# Patient Record
Sex: MALE | Race: BLACK OR AFRICAN AMERICAN | Employment: OTHER | URBAN - METROPOLITAN AREA
[De-identification: names, ages, dates, MRNs, and addresses within clinical notes are randomized per-mention and may not be internally consistent; named-entity substitution may affect disease eponyms.]

---

## 2017-01-09 ENCOUNTER — OFFICE VISIT (OUTPATIENT)
Dept: OTHER | Facility: CLINIC | Age: 72
End: 2017-01-09
Attending: PHYSICIAN ASSISTANT
Payer: MEDICARE

## 2017-01-09 DIAGNOSIS — I77.0 AVF (ARTERIOVENOUS FISTULA) (H): Primary | ICD-10-CM

## 2017-01-09 DIAGNOSIS — N18.6 ESRD (END STAGE RENAL DISEASE) ON DIALYSIS (H): ICD-10-CM

## 2017-01-09 DIAGNOSIS — Z99.2 ESRD (END STAGE RENAL DISEASE) ON DIALYSIS (H): ICD-10-CM

## 2017-01-09 PROCEDURE — 99024 POSTOP FOLLOW-UP VISIT: CPT | Mod: ZP | Performed by: SURGERY

## 2017-01-09 NOTE — PROGRESS NOTES
Keagan Wayne returned to see me today.  He had a failed right upper arm AVF.  We created a right distal radial - cephalic AVF on 12-14-16.  An excellent vein was noted.    He is on dialysis via a tunneled catheter q T-TH-Sat with no problems.      EXAM:  Alert   Comfortable.               Well healed right wrist incision.    +3 bruit and pulse in AVF from wrist to elbow.               Good size of vein @ least 5mm.   No edema.    IMPRESSION:  Excellent AVF.  Given tourniquet to use for 5-10 minutes at elbow region for 5-6 times daily to mature AVF.  Follow-up Duplex 3-4 weeks the begin use.     Contreras Bowman MD     Please route or send letter to:  *None*

## 2017-01-14 ENCOUNTER — TRANSFERRED RECORDS (OUTPATIENT)
Dept: HEALTH INFORMATION MANAGEMENT | Facility: CLINIC | Age: 72
End: 2017-01-14

## 2017-01-15 ENCOUNTER — HOSPITAL ENCOUNTER (INPATIENT)
Facility: CLINIC | Age: 72
LOS: 4 days | Discharge: HOME | DRG: 190 | End: 2017-01-19
Attending: EMERGENCY MEDICINE | Admitting: INTERNAL MEDICINE
Payer: MEDICARE

## 2017-01-15 ENCOUNTER — APPOINTMENT (OUTPATIENT)
Dept: GENERAL RADIOLOGY | Facility: CLINIC | Age: 72
DRG: 190 | End: 2017-01-15
Attending: EMERGENCY MEDICINE
Payer: MEDICARE

## 2017-01-15 DIAGNOSIS — N18.6 ESRD (END STAGE RENAL DISEASE) ON DIALYSIS (H): ICD-10-CM

## 2017-01-15 DIAGNOSIS — R50.9 FEVER, UNSPECIFIED: ICD-10-CM

## 2017-01-15 DIAGNOSIS — R04.2 HEMOPTYSIS: ICD-10-CM

## 2017-01-15 DIAGNOSIS — J18.9 PNEUMONIA OF LEFT LOWER LOBE DUE TO INFECTIOUS ORGANISM: ICD-10-CM

## 2017-01-15 DIAGNOSIS — Z99.2 ESRD (END STAGE RENAL DISEASE) ON DIALYSIS (H): ICD-10-CM

## 2017-01-15 LAB
ALBUMIN SERPL-MCNC: 2.6 G/DL (ref 3.4–5)
ALP SERPL-CCNC: 53 U/L (ref 40–150)
ALT SERPL W P-5'-P-CCNC: 9 U/L (ref 0–70)
ANION GAP SERPL CALCULATED.3IONS-SCNC: 10 MMOL/L (ref 3–14)
AST SERPL W P-5'-P-CCNC: 9 U/L (ref 0–45)
BASOPHILS # BLD AUTO: 0 10E9/L (ref 0–0.2)
BASOPHILS NFR BLD AUTO: 0.1 %
BILIRUB DIRECT SERPL-MCNC: 0.1 MG/DL (ref 0–0.2)
BILIRUB SERPL-MCNC: 0.8 MG/DL (ref 0.2–1.3)
BUN SERPL-MCNC: 38 MG/DL (ref 7–30)
CALCIUM SERPL-MCNC: 8.7 MG/DL (ref 8.5–10.1)
CHLORIDE SERPL-SCNC: 97 MMOL/L (ref 94–109)
CO2 SERPL-SCNC: 27 MMOL/L (ref 20–32)
CREAT SERPL-MCNC: 4.74 MG/DL (ref 0.66–1.25)
DIFFERENTIAL METHOD BLD: ABNORMAL
EOSINOPHIL # BLD AUTO: 0 10E9/L (ref 0–0.7)
EOSINOPHIL NFR BLD AUTO: 0.1 %
ERYTHROCYTE [DISTWIDTH] IN BLOOD BY AUTOMATED COUNT: 14 % (ref 10–15)
FLUAV+FLUBV AG SPEC QL: NEGATIVE
FLUAV+FLUBV AG SPEC QL: NORMAL
GFR SERPL CREATININE-BSD FRML MDRD: 12 ML/MIN/1.7M2
GLUCOSE BLDC GLUCOMTR-MCNC: 216 MG/DL (ref 70–99)
GLUCOSE BLDC GLUCOMTR-MCNC: 242 MG/DL (ref 70–99)
GLUCOSE BLDC GLUCOMTR-MCNC: 271 MG/DL (ref 70–99)
GLUCOSE BLDC GLUCOMTR-MCNC: 296 MG/DL (ref 70–99)
GLUCOSE SERPL-MCNC: 228 MG/DL (ref 70–99)
GRAM STN SPEC: NORMAL
HCT VFR BLD AUTO: 32.9 % (ref 40–53)
HGB BLD-MCNC: 10.7 G/DL (ref 13.3–17.7)
IMM GRANULOCYTES # BLD: 0.1 10E9/L (ref 0–0.4)
IMM GRANULOCYTES NFR BLD: 0.8 %
LACTATE BLD-SCNC: 0.9 MMOL/L (ref 0.7–2.1)
LYMPHOCYTES # BLD AUTO: 1.2 10E9/L (ref 0.8–5.3)
LYMPHOCYTES NFR BLD AUTO: 7.5 %
Lab: NORMAL
MCH RBC QN AUTO: 29.4 PG (ref 26.5–33)
MCHC RBC AUTO-ENTMCNC: 32.5 G/DL (ref 31.5–36.5)
MCV RBC AUTO: 90 FL (ref 78–100)
MICRO REPORT STATUS: NORMAL
MONOCYTES # BLD AUTO: 1.3 10E9/L (ref 0–1.3)
MONOCYTES NFR BLD AUTO: 8 %
NEUTROPHILS # BLD AUTO: 13.1 10E9/L (ref 1.6–8.3)
NEUTROPHILS NFR BLD AUTO: 83.5 %
NRBC # BLD AUTO: 0 10*3/UL
NRBC BLD AUTO-RTO: 0 /100
PLATELET # BLD AUTO: 141 10E9/L (ref 150–450)
POTASSIUM SERPL-SCNC: 3.4 MMOL/L (ref 3.4–5.3)
PROT SERPL-MCNC: 7.9 G/DL (ref 6.8–8.8)
RBC # BLD AUTO: 3.64 10E12/L (ref 4.4–5.9)
SODIUM SERPL-SCNC: 134 MMOL/L (ref 133–144)
SPECIMEN SOURCE: NORMAL
SPECIMEN SOURCE: NORMAL
WBC # BLD AUTO: 16.2 10E9/L (ref 4–11)

## 2017-01-15 PROCEDURE — 80076 HEPATIC FUNCTION PANEL: CPT | Performed by: INTERNAL MEDICINE

## 2017-01-15 PROCEDURE — 36415 COLL VENOUS BLD VENIPUNCTURE: CPT

## 2017-01-15 PROCEDURE — 25900017 H RX MED GY IP 259 OP 259 PS 637: Mod: GY | Performed by: INTERNAL MEDICINE

## 2017-01-15 PROCEDURE — A9270 NON-COVERED ITEM OR SERVICE: HCPCS | Mod: GY | Performed by: INTERNAL MEDICINE

## 2017-01-15 PROCEDURE — 99285 EMERGENCY DEPT VISIT HI MDM: CPT

## 2017-01-15 PROCEDURE — 25000308 HC RX OP HPI UCR WEL MED 250 IP 250: Performed by: INTERNAL MEDICINE

## 2017-01-15 PROCEDURE — 25000125 ZZHC RX 250: Performed by: EMERGENCY MEDICINE

## 2017-01-15 PROCEDURE — 25000132 ZZH RX MED GY IP 250 OP 250 PS 637: Mod: GY | Performed by: INTERNAL MEDICINE

## 2017-01-15 PROCEDURE — 25000128 H RX IP 250 OP 636

## 2017-01-15 PROCEDURE — 71020 XR CHEST 2 VW: CPT

## 2017-01-15 PROCEDURE — 36415 COLL VENOUS BLD VENIPUNCTURE: CPT | Performed by: INTERNAL MEDICINE

## 2017-01-15 PROCEDURE — 00000146 ZZHCL STATISTIC GLUCOSE BY METER IP

## 2017-01-15 PROCEDURE — A9270 NON-COVERED ITEM OR SERVICE: HCPCS | Mod: GY

## 2017-01-15 PROCEDURE — 25000125 ZZHC RX 250: Performed by: INTERNAL MEDICINE

## 2017-01-15 PROCEDURE — 87804 INFLUENZA ASSAY W/OPTIC: CPT | Performed by: EMERGENCY MEDICINE

## 2017-01-15 PROCEDURE — 85025 COMPLETE CBC W/AUTO DIFF WBC: CPT | Performed by: EMERGENCY MEDICINE

## 2017-01-15 PROCEDURE — 87205 SMEAR GRAM STAIN: CPT | Performed by: INTERNAL MEDICINE

## 2017-01-15 PROCEDURE — 99223 1ST HOSP IP/OBS HIGH 75: CPT | Mod: AI | Performed by: INTERNAL MEDICINE

## 2017-01-15 PROCEDURE — 96365 THER/PROPH/DIAG IV INF INIT: CPT

## 2017-01-15 PROCEDURE — 83605 ASSAY OF LACTIC ACID: CPT | Performed by: EMERGENCY MEDICINE

## 2017-01-15 PROCEDURE — 25000125 ZZHC RX 250

## 2017-01-15 PROCEDURE — 12000000 ZZH R&B MED SURG/OB

## 2017-01-15 PROCEDURE — 87070 CULTURE OTHR SPECIMN AEROBIC: CPT | Performed by: INTERNAL MEDICINE

## 2017-01-15 PROCEDURE — 87040 BLOOD CULTURE FOR BACTERIA: CPT | Performed by: EMERGENCY MEDICINE

## 2017-01-15 PROCEDURE — 40000275 ZZH STATISTIC RCP TIME EA 10 MIN

## 2017-01-15 PROCEDURE — 25000132 ZZH RX MED GY IP 250 OP 250 PS 637: Mod: GY

## 2017-01-15 PROCEDURE — 80048 BASIC METABOLIC PNL TOTAL CA: CPT | Performed by: EMERGENCY MEDICINE

## 2017-01-15 PROCEDURE — 94640 AIRWAY INHALATION TREATMENT: CPT

## 2017-01-15 RX ORDER — NALOXONE HYDROCHLORIDE 0.4 MG/ML
.1-.4 INJECTION, SOLUTION INTRAMUSCULAR; INTRAVENOUS; SUBCUTANEOUS
Status: DISCONTINUED | OUTPATIENT
Start: 2017-01-15 | End: 2017-01-19 | Stop reason: HOSPADM

## 2017-01-15 RX ORDER — ATORVASTATIN CALCIUM 40 MG/1
80 TABLET, FILM COATED ORAL DAILY
Status: DISCONTINUED | OUTPATIENT
Start: 2017-01-15 | End: 2017-01-19 | Stop reason: HOSPADM

## 2017-01-15 RX ORDER — ISOSORBIDE MONONITRATE 30 MG/1
90 TABLET, EXTENDED RELEASE ORAL DAILY
Status: DISCONTINUED | OUTPATIENT
Start: 2017-01-15 | End: 2017-01-19 | Stop reason: HOSPADM

## 2017-01-15 RX ORDER — SODIUM BICARBONATE 650 MG/1
650 TABLET ORAL 2 TIMES DAILY
Status: DISCONTINUED | OUTPATIENT
Start: 2017-01-15 | End: 2017-01-19 | Stop reason: HOSPADM

## 2017-01-15 RX ORDER — OXYCODONE HYDROCHLORIDE 5 MG/1
15 TABLET ORAL EVERY 6 HOURS PRN
Status: DISCONTINUED | OUTPATIENT
Start: 2017-01-15 | End: 2017-01-19 | Stop reason: HOSPADM

## 2017-01-15 RX ORDER — ALBUTEROL SULFATE 0.83 MG/ML
1 SOLUTION RESPIRATORY (INHALATION) EVERY 4 HOURS PRN
Status: DISCONTINUED | OUTPATIENT
Start: 2017-01-15 | End: 2017-01-19 | Stop reason: HOSPADM

## 2017-01-15 RX ORDER — FLUTICASONE PROPIONATE 110 UG/1
2 AEROSOL, METERED RESPIRATORY (INHALATION) 2 TIMES DAILY
Status: DISCONTINUED | OUTPATIENT
Start: 2017-01-15 | End: 2017-01-15 | Stop reason: CLARIF

## 2017-01-15 RX ORDER — BUMETANIDE 1 MG/1
4 TABLET ORAL DAILY
Status: DISCONTINUED | OUTPATIENT
Start: 2017-01-15 | End: 2017-01-19 | Stop reason: HOSPADM

## 2017-01-15 RX ORDER — BRIMONIDINE TARTRATE 1.5 MG/ML
1 SOLUTION/ DROPS OPHTHALMIC 3 TIMES DAILY
Status: DISCONTINUED | OUTPATIENT
Start: 2017-01-15 | End: 2017-01-15 | Stop reason: CLARIF

## 2017-01-15 RX ORDER — VANCOMYCIN HYDROCHLORIDE 1 G/200ML
1000 INJECTION, SOLUTION INTRAVENOUS ONCE
Status: COMPLETED | OUTPATIENT
Start: 2017-01-15 | End: 2017-01-15

## 2017-01-15 RX ORDER — BRIMONIDINE TARTRATE 2 MG/ML
1 SOLUTION/ DROPS OPHTHALMIC 3 TIMES DAILY
Status: DISCONTINUED | OUTPATIENT
Start: 2017-01-15 | End: 2017-01-19 | Stop reason: HOSPADM

## 2017-01-15 RX ORDER — LEVOFLOXACIN 5 MG/ML
500 INJECTION, SOLUTION INTRAVENOUS
Status: DISCONTINUED | OUTPATIENT
Start: 2017-01-17 | End: 2017-01-19 | Stop reason: HOSPADM

## 2017-01-15 RX ORDER — DEXTROSE MONOHYDRATE 25 G/50ML
25-50 INJECTION, SOLUTION INTRAVENOUS
Status: DISCONTINUED | OUTPATIENT
Start: 2017-01-15 | End: 2017-01-19 | Stop reason: HOSPADM

## 2017-01-15 RX ORDER — CODEINE PHOSPHATE AND GUAIFENESIN 10; 100 MG/5ML; MG/5ML
5 SOLUTION ORAL EVERY 4 HOURS PRN
Status: DISCONTINUED | OUTPATIENT
Start: 2017-01-15 | End: 2017-01-19 | Stop reason: HOSPADM

## 2017-01-15 RX ORDER — HYDRALAZINE HYDROCHLORIDE 50 MG/1
50 TABLET, FILM COATED ORAL 3 TIMES DAILY
Status: DISCONTINUED | OUTPATIENT
Start: 2017-01-15 | End: 2017-01-19 | Stop reason: HOSPADM

## 2017-01-15 RX ORDER — ACETAMINOPHEN 325 MG/1
650 TABLET ORAL ONCE
Status: DISCONTINUED | OUTPATIENT
Start: 2017-01-15 | End: 2017-01-19 | Stop reason: HOSPADM

## 2017-01-15 RX ORDER — OXYCODONE HYDROCHLORIDE 5 MG/1
15 TABLET ORAL EVERY 6 HOURS
Status: DISCONTINUED | OUTPATIENT
Start: 2017-01-15 | End: 2017-01-19 | Stop reason: HOSPADM

## 2017-01-15 RX ORDER — PIPERACILLIN SODIUM, TAZOBACTAM SODIUM 2; .25 G/10ML; G/10ML
2.25 INJECTION, POWDER, LYOPHILIZED, FOR SOLUTION INTRAVENOUS ONCE
Status: DISCONTINUED | OUTPATIENT
Start: 2017-01-15 | End: 2017-01-15

## 2017-01-15 RX ORDER — DEXTROSE MONOHYDRATE 25 G/50ML
25-50 INJECTION, SOLUTION INTRAVENOUS
Status: DISCONTINUED | OUTPATIENT
Start: 2017-01-15 | End: 2017-01-15

## 2017-01-15 RX ORDER — ASPIRIN 81 MG/1
81 TABLET, CHEWABLE ORAL DAILY
Status: DISCONTINUED | OUTPATIENT
Start: 2017-01-15 | End: 2017-01-19 | Stop reason: HOSPADM

## 2017-01-15 RX ORDER — NICOTINE POLACRILEX 4 MG
15-30 LOZENGE BUCCAL
Status: DISCONTINUED | OUTPATIENT
Start: 2017-01-15 | End: 2017-01-15

## 2017-01-15 RX ORDER — LISINOPRIL 40 MG/1
40 TABLET ORAL DAILY
Status: DISCONTINUED | OUTPATIENT
Start: 2017-01-15 | End: 2017-01-15 | Stop reason: SINTOL

## 2017-01-15 RX ORDER — GABAPENTIN 100 MG/1
200 CAPSULE ORAL DAILY
Status: DISCONTINUED | OUTPATIENT
Start: 2017-01-15 | End: 2017-01-19 | Stop reason: HOSPADM

## 2017-01-15 RX ORDER — LEVOFLOXACIN 5 MG/ML
750 INJECTION, SOLUTION INTRAVENOUS ONCE
Status: COMPLETED | OUTPATIENT
Start: 2017-01-15 | End: 2017-01-15

## 2017-01-15 RX ORDER — FERROUS SULFATE 325(65) MG
325 TABLET ORAL 2 TIMES DAILY
Status: DISCONTINUED | OUTPATIENT
Start: 2017-01-15 | End: 2017-01-19 | Stop reason: HOSPADM

## 2017-01-15 RX ORDER — NICOTINE POLACRILEX 4 MG
15-30 LOZENGE BUCCAL
Status: DISCONTINUED | OUTPATIENT
Start: 2017-01-15 | End: 2017-01-19 | Stop reason: HOSPADM

## 2017-01-15 RX ORDER — LATANOPROST 50 UG/ML
1 SOLUTION/ DROPS OPHTHALMIC AT BEDTIME
Status: DISCONTINUED | OUTPATIENT
Start: 2017-01-15 | End: 2017-01-19 | Stop reason: HOSPADM

## 2017-01-15 RX ORDER — PIPERACILLIN SODIUM, TAZOBACTAM SODIUM 2; .25 G/10ML; G/10ML
2.25 INJECTION, POWDER, LYOPHILIZED, FOR SOLUTION INTRAVENOUS EVERY 6 HOURS
Status: DISCONTINUED | OUTPATIENT
Start: 2017-01-15 | End: 2017-01-18

## 2017-01-15 RX ADMIN — FERROUS SULFATE TAB 325 MG (65 MG ELEMENTAL FE) 325 MG: 325 (65 FE) TAB at 20:22

## 2017-01-15 RX ADMIN — LEVOFLOXACIN 750 MG: 5 INJECTION, SOLUTION INTRAVENOUS at 09:55

## 2017-01-15 RX ADMIN — OXYCODONE HYDROCHLORIDE 15 MG: 5 TABLET ORAL at 09:51

## 2017-01-15 RX ADMIN — PIPERACILLIN AND TAZOBACTAM 2.25 G: 2; .25 INJECTION, POWDER, FOR SOLUTION INTRAVENOUS at 20:21

## 2017-01-15 RX ADMIN — CALCIUM ACETATE 667 MG: 667 CAPSULE ORAL at 17:57

## 2017-01-15 RX ADMIN — INSULIN ASPART 4 UNITS: 100 INJECTION, SOLUTION INTRAVENOUS; SUBCUTANEOUS at 11:46

## 2017-01-15 RX ADMIN — SODIUM BICARBONATE 650 MG TABLET 650 MG: at 20:22

## 2017-01-15 RX ADMIN — ALBUTEROL SULFATE 2.5 MG: 2.5 SOLUTION RESPIRATORY (INHALATION) at 11:03

## 2017-01-15 RX ADMIN — GABAPENTIN 200 MG: 100 CAPSULE ORAL at 09:36

## 2017-01-15 RX ADMIN — FLUTICASONE PROPIONATE 1 PUFF: 100 POWDER, METERED RESPIRATORY (INHALATION) at 10:56

## 2017-01-15 RX ADMIN — LISINOPRIL 40 MG: 40 TABLET ORAL at 09:51

## 2017-01-15 RX ADMIN — OXYCODONE HYDROCHLORIDE 15 MG: 5 TABLET ORAL at 15:26

## 2017-01-15 RX ADMIN — CARVEDILOL 37.5 MG: 12.5 TABLET, FILM COATED ORAL at 17:56

## 2017-01-15 RX ADMIN — ATORVASTATIN CALCIUM 80 MG: 40 TABLET, FILM COATED ORAL at 09:36

## 2017-01-15 RX ADMIN — PIPERACILLIN AND TAZOBACTAM 2.25 G: 2; .25 INJECTION, POWDER, FOR SOLUTION INTRAVENOUS at 14:29

## 2017-01-15 RX ADMIN — INSULIN ASPART 3 UNITS: 100 INJECTION, SOLUTION INTRAVENOUS; SUBCUTANEOUS at 10:54

## 2017-01-15 RX ADMIN — INSULIN GLARGINE 12 UNITS: 100 INJECTION, SOLUTION SUBCUTANEOUS at 21:27

## 2017-01-15 RX ADMIN — VANCOMYCIN HYDROCHLORIDE 750 MG: 1 INJECTION, POWDER, LYOPHILIZED, FOR SOLUTION INTRAVENOUS at 11:31

## 2017-01-15 RX ADMIN — CARVEDILOL 37.5 MG: 12.5 TABLET, FILM COATED ORAL at 09:36

## 2017-01-15 RX ADMIN — CALCIUM ACETATE 667 MG: 667 CAPSULE ORAL at 11:45

## 2017-01-15 RX ADMIN — VITAMIN D, TAB 1000IU (100/BT) 1000 UNITS: 25 TAB at 09:37

## 2017-01-15 RX ADMIN — LATANOPROST 1 DROP: 50 SOLUTION/ DROPS OPHTHALMIC at 21:26

## 2017-01-15 RX ADMIN — HYDRALAZINE HYDROCHLORIDE 50 MG: 50 TABLET ORAL at 21:27

## 2017-01-15 RX ADMIN — ASPIRIN 81 MG 81 MG: 81 TABLET ORAL at 09:37

## 2017-01-15 RX ADMIN — FLUTICASONE PROPIONATE 1 PUFF: 100 POWDER, METERED RESPIRATORY (INHALATION) at 21:27

## 2017-01-15 RX ADMIN — BUMETANIDE 4 MG: 1 TABLET ORAL at 09:36

## 2017-01-15 RX ADMIN — VITAMINS TABLETS 1 TABLET: 115; 800; 115 TABLET ORAL at 09:51

## 2017-01-15 RX ADMIN — ISOSORBIDE MONONITRATE 90 MG: 30 TABLET, EXTENDED RELEASE ORAL at 09:36

## 2017-01-15 RX ADMIN — INSULIN ASPART 2 UNITS: 100 INJECTION, SOLUTION INTRAVENOUS; SUBCUTANEOUS at 17:57

## 2017-01-15 RX ADMIN — CALCIUM ACETATE 667 MG: 667 CAPSULE ORAL at 09:36

## 2017-01-15 RX ADMIN — VANCOMYCIN HYDROCHLORIDE 1000 MG: 1 INJECTION, SOLUTION INTRAVENOUS at 06:57

## 2017-01-15 RX ADMIN — HYDRALAZINE HYDROCHLORIDE 50 MG: 50 TABLET ORAL at 09:37

## 2017-01-15 RX ADMIN — CLONIDINE 1 PATCH: 0.2 PATCH TRANSDERMAL at 09:40

## 2017-01-15 RX ADMIN — BRIMONIDINE TARTRATE 1 DROP: 2 SOLUTION/ DROPS OPHTHALMIC at 10:55

## 2017-01-15 RX ADMIN — BRIMONIDINE TARTRATE 1 DROP: 2 SOLUTION/ DROPS OPHTHALMIC at 16:11

## 2017-01-15 RX ADMIN — SODIUM BICARBONATE 650 MG TABLET 650 MG: at 09:36

## 2017-01-15 RX ADMIN — PIPERACILLIN AND TAZOBACTAM 2.25 G: 2; .25 INJECTION, POWDER, FOR SOLUTION INTRAVENOUS at 08:49

## 2017-01-15 RX ADMIN — BRIMONIDINE TARTRATE 1 DROP: 2 SOLUTION/ DROPS OPHTHALMIC at 21:28

## 2017-01-15 RX ADMIN — FERROUS SULFATE TAB 325 MG (65 MG ELEMENTAL FE) 325 MG: 325 (65 FE) TAB at 09:36

## 2017-01-15 RX ADMIN — GUAIFENESIN AND CODEINE PHOSPHATE 5 ML: 10; 100 LIQUID ORAL at 11:38

## 2017-01-15 RX ADMIN — OXYCODONE HYDROCHLORIDE 15 MG: 5 TABLET ORAL at 21:27

## 2017-01-15 ASSESSMENT — ENCOUNTER SYMPTOMS
NAUSEA: 0
FEVER: 1
COUGH: 1
VOMITING: 0
DIARRHEA: 0

## 2017-01-15 ASSESSMENT — ACTIVITIES OF DAILY LIVING (ADL)
FALL_HISTORY_WITHIN_LAST_SIX_MONTHS: YES
BATHING: 2-->ASSISTIVE PERSON
AMBULATION: 0-->INDEPENDENT
RETIRED_EATING: 0-->INDEPENDENT
SWALLOWING: 0-->SWALLOWS FOODS/LIQUIDS WITHOUT DIFFICULTY
DRESS: 0-->INDEPENDENT
TRANSFERRING: 0-->INDEPENDENT
DRESS: 0-->INDEPENDENT
COGNITION: 0 - NO COGNITION ISSUES REPORTED
CHANGE_IN_FUNCTIONAL_STATUS_SINCE_ONSET_OF_CURRENT_ILLNESS/INJURY: NO
SWALLOWING: 0-->SWALLOWS FOODS/LIQUIDS WITHOUT DIFFICULTY
EATING: 0-->INDEPENDENT
NUMBER_OF_TIMES_PATIENT_HAS_FALLEN_WITHIN_LAST_SIX_MONTHS: 1
AMBULATION: 0-->INDEPENDENT
TOILETING: 0-->INDEPENDENT
TOILETING: 0-->INDEPENDENT
RETIRED_COMMUNICATION: 0-->UNDERSTANDS/COMMUNICATES WITHOUT DIFFICULTY
COMMUNICATION: 0-->UNDERSTANDS/COMMUNICATES WITHOUT DIFFICULTY
BATHING: 0-->INDEPENDENT
TRANSFERRING: 0-->INDEPENDENT

## 2017-01-15 NOTE — ED NOTES
Bed: ED26  Expected date: 1/15/17  Expected time: 5:28 AM  Means of arrival: Ambulance  Comments:  264-27M Fever

## 2017-01-15 NOTE — PHARMACY-ADMISSION MEDICATION HISTORY
Admission medication history interview status for the 1/15/2017  admission is complete. See EPIC admission navigator for prior to admission medications     Medication history source reliability:Good    Actions taken by pharmacist (provider contacted, etc): interview with patient, wife and recent discharge summary.     Additional medication history information not noted on PTA med list :None    Medication reconciliation/reorder completed by provider prior to medication history? Yes    Time spent in this activity: 15 min    Prior to Admission medications    Medication Sig Last Dose Taking? Auth Provider   OXYCODONE HCL PO Take 15 mg by mouth every 6 hours Pt states he has been taking around the clock 1/14/2017 at am Yes Unknown, Entered By History   insulin glargine (LANTUS) 100 UNIT/ML injection Inject 12 Units Subcutaneous At Bedtime 1/13/2017 at hs Yes Tamir Jett MD   hydrALAZINE (APRESOLINE) 50 MG tablet Take 1 tablet (50 mg) by mouth 3 times daily 1/14/2017 at am Yes Tamir Jett MD   bumetanide (BUMEX) 2 MG tablet Take 2 tablets (4 mg) by mouth daily 1/14/2017 at am Yes Tamir Jett MD   folic acid-vit B6-vit B12 (FOLGARD) 0.8-10-0.115 MG TABS per tablet Take 1 tablet by mouth daily 1/13/2017 at hs Yes Tamir Jett MD   atorvastatin (LIPITOR) 80 MG tablet Take 1 tablet (80 mg) by mouth daily 1/14/2017 at am Yes Eduardo Mahmood MD   lisinopril (PRINIVIL/ZESTRIL) 40 MG tablet Take 1 tablet (40 mg) by mouth daily 1/14/2017 at am Yes Eduardo Mahmood MD   carvedilol (COREG) 25 MG tablet Take 1.5 tablets (37.5 mg) by mouth 2 times daily (with meals) 1/14/2017 at am Yes Eduardo Mahmood MD   albuterol (PROAIR HFA, PROVENTIL HFA, VENTOLIN HFA) 108 (90 BASE) MCG/ACT inhaler Inhale 2 puffs into the lungs every 4 hours as needed for shortness of breath / dyspnea or wheezing prn Yes Sundar Lundberg MD   guaiFENesin-dextromethorphan (ROBITUSSIN DM) 100-10 MG/5ML syrup Take  10 mLs by mouth every 4 hours as needed for cough prn Yes Sundar Lundberg MD   guaiFENesin-codeine (ROBITUSSIN AC) 100-10 MG/5ML SOLN Take 5 mLs by mouth every 4 hours as needed for cough prn Yes Sundar Lundberg MD   fluticasone (FLOVENT HFA) 110 MCG/ACT inhaler Inhale 2 puffs into the lungs 2 times daily 1/14/2017 at am Yes Unknown, Entered By History   GABAPENTIN PO Take 200 mg by mouth daily  1/14/2017 at am Yes Unknown, Entered By History   Isosorbide Mononitrate (IMDUR PO) Take 90 mg by mouth daily  1/14/2017 at am Yes Unknown, Entered By History   ferrous sulfate (IRON) 325 (65 FE) MG tablet Take 325 mg by mouth 2 times daily 1/14/2017 at am Yes Unknown, Entered By History   VITAMIN D, CHOLECALCIFEROL, PO Take 1,000 Units by mouth daily 1/14/2017 at am Yes Unknown, Entered By History   cloNIDine (CATAPRES-TTS2) 0.2 MG/24HR patch Place 1 patch onto the skin once a week 1/6/2017 Yes Reported, Patient   aspirin 81 MG chewable tablet Take 1 tablet (81 mg) by mouth daily 1/14/2017 at am Yes Nila Espana DO   sodium bicarbonate 650 MG tablet Take 1 tablet (650 mg) by mouth 2 times daily 1/14/2017 at am Yes Nila Espana DO   albuterol (2.5 MG/3ML) 0.083% nebulizer solution Take 1 vial (2.5 mg) by nebulization every 4 hours as needed for shortness of breath / dyspnea or wheezing prn Yes Nila Espana DO   beclomethasone (QVAR) 40 MCG/ACT Inhaler Inhale 2 puffs into the lungs 2 times daily 1/14/2017 at am Yes Nila Espana DO   citalopram (CELEXA) 20 MG tablet Take 1 tablet (20 mg) by mouth daily 1/14/2017 at am Yes Nila Espana DO   calcium acetate (PHOSLO) 667 MG CAPS Take 1 capsule (667 mg) by mouth 3 times daily (with meals) 1/14/2017 at am Yes Nila Espana DO   brimonidine (ALPHAGAN-P) 0.15 % ophthalmic solution Place 1 drop into both eyes 3 times daily 1/14/2017 at am Yes Nila Espana DO   latanoprost (XALATAN)  0.005 % ophthalmic solution Place 1 drop into both eyes At Bedtime 1/13/2017 at  Yes Nila Espana, DO

## 2017-01-15 NOTE — PLAN OF CARE
Problem: Goal Outcome Summary  Goal: Goal Outcome Summary  Outcome: No Change  Afebrile; infrequent cough; room air sat mid 90's; mild SOB with activity, neb x 1; sputum sent o lab, blood tinged. BP 94/54 now; Dr. Ward informed; hydralazine held, lisinopril d/c'd; will monitor; patient has very mild lightheadedness, now sitting at edge of bed with no complaints.  BG's 271, 296, Novolog coverage; appetite improved this afternoon.  Up in room independently.  Chronic pain at baseline, scheduled oxycodone; denied needing anything else.  Dialysis next on Tuesday; tunnel cath right upper chest.; urine output dark hilton.

## 2017-01-15 NOTE — CONSULTS
[facility]    RENAL CONSULTATION NOTE    REFERRING MD:  Dr. Ward    REASON FOR CONSULTATION:  ESKD, fever    DATE OF CONSULTATION: 01/15/2017    SHORTHAND KEY FOR MY NOTES:  c = with, s = without, p = after, a = before, x = except, asx = asymptomatic, tx = transplant or treatment, sx = symptoms or symptomatic, cx = canceled or culture, rxn = reaction, yday = yesterday, nl = normal, abx = antibiotics, fxn = function, dx = diagnosis, dz = disease, m/h = melena/hematochezia, c/d/l/ha = cramping/dizziness/lightheadedness/headache, d/c = discharge or diarrhea/constipation, f/c/n/v = fevers/chills/nausea/vomiting, cp/sob = chest pain/shortness of breath.    HPI: Keagan Wayne is a 71 year old male c ESKD 2 DM2 who dialyses TRS via a RIJ at the [facility] under the care of Dr. Calle/Solange Reddy NP and was admitted on 1/15/2017 c fevers.    Pt had fevers for a couple of days prior to admission, as high as 105F at home, he reports.  He went to HD yday and though he had a fever there, it wasn't as high, per his report.  Pt had a stable HD run, and it does not appear that any BCx were drawn.  He did not take any antipyretics at home.  In addition, pt developed a cough over the past few days and the day PTA he had some hemoptysis x 1.       In the ER, the pt's temp was 99.5F and he was started on abx bc of a CXR that showed a L pneumonia.  Overnight, the pt remained afebrile and today he is feeling fine.  He is breathing ok now and is not requiring any supplement o2.  He states his breathing has improved markedly and he is not having any f/c/n/v/itching.  No cp/abd pain.      ROS:  A complete review of systems was performed and is negative x as noted above.    PMH:    Past Medical History   Diagnosis Date     Diabetes (H)      Hypertension      COPD (chronic obstructive pulmonary disease) (H)      Chronic kidney disease      PSH:    Past Surgical History   Procedure Laterality Date      Hernia repair       Create fistula arteriovenous upper extremity Right 5/8/2016     Procedure: CREATE FISTULA ARTERIOVENOUS UPPER EXTREMITY;  Surgeon: Josias Valente MD;  Location: [facility] OR     Create fistula arteriovenous upper extremity Right 12/14/2016     Procedure: CREATE FISTULA ARTERIOVENOUS UPPER EXTREMITY;  Surgeon: Contreras Bowman MD;  Location: [facility] OR     MEDICATIONS:      acetaminophen  650 mg Oral Once     aspirin  81 mg Oral Daily     atorvastatin  80 mg Oral Daily     bumetanide  4 mg Oral Daily     calcium acetate  667 mg Oral TID w/meals     carvedilol  37.5 mg Oral BID w/meals     cloNIDine  1 patch Transdermal Weekly     ferrous sulfate  325 mg Oral BID     folic acid-vit B6-vit B12  1 tablet Oral Daily     gabapentin (NEURONTIN) capsule 200 mg  200 mg Oral Daily     hydrALAZINE  50 mg Oral TID     insulin glargine  12 Units Subcutaneous At Bedtime     isosorbide mononitrate (IMDUR) 24 hr tablet 90 mg  90 mg Oral Daily     latanoprost  1 drop Both Eyes At Bedtime     sodium bicarbonate  650 mg Oral BID     piperacillin-tazobactam  2.25 g Intravenous Q6H     [START ON 1/17/2017] levofloxacin  500 mg Intravenous Q48H     [START ON 1/22/2017] cloNIDine   Transdermal Weekly     cloNIDine   Transdermal Q8H     fluticasone  1 puff Inhalation Q12H     brimonidine  1 drop Both Eyes TID     cholecalciferol  1,000 Units Oral Daily     vancomycin place winslow - receiving intermittent dosing  1 each Does not apply See Admin Instructions     oxyCODONE (ROXICODONE) IR tablet 15 mg  15 mg Oral Q6H     lisinopril  40 mg Oral Daily     insulin aspart  1-7 Units Subcutaneous TID AC     insulin aspart  1-5 Units Subcutaneous At Bedtime     ALLERGIES:    Allergies as of 01/15/2017 - reviewed 01/15/2017   Allergen Reaction Noted     Morphine Rash 09/29/2016     FH:    Family History   Problem Relation Age of Onset     Coronary Artery Disease Brother      SH:    Social History     Social History      "Marital Status:      Spouse Name: N/A     Number of Children: N/A     Years of Education: N/A     Occupational History     Not on file.     Social History Main Topics     Smoking status: Current Some Day Smoker     Smokeless tobacco: Not on file     Alcohol Use: No      Comment: occassionaly     Drug Use: Yes     Special: Marijuana     Sexual Activity: Not on file     Other Topics Concern     Not on file     Social History Narrative     PHYSICAL EXAM:    /68 mmHg  Pulse 68  Temp(Src) 97.6  F (36.4  C) (Oral)  Resp 18  Ht 1.905 m (6' 3\")  Wt 80.513 kg (177 lb 8 oz)  BMI 22.19 kg/m2  SpO2 98%    GENERAL: awake, alert, NAD  HEENT:  Normocephalic. No gross abnormalities.  MMM.  Dentition - missing some teeth.  Pupils equal.  EOMI.  Sclera are whitish/yellow and a bit injected.  CV: RRR c 1/6 murmurs, no clicks, gallops, or rubs, no ble edema.  RESP: Clear bilaterally with good efforts x in LML c crackles  GI: Abdomen s/nt/nd, BS present. No masses, organomegaly  MUSCULOSKELETAL: extremities nl - no gross deformities noted  SKIN: no suspicious lesions or rashes, dry to touch  NEURO:  Strength normal and symmetric.   PSYCH: mood good, affect appropriate  LYMPH: No palpable ant/post cervical and supraclavicular adenopathy  OTHER:  Access - RUAF c good thrill, bruit has a high-pitch squeak mid-forearm; RIJ tunneled catheter - site clean, not warm/red, no pus expressed    LABS:      CBC RESULTS:     Recent Labs  Lab 01/15/17  0600   WBC 16.2*   RBC 3.64*   HGB 10.7*   HCT 32.9*   *     BMP RESULTS:    Recent Labs  Lab 01/15/17  0600      POTASSIUM 3.4   CHLORIDE 97   CO2 27   BUN 38*   CR 4.74*   *   ALLYSON 8.7     INRNo lab results found in last 7 days.     DIAGNOSTICS:  Personally reviewed CXR - LML pneumonia    A/P:  Keagan Wayne is a 71 year old male c ESKD who has pneumonia.    1.  ESKD.  Pt dialyses per a TRS schedule.  His chemistries are fine.  He had a stable run yday c decent " volume removal and no hypotension.    A.  Next planned run on Tuesday.  Orders placed.    2.  Fever likely 2 pneumonia.  Pt's pneumonia seems to be the source for his fever. He has a tunneled HD catheter, which is another potential source, but less likely given the clinical situation.  BCx are pending.  He is on Vanco/Zosyn to cover both potential sources.  WBC is elevated, but fever curve is decreased s antipyretics, though he did take a baby ASA.  A.  Continue abx at proper renal doses.  B.  Follow cx results and adjust meds prn.    3.  HTN.  Pt's BP is controlled c meds.    A.  Continue all meds/doses.    4.  Fe def anemia.  Hb is higher than baseline.  Last wk it was 8.9 at the HD unit.  He is on EPO c HD.  He was getting IV iron for fe def, but will hold it for now.  A.  Continue EPO c HD.    5.  FEN.  Pt is on a dialysis diet.  Electrolytes are ok.  A.  Dialysis diet.    Thank you for this consultation. We will follow c you.  Please call if any questions.    Attestation:   I have reviewed today's relevant vital signs, notes, medications, labs and imaging.    Bryn Warren MD  [facility] - Nephrology  [phone]

## 2017-01-15 NOTE — PROGRESS NOTES
BP 94/54. Will stop lisinopril for now and hold hydralazine. Reassess bp.     Lazaro Ward MD  [phone]

## 2017-01-15 NOTE — ED PROVIDER NOTES
History     Chief Complaint:  Fever      HPI   Keagan Wayne is a 71 year old male, with a complex medical history significant for DM type 2, CHF, COPD, HTN, and ESRD, on dialysis through tunneled dialysis catheter, who presents via EMS with a fever. The patient reports the onset of a fever 2 days ago with an initial temperature of 100.5F. He reports that his nurse then took his temperature this morning and it was 103.2F. He denies taking any medication for his fever. On evaluation, the patient's temperature is 99.5F. The patient also reports the onset of a cough 3 days ago and had an episode of hemoptysis last night where he coughed up straight blood, per patient. The patient denies nausea, vomiting, or diarrhea. His last dialysis appointment was yesterday and they removed 3 liters of fluid. The patient's most recent hospitalization was from 12/6/16-12/19/16 when multiple issues were addressed, including pulmonary edema, hyponatremia, NSTEMI, and anemia, and after which he started treatment with dialysis.    Allergies:  Morphine: rash     Medications:    Xalatan  Celexa  Phoslo  Albuterol  Aspirin  Clonidine  Gabapentin  Imdur  Flovent  Robitussin  Lipitor  Lisinopril  Oxycodone  Bumex  Lantus  Hydralazine     Past Medical History:    DM  HTN  COPD  CKD   Hyperglycemia  Colitis  Glaucoma  Peripheral neuropathy  ESRD on hemodialysis  CHF    Past Surgical History:    Hernia repair  Create fistula arteriovenous upper extremity x 2    Family History:    Brother: CAD     Social History:  Marital status:   Current smoker, positive for alcohol use.  The patient presents via EMS with girlfriend.    Review of Systems   Constitutional: Positive for fever.   Respiratory: Positive for cough.         Positive for hemoptysis.   Gastrointestinal: Negative for nausea, vomiting and diarrhea.   All other systems reviewed and are negative.    Physical Exam     Patient Vitals for the past 24 hrs:   BP Temp Temp src Pulse  "Heart Rate Resp SpO2 Height Weight   01/15/17 0817 156/76 mmHg 98  F (36.7  C) Oral 73 - 16 99 % 1.905 m (6' 3\") 80.513 kg (177 lb 8 oz)   01/15/17 0800 162/87 mmHg - - - - - 99 % - -   01/15/17 0730 170/86 mmHg - - - - - - - -   01/15/17 0700 170/81 mmHg - - - - - - - -   01/15/17 0630 168/77 mmHg - - - - - 98 % - -   01/15/17 0600 171/84 mmHg - - - - - - - -   01/15/17 0554 (!) 180/109 mmHg 99.5  F (37.5  C) Oral - 86 18 98 % 1.905 m (6' 3\") 84.369 kg (186 lb)      Physical Exam  General: Patient is alert and normal appearing.  HEENT: Head atraumatic    Eyes: pupils equal and reactive. Conjunctiva clear   Nares: patent   Oropharynx: no lesions, uvula midline, no palatal draping, normal voice, no trismus, dry mucous membranes  Neck: Supple without lymphadenopathy, no meningismus  Chest: Heart regular rate and rhythm.   Lungs: Equal clear to auscultation with no wheeze or rales.  Tunnel catheter to right chest wall with no erythema, warmth, drainage or tenderness  Abdomen: Soft, non tender, nondistended, normal bowel sounds  Back: No costovertebral angle tenderness, no midline C, T or L spine tenderness  Neuro: Grossly nonfocal, normal speech, strength equal bilaterally, CN 2-12 intact  Extremities: No deformities, equal radial and DP pulses. No clubbing, cyanosis.  No edema  Skin: Warm and dry with no rash.       Emergency Department Course     Imaging:  Radiographic findings were communicated with the patient who voiced understanding of the findings.    Chest XR per radiology:   Left infrahilar consolidation suggests pneumonia. Small bilateral pleural effusions as well.     Laboratory:  CBC: WBC 16.2 (H) HGB 10.7 (L)  (L) RBC 3.64 (L) HCT 32.9 (L) Absolute Neutrophil 13.1 (H)  BMP: Creatinine 4.74 (H) Glucose 228 (H) BUN 38 (H) GFR 12 (L) Rest WNL  Lactic Acid: 0.9 (WNL)    Blood culture 1: Pending  Blood culture 2: Pending     Influenza A/B antigen: A: Negative B: Negative      Interventions:  0657: " Vancomycin, 1000 mg, IV injection  Zosyn pending at time of admission    ED Course:  Nursing notes and vitals reviewed.  I performed an exam of the patient as documented above.     0710: I checked in with and updated the patient.  0717: I spoke to Dr. Ward of the hospitalist service about admitting the patient.    Findings and plan explained to the Patient who consents to admission. Discussed the patient with Dr. Ward, who will admit the patient to a medical telemetry bed for further monitoring, evaluation, and treatment.      Impression & Plan      Medical Decision Making:  Keagan Wayne is a 71 year old male who presents to the Emergency Department with 3 days of fevers, up to 103F and 104F. He began feeling poorly today. He had an episode of hemoptysis last night. He does have an indwelling catheter for dialysis and last dialysis was yesterday and it was a full run with three liters taken off. He has had no difficulty with shortness of breath. He has been coughing productive of sputum. Patient has a fistula that is new in his right arm that is in the process of maturing, per Dr. Bowman's recent report. Patient denies any nausea, vomiting, diarrhea, chest pain, or new shortness of breath. Here in the ED, he is found to have an elevated white blood cell count. His lactic acid is normal. Blood pressures have been stable as is his heart rate. X-ray does show a left infrahilar pneumonia. He is at significant risk for bacteremia given his indwelling catheter and frequent dialysis. Blood cultures were sent and are pending. He was covered with Vancomycin and Zosyn to cover healthcare associated pneumonia. He has the potential to get extremely ill given his very fragile medical state. He has a history of congestive heart failure. I did not give him fluids at this time due to his congestive heart failure, though his mucous membranes appear mildly dry and he may need some fluid hydration throughout his stay. Patient is  agreeable with the management plan and I feel he needs IV antibiotics given the healthcare associated nature of this pneumonia, his white blood cell count, and fragile medical state. He is agreeable with the plan and patient is kindly accepted by Dr. Ward for admission to the medical tele floor.    Diagnosis:    ICD-10-CM    1. Pneumonia of left lower lobe due to infectious organism J18.9 Influenza A/B antigen   2. ESRD (end stage renal disease) on dialysis (H) N18.6     Z99.2    3. Hemoptysis R04.2    4. Fever, unspecified R50.9      Disposition:   Admission for further evaluation, monitoring, and treatment.     IJane, am serving as a scribe on 1/15/2017 at 6:29 AM to personally document services performed by Elida Dickey MD, based on my observations and the provider's statements to me.              Elida Dickey MD  01/15/17 8459

## 2017-01-15 NOTE — IP AVS SNAPSHOT
[facility] [address] [facility]    [address] [place] [address]    Phone:  [phone]                                       After Visit Summary   1/15/2017    Keagan Wayne    MRN: [id number]           After Visit Summary Signature Page     I have received my discharge instructions, and my questions have been answered. I have discussed any challenges I see with this plan with the nurse or doctor.    ..........................................................................................................................................  Patient/Patient Representative Signature      ..........................................................................................................................................  Patient Representative Print Name and Relationship to Patient    ..................................................               ................................................  Date                                            Time    ..........................................................................................................................................  Reviewed by Signature/Title    ...................................................              ..............................................  Date                                                            Time

## 2017-01-15 NOTE — PHARMACY-VANCOMYCIN DOSING SERVICE
Pharmacy Vancomycin Initial Note  Date of Service January 15, 2017  Patient's  1945  71 year old, male    Indication: Healthcare-Associated Pneumonia    Current estimated CrCl = Estimated Creatinine Clearance: 16.5 mL/min (based on Cr of 4.74).    Creatinine for last 3 days  1/15/2017:  6:00 AM Creatinine 4.74 mg/dL*    Recent Vancomycin Level(s) for last 3 days  No results found for requested labs within last 3 days.      Vancomycin IV Administrations (past 72 hours)                   vancomycin (VANCOCIN) 1000 mg in dextrose 5% 200 mL PREMIX (mg) 1,000 mg New Bag 01/15/17 0657                Nephrotoxins and other renal medications (Future)    Start     Dose/Rate Route Frequency Ordered Stop    01/15/17 0900  bumetanide (BUMEX) tablet 4 mg      4 mg Oral DAILY 01/15/17 0818      01/15/17 0846  vancomycin place winslow - receiving intermittent dosing      1 each Does not apply SEE ADMIN INSTRUCTIONS 01/15/17 0846      01/15/17 0830  piperacillin-tazobactam (ZOSYN) 2.25 g vial to attach to  ml bag      2.25 g  over 30 Minutes Intravenous EVERY 6 HOURS 01/15/17 0818            Contrast Orders - past 72 hours     None                Plan:  1. Vancomycin  1000 mg IV Once given in ED. Will give additional 750 mg (total of 1750 mg) followed by intermittent dosing based level.   2.  Goal Trough Level: 15-20 mg/L   3.  Pharmacy will check trough levels as appropriate in 1-3 Days.    4. Serum creatinine levels will be ordered daily for the first week of therapy and at least twice weekly for subsequent weeks.    5. [place] method utilized to dose vancomycin therapy: Method 2    Cally Leavitt, PharmD

## 2017-01-15 NOTE — ED NOTES
"[facility]  ED Nurse Handoff Report    ED Chief complaint: Fever      ED Diagnosis:   Final diagnoses:   Pneumonia of left lower lobe due to infectious organism   ESRD (end stage renal disease) on dialysis (H)   Hemoptysis   Fever, unspecified       Code Status: To be discussed by admitting provider    Allergies:   Allergies   Allergen Reactions     Morphine Rash       Activity level:  Stand with Assist     Needed?: No    Isolation: No  Infection: Not Applicable    Bariatric?: No      Vital Signs:   Filed Vitals:    01/15/17 0554 01/15/17 0630   BP: 180/109 168/77   Temp: 99.5  F (37.5  C)    TempSrc: Oral    Resp: 18    Height: 1.905 m (6' 3\")    Weight: 84.369 kg (186 lb)    SpO2: 98% 98%       Cardiac Rhythm: ,        Pain level: 0-10 Pain Scale: 5    Is this patient confused?: No    Patient Report: Initial Complaint: Patient came in after having a fever at home and a cough.   Focused Assessment: Alert and oriented. Up with SBA, Vitals stable. +cough  Tests Performed: Labs, Xray   Abnormal Results: See results review  Treatments provided: medications    Family Comments: wife at bedside    OBS brochure/video discussed/provided to patient: No    ED Medications:   Medications   acetaminophen (TYLENOL) tablet 650 mg (650 mg Oral Not Given 1/15/17 0704)   vancomycin (VANCOCIN) 1000 mg in dextrose 5% 200 mL PREMIX (1,000 mg Intravenous New Bag 1/15/17 0657)   piperacillin-tazobactam (ZOSYN) 2.25 g vial to attach to  ml bag (not administered)       Drips infusing?: Yes: antibiotics      ED NURSE PHONE NUMBER: [phone] Monik NARVAEZ           "

## 2017-01-15 NOTE — ED NOTES
"Pt presents with c/o fever, pt states that his fever was 105 on Thursday but at that time he opted out of coming to ED, pt cough that started on Wednesday continued to get worse and fever has not improved. Pt reports \"coughing up blood throughout night\" and developed chest pain with coughing. Pt states he presents today for evaluation of fever.   "

## 2017-01-15 NOTE — H&P
CHIEF COMPLAINT:  Fever.      HISTORY OF PRESENT ILLNESS:  Keagan Wayne is a 71-year-old -American gentleman with type 2 diabetes, CHF with combined systolic and diastolic heart failure, COPD and hypertension, end-stage renal disease on hemodialysis on Tuesdays, Thursdays, Saturdays.  The patient states the past several days he has been feeling poorly in general.  He began noting a cough, and in the last 24 hours has become more productive with sometimes blood-tinged sputum.  He reports fevers and chills.  He presented to the Emergency Department at [facility].  X-ray shows what appears to be a left infrahilar consolidation suggestive of pneumonia and small bilateral pleural effusions.  The patient reports he was last dialyzed on Saturday at [facility] in [place].  He is on a Tuesday, Thursday, Saturday schedule.  He does dialyze through a temporary hemodialysis catheter in the right chest.  He reports that he has a right AV graft that is awaiting maturation after a previous failed graft.  The patient was started on vancomycin, Levaquin and Zosyn for healthcare-associated pneumonia as he does have a recent hospitalization from 12/06/2016 through 12/19/2016.      PAST MEDICAL HISTORY:  Type 2 diabetes, CHF with combined systolic and diastolic dysfunction, COPD, hypertension, end-stage renal disease on a Tuesday, Thursday, Saturday dialysis schedule, glaucoma, depression, history of hernia repair, AV fistula, chronic pain, anemia of chronic disease.      MEDICATIONS PRIOR TO ADMISSION:  Include albuterol, aspirin, atorvastatin, Qvar, Alphagan, Bumex, PhosLo, Coreg, clonidine, ferrous sulfate, Flovent, folic acid, Neurontin, Robitussin, hydralazine, Imdur, Xalatan, lisinopril, oxycodone, sodium bicarbonate, vitamin D, Celexa, Robitussin.      ALLERGIES:  Reported to morphine causing a rash.      FAMILY HISTORY:  Brother with CAD.      SOCIAL HISTORY:  No tobacco use.  Occasional alcohol use.  He  does smoke marijuana.      REVIEW OF SYSTEMS:  A 10-point review completed.  The patient reports that he was having some chest pressure, felt short of breath, cough productive of blood-tinged sputum, fevers, chills and some sore throat.  No nausea, no vomiting, some  periumbilical abdominal pain.  No dysuria, but does still make urine.  No, no melena or hematochezia.  Otherwise denies all other complaints on 10-point review.      PHYSICAL EXAMINATION:   GENERAL:  He is awake, alert, resting in bed.   HEENT:  Extraocular muscles intact.   NECK:  No lymphadenopathy.   LUNGS:  Clear to auscultation.   CARDIOVASCULAR:  Regular rate and rhythm, S1, S2.   ABDOMEN:  Bowel sounds positive, nontender, nondistended.   EXTREMITIES:  No edema.   CHEST:  He has a right-sided dialysis catheter in place.   NEUROLOGIC:  Cranial nerves II through XII are intact.      LABORATORY DATA:  Sodium 134, potassium 3.4, chloride 97, CO2 27, BUN of 38, creatinine 4.74, glucose of 228, white blood cell count 16.2, hemoglobin 10.7, hematocrit 32.9, platelet count 141,000.        Chest x-ray shows left infrahilar consolidation suggestive of pneumonia, small bilateral pleural effusions.      ASSESSMENT AND PLAN:  This is a 71-year-old gentleman who presents with fevers, chills, cough with blood-tinged sputum.   1.  Pulmonary:  Cough, fevers, chills, some blood-tinged sputum, x-ray findings of a left infrahilar consolidation.  Suspect he has healthcare-associated pneumonia given his recent hospitalization at [facility] in 12/2016.  We will put him on broad-spectrum antibiotics with Zosyn, Levaquin and vancomycin, send sputum cultures if one is produced, supplemental oxygen if needed.  At baseline, the patient does have a history of chronic obstructive pulmonary disease.  We will continue Qvar, fluticasone, albuterol.   2.  Cardiovascular:  History of coronary artery disease, hypertension, congestive heart failure with systolic and  diastolic dysfunction.  Continue lkhia-aa-oqfsjnqyf aspirin, atorvastatin, Bumex, Coreg, clonidine, hydralazine, lisinopril, Imdur.   3.  Renal:  End-stage renal disease on hemodialysis on ,  and .  Last run was on Saturday.  He does have temporary right-sided dialysis catheter and reports that he has an AV fistula/graft waiting for maturation on his right arm.  Will consult with Nephrology to continue dialysis.  Continue home medications, including PhosLo and sodium bicarbonate.  The patient reports that he does still make urine.   4.  Endocrine:  Type 2 diabetic.  Continue Lantus 12 units nightly and NovoLog medium resistance sliding scale.   5.  Ophthalmologic:  History of glaucoma.  Continue Alphagan and Xalatan eyedrops.   6.  Psychiatric:  History of depression.  Will hold citalopram at this time due to potential QT prolongation with use of Levaquin.  May resume on discharge depending on further antibiotic therapy as an outpatient.   7.  Deep venous thrombosis prophylaxis:  Sequential compressive devices.        CODE STATUS:  Full code.       DISPOSITION:  The patient is admitted under inpatient status, likely greater than a 2-midnight stay.         JOSELYN BETANCUR MD             D: 01/15/2017 09:28   T: 01/15/2017 10:18   MT: [id number]      Name:     KRISTAL MORALES   MRN:      [id number]        Account:      [id number]   :      1945           Admitted:     [id number]      Document: [id number]

## 2017-01-16 LAB
ANION GAP SERPL CALCULATED.3IONS-SCNC: 12 MMOL/L (ref 3–14)
BUN SERPL-MCNC: 62 MG/DL (ref 7–30)
CALCIUM SERPL-MCNC: 8.7 MG/DL (ref 8.5–10.1)
CHLORIDE SERPL-SCNC: 92 MMOL/L (ref 94–109)
CO2 SERPL-SCNC: 27 MMOL/L (ref 20–32)
CREAT SERPL-MCNC: 6.46 MG/DL (ref 0.66–1.25)
ERYTHROCYTE [DISTWIDTH] IN BLOOD BY AUTOMATED COUNT: 13.9 % (ref 10–15)
GFR SERPL CREATININE-BSD FRML MDRD: 9 ML/MIN/1.7M2
GLUCOSE BLDC GLUCOMTR-MCNC: 230 MG/DL (ref 70–99)
GLUCOSE BLDC GLUCOMTR-MCNC: 231 MG/DL (ref 70–99)
GLUCOSE BLDC GLUCOMTR-MCNC: 231 MG/DL (ref 70–99)
GLUCOSE BLDC GLUCOMTR-MCNC: 264 MG/DL (ref 70–99)
GLUCOSE BLDC GLUCOMTR-MCNC: 284 MG/DL (ref 70–99)
GLUCOSE SERPL-MCNC: 220 MG/DL (ref 70–99)
HBA1C MFR BLD: 7.6 % (ref 4.3–6)
HCT VFR BLD AUTO: 32.3 % (ref 40–53)
HGB BLD-MCNC: 10.7 G/DL (ref 13.3–17.7)
MCH RBC QN AUTO: 29.5 PG (ref 26.5–33)
MCHC RBC AUTO-ENTMCNC: 33.1 G/DL (ref 31.5–36.5)
MCV RBC AUTO: 89 FL (ref 78–100)
PLATELET # BLD AUTO: 129 10E9/L (ref 150–450)
POTASSIUM SERPL-SCNC: 3.7 MMOL/L (ref 3.4–5.3)
PROCALCITONIN SERPL-MCNC: 10.31 NG/ML
RBC # BLD AUTO: 3.63 10E12/L (ref 4.4–5.9)
SODIUM SERPL-SCNC: 131 MMOL/L (ref 133–144)
VANCOMYCIN SERPL-MCNC: 17.2 MG/L
WBC # BLD AUTO: 10.5 10E9/L (ref 4–11)

## 2017-01-16 PROCEDURE — 83036 HEMOGLOBIN GLYCOSYLATED A1C: CPT | Performed by: INTERNAL MEDICINE

## 2017-01-16 PROCEDURE — 36415 COLL VENOUS BLD VENIPUNCTURE: CPT | Performed by: INTERNAL MEDICINE

## 2017-01-16 PROCEDURE — 12000000 ZZH R&B MED SURG/OB

## 2017-01-16 PROCEDURE — A9270 NON-COVERED ITEM OR SERVICE: HCPCS | Mod: GY | Performed by: INTERNAL MEDICINE

## 2017-01-16 PROCEDURE — 80048 BASIC METABOLIC PNL TOTAL CA: CPT | Performed by: INTERNAL MEDICINE

## 2017-01-16 PROCEDURE — 25000125 ZZHC RX 250: Performed by: INTERNAL MEDICINE

## 2017-01-16 PROCEDURE — 84145 PROCALCITONIN (PCT): CPT | Performed by: INTERNAL MEDICINE

## 2017-01-16 PROCEDURE — 00000146 ZZHCL STATISTIC GLUCOSE BY METER IP

## 2017-01-16 PROCEDURE — 25900017 H RX MED GY IP 259 OP 259 PS 637: Mod: GY | Performed by: INTERNAL MEDICINE

## 2017-01-16 PROCEDURE — 99233 SBSQ HOSP IP/OBS HIGH 50: CPT | Performed by: INTERNAL MEDICINE

## 2017-01-16 PROCEDURE — 85048 AUTOMATED LEUKOCYTE COUNT: CPT | Performed by: INTERNAL MEDICINE

## 2017-01-16 PROCEDURE — 25000132 ZZH RX MED GY IP 250 OP 250 PS 637: Mod: GY | Performed by: INTERNAL MEDICINE

## 2017-01-16 PROCEDURE — 80202 ASSAY OF VANCOMYCIN: CPT | Performed by: INTERNAL MEDICINE

## 2017-01-16 PROCEDURE — 25000128 H RX IP 250 OP 636: Performed by: INTERNAL MEDICINE

## 2017-01-16 PROCEDURE — 85027 COMPLETE CBC AUTOMATED: CPT | Performed by: INTERNAL MEDICINE

## 2017-01-16 RX ORDER — LISINOPRIL 40 MG/1
40 TABLET ORAL DAILY
Status: DISCONTINUED | OUTPATIENT
Start: 2017-01-16 | End: 2017-01-19 | Stop reason: HOSPADM

## 2017-01-16 RX ADMIN — INSULIN ASPART 3 UNITS: 100 INJECTION, SOLUTION INTRAVENOUS; SUBCUTANEOUS at 17:41

## 2017-01-16 RX ADMIN — CALCIUM ACETATE 667 MG: 667 CAPSULE ORAL at 12:41

## 2017-01-16 RX ADMIN — HYDRALAZINE HYDROCHLORIDE 50 MG: 50 TABLET ORAL at 21:09

## 2017-01-16 RX ADMIN — CARVEDILOL 37.5 MG: 12.5 TABLET, FILM COATED ORAL at 08:13

## 2017-01-16 RX ADMIN — PIPERACILLIN AND TAZOBACTAM 2.25 G: 2; .25 INJECTION, POWDER, FOR SOLUTION INTRAVENOUS at 14:54

## 2017-01-16 RX ADMIN — CALCIUM ACETATE 667 MG: 667 CAPSULE ORAL at 17:41

## 2017-01-16 RX ADMIN — GUAIFENESIN AND CODEINE PHOSPHATE 5 ML: 10; 100 LIQUID ORAL at 16:36

## 2017-01-16 RX ADMIN — VANCOMYCIN HYDROCHLORIDE 1750 MG: 5 INJECTION, POWDER, LYOPHILIZED, FOR SOLUTION INTRAVENOUS at 12:01

## 2017-01-16 RX ADMIN — ASPIRIN 81 MG 81 MG: 81 TABLET ORAL at 08:14

## 2017-01-16 RX ADMIN — FLUTICASONE PROPIONATE 1 PUFF: 100 POWDER, METERED RESPIRATORY (INHALATION) at 21:09

## 2017-01-16 RX ADMIN — HYDRALAZINE HYDROCHLORIDE 50 MG: 50 TABLET ORAL at 08:13

## 2017-01-16 RX ADMIN — FLUTICASONE PROPIONATE 1 PUFF: 100 POWDER, METERED RESPIRATORY (INHALATION) at 08:12

## 2017-01-16 RX ADMIN — INSULIN ASPART 2 UNITS: 100 INJECTION, SOLUTION INTRAVENOUS; SUBCUTANEOUS at 12:41

## 2017-01-16 RX ADMIN — HYDRALAZINE HYDROCHLORIDE 50 MG: 50 TABLET ORAL at 14:10

## 2017-01-16 RX ADMIN — LATANOPROST 1 DROP: 50 SOLUTION/ DROPS OPHTHALMIC at 21:08

## 2017-01-16 RX ADMIN — BUMETANIDE 4 MG: 1 TABLET ORAL at 08:14

## 2017-01-16 RX ADMIN — FERROUS SULFATE TAB 325 MG (65 MG ELEMENTAL FE) 325 MG: 325 (65 FE) TAB at 08:15

## 2017-01-16 RX ADMIN — OXYCODONE HYDROCHLORIDE 15 MG: 5 TABLET ORAL at 03:04

## 2017-01-16 RX ADMIN — CALCIUM ACETATE 667 MG: 667 CAPSULE ORAL at 08:19

## 2017-01-16 RX ADMIN — GABAPENTIN 200 MG: 100 CAPSULE ORAL at 08:15

## 2017-01-16 RX ADMIN — OXYCODONE HYDROCHLORIDE 15 MG: 5 TABLET ORAL at 21:08

## 2017-01-16 RX ADMIN — LISINOPRIL 40 MG: 40 TABLET ORAL at 16:31

## 2017-01-16 RX ADMIN — VITAMINS TABLETS 1 TABLET: 115; 800; 115 TABLET ORAL at 08:14

## 2017-01-16 RX ADMIN — SODIUM BICARBONATE 650 MG TABLET 650 MG: at 08:15

## 2017-01-16 RX ADMIN — INSULIN GLARGINE 12 UNITS: 100 INJECTION, SOLUTION SUBCUTANEOUS at 21:11

## 2017-01-16 RX ADMIN — CARVEDILOL 37.5 MG: 12.5 TABLET, FILM COATED ORAL at 17:42

## 2017-01-16 RX ADMIN — INSULIN ASPART 2 UNITS: 100 INJECTION, SOLUTION INTRAVENOUS; SUBCUTANEOUS at 09:12

## 2017-01-16 RX ADMIN — VITAMIN D, TAB 1000IU (100/BT) 1000 UNITS: 25 TAB at 08:14

## 2017-01-16 RX ADMIN — OXYCODONE HYDROCHLORIDE 15 MG: 5 TABLET ORAL at 15:25

## 2017-01-16 RX ADMIN — BRIMONIDINE TARTRATE 1 DROP: 2 SOLUTION/ DROPS OPHTHALMIC at 21:11

## 2017-01-16 RX ADMIN — ATORVASTATIN CALCIUM 80 MG: 40 TABLET, FILM COATED ORAL at 08:13

## 2017-01-16 RX ADMIN — PIPERACILLIN AND TAZOBACTAM 2.25 G: 2; .25 INJECTION, POWDER, FOR SOLUTION INTRAVENOUS at 03:05

## 2017-01-16 RX ADMIN — FERROUS SULFATE TAB 325 MG (65 MG ELEMENTAL FE) 325 MG: 325 (65 FE) TAB at 21:08

## 2017-01-16 RX ADMIN — PIPERACILLIN AND TAZOBACTAM 2.25 G: 2; .25 INJECTION, POWDER, FOR SOLUTION INTRAVENOUS at 20:57

## 2017-01-16 RX ADMIN — ISOSORBIDE MONONITRATE 90 MG: 30 TABLET, EXTENDED RELEASE ORAL at 08:14

## 2017-01-16 RX ADMIN — SODIUM BICARBONATE 650 MG TABLET 650 MG: at 21:08

## 2017-01-16 RX ADMIN — BRIMONIDINE TARTRATE 1 DROP: 2 SOLUTION/ DROPS OPHTHALMIC at 08:31

## 2017-01-16 RX ADMIN — PIPERACILLIN AND TAZOBACTAM 2.25 G: 2; .25 INJECTION, POWDER, FOR SOLUTION INTRAVENOUS at 08:26

## 2017-01-16 RX ADMIN — BRIMONIDINE TARTRATE 1 DROP: 2 SOLUTION/ DROPS OPHTHALMIC at 16:32

## 2017-01-16 RX ADMIN — OXYCODONE HYDROCHLORIDE 15 MG: 5 TABLET ORAL at 09:16

## 2017-01-16 NOTE — PROGRESS NOTES
Doing well today.  Resp sx's improving.  VS ok.  Labs reviewed.  Lytes with sl low Na+, others ok.  BUN/Cr in expected range.  WBC down to nl.  Dialysis in AM, as planned.  Maybe discharge soon.    Chas Curtis MD  Nephrology; [employer]  [phone]

## 2017-01-16 NOTE — PLAN OF CARE
Problem: Goal Outcome Summary  Goal: Goal Outcome Summary  Outcome: No Change  A/O x 4.  VSS.  Lungs diminished x all lobes; productive cough scant amount of sputum greenish.  ; 231.  Dialysis diet; dialysis in the AM. IND.  DC pending.

## 2017-01-16 NOTE — PLAN OF CARE
Problem: Goal Outcome Summary  Goal: Goal Outcome Summary  Outcome: No Change  Pt A&O x4. VSS on RA. Afebrile. C/o pain, oxy x1. Hemodialysis port c/d/i. Next Dialysis on Tuesday. Dialysis/mod carb diet. . Independent. DC pending.

## 2017-01-16 NOTE — PHARMACY-VANCOMYCIN DOSING SERVICE
Pharmacy Vancomycin Note  Date of Service 2017  Patient's  1945   71 year old, male    Indication: Healthcare-Associated Pneumonia  Goal Trough Level: 15-20 mg/L  Day of Therapy: 2  Current Vancomycin regimen:  Intermittent dosing based on levels and dialysis schedule    Current estimated CrCl = < 10 ml/min  Creatinine for last 3 days  1/15/2017:  6:00 AM Creatinine 4.74 mg/dL*  2017:  8:10 AM Creatinine 6.46 mg/dL*    Recent Vancomycin Levels (past 3 days)  2017:  9:00 AM Vancomycin Level 17.2 mg/L    Vancomycin IV Administrations (past 72 hours)                   vancomycin (VANCOCIN) 750 mg in NaCl 0.9 % 250 mL intermittent infusion (mg) 750 mg New Bag 01/15/17 1131    vancomycin (VANCOCIN) 1000 mg in dextrose 5% 200 mL PREMIX (mg) 1,000 mg New Bag 01/15/17 0657                Nephrotoxins and other renal medications (Future)    Start     Dose/Rate Route Frequency Ordered Stop    17 1100  vancomycin (VANCOCIN) 1,750 mg in NaCl 0.9 % 500 mL intermittent infusion      1,750 mg Intravenous ONCE 17 1009      01/15/17 0900  bumetanide (BUMEX) tablet 4 mg      4 mg Oral DAILY 01/15/17 0818      01/15/17 0846  vancomycin place winslow - receiving intermittent dosing      1 each Does not apply SEE ADMIN INSTRUCTIONS 01/15/17 0846      01/15/17 0830  piperacillin-tazobactam (ZOSYN) 2.25 g vial to attach to  ml bag      2.25 g  over 30 Minutes Intravenous EVERY 6 HOURS 01/15/17 0818               Contrast Orders - past 72 hours     None          Interpretation of levels and current regimen:  Trough level is  Therapeutic    Has serum creatinine changed > 50% in last 72 hours: n/a    Urine output:  n/a    Renal Function: ESRD on Dialysis    Plan:  1.  give another 1750 mg IV once  2.  Pharmacy will check trough levels as appropriate  117 am pre dialysis.    3. Serum creatinine levels will be ordered n/a.      Herbie Atkins [facility]        .

## 2017-01-16 NOTE — PLAN OF CARE
Problem: Goal Outcome Summary  Goal: Goal Outcome Summary  Outcome: No Change  VSS, O2 wnl RA. A&Ox4. Up independently. Tolerating dialysis/mod carb diet, good appetite. Scheduled pain meds given for chronic pain. Rocky on right upper chest, dressing CDI. Blood sugars 216 and 242. Next Dialysis on Tuesday. On IV abx. DC pending. RN will cont to monitor.

## 2017-01-16 NOTE — PROGRESS NOTES
[facility]    Hospitalist Progress Note    Date of Admission:  1/15/2017  Date of Service: 01/16/2017    Assessment and Plan  This is a 71-year-old gentleman who presents with fevers, chills, cough with blood-tinged sputum.     Healthcare-associated pneumonia    Cough, fevers, chills, some blood-tinged sputum, x-ray findings of a left infrahilar consolidation. Recent hospitalization in 12/2016.  History of COPD.  Leukocytosis and low grade temp improved.  - Continue Zosyn, Levaquin.  Stop vancomycin  - Sputum culture negative, Blood culture NGTD  - continue Qvar, fluticasone, albuterol  - Not hypoxic  - Check procalcitonin    History of CAD, hypertension, chronic systolic and diastolic CHF    - Continue PTA aspirin, atorvastatin, Bumex, Coreg, clonidine, hydralazine, lisinopril, Imdur.  - Lisinopril was held yesterday due to hypotension, now with uncontrolled HTN, lisinopril has been reordered     ESRD  On hemodialysis on Tuesdays, Thursdays and Saturdays.  He does have temporary right-sided dialysis catheter and reports that he has an AV fistula/graft waiting for maturation on his right arm.    - Nephrology consulted, next dialysis on 1/17.    - Continue PTA PhosLo and sodium bicarbonate  - Intermittent bladder scans and straight cath if > 300ml      Type 2 diabetes.    - Continue Lantus 12 units nightly and NovoLog medium resistance sliding scale.     Glaucoma.   - Continue Alphagan and Xalatan eyedrops.     Depression.    - Will hold citalopram at this time due to potential QT prolongation with use of Levaquin.      DVT Prophylaxis: Pneumatic Compression Devices  Code Status: Full Code  Disposition: Expected discharge in 1-2 days    Tad Barker MD  Hospitalist  [phone] (P)  Text Page (7 am to 6 pm)    Interval History  Minimal respiratory symptoms today as he denies SOB.  Cough is intermittent.  -Data reviewed today: I reviewed all new labs and imaging results over the last 24 hours. I  "personally reviewed no images or EKG's today.    Physical Exam  /70 mmHg  Pulse 60  Temp(Src) 97.9  F (36.6  C) (Oral)  Resp 16  Ht 1.905 m (6' 3\")  Wt 85.7 kg (188 lb 15 oz)  BMI 23.62 kg/m2  SpO2 97%  Constitutional: NAD, awake  HEENT: EOMI   Cardiovascular: S1, S2, regular rhythm  Respiratory: CTAB, no crackles  Gastrointestinal: Soft, NT  Neurologic: No focal deficits    Medications       - MEDICATION INSTRUCTIONS for Dialysis Patients -   Does not apply See Admin Instructions     acetaminophen  650 mg Oral Once     aspirin  81 mg Oral Daily     atorvastatin  80 mg Oral Daily     bumetanide  4 mg Oral Daily     calcium acetate  667 mg Oral TID w/meals     carvedilol  37.5 mg Oral BID w/meals     cloNIDine  1 patch Transdermal Weekly     ferrous sulfate  325 mg Oral BID     folic acid-vit B6-vit B12  1 tablet Oral Daily     gabapentin (NEURONTIN) capsule 200 mg  200 mg Oral Daily     hydrALAZINE  50 mg Oral TID     insulin glargine  12 Units Subcutaneous At Bedtime     isosorbide mononitrate (IMDUR) 24 hr tablet 90 mg  90 mg Oral Daily     latanoprost  1 drop Both Eyes At Bedtime     sodium bicarbonate  650 mg Oral BID     piperacillin-tazobactam  2.25 g Intravenous Q6H     [START ON 1/17/2017] levofloxacin  500 mg Intravenous Q48H     [START ON 1/22/2017] cloNIDine   Transdermal Weekly     cloNIDine   Transdermal Q8H     fluticasone  1 puff Inhalation Q12H     brimonidine  1 drop Both Eyes TID     cholecalciferol  1,000 Units Oral Daily     vancomycin place winslow - receiving intermittent dosing  1 each Does not apply See Admin Instructions     oxyCODONE (ROXICODONE) IR tablet 15 mg  15 mg Oral Q6H     insulin aspart  1-7 Units Subcutaneous TID AC     insulin aspart  1-5 Units Subcutaneous At Bedtime       Data    Recent Labs  Lab 01/16/17  0810 01/15/17  1530 01/15/17  0600   WBC 10.5  --  16.2*   HGB 10.7*  --  10.7*   MCV 89  --  90   *  --  141*   *  --  134   POTASSIUM 3.7  --  3.4 "   CHLORIDE 92*  --  97   CO2 27  --  27   BUN 62*  --  38*   CR 6.46*  --  4.74*   ANIONGAP 12  --  10   ALLYSON 8.7  --  8.7   *  --  228*   LACT  --   --  0.9   ALBUMIN  --  2.6*  --    PROTTOTAL  --  7.9  --    BILITOTAL  --  0.8  --    ALKPHOS  --  53  --    ALT  --  9  --    AST  --  9  --      No results found for this or any previous visit (from the past 24 hour(s)).

## 2017-01-17 LAB
ANION GAP SERPL CALCULATED.3IONS-SCNC: 9 MMOL/L (ref 3–14)
BACTERIA SPEC CULT: NORMAL
BUN SERPL-MCNC: 26 MG/DL (ref 7–30)
CALCIUM SERPL-MCNC: 7.7 MG/DL (ref 8.5–10.1)
CHLORIDE SERPL-SCNC: 98 MMOL/L (ref 94–109)
CO2 SERPL-SCNC: 28 MMOL/L (ref 20–32)
CREAT SERPL-MCNC: 3.23 MG/DL (ref 0.66–1.25)
GFR SERPL CREATININE-BSD FRML MDRD: 19 ML/MIN/1.7M2
GLUCOSE BLDC GLUCOMTR-MCNC: 152 MG/DL (ref 70–99)
GLUCOSE BLDC GLUCOMTR-MCNC: 175 MG/DL (ref 70–99)
GLUCOSE BLDC GLUCOMTR-MCNC: 195 MG/DL (ref 70–99)
GLUCOSE BLDC GLUCOMTR-MCNC: 231 MG/DL (ref 70–99)
GLUCOSE BLDC GLUCOMTR-MCNC: 280 MG/DL (ref 70–99)
GLUCOSE SERPL-MCNC: 133 MG/DL (ref 70–99)
Lab: NORMAL
MICRO REPORT STATUS: NORMAL
PHOSPHATE SERPL-MCNC: 1.7 MG/DL (ref 2.5–4.5)
POTASSIUM SERPL-SCNC: 3.5 MMOL/L (ref 3.4–5.3)
SODIUM SERPL-SCNC: 135 MMOL/L (ref 133–144)
SPECIMEN SOURCE: NORMAL

## 2017-01-17 PROCEDURE — 25000125 ZZHC RX 250: Performed by: INTERNAL MEDICINE

## 2017-01-17 PROCEDURE — 25000132 ZZH RX MED GY IP 250 OP 250 PS 637: Mod: GY | Performed by: INTERNAL MEDICINE

## 2017-01-17 PROCEDURE — 84100 ASSAY OF PHOSPHORUS: CPT | Performed by: INTERNAL MEDICINE

## 2017-01-17 PROCEDURE — A9270 NON-COVERED ITEM OR SERVICE: HCPCS | Mod: GY | Performed by: INTERNAL MEDICINE

## 2017-01-17 PROCEDURE — 25900017 H RX MED GY IP 259 OP 259 PS 637: Mod: GY | Performed by: INTERNAL MEDICINE

## 2017-01-17 PROCEDURE — 25000125 ZZHC RX 250

## 2017-01-17 PROCEDURE — 63400005 ZZH RX 634: Performed by: INTERNAL MEDICINE

## 2017-01-17 PROCEDURE — 5A1D60Z PERFORMANCE OF URINARY FILTRATION, MULTIPLE: ICD-10-PCS | Performed by: INTERNAL MEDICINE

## 2017-01-17 PROCEDURE — 12000000 ZZH R&B MED SURG/OB

## 2017-01-17 PROCEDURE — 25000128 H RX IP 250 OP 636: Performed by: INTERNAL MEDICINE

## 2017-01-17 PROCEDURE — 00000146 ZZHCL STATISTIC GLUCOSE BY METER IP

## 2017-01-17 PROCEDURE — 40000894 ZZH STATISTIC OT IP EVAL DEFER

## 2017-01-17 PROCEDURE — 90937 HEMODIALYSIS REPEATED EVAL: CPT

## 2017-01-17 PROCEDURE — 99233 SBSQ HOSP IP/OBS HIGH 50: CPT | Performed by: INTERNAL MEDICINE

## 2017-01-17 PROCEDURE — 80048 BASIC METABOLIC PNL TOTAL CA: CPT | Performed by: INTERNAL MEDICINE

## 2017-01-17 RX ORDER — ONDANSETRON 2 MG/ML
4-8 INJECTION INTRAMUSCULAR; INTRAVENOUS EVERY 8 HOURS PRN
Status: DISCONTINUED | OUTPATIENT
Start: 2017-01-17 | End: 2017-01-19 | Stop reason: HOSPADM

## 2017-01-17 RX ORDER — PROCHLORPERAZINE MALEATE 5 MG
5 TABLET ORAL EVERY 6 HOURS PRN
Status: DISCONTINUED | OUTPATIENT
Start: 2017-01-17 | End: 2017-01-19 | Stop reason: HOSPADM

## 2017-01-17 RX ORDER — DOXERCALCIFEROL 4 UG/2ML
4 INJECTION INTRAVENOUS SEE ADMIN INSTRUCTIONS
Status: DISCONTINUED | OUTPATIENT
Start: 2017-01-17 | End: 2017-01-17

## 2017-01-17 RX ORDER — ALBUMIN, HUMAN INJ 5% 5 %
250 SOLUTION INTRAVENOUS
Status: DISCONTINUED | OUTPATIENT
Start: 2017-01-17 | End: 2017-01-17

## 2017-01-17 RX ORDER — ALBUMIN (HUMAN) 12.5 G/50ML
50 SOLUTION INTRAVENOUS
Status: DISCONTINUED | OUTPATIENT
Start: 2017-01-17 | End: 2017-01-17

## 2017-01-17 RX ORDER — BISACODYL 10 MG
10 SUPPOSITORY, RECTAL RECTAL DAILY PRN
Status: DISCONTINUED | OUTPATIENT
Start: 2017-01-17 | End: 2017-01-19 | Stop reason: HOSPADM

## 2017-01-17 RX ORDER — BISACODYL 10 MG
10 SUPPOSITORY, RECTAL RECTAL ONCE
Status: COMPLETED | OUTPATIENT
Start: 2017-01-17 | End: 2017-01-17

## 2017-01-17 RX ORDER — ONDANSETRON 2 MG/ML
INJECTION INTRAMUSCULAR; INTRAVENOUS
Status: COMPLETED
Start: 2017-01-17 | End: 2017-01-17

## 2017-01-17 RX ORDER — LORAZEPAM 2 MG/ML
.5-1 INJECTION INTRAMUSCULAR EVERY 4 HOURS PRN
Status: DISCONTINUED | OUTPATIENT
Start: 2017-01-17 | End: 2017-01-19 | Stop reason: HOSPADM

## 2017-01-17 RX ADMIN — DOXERCALCIFEROL 4 MCG: 2 INJECTION, SOLUTION INTRAVENOUS at 09:32

## 2017-01-17 RX ADMIN — FERROUS SULFATE TAB 325 MG (65 MG ELEMENTAL FE) 325 MG: 325 (65 FE) TAB at 14:50

## 2017-01-17 RX ADMIN — BRIMONIDINE TARTRATE 1 DROP: 2 SOLUTION/ DROPS OPHTHALMIC at 15:47

## 2017-01-17 RX ADMIN — OXYCODONE HYDROCHLORIDE 15 MG: 5 TABLET ORAL at 14:48

## 2017-01-17 RX ADMIN — EPOETIN ALFA 5000 UNITS: 3000 SOLUTION INTRAVENOUS; SUBCUTANEOUS at 09:33

## 2017-01-17 RX ADMIN — SODIUM BICARBONATE 650 MG TABLET 650 MG: at 21:09

## 2017-01-17 RX ADMIN — CALCIUM ACETATE 667 MG: 667 CAPSULE ORAL at 14:38

## 2017-01-17 RX ADMIN — ATORVASTATIN CALCIUM 80 MG: 40 TABLET, FILM COATED ORAL at 14:47

## 2017-01-17 RX ADMIN — FERROUS SULFATE TAB 325 MG (65 MG ELEMENTAL FE) 325 MG: 325 (65 FE) TAB at 21:09

## 2017-01-17 RX ADMIN — HYDRALAZINE HYDROCHLORIDE 50 MG: 50 TABLET ORAL at 21:09

## 2017-01-17 RX ADMIN — ISOSORBIDE MONONITRATE 90 MG: 30 TABLET, EXTENDED RELEASE ORAL at 14:40

## 2017-01-17 RX ADMIN — FLUTICASONE PROPIONATE 1 PUFF: 100 POWDER, METERED RESPIRATORY (INHALATION) at 21:08

## 2017-01-17 RX ADMIN — PROCHLORPERAZINE MALEATE 5 MG: 5 TABLET, FILM COATED ORAL at 16:30

## 2017-01-17 RX ADMIN — PIPERACILLIN AND TAZOBACTAM 2.25 G: 2; .25 INJECTION, POWDER, FOR SOLUTION INTRAVENOUS at 03:00

## 2017-01-17 RX ADMIN — INSULIN ASPART 2 UNITS: 100 INJECTION, SOLUTION INTRAVENOUS; SUBCUTANEOUS at 08:19

## 2017-01-17 RX ADMIN — BRIMONIDINE TARTRATE 1 DROP: 2 SOLUTION/ DROPS OPHTHALMIC at 21:08

## 2017-01-17 RX ADMIN — LEVOFLOXACIN 500 MG: 5 INJECTION, SOLUTION INTRAVENOUS at 08:30

## 2017-01-17 RX ADMIN — ONDANSETRON HYDROCHLORIDE 4 MG: 2 SOLUTION INTRAMUSCULAR; INTRAVENOUS at 08:19

## 2017-01-17 RX ADMIN — SODIUM CHLORIDE 250 ML: 9 INJECTION, SOLUTION INTRAVENOUS at 09:33

## 2017-01-17 RX ADMIN — ONDANSETRON HYDROCHLORIDE 8 MG: 2 SOLUTION INTRAMUSCULAR; INTRAVENOUS at 14:36

## 2017-01-17 RX ADMIN — GABAPENTIN 200 MG: 100 CAPSULE ORAL at 14:37

## 2017-01-17 RX ADMIN — OXYCODONE HYDROCHLORIDE 15 MG: 5 TABLET ORAL at 03:01

## 2017-01-17 RX ADMIN — LATANOPROST 1 DROP: 50 SOLUTION/ DROPS OPHTHALMIC at 21:11

## 2017-01-17 RX ADMIN — HYDRALAZINE HYDROCHLORIDE 50 MG: 50 TABLET ORAL at 14:49

## 2017-01-17 RX ADMIN — LEVOFLOXACIN 500 MG: 5 INJECTION, SOLUTION INTRAVENOUS at 13:24

## 2017-01-17 RX ADMIN — VITAMIN D, TAB 1000IU (100/BT) 1000 UNITS: 25 TAB at 14:48

## 2017-01-17 RX ADMIN — BUMETANIDE 4 MG: 1 TABLET ORAL at 14:38

## 2017-01-17 RX ADMIN — OXYCODONE HYDROCHLORIDE 15 MG: 5 TABLET ORAL at 21:09

## 2017-01-17 RX ADMIN — INSULIN GLARGINE 12 UNITS: 100 INJECTION, SOLUTION SUBCUTANEOUS at 21:11

## 2017-01-17 RX ADMIN — CARVEDILOL 37.5 MG: 12.5 TABLET, FILM COATED ORAL at 14:47

## 2017-01-17 RX ADMIN — BISACODYL 10 MG: 10 SUPPOSITORY RECTAL at 17:47

## 2017-01-17 RX ADMIN — PIPERACILLIN AND TAZOBACTAM 2.25 G: 2; .25 INJECTION, POWDER, FOR SOLUTION INTRAVENOUS at 14:49

## 2017-01-17 RX ADMIN — ASPIRIN 81 MG 81 MG: 81 TABLET ORAL at 14:50

## 2017-01-17 RX ADMIN — SODIUM BICARBONATE 650 MG TABLET 650 MG: at 14:40

## 2017-01-17 RX ADMIN — FLUTICASONE PROPIONATE 1 PUFF: 100 POWDER, METERED RESPIRATORY (INHALATION) at 07:59

## 2017-01-17 RX ADMIN — VITAMINS TABLETS 1 TABLET: 115; 800; 115 TABLET ORAL at 14:48

## 2017-01-17 RX ADMIN — CALCIUM ACETATE 667 MG: 667 CAPSULE ORAL at 17:47

## 2017-01-17 RX ADMIN — BRIMONIDINE TARTRATE 1 DROP: 2 SOLUTION/ DROPS OPHTHALMIC at 08:00

## 2017-01-17 RX ADMIN — PIPERACILLIN AND TAZOBACTAM 2.25 G: 2; .25 INJECTION, POWDER, FOR SOLUTION INTRAVENOUS at 21:06

## 2017-01-17 RX ADMIN — INSULIN ASPART 1 UNITS: 100 INJECTION, SOLUTION INTRAVENOUS; SUBCUTANEOUS at 15:05

## 2017-01-17 RX ADMIN — CARVEDILOL 37.5 MG: 12.5 TABLET, FILM COATED ORAL at 17:47

## 2017-01-17 RX ADMIN — GUAIFENESIN AND CODEINE PHOSPHATE 5 ML: 10; 100 LIQUID ORAL at 15:19

## 2017-01-17 RX ADMIN — LISINOPRIL 40 MG: 40 TABLET ORAL at 14:40

## 2017-01-17 RX ADMIN — INSULIN ASPART 1 UNITS: 100 INJECTION, SOLUTION INTRAVENOUS; SUBCUTANEOUS at 16:31

## 2017-01-17 NOTE — CONSULTS
GI     Consult received for possible treatment of HCV.      Chart reviewed.  Labs consistent with advanced liver disease, perhaps cirrhosis. Does not appear to have active issues attributable to acute hepatic decompensation.     Will arrange Liver clinic follow up as HCV management is an outpatient issue requiring ongoing relationship and commitment from the patient - will need to obtain [facility] records to have a meaningful discussion of treatment options.     Please call if there are acute issues we can help with.     Thanks.   Edward Case DO   [facility]  Cell [phone]

## 2017-01-17 NOTE — PLAN OF CARE
Problem: Goal Outcome Summary  Goal: Goal Outcome Summary  OT: Orders received. Patient noted to be independent in his room. Patient going to dialysis today per conversation with RN. No need for OT at this time. Recommend patient ambulate in halls with nursing.

## 2017-01-17 NOTE — PLAN OF CARE
Problem: Goal Outcome Summary  Goal: Goal Outcome Summary  Outcome: No Change  Nausea with abdominal pain this am; MD updated and zofran given prior to going down to dialysis; heat application to abdomen also helpful in relieving.  Taking fluids only today as still has some mild nausea.  , 152.  BP elevated 150-170 systolic; taking daily meds now since back from dialysis and tolerating some fluid intake.  No lab results; were drawn in dialysis but not received by lab?  Will have lab draw if they cannot find the tube.   Lungs clear/diminished; no cough; continues on IV abx for pneumonia.  Afebrile.  Up independently in room.

## 2017-01-17 NOTE — PROVIDER NOTIFICATION
"Patient reports abdominal pain and nausea.  Active bowel sounds, soft non tender abdomen.  States pain is \"sore\" pointing to mid left abdomen.  Had normal BM last evening.  Last void 100 cc evenings.  Strong nausea, text page to MD to request nausea meds.  "

## 2017-01-17 NOTE — PROGRESS NOTES
[facility]    Hospitalist Progress Note    Date of Admission:  1/15/2017  Date of Service: 01/17/2017    Assessment and Plan  This is a 71-year-old gentleman who presents with fevers, chills, cough with blood-tinged sputum.     Healthcare-associated pneumonia    Cough, fevers, chills, some blood-tinged sputum, x-ray findings of a left infrahilar consolidation. Recent hospitalization in 12/2016.  History of COPD.  Leukocytosis and low grade temp improved. Procalcitonin elevated to 10.31.  - Continue Zosyn, Levaquin.    - vancomycin stopped 1/16  - Sputum culture normal memo, Blood culture NGTD  - continue Qvar, fluticasone, albuterol  - Not hypoxic    Nausea and LLQ abdominal pain  Exam benign.  Last BM 1/16, although no flatus.  - Zofran, Compazine, ativan PRN  - Check CMP, lipase  - Trial of Dulcolax suppository  - If not improved by tomorrow, with perform a noncontrast CT abd/pelvis    History of CAD, hypertension, chronic systolic and diastolic CHF    - Continue PTA aspirin, atorvastatin, Bumex, Coreg, clonidine, hydralazine, lisinopril, Imdur.     ESRD  On hemodialysis on Tuesdays, Thursdays and Saturdays.  He does have temporary right-sided dialysis catheter and reports that he has an AV fistula/graft waiting for maturation on his right arm.    - Nephrology consulted, dialyzed on 1/17   - Continue PTA PhosLo and sodium bicarbonate  - Intermittent bladder scans and straight cath if > 300ml      Type 2 diabetes.    - Continue Lantus 12 units nightly and NovoLog medium resistance sliding scale.     Glaucoma.   - Continue Alphagan and Xalatan eyedrops.     Chronic hepatitis C  Previously followed with [place]. Bacon at [facility], but no longer wants to go back [place] [facility]  - Will need to re-establish care in a liver clinic for possible chronic hep C treatment  - Appreciate assistance from GI in arranging this    Depression.    - Will hold citalopram at this time due to potential QT prolongation with use of  "Levaquin.      DVT Prophylaxis: Pneumatic Compression Devices  Code Status: Full Code  Disposition: Expected discharge in 2-3 days    Tad Barker MD  Hospitalist  [phone] (P)  Text Page (7 am to 6 pm)    Interval History  Patient reports constant 4/10 LLQ abdominal pain and increased nausea that started yesterday evening.  Tolerating some food without emesis  -Data reviewed today: I reviewed all new labs and imaging results over the last 24 hours. I personally reviewed no images or EKG's today.    Physical Exam  /73 mmHg  Pulse 78  Temp(Src) 97.6  F (36.4  C) (Oral)  Resp 18  Ht 1.905 m (6' 3\")  Wt 75.8 kg (167 lb 1.7 oz)  BMI 20.89 kg/m2  SpO2 98%  Constitutional: NAD, awake  HEENT: EOMI   Cardiovascular: S1, S2, regular rhythm  Respiratory: CTAB, no crackles  Gastrointestinal: Soft, no rebound, tenderness over LLQ  Neurologic: No focal deficits    Medications       - MEDICATION INSTRUCTIONS for Dialysis Patients -   Does not apply See Admin Instructions     lisinopril  40 mg Oral Daily     acetaminophen  650 mg Oral Once     aspirin  81 mg Oral Daily     atorvastatin  80 mg Oral Daily     bumetanide  4 mg Oral Daily     calcium acetate  667 mg Oral TID w/meals     carvedilol  37.5 mg Oral BID w/meals     cloNIDine  1 patch Transdermal Weekly     ferrous sulfate  325 mg Oral BID     folic acid-vit B6-vit B12  1 tablet Oral Daily     gabapentin (NEURONTIN) capsule 200 mg  200 mg Oral Daily     hydrALAZINE  50 mg Oral TID     insulin glargine  12 Units Subcutaneous At Bedtime     isosorbide mononitrate (IMDUR) 24 hr tablet 90 mg  90 mg Oral Daily     latanoprost  1 drop Both Eyes At Bedtime     sodium bicarbonate  650 mg Oral BID     piperacillin-tazobactam  2.25 g Intravenous Q6H     levofloxacin  500 mg Intravenous Q48H     [START ON 1/22/2017] cloNIDine   Transdermal Weekly     cloNIDine   Transdermal Q8H     fluticasone  1 puff Inhalation Q12H     brimonidine  1 drop Both Eyes TID     " cholecalciferol  1,000 Units Oral Daily     oxyCODONE (ROXICODONE) IR tablet 15 mg  15 mg Oral Q6H     insulin aspart  1-7 Units Subcutaneous TID AC     insulin aspart  1-5 Units Subcutaneous At Bedtime       Data    Recent Labs  Lab 01/17/17  1230 01/16/17  0810 01/15/17  1530 01/15/17  0600   WBC  --  10.5  --  16.2*   HGB  --  10.7*  --  10.7*   MCV  --  89  --  90   PLT  --  129*  --  141*    131*  --  134   POTASSIUM 3.5 3.7  --  3.4   CHLORIDE 98 92*  --  97   CO2 28 27  --  27   BUN 26 62*  --  38*   CR 3.23* 6.46*  --  4.74*   ANIONGAP 9 12  --  10   ALLYSON 7.7* 8.7  --  8.7   * 220*  --  228*   LACT  --   --   --  0.9   ALBUMIN  --   --  2.6*  --    PROTTOTAL  --   --  7.9  --    BILITOTAL  --   --  0.8  --    ALKPHOS  --   --  53  --    ALT  --   --  9  --    AST  --   --  9  --      No results found for this or any previous visit (from the past 24 hour(s)).

## 2017-01-17 NOTE — PLAN OF CARE
Problem: Goal Outcome Summary  Goal: Goal Outcome Summary  Outcome: Improving  Pt A&O x4. VSS on RA. Afebrile. C/o pain, oxy x1. Hemodialysis port c/d/i. Dialysis today in AM. Dialysis/mod carb diet. . Independent. DC pending.

## 2017-01-17 NOTE — PLAN OF CARE
Problem: Goal Outcome Summary  Goal: Goal Outcome Summary  PT: Received PT order.  Noted pt up independently, no PT needs identified.  Pt to continue ambulating independently.

## 2017-01-17 NOTE — PROGRESS NOTES
" Inpatient Dialysis Progress Note           Assessment and Plan:   ESRD, status quo.  Stable dialysis today.  Expect good chemical exchange and improved fluid balance by end of run.  Next HD 1/19.    Pt's abd distress today, \"Gas pain,\" of unclear etiology; exam not remarkable.  He has untreated infection with Hepatitis C.  Previous [place] [facility] [place] to have been lost due to non-compliance.  Consider GI consult here to evaluate for   Rx.     Pneumonia    * No resolved hospital problems. *               Interval History:   denies chest pain, denies shortness of breath, denies abdominal pain, up and ambulating and alert, oriented to person, place and time.  Examined during run.  Abd distress began after PM meal yesterday and persists today.  No N&V.  VS ok.  Syst BP sl high.  Good rm air SaO2.  Some intake.  Small UO.  Meds reviewed.  Labs pending.                 Dialysis Details:   Current weight: 75.8 kg (actual weight)  Dry Weight: TBD  Dialysis Temp: 36.5  C  Access Device: IJ catheter  Access Site: right  Dialyzer: Optiflux 160  Dialysis Bath: K+ 3  Sodium Profile: no  UF Goal: 2.5-3L.  Blood Flow Rate (mL/min): 400  Total Treatment Time (hrs): 3.5  Heparin: none    Medications:  EPO dose: 5000  Zemplar: 4mcg  IV Fe: no            Medications:       sodium chloride 0.9%  250-1,000 mL Intravenous Once in dialysis     epoetin antwon (EPOGEN,PROCRIT) inj ESRD  5,000 Units Intravenous See Admin Instructions     doxercalciferol  4 mcg Intravenous See Admin Instructions     epoetin antwon         - MEDICATION INSTRUCTIONS for Dialysis Patients -   Does not apply See Admin Instructions     lisinopril  40 mg Oral Daily     acetaminophen  650 mg Oral Once     aspirin  81 mg Oral Daily     atorvastatin  80 mg Oral Daily     bumetanide  4 mg Oral Daily     calcium acetate  667 mg Oral TID w/meals     carvedilol  37.5 mg Oral BID w/meals     cloNIDine  1 patch Transdermal Weekly     ferrous sulfate  325 mg " Oral BID     folic acid-vit B6-vit B12  1 tablet Oral Daily     gabapentin (NEURONTIN) capsule 200 mg  200 mg Oral Daily     hydrALAZINE  50 mg Oral TID     insulin glargine  12 Units Subcutaneous At Bedtime     isosorbide mononitrate (IMDUR) 24 hr tablet 90 mg  90 mg Oral Daily     latanoprost  1 drop Both Eyes At Bedtime     sodium bicarbonate  650 mg Oral BID     piperacillin-tazobactam  2.25 g Intravenous Q6H     levofloxacin  500 mg Intravenous Q48H     [START ON 1/22/2017] cloNIDine   Transdermal Weekly     cloNIDine   Transdermal Q8H     fluticasone  1 puff Inhalation Q12H     brimonidine  1 drop Both Eyes TID     cholecalciferol  1,000 Units Oral Daily     oxyCODONE (ROXICODONE) IR tablet 15 mg  15 mg Oral Q6H     insulin aspart  1-7 Units Subcutaneous TID AC     insulin aspart  1-5 Units Subcutaneous At Bedtime       - MEDICATION INSTRUCTIONS -                      Physical Exam:       Vital Sign Ranges  Temp:  [97.4  F (36.3  C)-98  F (36.7  C)] 98  F (36.7  C)  Pulse:  [60-68] 64  Heart Rate:  [59-70] 70  Resp:  [14-20] 20  BP: (138-176)/(70-88) 176/86 mmHg  SpO2:  [94 %-99 %] 99 %    Weight, current: 75.8 kg (actual weight)  Weight change: -4.713 kg (-10 lb 6.3 oz)    I/O last 3 completed shifts:  In: 1220 [P.O.:1120; I.V.:100]  Out: 100 [Urine:100]    Physical Exam:   General:  Patient uncomfortable, but in no acute distress.  Awake, alert, oriented x3.  Neck:  Supple, no JVD.  Lungs:  Clear to auscultation bilaterally.  Cardiac:  Regular rate and rhythm, no murmurs, rub, or gallops.  Abdomen:  Soft, nontender, active sounds.  Extremities:  Without edema.  2+ pulses.  Right arm AVF patent.  Skin:  Warm, dry.  Neurologic:  No focal deficits.             Data:        Lab Results   Component Value Date    * 01/16/2017    Lab Results   Component Value Date    CHLORIDE 92* 01/16/2017    Lab Results   Component Value Date    BUN 62* 01/16/2017      Lab Results   Component Value Date    POTASSIUM 3.7  01/16/2017    Lab Results   Component Value Date    CO2 27 01/16/2017    Lab Results   Component Value Date    CR 6.46* 01/16/2017        Lab Results   Component Value Date    * 01/16/2017     01/15/2017     12/17/2016     Lab Results   Component Value Date    POTASSIUM 3.7 01/16/2017    POTASSIUM 3.4 01/15/2017    POTASSIUM 4.6 12/17/2016     Lab Results   Component Value Date    CHLORIDE 92* 01/16/2017    CHLORIDE 97 01/15/2017    CHLORIDE 99 12/17/2016     Lab Results   Component Value Date    CO2 27 01/16/2017    CO2 27 01/15/2017    CO2 29 12/17/2016     Lab Results   Component Value Date    CR 6.46* 01/16/2017    CR 4.74* 01/15/2017    CR 5.99* 12/17/2016     Lab Results   Component Value Date    BUN 62* 01/16/2017    BUN 38* 01/15/2017    BUN 49* 12/17/2016     Lab Results   Component Value Date    HGB 10.7* 01/16/2017    HGB 10.7* 01/15/2017    HGB 8.4* 12/15/2016     Lab Results   Component Value Date    PH 7.36 12/06/2016    PO2 54* 12/06/2016    PCO2 36 12/06/2016    HCO3 21 12/06/2016    BERTHA 2.9 08/05/2016           Chas Curtis MD  Nephrology; [employer]  [phone]

## 2017-01-17 NOTE — PROGRESS NOTES
POTASSIUM   Date Value Ref Range Status   01/16/2017 3.7 3.4 - 5.3 mmol/L Final     HGB     10.7   1/16/2017  Weight: 75.8 kg (167 lb 1.7 oz) (Actual weight after subtrating bed extension.)  POST WT  DIALYSIS PROCEDURE NOTE    Patient dialyzed for 3.5 hrs on a 3 K bath with a  net fluid removal of 3.0L.  A BFR of 400ml/min was obtained via R CVC.  Patient was seen by Dr. Curtis during treatment.  Total heparin received during treatment:: 0 units.  Heparin instilled in both ports post run.  Meds given: Hectorol, Epogen. Complications: Abdominal pain, present prior to initiation of HD and persistent throughout treatment.   Procedure and ESRD teaching done and questions answered.  See flowsheet in EPIC for further details and post assessment.  Machine water alarm in place and functioning.  HEPATITIS B SURFACE ANTIGEN Non-Reactive Date 12/8/16 HEPATITIS B SURFACE ANTIBODY Immune DATE12/8/16  CHLORINE AND CHLORAMINES CHECK on WATER SYSTEM EVERY 4 hours.   PT returned via WC  Catheter Access: Aseptic prep done for both on/off.  Report received from: ARIN Brian RN  Report given to: ARIN Brian RN  Outpatient Dialysis at [facility]

## 2017-01-17 NOTE — PROVIDER NOTIFICATION
On call hospitalist notified regarding critical procal level 10.31. Pt already on IV abx. No new orders.

## 2017-01-17 NOTE — CONSULTS
[facility]  Antimicrobial Stewardship Team (AST) Note: Rosana Boogie, MRN: [id number]            To:  Hospitalist  Unit:  66  Allergies: Morphine     Brief Summary: Keagan Wayne is a 71-year-old -American gentleman with type 2 diabetes, CHF with combined systolic and diastolic heart failure, COPD and hypertension, ESRD on HD , , .  Who presents to the Emergency department with fever, chills and productive cough with blood tinged sputum.   X-ray shows what appears to be a left infrahilar consolidation suggestive of pneumonia and small bilateral pleural effusions.  The patient reports he was last dialyzed on Saturday at [facility] in [place].   He does dialyze through a temporary hemodialysis catheter in the right chest.  He reports that he has a right AV graft that is awaiting maturation after a previous failed graft.  The patient was started on vancomycin, Levaquin and Zosyn for healthcare-associated pneumonia as he does have a recent hospitalization from 2016 through 2016.      Healthcare-associated pneumonia    Cough, fevers, chills, some blood-tinged sputum, x-ray findings of a left infrahilar consolidation. Recent hospitalization in 2016.  History of COPD.  Leukocytosis and low grade temp improved.  - Continue Zosyn, Levaquin.  Stop vancomycin ()   - Sputum culture negative, Blood culture NGTD  - continue Qvar, fluticasone, albuterol  - Not hypoxic  - Check procalcitonin    Assessment: Agree with current antibiotic plan.       Previous Anti-infective orders: Vancomycin 1/15 -     Clinical Features/Vital Signs (VS):    Tmax: 99.5, afebrile since  Procal:  = 10.31        Imagin/15/2017 CXR:  IMPRESSION: Left infrahilar consolidation suggests pneumonia. Small bilateral pleural effusions as well. JESSE GRIFFIN MD    Recommendation/Interventions:   1). Continue Levaquin and Zosyn    Discussed w/ ID Staff-Dr. Isidro Leavitt  PharmD

## 2017-01-17 NOTE — PLAN OF CARE
Problem: Goal Outcome Summary  Goal: Goal Outcome Summary  Outcome: No Change  VSS, O2 wnl RA. A&Ox4. Up independently. Tolerating dialysis/mod carb diet, good appetite. Blood sugars 264 and 284. Dialysis port on right chest, dressing CDI. Scheduled for dialysis tomorrow. Procal elevated 10.31, On call hospitalist notified, no new orders. Pt on IV Zosyn and IV Levaquin. Pt voided x1, 100 cc, Bladder scanned with 20 cc PVR.  Possible dc in 1-2 days. RN will cont to monitor.

## 2017-01-18 LAB
ALBUMIN SERPL-MCNC: 2.7 G/DL (ref 3.4–5)
ALP SERPL-CCNC: 57 U/L (ref 40–150)
ALT SERPL W P-5'-P-CCNC: 9 U/L (ref 0–70)
ANION GAP SERPL CALCULATED.3IONS-SCNC: 9 MMOL/L (ref 3–14)
AST SERPL W P-5'-P-CCNC: 10 U/L (ref 0–45)
BILIRUB SERPL-MCNC: 0.3 MG/DL (ref 0.2–1.3)
BUN SERPL-MCNC: 37 MG/DL (ref 7–30)
CALCIUM SERPL-MCNC: 8.1 MG/DL (ref 8.5–10.1)
CHLORIDE SERPL-SCNC: 98 MMOL/L (ref 94–109)
CO2 SERPL-SCNC: 27 MMOL/L (ref 20–32)
CREAT SERPL-MCNC: 5.57 MG/DL (ref 0.66–1.25)
ERYTHROCYTE [DISTWIDTH] IN BLOOD BY AUTOMATED COUNT: 13.7 % (ref 10–15)
GFR SERPL CREATININE-BSD FRML MDRD: 10 ML/MIN/1.7M2
GLUCOSE BLDC GLUCOMTR-MCNC: 180 MG/DL (ref 70–99)
GLUCOSE BLDC GLUCOMTR-MCNC: 185 MG/DL (ref 70–99)
GLUCOSE BLDC GLUCOMTR-MCNC: 235 MG/DL (ref 70–99)
GLUCOSE BLDC GLUCOMTR-MCNC: 267 MG/DL (ref 70–99)
GLUCOSE BLDC GLUCOMTR-MCNC: 277 MG/DL (ref 70–99)
GLUCOSE SERPL-MCNC: 249 MG/DL (ref 70–99)
HCT VFR BLD AUTO: 30.3 % (ref 40–53)
HGB BLD-MCNC: 9.8 G/DL (ref 13.3–17.7)
LIPASE SERPL-CCNC: 82 U/L (ref 73–393)
MCH RBC QN AUTO: 28.9 PG (ref 26.5–33)
MCHC RBC AUTO-ENTMCNC: 32.3 G/DL (ref 31.5–36.5)
MCV RBC AUTO: 89 FL (ref 78–100)
PLATELET # BLD AUTO: 156 10E9/L (ref 150–450)
POTASSIUM SERPL-SCNC: 3.9 MMOL/L (ref 3.4–5.3)
PROT SERPL-MCNC: 7.9 G/DL (ref 6.8–8.8)
RBC # BLD AUTO: 3.39 10E12/L (ref 4.4–5.9)
SODIUM SERPL-SCNC: 134 MMOL/L (ref 133–144)
WBC # BLD AUTO: 5.4 10E9/L (ref 4–11)

## 2017-01-18 PROCEDURE — 83690 ASSAY OF LIPASE: CPT | Performed by: INTERNAL MEDICINE

## 2017-01-18 PROCEDURE — A9270 NON-COVERED ITEM OR SERVICE: HCPCS | Mod: GY | Performed by: INTERNAL MEDICINE

## 2017-01-18 PROCEDURE — 25000125 ZZHC RX 250: Performed by: INTERNAL MEDICINE

## 2017-01-18 PROCEDURE — 80053 COMPREHEN METABOLIC PANEL: CPT | Performed by: INTERNAL MEDICINE

## 2017-01-18 PROCEDURE — 85027 COMPLETE CBC AUTOMATED: CPT | Performed by: INTERNAL MEDICINE

## 2017-01-18 PROCEDURE — 25000132 ZZH RX MED GY IP 250 OP 250 PS 637: Mod: GY | Performed by: INTERNAL MEDICINE

## 2017-01-18 PROCEDURE — 12000000 ZZH R&B MED SURG/OB

## 2017-01-18 PROCEDURE — 99232 SBSQ HOSP IP/OBS MODERATE 35: CPT | Performed by: INTERNAL MEDICINE

## 2017-01-18 PROCEDURE — 25900017 H RX MED GY IP 259 OP 259 PS 637: Mod: GY | Performed by: INTERNAL MEDICINE

## 2017-01-18 PROCEDURE — 00000146 ZZHCL STATISTIC GLUCOSE BY METER IP

## 2017-01-18 PROCEDURE — 36415 COLL VENOUS BLD VENIPUNCTURE: CPT | Performed by: INTERNAL MEDICINE

## 2017-01-18 RX ORDER — HYDRALAZINE HYDROCHLORIDE 20 MG/ML
10 INJECTION INTRAMUSCULAR; INTRAVENOUS EVERY 4 HOURS PRN
Status: DISCONTINUED | OUTPATIENT
Start: 2017-01-18 | End: 2017-01-19 | Stop reason: HOSPADM

## 2017-01-18 RX ORDER — DOXERCALCIFEROL 4 UG/2ML
4 INJECTION INTRAVENOUS
Status: DISCONTINUED | OUTPATIENT
Start: 2017-01-19 | End: 2017-01-19 | Stop reason: HOSPADM

## 2017-01-18 RX ADMIN — OXYCODONE HYDROCHLORIDE 15 MG: 5 TABLET ORAL at 02:43

## 2017-01-18 RX ADMIN — BRIMONIDINE TARTRATE 1 DROP: 2 SOLUTION/ DROPS OPHTHALMIC at 09:04

## 2017-01-18 RX ADMIN — GUAIFENESIN AND CODEINE PHOSPHATE 5 ML: 10; 100 LIQUID ORAL at 09:10

## 2017-01-18 RX ADMIN — OXYCODONE HYDROCHLORIDE 15 MG: 5 TABLET ORAL at 08:46

## 2017-01-18 RX ADMIN — VITAMIN D, TAB 1000IU (100/BT) 1000 UNITS: 25 TAB at 08:45

## 2017-01-18 RX ADMIN — OXYCODONE HYDROCHLORIDE 15 MG: 5 TABLET ORAL at 15:44

## 2017-01-18 RX ADMIN — HYDRALAZINE HYDROCHLORIDE 50 MG: 50 TABLET ORAL at 22:20

## 2017-01-18 RX ADMIN — HYDRALAZINE HYDROCHLORIDE 50 MG: 50 TABLET ORAL at 08:44

## 2017-01-18 RX ADMIN — INSULIN GLARGINE 16 UNITS: 100 INJECTION, SOLUTION SUBCUTANEOUS at 22:21

## 2017-01-18 RX ADMIN — FLUTICASONE PROPIONATE 1 PUFF: 100 POWDER, METERED RESPIRATORY (INHALATION) at 22:20

## 2017-01-18 RX ADMIN — FERROUS SULFATE TAB 325 MG (65 MG ELEMENTAL FE) 325 MG: 325 (65 FE) TAB at 08:45

## 2017-01-18 RX ADMIN — VITAMINS TABLETS 1 TABLET: 115; 800; 115 TABLET ORAL at 08:46

## 2017-01-18 RX ADMIN — HYDRALAZINE HYDROCHLORIDE 50 MG: 50 TABLET ORAL at 13:18

## 2017-01-18 RX ADMIN — CARVEDILOL 37.5 MG: 12.5 TABLET, FILM COATED ORAL at 08:45

## 2017-01-18 RX ADMIN — LATANOPROST 1 DROP: 50 SOLUTION/ DROPS OPHTHALMIC at 22:24

## 2017-01-18 RX ADMIN — CARVEDILOL 37.5 MG: 12.5 TABLET, FILM COATED ORAL at 18:01

## 2017-01-18 RX ADMIN — INSULIN ASPART 3 UNITS: 100 INJECTION, SOLUTION INTRAVENOUS; SUBCUTANEOUS at 18:01

## 2017-01-18 RX ADMIN — BRIMONIDINE TARTRATE 1 DROP: 2 SOLUTION/ DROPS OPHTHALMIC at 15:45

## 2017-01-18 RX ADMIN — FERROUS SULFATE TAB 325 MG (65 MG ELEMENTAL FE) 325 MG: 325 (65 FE) TAB at 22:20

## 2017-01-18 RX ADMIN — CALCIUM ACETATE 667 MG: 667 CAPSULE ORAL at 18:01

## 2017-01-18 RX ADMIN — ASPIRIN 81 MG 81 MG: 81 TABLET ORAL at 08:45

## 2017-01-18 RX ADMIN — CALCIUM ACETATE 667 MG: 667 CAPSULE ORAL at 13:16

## 2017-01-18 RX ADMIN — ISOSORBIDE MONONITRATE 90 MG: 30 TABLET, EXTENDED RELEASE ORAL at 08:44

## 2017-01-18 RX ADMIN — LISINOPRIL 40 MG: 40 TABLET ORAL at 08:45

## 2017-01-18 RX ADMIN — GABAPENTIN 200 MG: 100 CAPSULE ORAL at 08:45

## 2017-01-18 RX ADMIN — INSULIN ASPART 1 UNITS: 100 INJECTION, SOLUTION INTRAVENOUS; SUBCUTANEOUS at 13:15

## 2017-01-18 RX ADMIN — FLUTICASONE PROPIONATE 1 PUFF: 100 POWDER, METERED RESPIRATORY (INHALATION) at 09:03

## 2017-01-18 RX ADMIN — ATORVASTATIN CALCIUM 80 MG: 40 TABLET, FILM COATED ORAL at 08:43

## 2017-01-18 RX ADMIN — SODIUM BICARBONATE 650 MG TABLET 650 MG: at 22:20

## 2017-01-18 RX ADMIN — SODIUM BICARBONATE 650 MG TABLET 650 MG: at 08:45

## 2017-01-18 RX ADMIN — OXYCODONE HYDROCHLORIDE 15 MG: 5 TABLET ORAL at 22:20

## 2017-01-18 RX ADMIN — BUMETANIDE 4 MG: 1 TABLET ORAL at 08:44

## 2017-01-18 RX ADMIN — PIPERACILLIN AND TAZOBACTAM 2.25 G: 2; .25 INJECTION, POWDER, FOR SOLUTION INTRAVENOUS at 02:40

## 2017-01-18 RX ADMIN — CALCIUM ACETATE 667 MG: 667 CAPSULE ORAL at 08:44

## 2017-01-18 RX ADMIN — BRIMONIDINE TARTRATE 1 DROP: 2 SOLUTION/ DROPS OPHTHALMIC at 22:22

## 2017-01-18 RX ADMIN — PIPERACILLIN AND TAZOBACTAM 2.25 G: 2; .25 INJECTION, POWDER, FOR SOLUTION INTRAVENOUS at 08:51

## 2017-01-18 RX ADMIN — INSULIN ASPART 3 UNITS: 100 INJECTION, SOLUTION INTRAVENOUS; SUBCUTANEOUS at 09:04

## 2017-01-18 NOTE — PLAN OF CARE
Problem: Goal Outcome Summary  Goal: Goal Outcome Summary  Outcome: Improving  Pt A&O x4. VSS on RA. Afebrile. Infrequent cough. LS dim. C/o pain, oxy x1. Hemodialysis port c/d/i. Dialysis T, Thurs, Sat. Urine output  280 cc. Dialysis/mod carb diet. . Eat x 2, good appetite. Independent. DC pending.

## 2017-01-18 NOTE — PROGRESS NOTES
[facility]    Hospitalist Progress Note    Date of Admission:  1/15/2017  Date of Service: 01/18/2017    Assessment and Plan  This is a 71-year-old gentleman who presents with fevers, chills, cough with blood-tinged sputum.     Healthcare-associated pneumonia    Cough, fevers, chills, some blood-tinged sputum, x-ray findings of a left infrahilar consolidation. Recent hospitalization in 12/2016.  History of COPD.  Leukocytosis and low grade temp resolved. Procalcitonin elevated to 10.31.  - Continue Levaquin.    - vancomycin stopped 1/16, Zosyn stopped 1/18  - Sputum culture normal memo, Blood culture NGTD  - continue Qvar, fluticasone, albuterol  - Not hypoxic  - Ambulating independently, no need for PT    Nausea and LLQ abdominal pain  Much improved today, possibly related to constipation or urinary retention  - Zofran, Compazine, ativan PRN  - Lipase and LFT's normal  - PRN Dulcolax suppository    History of CAD, hypertension, chronic systolic and diastolic CHF    BP uncontrolled today  - Continue PTA aspirin, atorvastatin, Bumex, Coreg, clonidine, hydralazine, lisinopril, Imdur  - PRN IV hydralazine     ESRD  On hemodialysis on Tuesdays, Thursdays and Saturdays.  He does have temporary right-sided dialysis catheter and reports that he has an AV fistula/graft waiting for maturation on his right arm.    - Nephrology consulted, dialyzed on 1/17   - Continue PTA PhosLo and sodium bicarbonate  - Intermittent bladder scans and straight cath if > 300ml      Type 2 diabetes.    BG in 200's  - Increase Lantus to 16 units nightly  - NovoLog medium resistance sliding scale.     Glaucoma.   - Continue Alphagan and Xalatan eyedrops.     Chronic hepatitis C  Previously [place] with [place]. Bacon [place] [facility], but no longer wants to go back to [facility]  - Will need to re-establish care in a liver clinic for possible chronic hep C treatment  - Appreciate assistance from GI in arranging this    Depression.    - Will  "hold citalopram at this time due to potential QT prolongation with use of Levaquin.      DVT Prophylaxis: Pneumatic Compression Devices  Code Status: Full Code  Disposition: Expected discharge tomorrow after dialysis    Tad Barker MD  Hospitalist  [phone] (P)  Text Page (7 am to 6 pm)    Interval History  Patient reports a bowel movement with suppository yesterday along with being able to urinate further.  Since then, his nausea has resolved.  Still mild LLQ abdominal pain.  -Data reviewed today: I reviewed all new labs and imaging results over the last 24 hours. I personally reviewed no images or EKG's today.    Physical Exam  /84 mmHg  Pulse 78  Temp(Src) 97.9  F (36.6  C) (Oral)  Resp 16  Ht 1.905 m (6' 3\")  Wt 75.8 kg (167 lb 1.7 oz)  BMI 20.89 kg/m2  SpO2 90%  Constitutional: NAD, awake  HEENT: EOMI   Cardiovascular: S1, S2, regular rhythm  Respiratory: CTAB, no crackles  Gastrointestinal: Soft, no rebound, non-tender  Neurologic: No focal deficits    Medications       insulin glargine  16 Units Subcutaneous At Bedtime     - MEDICATION INSTRUCTIONS for Dialysis Patients -   Does not apply See Admin Instructions     lisinopril  40 mg Oral Daily     acetaminophen  650 mg Oral Once     aspirin  81 mg Oral Daily     atorvastatin  80 mg Oral Daily     bumetanide  4 mg Oral Daily     calcium acetate  667 mg Oral TID w/meals     carvedilol  37.5 mg Oral BID w/meals     cloNIDine  1 patch Transdermal Weekly     ferrous sulfate  325 mg Oral BID     folic acid-vit B6-vit B12  1 tablet Oral Daily     gabapentin (NEURONTIN) capsule 200 mg  200 mg Oral Daily     hydrALAZINE  50 mg Oral TID     isosorbide mononitrate (IMDUR) 24 hr tablet 90 mg  90 mg Oral Daily     latanoprost  1 drop Both Eyes At Bedtime     sodium bicarbonate  650 mg Oral BID     levofloxacin  500 mg Intravenous Q48H     [START ON 1/22/2017] cloNIDine   Transdermal Weekly     cloNIDine   Transdermal Q8H     fluticasone  1 puff " Inhalation Q12H     brimonidine  1 drop Both Eyes TID     cholecalciferol  1,000 Units Oral Daily     oxyCODONE (ROXICODONE) IR tablet 15 mg  15 mg Oral Q6H     insulin aspart  1-7 Units Subcutaneous TID AC     insulin aspart  1-5 Units Subcutaneous At Bedtime       Data    Recent Labs  Lab 01/18/17  0741 01/17/17  1230 01/16/17  0810 01/15/17  1530 01/15/17  0600   WBC 5.4  --  10.5  --  16.2*   HGB 9.8*  --  10.7*  --  10.7*   MCV 89  --  89  --  90     --  129*  --  141*    135 131*  --  134   POTASSIUM 3.9 3.5 3.7  --  3.4   CHLORIDE 98 98 92*  --  97   CO2 27 28 27  --  27   BUN 37* 26 62*  --  38*   CR 5.57* 3.23* 6.46*  --  4.74*   ANIONGAP 9 9 12  --  10   ALLYSON 8.1* 7.7* 8.7  --  8.7   * 133* 220*  --  228*   LACT  --   --   --   --  0.9   ALBUMIN 2.7*  --   --  2.6*  --    PROTTOTAL 7.9  --   --  7.9  --    BILITOTAL 0.3  --   --  0.8  --    ALKPHOS 57  --   --  53  --    ALT 9  --   --  9  --    AST 10  --   --  9  --    LIPASE 82  --   --   --   --      No results found for this or any previous visit (from the past 24 hour(s)).

## 2017-01-18 NOTE — PLAN OF CARE
Problem: Goal Outcome Summary  Goal: Goal Outcome Summary  Outcome: No Change  Pt A&O x4. BP elevated, VSS on RA. Afebrile. Infrequent cough. LS dim. C/o pain, oxy x1. Robitussin x1. Hemodialysis port c/d/i. Dialysis T, Thurs, Sat. Dialysis/mod carb diet. , 180. Tolerating diet very well, good appetite. Independent. Zosyn d/c'd. DC expecting tomorrow after dialysis.

## 2017-01-18 NOTE — PROGRESS NOTES
Better today.  No further abd sx's.  Plan reviewed for out-pt GI appt to discuss treatment for Hepatitis C infection.  Today's labs all ok; chemistries in expected range, and CBC better with normal WBC/plts.  Dialysis in AM.  Likely discharge after.    Chas Curtis MD  Nephrology; [employer]  [phone]

## 2017-01-18 NOTE — PLAN OF CARE
Problem: Goal Outcome Summary  Goal: Goal Outcome Summary  Outcome: Improving  Pt A&Ox4. VSS on RA. C/O generalized pain- scheduled oxy given x1.  and 280. Infrequent cough. Dim LS. C/O nausea- compazine given x1. Suppository given- 1 BM per pt report.

## 2017-01-19 VITALS
DIASTOLIC BLOOD PRESSURE: 87 MMHG | SYSTOLIC BLOOD PRESSURE: 151 MMHG | OXYGEN SATURATION: 96 % | HEART RATE: 68 BPM | WEIGHT: 167.11 LBS | TEMPERATURE: 97.9 F | BODY MASS INDEX: 20.78 KG/M2 | HEIGHT: 75 IN | RESPIRATION RATE: 16 BRPM

## 2017-01-19 LAB
ANION GAP SERPL CALCULATED.3IONS-SCNC: 11 MMOL/L (ref 3–14)
BUN SERPL-MCNC: 45 MG/DL (ref 7–30)
CALCIUM SERPL-MCNC: 8.4 MG/DL (ref 8.5–10.1)
CHLORIDE SERPL-SCNC: 98 MMOL/L (ref 94–109)
CO2 SERPL-SCNC: 26 MMOL/L (ref 20–32)
CREAT SERPL-MCNC: 6.16 MG/DL (ref 0.66–1.25)
GFR SERPL CREATININE-BSD FRML MDRD: 9 ML/MIN/1.7M2
GLUCOSE BLDC GLUCOMTR-MCNC: 144 MG/DL (ref 70–99)
GLUCOSE BLDC GLUCOMTR-MCNC: 171 MG/DL (ref 70–99)
GLUCOSE BLDC GLUCOMTR-MCNC: 175 MG/DL (ref 70–99)
GLUCOSE SERPL-MCNC: 156 MG/DL (ref 70–99)
POTASSIUM SERPL-SCNC: 3.6 MMOL/L (ref 3.4–5.3)
SODIUM SERPL-SCNC: 135 MMOL/L (ref 133–144)

## 2017-01-19 PROCEDURE — A9270 NON-COVERED ITEM OR SERVICE: HCPCS | Mod: GY | Performed by: INTERNAL MEDICINE

## 2017-01-19 PROCEDURE — 80048 BASIC METABOLIC PNL TOTAL CA: CPT | Performed by: INTERNAL MEDICINE

## 2017-01-19 PROCEDURE — 25900017 H RX MED GY IP 259 OP 259 PS 637: Mod: GY | Performed by: INTERNAL MEDICINE

## 2017-01-19 PROCEDURE — 25000132 ZZH RX MED GY IP 250 OP 250 PS 637: Mod: GY | Performed by: INTERNAL MEDICINE

## 2017-01-19 PROCEDURE — 99239 HOSP IP/OBS DSCHRG MGMT >30: CPT | Performed by: INTERNAL MEDICINE

## 2017-01-19 PROCEDURE — 25000128 H RX IP 250 OP 636: Performed by: INTERNAL MEDICINE

## 2017-01-19 PROCEDURE — 25000125 ZZHC RX 250: Performed by: INTERNAL MEDICINE

## 2017-01-19 PROCEDURE — 00000146 ZZHCL STATISTIC GLUCOSE BY METER IP

## 2017-01-19 PROCEDURE — 90937 HEMODIALYSIS REPEATED EVAL: CPT

## 2017-01-19 PROCEDURE — 63400005 ZZH RX 634: Performed by: INTERNAL MEDICINE

## 2017-01-19 RX ORDER — LABETALOL HYDROCHLORIDE 5 MG/ML
10 INJECTION, SOLUTION INTRAVENOUS
Status: DISCONTINUED | OUTPATIENT
Start: 2017-01-19 | End: 2017-01-19 | Stop reason: HOSPADM

## 2017-01-19 RX ORDER — LEVOFLOXACIN 500 MG/1
500 TABLET, FILM COATED ORAL
Qty: 2 TABLET | Refills: 0 | Status: SHIPPED | OUTPATIENT
Start: 2017-01-21 | End: 2017-01-24

## 2017-01-19 RX ORDER — HEPARIN SODIUM 1000 [USP'U]/ML
500 INJECTION, SOLUTION INTRAVENOUS; SUBCUTANEOUS
Status: COMPLETED | OUTPATIENT
Start: 2017-01-19 | End: 2017-01-19

## 2017-01-19 RX ORDER — HEPARIN SODIUM 1000 [USP'U]/ML
500 INJECTION, SOLUTION INTRAVENOUS; SUBCUTANEOUS CONTINUOUS
Status: DISCONTINUED | OUTPATIENT
Start: 2017-01-19 | End: 2017-01-19

## 2017-01-19 RX ADMIN — HYDRALAZINE HYDROCHLORIDE 50 MG: 50 TABLET ORAL at 11:42

## 2017-01-19 RX ADMIN — LISINOPRIL 40 MG: 40 TABLET ORAL at 11:41

## 2017-01-19 RX ADMIN — OXYCODONE HYDROCHLORIDE 15 MG: 5 TABLET ORAL at 09:19

## 2017-01-19 RX ADMIN — HEPARIN SODIUM 500 UNITS: 1000 INJECTION, SOLUTION INTRAVENOUS; SUBCUTANEOUS at 07:30

## 2017-01-19 RX ADMIN — HEPARIN SODIUM 500 UNITS/HR: 1000 INJECTION, SOLUTION INTRAVENOUS; SUBCUTANEOUS at 07:30

## 2017-01-19 RX ADMIN — GUAIFENESIN AND CODEINE PHOSPHATE 5 ML: 10; 100 LIQUID ORAL at 09:19

## 2017-01-19 RX ADMIN — VITAMINS TABLETS 1 TABLET: 115; 800; 115 TABLET ORAL at 11:41

## 2017-01-19 RX ADMIN — DOXERCALCIFEROL 4 MCG: 4 INJECTION INTRAVENOUS at 10:56

## 2017-01-19 RX ADMIN — CARVEDILOL 37.5 MG: 12.5 TABLET, FILM COATED ORAL at 11:42

## 2017-01-19 RX ADMIN — VITAMIN D, TAB 1000IU (100/BT) 1000 UNITS: 25 TAB at 11:40

## 2017-01-19 RX ADMIN — INSULIN ASPART 1 UNITS: 100 INJECTION, SOLUTION INTRAVENOUS; SUBCUTANEOUS at 11:50

## 2017-01-19 RX ADMIN — GABAPENTIN 200 MG: 100 CAPSULE ORAL at 11:39

## 2017-01-19 RX ADMIN — BUMETANIDE 4 MG: 1 TABLET ORAL at 11:49

## 2017-01-19 RX ADMIN — INSULIN ASPART 1 UNITS: 100 INJECTION, SOLUTION INTRAVENOUS; SUBCUTANEOUS at 09:18

## 2017-01-19 RX ADMIN — ATORVASTATIN CALCIUM 80 MG: 40 TABLET, FILM COATED ORAL at 11:39

## 2017-01-19 RX ADMIN — EPOETIN ALFA 5000 UNITS: 3000 SOLUTION INTRAVENOUS; SUBCUTANEOUS at 10:56

## 2017-01-19 RX ADMIN — ASPIRIN 81 MG 81 MG: 81 TABLET ORAL at 11:42

## 2017-01-19 RX ADMIN — ISOSORBIDE MONONITRATE 90 MG: 30 TABLET, EXTENDED RELEASE ORAL at 11:40

## 2017-01-19 RX ADMIN — FLUTICASONE PROPIONATE 1 PUFF: 100 POWDER, METERED RESPIRATORY (INHALATION) at 11:50

## 2017-01-19 RX ADMIN — BRIMONIDINE TARTRATE 1 DROP: 2 SOLUTION/ DROPS OPHTHALMIC at 11:50

## 2017-01-19 RX ADMIN — OXYCODONE HYDROCHLORIDE 15 MG: 5 TABLET ORAL at 03:36

## 2017-01-19 RX ADMIN — FERROUS SULFATE TAB 325 MG (65 MG ELEMENTAL FE) 325 MG: 325 (65 FE) TAB at 11:43

## 2017-01-19 RX ADMIN — SODIUM CHLORIDE 250 ML: 9 INJECTION, SOLUTION INTRAVENOUS at 07:30

## 2017-01-19 RX ADMIN — LEVOFLOXACIN 500 MG: 5 INJECTION, SOLUTION INTRAVENOUS at 11:57

## 2017-01-19 RX ADMIN — SODIUM BICARBONATE 650 MG TABLET 650 MG: at 11:39

## 2017-01-19 NOTE — PLAN OF CARE
Problem: Goal Outcome Summary  Goal: Goal Outcome Summary  Outcome: Improving  Pt a&ox4. Up independently. VSS on ra. Pt tolerating mod carb/renal diet well. Dialysis port cdi. Infrequent cough. /185. D/c tomorrow post dialysis.

## 2017-01-19 NOTE — DISCHARGE SUMMARY
[facility]    Discharge Summary  Hospitalist    Date of Admission:  1/15/2017  Date of Discharge:  1/19/2017  Discharging Provider: Tad Barker MD  Date of Service: 01/19/2017    Discharge Diagnoses  Pneumonia of left lower lobe due to infectious organism  ESRD (end stage renal disease) on dialysis  Hemoptysis  Fever  Nausea and LLQ abdominal pain    History of Present Illness  This is a 71-year-old gentleman who presents with fevers, chills, cough with blood-tinged sputum.     Please refer to HPI for further details.    Hospital Course  The following problems were addressed during his hospitalization.     Healthcare-associated pneumonia (cannot clinically determine organism)  Cough, fevers, chills, some blood-tinged sputum, x-ray findings of a left infrahilar consolidation. Small bilateral pleural effusions also seen.  Recent hospitalization in 12/2016.  History of COPD.  Leukocytosis and low grade temp resolved with antibiotics. Procalcitonin elevated to 10.31.  No evidence of sepsis.  - Continue Levaquin on discharge for another 2 doses every 48 hours  - vancomycin stopped 1/16, Zosyn stopped 1/18  - Sputum culture normal memo, Blood culture NGTD  - continue Qvar, fluticasone, albuterol  - Not hypoxic  - Ambulating independently, no need for PT  - Recommend repeat CXR in 1-2 months    Nausea and LLQ abdominal pain  Much improved, possibly related to constipation or urinary retention  - Lipase and LFT's normal    History of CAD, hypertension, chronic systolic and diastolic CHF    BP uncontrolled thus far today due to not taking his BP meds until after dialysis today  - Continue PTA aspirin, atorvastatin, Bumex, Coreg, clonidine, hydralazine, lisinopril, Imdur     ESRD  On hemodialysis on Tuesdays, Thursdays and Saturdays.  He does have temporary right-sided dialysis catheter and reports that he has an AV fistula/graft waiting for maturation on his right arm.    - Nephrology consulted, dialyzed  "on 1/17 and 1/19  - Continue PTA PhosLo and sodium bicarbonate     Type 2 diabetes.    - Increased Lantus to 16 units nightly due to A1c of 7.6 and BG in the 200's     Glaucoma.   - Continue Alphagan and Xalatan eyedrops.     Chronic hepatitis C  Previously followed with Dr. Bacon at [facility], but no longer wants to go back to [facility]  - Will need to re-establish care in a liver clinic for possible chronic hep C treatment  - Appreciate assistance from  in arranging this  - He may also f/u with a liver specialist arranged though his PCP    Depression.    - Will hold citalopram at this time due to potential QT prolongation with use of Levaquin.      Tad Barker MD  [place] [facility]    Unresulted Labs Ordered in the Past 30 Days of this Admission     Date and Time Order Name Status Description    1/15/2017 0615 Blood culture Preliminary     1/15/2017 0615 Blood culture Preliminary           Code Status  Full Code       Primary Care Physician  Precious Echevarria    Physical Exam  /94 mmHg  Pulse 70  Temp(Src) 97.9  F (36.6  C) (Oral)  Resp 16  Ht 1.905 m (6' 3\")  Wt 75.8 kg (167 lb 1.7 oz)  BMI 20.89 kg/m2  SpO2 96%  Constitutional: NAD, awake  HEENT: EOMI   Cardiovascular: S1, S2, regular rhythm  Respiratory: CTAB, no crackles  Gastrointestinal: Soft, NT  Neurologic: No focal deficits    Discharge Disposition  Discharged to home  Condition at discharge: Stable    Consultations This Hospital Stay  PHARMACY TO DOSE VANCO  NEPHROLOGY IP CONSULT  PHYSICAL THERAPY ADULT IP CONSULT  OCCUPATIONAL THERAPY ADULT IP CONSULT  GASTROENTEROLOGY IP CONSULT    Time Spent on This Encounter  ITad, personally saw the patient today and spent greater than 30 minutes discharging this patient.    Discharge Orders    Transitions 30-Day Tel-Assurance   You will receive a phone call for enrollment following hospital discharge. If you have not received a phone call within 24 hours to enroll, please call [phone]. "     Reason for your hospital stay   pneumonia     Follow-up and recommended labs and tests   Follow up with primary care provider, Precious Echevarria, within 7 days for hospital follow- up.  The following labs/tests are recommended: chest x-ray in 1-2 months.     Activity   Your activity upon discharge: activity as tolerated     Full Code     Diet   Follow this diet upon discharge: Orders Placed This Encounter  Combination Diet Dialysis Diet         Discharge Medications  Current Discharge Medication List      START taking these medications    Details   levofloxacin (LEVAQUIN) 500 MG tablet Take 1 tablet (500 mg) by mouth every 48 hours for 2 doses  Qty: 2 tablet, Refills: 0    Associated Diagnoses: Pneumonia of left lower lobe due to infectious organism         CONTINUE these medications which have CHANGED    Details   insulin glargine (LANTUS) 100 UNIT/ML injection Inject 16 Units Subcutaneous At Bedtime  Qty: 6 mL, Refills: 0    Associated Diagnoses: Uncontrolled type 2 diabetes mellitus with complication, with long-term current use of insulin (H)         CONTINUE these medications which have NOT CHANGED    Details   OXYCODONE HCL PO Take 15 mg by mouth every 6 hours Pt states he has been taking around the clock      hydrALAZINE (APRESOLINE) 50 MG tablet Take 1 tablet (50 mg) by mouth 3 times daily  Qty: 90 tablet, Refills: 0    Associated Diagnoses: Benign essential hypertension      bumetanide (BUMEX) 2 MG tablet Take 2 tablets (4 mg) by mouth daily  Qty: 60 tablet, Refills: 2    Associated Diagnoses: Acute on chronic systolic congestive heart failure (H)      folic acid-vit B6-vit B12 (FOLGARD) 0.8-10-0.115 MG TABS per tablet Take 1 tablet by mouth daily  Qty: 30 tablet, Refills: 0    Associated Diagnoses: Anemia of chronic disease      atorvastatin (LIPITOR) 80 MG tablet Take 1 tablet (80 mg) by mouth daily  Qty: 30 tablet, Refills: 0    Associated Diagnoses: Acute diastolic CHF (congestive heart failure) (H)       lisinopril (PRINIVIL/ZESTRIL) 40 MG tablet Take 1 tablet (40 mg) by mouth daily  Qty: 30 tablet, Refills: 0    Associated Diagnoses: Hypertension due to endocrine disorder      carvedilol (COREG) 25 MG tablet Take 1.5 tablets (37.5 mg) by mouth 2 times daily (with meals)  Qty: 100 tablet, Refills: 0    Associated Diagnoses: Acute diastolic CHF (congestive heart failure) (H)      albuterol (PROAIR HFA, PROVENTIL HFA, VENTOLIN HFA) 108 (90 BASE) MCG/ACT inhaler Inhale 2 puffs into the lungs every 4 hours as needed for shortness of breath / dyspnea or wheezing  Qty: 1 Inhaler, Refills: 3    Associated Diagnoses: Chronic obstructive pulmonary disease, unspecified COPD type (H)      guaiFENesin-dextromethorphan (ROBITUSSIN DM) 100-10 MG/5ML syrup Take 10 mLs by mouth every 4 hours as needed for cough  Qty: 560 mL, Refills: 0    Associated Diagnoses: Cough      guaiFENesin-codeine (ROBITUSSIN AC) 100-10 MG/5ML SOLN Take 5 mLs by mouth every 4 hours as needed for cough  Qty: 120 mL, Refills: 0    Associated Diagnoses: Cough      fluticasone (FLOVENT HFA) 110 MCG/ACT inhaler Inhale 2 puffs into the lungs 2 times daily      GABAPENTIN PO Take 200 mg by mouth daily       Isosorbide Mononitrate (IMDUR PO) Take 90 mg by mouth daily       ferrous sulfate (IRON) 325 (65 FE) MG tablet Take 325 mg by mouth 2 times daily      VITAMIN D, CHOLECALCIFEROL, PO Take 1,000 Units by mouth daily      cloNIDine (CATAPRES-TTS2) 0.2 MG/24HR patch Place 1 patch onto the skin once a week      aspirin 81 MG chewable tablet Take 1 tablet (81 mg) by mouth daily  Qty: 36 tablet    Associated Diagnoses: Type 2 diabetes mellitus with diabetic nephropathy (H)      sodium bicarbonate 650 MG tablet Take 1 tablet (650 mg) by mouth 2 times daily    Associated Diagnoses: Chronic kidney disease, stage IV (severe) (H)      albuterol (2.5 MG/3ML) 0.083% nebulizer solution Take 1 vial (2.5 mg) by nebulization every 4 hours as needed for shortness of breath /  dyspnea or wheezing  Qty: 360 mL    Associated Diagnoses: Chronic obstructive pulmonary disease, unspecified COPD type (H)      beclomethasone (QVAR) 40 MCG/ACT Inhaler Inhale 2 puffs into the lungs 2 times daily    Associated Diagnoses: Chronic obstructive pulmonary disease, unspecified COPD type (H)      citalopram (CELEXA) 20 MG tablet Take 1 tablet (20 mg) by mouth daily  Qty: 30 tablet    Associated Diagnoses: Depression      calcium acetate (PHOSLO) 667 MG CAPS Take 1 capsule (667 mg) by mouth 3 times daily (with meals)  Qty: 180 capsule    Associated Diagnoses: Chronic kidney disease, stage IV (severe) (H)      brimonidine (ALPHAGAN-P) 0.15 % ophthalmic solution Place 1 drop into both eyes 3 times daily  Qty: 1 Bottle    Associated Diagnoses: Glaucoma      latanoprost (XALATAN) 0.005 % ophthalmic solution Place 1 drop into both eyes At Bedtime  Qty: 1 Bottle    Associated Diagnoses: Glaucoma           Allergies  Allergies   Allergen Reactions     Morphine Rash     Data  Most Recent 3 CBC's:  Recent Labs   Lab Test  01/18/17   0741  01/16/17   0810  01/15/17   0600   WBC  5.4  10.5  16.2*   HGB  9.8*  10.7*  10.7*   MCV  89  89  90   PLT  156  129*  141*      Most Recent 3 BMP's:  Recent Labs   Lab Test  01/19/17   0735  01/18/17   0741  01/17/17   1230   NA  135  134  135   POTASSIUM  3.6  3.9  3.5   CHLORIDE  98  98  98   CO2  26  27  28   BUN  45*  37*  26   CR  6.16*  5.57*  3.23*   ANIONGAP  11  9  9   ALLYSON  8.4*  8.1*  7.7*   GLC  156*  249*  133*     Most Recent 2 LFT's:  Recent Labs   Lab Test  01/18/17   0741  01/15/17   1530   AST  10  9   ALT  9  9   ALKPHOS  57  53   BILITOTAL  0.3  0.8     Most Recent INR's and Anticoagulation Dosing History:        Anticoagulation Dose History     Recent Dosing and Labs Latest Ref Rng 1/3/2016 1/27/2016 5/5/2016 10/7/2016 12/6/2016 12/12/2016 12/15/2016    INR 0.86 - 1.14 1.0 1.00 1.05 1.09 1.18(H) 1.12 1.17(H)        Most Recent 3 Troponin's:  Recent Labs   Lab  Test  12/07/16   1125  12/07/16   0526  12/06/16   1645   TROPI  0.176*  0.279*  0.255*     Most Recent Cholesterol Panel:  Recent Labs   Lab Test  12/07/16   0526   CHOL  117   LDL  57   HDL  39*   TRIG  104     Most Recent 6 Bacteria Isolates From Any Culture (See EPIC Reports for Culture Details):  Recent Labs   Lab Test  01/15/17   0910  01/15/17   0624  01/15/17   0600  12/06/16   0815  12/06/16   0812  07/01/16   0950   CULT  Moderate growth Normal memo  No growth after 4 days  No growth after 4 days  No growth  No growth  Heavy growth Normal memo  Moderate growth Haemophilus influenzae Beta lactamase negative  Beta-lactamase negative Haemophilus influenzae are usually susceptible to   ampicillin, amoxacillin/clavulanic acid, levofloxacin, and 3rd generation   cephalosporins, such as ceftriaxone.  *  Canceled, Test credited Incorrectly ordered by PCU/Clinic     Most Recent TSH, T4 and A1c Labs:  Recent Labs   Lab Test  01/16/17   0810   01/27/16   0737   TSH   --    --   6.44*   T4   --    --   1.23   A1C  7.6*   < >   --     < > = values in this interval not displayed.     Results for orders placed or performed during the hospital encounter of 01/15/17   XR Chest 2 Views    Narrative    XR CHEST 2 VW 1/15/2017 6:45 AM    HISTORY: Cough.    COMPARISON: 12/7/2016    FINDINGS: Small bilateral pleural effusions. Left infrahilar airspace  opacity. No pneumothorax. Stable heart size. Dual lumen catheter tip  is in the projection of the SVC/RA junction.      Impression    IMPRESSION: Left infrahilar consolidation suggests pneumonia. Small  bilateral pleural effusions as well.    JESSE GRIFFIN MD

## 2017-01-19 NOTE — PROGRESS NOTES
Care Coordinator met with pt yesterday.  He was in agreement for Tele-Assurance and explanation was given again to his Significant Other Elsa, that lives with him.  Pt has a past history of noncompliance, which has improved since elsa has been in his life.  Follow-up appointments have been made.  No other discharge needs have been identified.

## 2017-01-19 NOTE — PLAN OF CARE
Problem: Goal Outcome Summary  Goal: Goal Outcome Summary  Outcome: No Change  Alert & oriented x 4, independent, renal diet, complain of back and leg pain, scheduled oxycodone given, denies shortness of breath, vital signs stable, except BP elevated 174/78, blood glucose 171. Dialysis today, possible discharge after dialysis

## 2017-01-19 NOTE — PROGRESS NOTES
Dialysis Run:  Assumed care from YASIR Grande  Complications:None  Pt was hypertensive thru out run floor nurse Aware  Pt was seen by Dr Curtis  during run  Aseptic prep both on and off procedure.  Heparin instilled in both ports post run   Procedure and ESRD discussed and all questions answered  VSS post run See EPIC for more details  Water detection monitor on floor during run  Pt dialyzes at [facility]   3L fluid removed     Bld Vol 77L     POTASSIUM   Date Value Ref Range Status   01/19/2017 3.6 3.4 - 5.3 mmol/L Final   ]  HEMOGLOBIN   Date Value Ref Range Status   01/18/2017 9.8* 13.3 - 17.7 g/dL Final   ]  CREATININE   Date Value Ref Range Status   01/19/2017 6.16* 0.66 - 1.25 mg/dL Final   ]      Fabiana Hernandez RN [facility]

## 2017-01-19 NOTE — PROGRESS NOTES
Inpatient Dialysis Progress Note           Assessment and Plan:   ESRD, status quo.  Stable dialysis today.  Expect good chemical exchange and improved fluid balance by end of run.  Next HD 1/21 at [facility].  BP high during dialysis, but pt's 6-drug anti-hypertensive regimen was held pre-run.  No change in Rx for HBP indicated at this time.  OK for discharge today from my view.  Calcium Acetate stopped yesterday after review of 1/17 labs, which included Phos 1.7.  Resume as needed when Phos rechecked in out-pt dialysis unit.  Pt's right arm AVF maturing.  Await re-evaluation by Landry Bowman later this month.  Thanks.        Pneumonia    * No resolved hospital problems. *               Interval History:   no new complaints, up and ambulating, alert, oriented to person, place and time and doing well; no cp, sob, n/v/d, or abd pain.  Examined during run.  Feels fine.  VS ok, except BP ~200/100, improving to ~180/95 at end of run.  Eating well.  Wt same as 1/17.  Meds and labs reviewed.  Lytes ok.  K+ continues <4.  BUN/Cr in expected higher range.  Glucoses ok, 150-200.                 Dialysis Details:   Current weight: 75.8 kg (actual weight)  Dry Weight: TBD  Dialysis Temp: 36.5  C  Access Device: IJ catheter  Access Site: right  Dialyzer: Optiflux 160  Dialysis Bath: K+ 3  Sodium Profile: no  UF Goal: 3L  Blood Flow Rate (mL/min): 400  Total Treatment Time (hrs): 3.5  Heparin: Low dose as required    Medications:  EPO dose: 5000  Zemplar: 4mcg  IV Fe: no            Medications:       sodium chloride 0.9%  250-1,000 mL Intravenous Once in dialysis     insulin glargine  16 Units Subcutaneous At Bedtime     epoetin antwon (EPOGEN,PROCRIT) inj ESRD  5,000 Units Intravenous Once per day on Tue Thu Sat     doxercalciferol  4 mcg Intravenous Once per day on Tue Thu Sat     - MEDICATION INSTRUCTIONS for Dialysis Patients -   Does not apply See Admin Instructions     lisinopril  40 mg Oral Daily     acetaminophen  650  mg Oral Once     aspirin  81 mg Oral Daily     atorvastatin  80 mg Oral Daily     bumetanide  4 mg Oral Daily     carvedilol  37.5 mg Oral BID w/meals     cloNIDine  1 patch Transdermal Weekly     ferrous sulfate  325 mg Oral BID     folic acid-vit B6-vit B12  1 tablet Oral Daily     gabapentin (NEURONTIN) capsule 200 mg  200 mg Oral Daily     hydrALAZINE  50 mg Oral TID     isosorbide mononitrate (IMDUR) 24 hr tablet 90 mg  90 mg Oral Daily     latanoprost  1 drop Both Eyes At Bedtime     sodium bicarbonate  650 mg Oral BID     levofloxacin  500 mg Intravenous Q48H     [START ON 1/22/2017] cloNIDine   Transdermal Weekly     cloNIDine   Transdermal Q8H     fluticasone  1 puff Inhalation Q12H     brimonidine  1 drop Both Eyes TID     cholecalciferol  1,000 Units Oral Daily     oxyCODONE (ROXICODONE) IR tablet 15 mg  15 mg Oral Q6H     insulin aspart  1-7 Units Subcutaneous TID AC     insulin aspart  1-5 Units Subcutaneous At Bedtime       heparin (porcine) 500 Units/hr (01/19/17 0730)                    Physical Exam:       Vital Sign Ranges  Temp:  [97.7  F (36.5  C)-97.9  F (36.6  C)] 97.9  F (36.6  C)  Pulse:  [69-75] 70  Heart Rate:  [69-71] 69  Resp:  [16-18] 16  BP: (156-210)/(78-99) 179/94 mmHg  SpO2:  [94 %-96 %] 96 %    Weight, current: 75.8 kg (actual weight)  Weight change:          Physical Exam:    General:  Patient comfortable, in no apparent distress.  Awake, alert, oriented x3.  Neck:  Supple, no JVD.  Lungs:  Clear to auscultation bilaterally.  Cardiac:  Regular rate and rhythm, no murmurs, rub, or gallops.  Abdomen:  Soft, nontender, physiologic sounds.  Extremities:  Without edema.  2+ pulses.  Right arm AVF developing.  Skin:  Warm, dry.  Neurologic:  No focal deficits.             Data:        Lab Results   Component Value Date     01/19/2017    Lab Results   Component Value Date    CHLORIDE 98 01/19/2017    Lab Results   Component Value Date    BUN 45* 01/19/2017      Lab Results    Component Value Date    POTASSIUM 3.6 01/19/2017    Lab Results   Component Value Date    CO2 26 01/19/2017    Lab Results   Component Value Date    CR 6.16* 01/19/2017        Lab Results   Component Value Date     01/19/2017     01/18/2017     01/17/2017     Lab Results   Component Value Date    POTASSIUM 3.6 01/19/2017    POTASSIUM 3.9 01/18/2017    POTASSIUM 3.5 01/17/2017     Lab Results   Component Value Date    CHLORIDE 98 01/19/2017    CHLORIDE 98 01/18/2017    CHLORIDE 98 01/17/2017     Lab Results   Component Value Date    CO2 26 01/19/2017    CO2 27 01/18/2017    CO2 28 01/17/2017     Lab Results   Component Value Date    CR 6.16* 01/19/2017    CR 5.57* 01/18/2017    CR 3.23* 01/17/2017     Lab Results   Component Value Date    BUN 45* 01/19/2017    BUN 37* 01/18/2017    BUN 26 01/17/2017     Lab Results   Component Value Date    HGB 9.8* 01/18/2017    HGB 10.7* 01/16/2017    HGB 10.7* 01/15/2017     Lab Results   Component Value Date    PH 7.36 12/06/2016    PO2 54* 12/06/2016    PCO2 36 12/06/2016    HCO3 21 12/06/2016    BERTHA 2.9 08/05/2016           Chas Curtis MD  Nephrology; [employer]  [phone]

## 2017-01-19 NOTE — DISCHARGE INSTRUCTIONS
The following appointments have been scheduled for you:  Dr Echevarria  Monday 1/23/17 at 10:00AM  [facility]  [phone]    [facility]  Xu Schaffer PA-C  Monday 2/6/17 at 9:15AM, please arrive at 9:00AM  [address] [facility]  [phone]    - Recommend not taking citalopram until you have completed your final dose of Levaquin antibiotic

## 2017-01-19 NOTE — PROGRESS NOTES
POTASSIUM   Date Value Ref Range Status   01/18/2017 3.9 3.4 - 5.3 mmol/L Final     HGB      9.8   1/18/2017  Weight: 75.8 kg (167 lb 1.7 oz)  POST WT 72.8 kg  DIALYSIS PROCEDURE NOTE    Patient dialyzed for 3.5 hrs. on a 3 K bath with a net fluid removal of  3 L.  A BFR of 400  ml/min was obtained via a RIJ. The patient was seen by Dr. Curtis during the treatment.  Total heparin received during the treatment: 1000 units.  Meds  Given: Epogen 5,000 units IV, Hectorol 4 mcg IV. Complications: Patient hypertensive throughout treatment.  Procedure and ESRD teaching done and questions answered. See flowsheet in EPIC for further details and post assessment.  Machine water alarm in place and functioning.  Consent for dialysis signed.  Pt returned via wheelchair alert and oriented to room 613.  Vascular Access: Aseptic prep done for both on/off.  Report received from: Sridhar Perdomo RN  Report given to: Fabiana Hernandez RN  HEPATITIS B SURFACE ANTIGEN non-reactive. DATE 12-8-16. HEPATITIS B SURFACE ANTIBODY immune DATE 12-8-16.  Chlorine/Chloramine water system checked every 4 hours.  Pt expected to discharge today after dialysis, 1-19-17.      Jyoti Grande RN  [facility]

## 2017-01-21 LAB
BACTERIA SPEC CULT: NO GROWTH
BACTERIA SPEC CULT: NO GROWTH
Lab: NORMAL
Lab: NORMAL
MICRO REPORT STATUS: NORMAL
MICRO REPORT STATUS: NORMAL
SPECIMEN SOURCE: NORMAL
SPECIMEN SOURCE: NORMAL

## 2017-01-25 ENCOUNTER — TRANSFERRED RECORDS (OUTPATIENT)
Dept: HEALTH INFORMATION MANAGEMENT | Facility: CLINIC | Age: 72
End: 2017-01-25

## 2017-02-13 ENCOUNTER — THERAPY VISIT (OUTPATIENT)
Dept: PHYSICAL THERAPY | Facility: CLINIC | Age: 72
End: 2017-02-13
Payer: MEDICARE

## 2017-02-13 DIAGNOSIS — M54.50 ACUTE BILATERAL LOW BACK PAIN WITHOUT SCIATICA: Primary | ICD-10-CM

## 2017-02-13 PROCEDURE — G8978 MOBILITY CURRENT STATUS: HCPCS | Mod: GP | Performed by: PHYSICAL THERAPIST

## 2017-02-13 PROCEDURE — 97110 THERAPEUTIC EXERCISES: CPT | Mod: GP | Performed by: PHYSICAL THERAPIST

## 2017-02-13 PROCEDURE — 97112 NEUROMUSCULAR REEDUCATION: CPT | Mod: GP | Performed by: PHYSICAL THERAPIST

## 2017-02-13 PROCEDURE — G8979 MOBILITY GOAL STATUS: HCPCS | Mod: GP | Performed by: PHYSICAL THERAPIST

## 2017-02-13 PROCEDURE — 97162 PT EVAL MOD COMPLEX 30 MIN: CPT | Mod: GP | Performed by: PHYSICAL THERAPIST

## 2017-02-13 NOTE — LETTER
DEPARTMENT OF HEALTH AND HUMAN SERVICES  CENTERS FOR MEDICARE & MEDICAID SERVICES    PLAN/UPDATED PLAN OF PROGRESS FOR OUTPATIENT REHABILITATION    PATIENTS NAME:  Keagan Wayne     : 1945    PROVID[facility] NUMBER:    [id number]    [id number]    PROVIDER NAME: [facility]    MEDICAL RECORD NUMBER: [id number]     START OF CARE DATE:  SOC Date: 17   TYPE:  PT    PRIMARY/TREATMENT DIAGNOSIS: (Pertinent Medical Diagnosis)  Acute bilateral low back pain without sciatica    VISITS FROM START OF CARE:  Rxs Used: 1     [facility] Initial Evaluation    Subjective:    Keagan Wayne is a 71 year old male with a lumbar condition.  Condition occurred with:  Insidious onset.  Condition occurred: for unknown reasons.  This is a new condition  Pt presents to clinic with complaints of bilateral low back pain and body aches starting after beginning dialysis treatment 2016. Pt currently has dialysis 3 days/week, often feeling very fatigued or weak the following day. Pt also was recently hospitalized with pneumonia. Pt had MD appointment on 17 and referred to PT. .    Patient reports pain:  Mid lumbar spine and lower lumbar spine.  Radiates to:  No radiation.  Pain is described as aching and is intermittent and reported as 7/10 and 4/10.  Associated symptoms:  Tingling, loss of motion/stiffness, loss of strength, fatigue and numbness. Pain is the same all the time.  Symptoms are exacerbated by walking, twisting and bending and relieved by analgesics and activity/movement.  Since onset symptoms are unchanged.  Special testing: none.  Previous treatment: none.    General health as reported by patient is poor.Pertinent medical history includes:  Diabetes, high blood pressure, heart problems, implanted device, kidney disease and hepatitis.  Medical allergies: yes.    Current medications:  Cardiac medication and high blood pressure medication.  Current occupation is Retired  Barriers include:  None as reported by the patient.Red flags:  None as reported by the patient.    Objective:    Standing Alignment:    Cervical/Thoracic:  Forward head  Shoulder/UE:  Rounded shoulders    Flexibility/Screens:     Lower Extremity:  Decreased left lower extremity flexibility:Quadriceps and Hamstrings    Decreased right lower extremity flexibility:  Quadriceps and Hamstrings         Lumbar/SI Evaluation  ROM:    AROM Lumbar:   Flexion:            To mid tibia slight pain  Ext:                    50%   Side Bend:        Left:  50%    Right:  50%  Rotation:           Left:  50%    Right:  50%  Side Glide:        Left:     Right:         Strength: poor abdominal strength  Lumbar Myotomes:    T12-L3 (Hip Flex):  Left: 5    Right: 5  L2-4 (Quads):  Left:  5    Right:  5  L4 (Ankle DF):  Left:  4+    Right:  5  L5 (Great Toe Ext): Left: 5    Right: 5   S1 (Toe Raise):  Left: 4+    Right: 4+  Lumbar DTR's:  normal    Lumbar Dermtomes:  normal    Neural Tension/Mobility:      Left side:SLR; Femoral Nerve or Slump  negative.     Right side:   Femoral Nerve; Slump or SLR  negative.   Lumbar Palpation:    Tenderness present at Left:    Quadratus Lumborum and Erector Spinae  Tenderness not present at Left:    Piriformis  Tenderness present at Right: Quadratus Lumborum and Erector Spinae  Tenderness not present at Right:  Piriformis    Cervical/Thoracic Evaluation    AROM:  AROM Cervical:    Flexion:          WNL  Extension:       50%  Rotation:         Left: 50%     Right: 50%  Side Bend:      Left:     Right:         Assessment/Plan:    Patient is a 71 year old male with lumbar complaints.    Patient has the following significant findings with corresponding treatment plan.                Diagnosis 1:  Low back pain, generalized weakness  Pain -  hot/cold therapy, manual therapy, self management, education and home program  Decreased ROM/flexibility - manual therapy, therapeutic exercise, therapeutic activity and  home program  Decreased strength - therapeutic exercise, therapeutic activities and home program  Impaired muscle performance - neuro re-education and home program  Decreased function - therapeutic activities and home program  Impaired posture - neuro re-education, therapeutic activities and home program    Therapy Evaluation Codes:   1) History comprised of:   Personal factors that impact the plan of care:      Age.    Comorbidity factors that impact the plan of care are:      Diabetes, Heart problems, High blood pressure, Implanted device, Pain at night/rest and Weakness.     Medications impacting care: Cardiac and High blood pressure.  2) Examination of Body Systems comprised of:   Body structures and functions that impact the plan of care:      Cervical spine, Hip, Lumbar spine and Pelvis.   Activity limitations that impact the plan of care are:      Bending, Dressing, Lifting, Sitting, Stairs, Standing, Walking and Laying down.  3) Clinical presentation characteristics are:   Evolving/Changing.  4) Decision-Making    Moderate complexity using standardized patient assessment instrument and/or measureable assessment of functional outcome.  Cumulative Therapy Evaluation is: Moderate complexity.    Previous and current functional limitations:  (See Goal Flow Sheet for this information)    Short term and Long term goals: (See Goal Flow Sheet for this information)     Communication ability:  Patient appears to be able to clearly communicate and understand verbal and written communication and follow directions correctly.  Treatment Explanation - The following has been discussed with the patient:   RX ordered/plan of care  Anticipated outcomes  Possible risks and side effects  This patient would benefit from PT intervention to resume normal activities.   Rehab potential is good.    Frequency:  1 X week, once daily  Duration:  for 8 weeks  Discharge Plan:  Achieve all LTG.  Independent in home treatment program.  Return  "to previous functional level by discharge.  Reach maximal therapeutic benefit.    Caregiver Signature/Credentials _____________________________ Date ________       Treating Provider: Michael Correia DPT   I have reviewed and certified the need for these services and plan of treatment while under my care.        PHYSICIAN'S SIGNATURE:   _________________________________________  Date___________   Xu Marcelino Atwood    Certification period:  Beginning of Cert date period: 02/13/17 to  End of Cert period date: 05/13/17     Functional Level Progress Report: Please see attached \"Goal Flow sheet for Functional level.\"    ____X____ Continue Services or       ________ DC Services                Service dates: From  SOC Date: 02/13/17 date to present                         "

## 2017-02-13 NOTE — PROGRESS NOTES
Subjective:    Keagan Wayne is a 71 year old male with a lumbar condition.  Condition occurred with:  Insidious onset.  Condition occurred: for unknown reasons.  This is a new condition  Pt presents to clinic with complaints of bilateral low back pain and body aches starting after beginning dialysis treatment December 2016. Pt currently has dialysis 3 days/week, often feeling very fatigued or weak the following day. Pt also was recently hospitalized with pneumonia. Pt had MD appointment on 1/24/17 and referred to PT. .    Patient reports pain:  Mid lumbar spine and lower lumbar spine.  Radiates to:  No radiation.  Pain is described as aching and is intermittent and reported as 7/10 and 4/10.  Associated symptoms:  Tingling, loss of motion/stiffness, loss of strength, fatigue and numbness. Pain is the same all the time.  Symptoms are exacerbated by walking, twisting and bending and relieved by analgesics and activity/movement.  Since onset symptoms are unchanged.  Special testing: none.  Previous treatment: none.    General health as reported by patient is poor.                  Barriers include:  None as reported by the patient.    Red flags:  None as reported by the patient.                      Objective:    Standing Alignment:    Cervical/Thoracic:  Forward head  Shoulder/UE:  Rounded shoulders                  Flexibility/Screens:       Lower Extremity:  Decreased left lower extremity flexibility:Quadriceps and Hamstrings    Decreased right lower extremity flexibility:  Quadriceps and Hamstrings               Lumbar/SI Evaluation  ROM:    AROM Lumbar:   Flexion:            To mid tibia slight pain  Ext:                    50%   Side Bend:        Left:  50%    Right:  50%  Rotation:           Left:  50%    Right:  50%  Side Glide:        Left:     Right:         Strength: poor abdominal strength  Lumbar Myotomes:    T12-L3 (Hip Flex):  Left: 5    Right: 5  L2-4 (Quads):  Left:  5    Right:  5  L4 (Ankle DF):   Left:  4+    Right:  5  L5 (Great Toe Ext): Left: 5    Right: 5   S1 (Toe Raise):  Left: 4+    Right: 4+  Lumbar DTR's:  normal        Lumbar Dermtomes:  normal                Neural Tension/Mobility:      Left side:SLR; Femoral Nerve or Slump  negative.     Right side:   Femoral Nerve; Slump or SLR  negative.   Lumbar Palpation:    Tenderness present at Left:    Quadratus Lumborum and Erector Spinae  Tenderness not present at Left:    Piriformis  Tenderness present at Right: Quadratus Lumborum and Erector Spinae  Tenderness not present at Right:  Piriformis             Cervical/Thoracic Evaluation    AROM:  AROM Cervical:    Flexion:          WNL  Extension:       50%  Rotation:         Left: 50%     Right: 50%  Side Bend:      Left:     Right:                                                                   [facility] Initial Evaluation    Assessment/Plan:      Patient is a 71 year old male with lumbar complaints.    Patient has the following significant findings with corresponding treatment plan.                Diagnosis 1:  Low back pain, generalized weakness  Pain -  hot/cold therapy, manual therapy, self management, education and home program  Decreased ROM/flexibility - manual therapy, therapeutic exercise, therapeutic activity and home program  Decreased strength - therapeutic exercise, therapeutic activities and home program  Impaired muscle performance - neuro re-education and home program  Decreased function - therapeutic activities and home program  Impaired posture - neuro re-education, therapeutic activities and home program    Therapy Evaluation Codes:   1) History comprised of:   Personal factors that impact the plan of care:      Age.    Comorbidity factors that impact the plan of care are:      Diabetes, Heart problems, High blood pressure, Implanted device, Pain at night/rest and Weakness.     Medications impacting care: Cardiac and High blood  pressure.  2) Examination of Body Systems comprised of:   Body structures and functions that impact the plan of care:      Cervical spine, Hip, Lumbar spine and Pelvis.   Activity limitations that impact the plan of care are:      Bending, Dressing, Lifting, Sitting, Stairs, Standing, Walking and Laying down.  3) Clinical presentation characteristics are:   Evolving/Changing.  4) Decision-Making    Moderate complexity using standardized patient assessment instrument and/or measureable assessment of functional outcome.  Cumulative Therapy Evaluation is: Moderate complexity.    Previous and current functional limitations:  (See Goal Flow Sheet for this information)    Short term and Long term goals: (See Goal Flow Sheet for this information)     Communication ability:  Patient appears to be able to clearly communicate and understand verbal and written communication and follow directions correctly.  Treatment Explanation - The following has been discussed with the patient:   RX ordered/plan of care  Anticipated outcomes  Possible risks and side effects  This patient would benefit from PT intervention to resume normal activities.   Rehab potential is good.    Frequency:  1 X week, once daily  Duration:  for 8 weeks  Discharge Plan:  Achieve all LTG.  Independent in home treatment program.  Return to previous functional level by discharge.  Reach maximal therapeutic benefit.    Please refer to the daily flowsheet for treatment today, total treatment time and time spent performing 1:1 timed codes.

## 2017-02-14 NOTE — PROGRESS NOTES
Subjective:                                       Pertinent medical history includes:  Diabetes, high blood pressure, heart problems, implanted device, kidney disease and hepatitis.  Medical allergies: yes.    Current medications:  Cardiac medication and high blood pressure medication.  Current occupation is Retired  .                                  Objective:    System    Physical Exam    General     ROS    Assessment/Plan:

## 2017-02-20 ENCOUNTER — THERAPY VISIT (OUTPATIENT)
Dept: PHYSICAL THERAPY | Facility: CLINIC | Age: 72
End: 2017-02-20
Payer: MEDICARE

## 2017-02-20 DIAGNOSIS — M54.50 ACUTE BILATERAL LOW BACK PAIN WITHOUT SCIATICA: ICD-10-CM

## 2017-02-20 PROCEDURE — 97112 NEUROMUSCULAR REEDUCATION: CPT | Mod: GP | Performed by: PHYSICAL THERAPIST

## 2017-02-20 PROCEDURE — 97110 THERAPEUTIC EXERCISES: CPT | Mod: GP | Performed by: PHYSICAL THERAPIST

## 2017-02-22 ENCOUNTER — TRANSFERRED RECORDS (OUTPATIENT)
Dept: HEALTH INFORMATION MANAGEMENT | Facility: CLINIC | Age: 72
End: 2017-02-22

## 2017-02-27 ENCOUNTER — HOSPITAL ENCOUNTER (OUTPATIENT)
Dept: ULTRASOUND IMAGING | Facility: CLINIC | Age: 72
Discharge: HOME | End: 2017-02-27
Attending: SURGERY | Admitting: SURGERY
Payer: MEDICARE

## 2017-02-27 DIAGNOSIS — I77.0 AVF (ARTERIOVENOUS FISTULA) (H): ICD-10-CM

## 2017-02-27 PROCEDURE — 93990 DOPPLER FLOW TESTING: CPT

## 2017-03-13 ENCOUNTER — HOSPITAL ENCOUNTER (OUTPATIENT)
Dept: ULTRASOUND IMAGING | Facility: CLINIC | Age: 72
Discharge: HOME | End: 2017-03-13
Attending: SURGERY | Admitting: SURGERY
Payer: MEDICARE

## 2017-03-13 ENCOUNTER — THERAPY VISIT (OUTPATIENT)
Dept: PHYSICAL THERAPY | Facility: CLINIC | Age: 72
End: 2017-03-13
Payer: MEDICARE

## 2017-03-13 DIAGNOSIS — N18.6 END STAGE RENAL DISEASE ON DIALYSIS (H): ICD-10-CM

## 2017-03-13 DIAGNOSIS — M54.50 ACUTE BILATERAL LOW BACK PAIN WITHOUT SCIATICA: ICD-10-CM

## 2017-03-13 DIAGNOSIS — Z99.2 END STAGE RENAL DISEASE ON DIALYSIS (H): ICD-10-CM

## 2017-03-13 PROCEDURE — 97110 THERAPEUTIC EXERCISES: CPT | Mod: GP | Performed by: PHYSICAL THERAPIST

## 2017-03-13 PROCEDURE — 93990 DOPPLER FLOW TESTING: CPT

## 2017-03-13 PROCEDURE — 97112 NEUROMUSCULAR REEDUCATION: CPT | Mod: GP | Performed by: PHYSICAL THERAPIST

## 2017-03-21 ENCOUNTER — TELEPHONE (OUTPATIENT)
Dept: OTHER | Facility: CLINIC | Age: 72
End: 2017-03-21

## 2017-03-21 NOTE — TELEPHONE ENCOUNTER
I called [facility], spoke to Dolores, explained Dr. Bowman's recommendation that they can start using pts fistula tomorrow. Dolores notes understanding.     Joann Davis, RN, BSN.

## 2017-03-21 NOTE — TELEPHONE ENCOUNTER
Keagan Morenot Replacement of a right distal radial to cephalic arterial venous fistula.  He previously undergone an upper arm distal radial to cephalic AVF.  Instead of mobilizing this I decided since he had a very well visualized cephalic vein to do the mentioned operation.  He's been working on his tourniquet exercises and clinically the vein is of good caliber.    However, on his last visit dated evaluated the first stage of the fistula in the upper arm but not the newer fistula in the forearm.  This had to be rescheduled and the exam was performed on 3/13/2017..    This reveals a widely patent fistula the diameter 4-5 mm in the forearm without a tourniquet.  There is no evidence of stenosis.  Good outflow was noted.    His dialysis center has reported that the fistula is functioning well and the ready to start using this.  With the ultrasound report as above I feel this is appropriate and we will contact them so they can use the fistula tomorrow.       Contreras Bwoman MD

## 2017-03-31 ENCOUNTER — HOSPITAL ENCOUNTER (OUTPATIENT)
Facility: CLINIC | Age: 72
Setting detail: OBSERVATION
Discharge: HOME | End: 2017-03-31
Attending: EMERGENCY MEDICINE | Admitting: INTERNAL MEDICINE
Payer: MEDICARE

## 2017-03-31 ENCOUNTER — APPOINTMENT (OUTPATIENT)
Dept: GENERAL RADIOLOGY | Facility: CLINIC | Age: 72
End: 2017-03-31
Attending: EMERGENCY MEDICINE
Payer: MEDICARE

## 2017-03-31 VITALS
SYSTOLIC BLOOD PRESSURE: 146 MMHG | TEMPERATURE: 97.9 F | RESPIRATION RATE: 18 BRPM | DIASTOLIC BLOOD PRESSURE: 81 MMHG | BODY MASS INDEX: 20.87 KG/M2 | OXYGEN SATURATION: 94 % | WEIGHT: 167 LBS | HEART RATE: 95 BPM

## 2017-03-31 DIAGNOSIS — E87.70 HYPERVOLEMIA, UNSPECIFIED HYPERVOLEMIA TYPE: ICD-10-CM

## 2017-03-31 LAB
ANION GAP SERPL CALCULATED.3IONS-SCNC: 12 MMOL/L (ref 3–14)
BASE DEFICIT BLDV-SCNC: 3.8 MMOL/L
BASOPHILS # BLD AUTO: 0.1 10E9/L (ref 0–0.2)
BASOPHILS NFR BLD AUTO: 0.9 %
BUN SERPL-MCNC: 62 MG/DL (ref 7–30)
CALCIUM SERPL-MCNC: 8.2 MG/DL (ref 8.5–10.1)
CHLORIDE SERPL-SCNC: 101 MMOL/L (ref 94–109)
CO2 SERPL-SCNC: 22 MMOL/L (ref 20–32)
CREAT SERPL-MCNC: 5.12 MG/DL (ref 0.66–1.25)
DIFFERENTIAL METHOD BLD: ABNORMAL
EOSINOPHIL # BLD AUTO: 0.5 10E9/L (ref 0–0.7)
EOSINOPHIL NFR BLD AUTO: 8.4 %
ERYTHROCYTE [DISTWIDTH] IN BLOOD BY AUTOMATED COUNT: 14.2 % (ref 10–15)
GFR SERPL CREATININE-BSD FRML MDRD: 11 ML/MIN/1.7M2
GLUCOSE BLDC GLUCOMTR-MCNC: 205 MG/DL (ref 70–99)
GLUCOSE BLDC GLUCOMTR-MCNC: 255 MG/DL (ref 70–99)
GLUCOSE SERPL-MCNC: 315 MG/DL (ref 70–99)
HCO3 BLDV-SCNC: 23 MMOL/L (ref 21–28)
HCT VFR BLD AUTO: 33.1 % (ref 40–53)
HGB BLD-MCNC: 10.6 G/DL (ref 13.3–17.7)
IMM GRANULOCYTES # BLD: 0 10E9/L (ref 0–0.4)
IMM GRANULOCYTES NFR BLD: 0 %
INTERPRETATION ECG - MUSE: NORMAL
LYMPHOCYTES # BLD AUTO: 1.8 10E9/L (ref 0.8–5.3)
LYMPHOCYTES NFR BLD AUTO: 31.2 %
MCH RBC QN AUTO: 28.7 PG (ref 26.5–33)
MCHC RBC AUTO-ENTMCNC: 32 G/DL (ref 31.5–36.5)
MCV RBC AUTO: 90 FL (ref 78–100)
MONOCYTES # BLD AUTO: 0.3 10E9/L (ref 0–1.3)
MONOCYTES NFR BLD AUTO: 5 %
NEUTROPHILS # BLD AUTO: 3.2 10E9/L (ref 1.6–8.3)
NEUTROPHILS NFR BLD AUTO: 54.5 %
NRBC # BLD AUTO: 0 10*3/UL
NRBC BLD AUTO-RTO: 0 /100
OXYHGB MFR BLDV: 89 %
PCO2 BLDV: 47 MM HG (ref 40–50)
PH BLDV: 7.29 PH (ref 7.32–7.43)
PLATELET # BLD AUTO: 169 10E9/L (ref 150–450)
PO2 BLDV: 68 MM HG (ref 25–47)
POTASSIUM SERPL-SCNC: 4.6 MMOL/L (ref 3.4–5.3)
RBC # BLD AUTO: 3.69 10E12/L (ref 4.4–5.9)
SODIUM SERPL-SCNC: 135 MMOL/L (ref 133–144)
WBC # BLD AUTO: 5.8 10E9/L (ref 4–11)

## 2017-03-31 PROCEDURE — 25000131 ZZH RX MED GY IP 250 OP 636 PS 637: Mod: GY | Performed by: INTERNAL MEDICINE

## 2017-03-31 PROCEDURE — 96360 HYDRATION IV INFUSION INIT: CPT

## 2017-03-31 PROCEDURE — 94640 AIRWAY INHALATION TREATMENT: CPT | Mod: 76

## 2017-03-31 PROCEDURE — G0378 HOSPITAL OBSERVATION PER HR: HCPCS

## 2017-03-31 PROCEDURE — 25000128 H RX IP 250 OP 636: Performed by: INTERNAL MEDICINE

## 2017-03-31 PROCEDURE — 90937 HEMODIALYSIS REPEATED EVAL: CPT

## 2017-03-31 PROCEDURE — 71010 XR CHEST PORT 1 VW: CPT

## 2017-03-31 PROCEDURE — 25000132 ZZH RX MED GY IP 250 OP 250 PS 637: Mod: GY | Performed by: INTERNAL MEDICINE

## 2017-03-31 PROCEDURE — 00000146 ZZHCL STATISTIC GLUCOSE BY METER IP

## 2017-03-31 PROCEDURE — 80048 BASIC METABOLIC PNL TOTAL CA: CPT | Performed by: EMERGENCY MEDICINE

## 2017-03-31 PROCEDURE — 40000275 ZZH STATISTIC RCP TIME EA 10 MIN

## 2017-03-31 PROCEDURE — 99285 EMERGENCY DEPT VISIT HI MDM: CPT | Mod: 25

## 2017-03-31 PROCEDURE — G0257 UNSCHED DIALYSIS ESRD PT HOS: HCPCS

## 2017-03-31 PROCEDURE — 94640 AIRWAY INHALATION TREATMENT: CPT

## 2017-03-31 PROCEDURE — 25000125 ZZHC RX 250: Performed by: EMERGENCY MEDICINE

## 2017-03-31 PROCEDURE — 99236 HOSP IP/OBS SAME DATE HI 85: CPT | Performed by: INTERNAL MEDICINE

## 2017-03-31 PROCEDURE — 63400005 ZZH RX 634: Performed by: INTERNAL MEDICINE

## 2017-03-31 PROCEDURE — 94660 CPAP INITIATION&MGMT: CPT

## 2017-03-31 PROCEDURE — A9270 NON-COVERED ITEM OR SERVICE: HCPCS | Mod: GY | Performed by: INTERNAL MEDICINE

## 2017-03-31 PROCEDURE — 82805 BLOOD GASES W/O2 SATURATION: CPT | Performed by: EMERGENCY MEDICINE

## 2017-03-31 PROCEDURE — 85025 COMPLETE CBC W/AUTO DIFF WBC: CPT | Performed by: EMERGENCY MEDICINE

## 2017-03-31 PROCEDURE — 25000131 ZZH RX MED GY IP 250 OP 636 PS 637: Mod: GY | Performed by: EMERGENCY MEDICINE

## 2017-03-31 RX ORDER — ONDANSETRON 2 MG/ML
4 INJECTION INTRAMUSCULAR; INTRAVENOUS EVERY 6 HOURS PRN
Status: DISCONTINUED | OUTPATIENT
Start: 2017-03-31 | End: 2017-03-31 | Stop reason: HOSPADM

## 2017-03-31 RX ORDER — LIDOCAINE 40 MG/G
CREAM TOPICAL
Status: DISCONTINUED | OUTPATIENT
Start: 2017-03-31 | End: 2017-03-31 | Stop reason: HOSPADM

## 2017-03-31 RX ORDER — POLYETHYLENE GLYCOL 3350 17 G/17G
17 POWDER, FOR SOLUTION ORAL DAILY PRN
Status: DISCONTINUED | OUTPATIENT
Start: 2017-03-31 | End: 2017-03-31 | Stop reason: HOSPADM

## 2017-03-31 RX ORDER — IPRATROPIUM BROMIDE AND ALBUTEROL SULFATE 2.5; .5 MG/3ML; MG/3ML
3 SOLUTION RESPIRATORY (INHALATION) ONCE
Status: COMPLETED | OUTPATIENT
Start: 2017-03-31 | End: 2017-03-31

## 2017-03-31 RX ORDER — SODIUM BICARBONATE 650 MG/1
650 TABLET ORAL 2 TIMES DAILY
Status: DISCONTINUED | OUTPATIENT
Start: 2017-03-31 | End: 2017-03-31 | Stop reason: HOSPADM

## 2017-03-31 RX ORDER — ACETAMINOPHEN 650 MG/1
650 SUPPOSITORY RECTAL EVERY 4 HOURS PRN
Status: DISCONTINUED | OUTPATIENT
Start: 2017-03-31 | End: 2017-03-31 | Stop reason: HOSPADM

## 2017-03-31 RX ORDER — ALBUTEROL SULFATE 0.83 MG/ML
1 SOLUTION RESPIRATORY (INHALATION) EVERY 4 HOURS PRN
Status: DISCONTINUED | OUTPATIENT
Start: 2017-03-31 | End: 2017-03-31 | Stop reason: HOSPADM

## 2017-03-31 RX ORDER — NALOXONE HYDROCHLORIDE 0.4 MG/ML
.1-.4 INJECTION, SOLUTION INTRAMUSCULAR; INTRAVENOUS; SUBCUTANEOUS
Status: DISCONTINUED | OUTPATIENT
Start: 2017-03-31 | End: 2017-03-31 | Stop reason: HOSPADM

## 2017-03-31 RX ORDER — PROCHLORPERAZINE MALEATE 5 MG
5 TABLET ORAL EVERY 6 HOURS PRN
Status: DISCONTINUED | OUTPATIENT
Start: 2017-03-31 | End: 2017-03-31 | Stop reason: HOSPADM

## 2017-03-31 RX ORDER — CARVEDILOL 12.5 MG/1
37.5 TABLET ORAL 2 TIMES DAILY WITH MEALS
Status: DISCONTINUED | OUTPATIENT
Start: 2017-03-31 | End: 2017-03-31 | Stop reason: HOSPADM

## 2017-03-31 RX ORDER — ISOSORBIDE MONONITRATE 30 MG/1
90 TABLET, EXTENDED RELEASE ORAL DAILY
Status: ON HOLD | COMMUNITY
End: 2020-06-22

## 2017-03-31 RX ORDER — ONDANSETRON 4 MG/1
4 TABLET, ORALLY DISINTEGRATING ORAL EVERY 6 HOURS PRN
Status: DISCONTINUED | OUTPATIENT
Start: 2017-03-31 | End: 2017-03-31 | Stop reason: HOSPADM

## 2017-03-31 RX ORDER — NICOTINE POLACRILEX 4 MG
15-30 LOZENGE BUCCAL
Status: DISCONTINUED | OUTPATIENT
Start: 2017-03-31 | End: 2017-03-31 | Stop reason: HOSPADM

## 2017-03-31 RX ORDER — AMOXICILLIN 250 MG
1-2 CAPSULE ORAL 2 TIMES DAILY PRN
Status: DISCONTINUED | OUTPATIENT
Start: 2017-03-31 | End: 2017-03-31 | Stop reason: HOSPADM

## 2017-03-31 RX ORDER — ACETAMINOPHEN 325 MG/1
650 TABLET ORAL EVERY 4 HOURS PRN
Status: DISCONTINUED | OUTPATIENT
Start: 2017-03-31 | End: 2017-03-31 | Stop reason: HOSPADM

## 2017-03-31 RX ORDER — PROCHLORPERAZINE 25 MG
12.5 SUPPOSITORY, RECTAL RECTAL EVERY 12 HOURS PRN
Status: DISCONTINUED | OUTPATIENT
Start: 2017-03-31 | End: 2017-03-31 | Stop reason: HOSPADM

## 2017-03-31 RX ORDER — LISINOPRIL 40 MG/1
40 TABLET ORAL DAILY
Status: DISCONTINUED | OUTPATIENT
Start: 2017-03-31 | End: 2017-03-31 | Stop reason: HOSPADM

## 2017-03-31 RX ORDER — DEXTROSE MONOHYDRATE 25 G/50ML
25-50 INJECTION, SOLUTION INTRAVENOUS
Status: DISCONTINUED | OUTPATIENT
Start: 2017-03-31 | End: 2017-03-31 | Stop reason: HOSPADM

## 2017-03-31 RX ORDER — HYDRALAZINE HYDROCHLORIDE 50 MG/1
50 TABLET, FILM COATED ORAL 3 TIMES DAILY
Status: DISCONTINUED | OUTPATIENT
Start: 2017-03-31 | End: 2017-03-31 | Stop reason: HOSPADM

## 2017-03-31 RX ORDER — GABAPENTIN 100 MG/1
200 CAPSULE ORAL DAILY
Status: DISCONTINUED | OUTPATIENT
Start: 2017-03-31 | End: 2017-03-31 | Stop reason: HOSPADM

## 2017-03-31 RX ORDER — HEPARIN SODIUM 1000 [USP'U]/ML
500 INJECTION, SOLUTION INTRAVENOUS; SUBCUTANEOUS
Status: DISCONTINUED | OUTPATIENT
Start: 2017-03-31 | End: 2017-03-31

## 2017-03-31 RX ORDER — HEPARIN SODIUM 1000 [USP'U]/ML
500 INJECTION, SOLUTION INTRAVENOUS; SUBCUTANEOUS CONTINUOUS
Status: DISCONTINUED | OUTPATIENT
Start: 2017-03-31 | End: 2017-03-31

## 2017-03-31 RX ORDER — BISACODYL 10 MG
10 SUPPOSITORY, RECTAL RECTAL DAILY PRN
Status: DISCONTINUED | OUTPATIENT
Start: 2017-03-31 | End: 2017-03-31 | Stop reason: HOSPADM

## 2017-03-31 RX ORDER — GABAPENTIN 100 MG/1
200 CAPSULE ORAL DAILY
Status: DISCONTINUED | OUTPATIENT
Start: 2017-03-31 | End: 2017-03-31

## 2017-03-31 RX ORDER — OXYCODONE HYDROCHLORIDE 5 MG/1
15 TABLET ORAL EVERY 6 HOURS
Status: DISCONTINUED | OUTPATIENT
Start: 2017-03-31 | End: 2017-03-31 | Stop reason: HOSPADM

## 2017-03-31 RX ORDER — HYDRALAZINE HYDROCHLORIDE 20 MG/ML
10 INJECTION INTRAMUSCULAR; INTRAVENOUS EVERY 4 HOURS PRN
Status: DISCONTINUED | OUTPATIENT
Start: 2017-03-31 | End: 2017-03-31 | Stop reason: HOSPADM

## 2017-03-31 RX ADMIN — EPOETIN ALFA 3000 UNITS: 3000 SOLUTION INTRAVENOUS; SUBCUTANEOUS at 10:58

## 2017-03-31 RX ADMIN — INSULIN GLARGINE 10 UNITS: 100 INJECTION, SOLUTION SUBCUTANEOUS at 08:40

## 2017-03-31 RX ADMIN — ISOSORBIDE MONONITRATE 90 MG: 60 TABLET, EXTENDED RELEASE ORAL at 13:47

## 2017-03-31 RX ADMIN — HYDRALAZINE HYDROCHLORIDE 50 MG: 50 TABLET ORAL at 16:34

## 2017-03-31 RX ADMIN — GABAPENTIN 200 MG: 100 CAPSULE ORAL at 13:47

## 2017-03-31 RX ADMIN — CALCIUM ACETATE 667 MG: 667 CAPSULE ORAL at 17:40

## 2017-03-31 RX ADMIN — IPRATROPIUM BROMIDE AND ALBUTEROL SULFATE 3 ML: .5; 3 SOLUTION RESPIRATORY (INHALATION) at 06:50

## 2017-03-31 RX ADMIN — LISINOPRIL 40 MG: 40 TABLET ORAL at 13:48

## 2017-03-31 RX ADMIN — CALCIUM ACETATE 667 MG: 667 CAPSULE ORAL at 13:47

## 2017-03-31 RX ADMIN — SODIUM CHLORIDE 250 ML: 9 INJECTION, SOLUTION INTRAVENOUS at 08:00

## 2017-03-31 RX ADMIN — IPRATROPIUM BROMIDE AND ALBUTEROL SULFATE 3 ML: .5; 3 SOLUTION RESPIRATORY (INHALATION) at 06:30

## 2017-03-31 RX ADMIN — INSULIN ASPART 5 UNITS: 100 INJECTION, SOLUTION INTRAVENOUS; SUBCUTANEOUS at 17:40

## 2017-03-31 RX ADMIN — OXYCODONE HYDROCHLORIDE 15 MG: 5 TABLET ORAL at 13:47

## 2017-03-31 RX ADMIN — CARVEDILOL 37.5 MG: 12.5 TABLET, FILM COATED ORAL at 16:34

## 2017-03-31 ASSESSMENT — PAIN DESCRIPTION - DESCRIPTORS
DESCRIPTORS: ACHING
DESCRIPTORS: ACHING

## 2017-03-31 ASSESSMENT — ENCOUNTER SYMPTOMS
FEVER: 0
SHORTNESS OF BREATH: 1
COUGH: 0

## 2017-03-31 NOTE — ED NOTES
[facility]  ED Nurse Handoff Report    ED Chief complaint: Respiratory Distress (SOB since last night was found hanging out his car in ED parking lot 80% sAo2 RA. Recieves dialysis [facility] missed wednesday because the machine wasent working denies CP)      ED Diagnosis:   Final diagnoses:   CHF (congestive heart failure) (H)       Code Status: Full Code-prior  Allergies:   Allergies   Allergen Reactions     Morphine Rash       Activity level:  Stand with Assist     Needed?: No    Isolation: No  Infection: Not Applicable    Bariatric?: No      Vital Signs:   Vitals:    03/31/17 0641 03/31/17 0645 03/31/17 0707 03/31/17 0708   BP: (!) 154/97 (!) 185/114     Resp:  22 21 21   Temp:       TempSrc:       SpO2:  100% 97% 98%   Weight:           Cardiac Rhythm: SR    Pain level:      Is this patient confused?: No    Patient Report: Initial Complaint: SOB  Focused Assessment: Respiratory Distress (SOB since last night was found hanging out his car in ED parking lot 80% sAo2 RA. Recieves dialysis [facility] missed wednesday because the machine wasent working denies CP)    Tests Performed: labs, EKG, CXR  Abnormal Results:pulmonary edema and effusions on CXR. Kidney failure. Ph 7.29 with elevated CO2.  Treatments provided: bipap    Family Comments: not in ED    OBS brochure/video discussed/provided to patient: N/A    ED Medications:   Medications   ipratropium - albuterol 0.5 mg/2.5 mg/3 mL (DUONEB) neb solution 3 mL (3 mLs Nebulization Given 3/31/17 0630)   ipratropium - albuterol 0.5 mg/2.5 mg/3 mL (DUONEB) neb solution 3 mL (3 mLs Nebulization Given 3/31/17 0650)       Drips infusing?:  No      ED NURSE PHONE NUMBER: [phone]

## 2017-03-31 NOTE — PROGRESS NOTES
Lab Results   Component Value Date    POTASSIUM 4.6 03/31/2017     Lab Results   Component Value Date    HGB 10.6 03/31/2017          Hemodialysis Note:      All machine safety checks completed and passed.  Total chlorine checks less than 0.1ppm   All connections secured, saline line double clamped.  Venous and arterial parameters set  Report rec'd from Bret Dreyer, RN    Rec'd pt per cart acc'd by transport team, directly from ER. Consent obtained for dialysis Rx this hospitalization.  Good circulation to immature right fistula arm. Time out taken.     Dialysis started with good flows from RIJ tunneled catheter. Pt ran 3.5 hours on a K 2 bath, with 4L removed. Blood flow rate of 400 ml/min was obtained.   PRE-WEIGHT:75.8kgs,    TARGET WEIGHT 83.5kgs,     POST-WT 71.8kgs.      Complications: None.   Education regarding ESRD given to patient with good understanding.     Catheter Access: Aseptic prep done for both on/off       Dressing changed to IJ catheter.  Total Heparin given: 0    Meds given: Epogen     Dr. Purvis visited pt during run.   Transducers checked Q 15 minutes, remain clear throughout Rx.  Machine water alarms in place and functioning.  Total blood volume processed:77.2L Patient dialyzes at [place] [facility] M-[facility]-F.  POST ASSESSMENTS: Skin warm and dry, alert, denies discomfort, Lungs diminished, Resp rate regular, apical rate regular. No change of edema.  See flowsheet in EPIC for further details.  Report given to Kymberly Ruelas RN.   [facility]

## 2017-03-31 NOTE — IP AVS SNAPSHOT
MRN:[id number]                      After Visit Summary   3/31/2017    Keagan Wayne    MRN: [id number]           Thank you!     Thank you for choosing [facility] for your care. Our goal is always to provide you with excellent care. Hearing back from our patients is one way we can continue to improve our services. Please take a few minutes to complete the written survey that you may receive in the mail after you visit with us. Thank you!        Patient Information     Date Of Birth          1945        About your hospital stay     You were admitted on:  March 31, 2017 You last received care in the:  [facility]    You were discharged on:  March 31, 2017        Reason for your hospital stay       You were admitted for fluid overload and trouble breathing after not getting dialysis.  After 4 liters were removed with dialysis your breathing is much improved and you are not needing oxygen.  Your blood pressure was elevated but we expect this to improve with continued dialysis and taking your meds.  Please work with your dialysis team to improve your fluid balance to make your breathing better and to decrease your blood pressure. It was a pleasure for our Hospitalist group and me personally to care for you at [facility].  We wish you a speedy recovery and good health!                  Who to Call     For medical emergencies, please call 911.  For non-urgent questions about your medical care, please call your primary care provider or clinic, [phone]          Attending Provider     Provider Specialty    Dolores Choi MD Emergency Medicine    Victor M Kohli MD Internal Medicine       Primary Care Provider Office Phone # Fax #    Precious Vinnie Echevarria [phone]       [facility] [address]  [facility] [address]        After Care Instructions     Activity       Your activity upon discharge: activity as  "tolerated            Diet       Follow this diet upon discharge: Dialysis Diet                  Follow-up Appointments     Follow-up and recommended labs and tests        Follow up with your nephrologist for continued dialysis as planned and they can work on your fluid intake and blood pressure.                  Pending Results     No orders found from 3/29/2017 to 2017.            Statement of Approval     Ordered          17 1644  I have reviewed and agree with all the recommendations and orders detailed in this document.  EFFECTIVE NOW     Approved and electronically signed by:  Victor M Kohli MD             Admission Information     Date & Time Provider Department Dept. Phone    3/31/2017 Victor M Kohli MD [facility] [phone]      Your Vitals Were     Blood Pressure Pulse Temperature Respirations Weight Pulse Oximetry    146/81 95 97.9  F (36.6  C) (Oral) 18 75.8 kg (167 lb) 94%    BMI (Body Mass Index)                   20.87 kg/m2           MyChart Information     [employer] lets you send messages to your doctor, view your test results, renew your prescriptions, schedule appointments and more. To sign up, go to [email or web address] . Click on \"Log in\" on the left side of the screen, which will take you to the Welcome page. Then click on \"Sign up Now\" on the right side of the page.     You will be asked to enter the access code listed below, as well as some personal information. Please follow the directions to create your username and password.     Your access code is: [email or web address]  Expires: 2017  9:28 AM     Your access code will  in 90 days. If you need help or a new code, please call your [facility] clinic or [phone].        Care EveryWhere ID     This is your Care EveryWhere ID. This could be used by other organizations to access your [facility] medical records  [id number]           Review of your medicines      CONTINUE these medicines which " have NOT CHANGED        Dose / Directions    * albuterol (2.5 MG/3ML) 0.083% neb solution   Used for:  Chronic obstructive pulmonary disease, unspecified COPD type (H)        Dose:  1 vial   Take 1 vial (2.5 mg) by nebulization every 4 hours as needed for shortness of breath / dyspnea or wheezing   Quantity:  360 mL   Refills:  0       * albuterol 108 (90 BASE) MCG/ACT Inhaler   Commonly known as:  PROAIR HFA/PROVENTIL HFA/VENTOLIN HFA   Used for:  Chronic obstructive pulmonary disease, unspecified COPD type (H)        Dose:  2 puff   Inhale 2 puffs into the lungs every 4 hours as needed for shortness of breath / dyspnea or wheezing   Quantity:  1 Inhaler   Refills:  3       aspirin 81 MG chewable tablet   Used for:  Type 2 diabetes mellitus with diabetic nephropathy (H)        Dose:  81 mg   Take 1 tablet (81 mg) by mouth daily   Quantity:  36 tablet   Refills:  0       atorvastatin 80 MG tablet   Commonly known as:  LIPITOR   Used for:  Acute diastolic CHF (congestive heart failure) (H)        Dose:  80 mg   Take 1 tablet (80 mg) by mouth daily   Quantity:  30 tablet   Refills:  0       brimonidine 0.15 % ophthalmic solution   Commonly known as:  ALPHAGAN-P   Used for:  Glaucoma        Dose:  1 drop   Place 1 drop into both eyes 3 times daily   Quantity:  1 Bottle   Refills:  0       bumetanide 2 MG tablet   Commonly known as:  BUMEX   Used for:  Acute on chronic systolic congestive heart failure (H)        Dose:  4 mg   Take 2 tablets (4 mg) by mouth daily   Quantity:  60 tablet   Refills:  2       calcium acetate 667 MG Caps capsule   Commonly known as:  PHOSLO   Used for:  Chronic kidney disease, stage IV (severe) (H)        Dose:  667 mg   Take 1 capsule (667 mg) by mouth 3 times daily (with meals)   Quantity:  180 capsule   Refills:  0       carvedilol 25 MG tablet   Commonly known as:  COREG   Used for:  Acute diastolic CHF (congestive heart failure) (H)        Dose:  37.5 mg   Take 1.5 tablets (37.5 mg) by  mouth 2 times daily (with meals)   Quantity:  100 tablet   Refills:  0       citalopram 20 MG tablet   Commonly known as:  celeXA   Used for:  Depression        Dose:  20 mg   Take 1 tablet (20 mg) by mouth daily   Quantity:  30 tablet   Refills:  0       cloNIDine 0.2 MG/24HR WK patch   Commonly known as:  CATAPRES-TTS2        Dose:  1 patch   Place 1 patch onto the skin once a week   Refills:  0       ferrous sulfate 325 (65 FE) MG tablet   Commonly known as:  IRON        Dose:  325 mg   Take 325 mg by mouth 2 times daily   Refills:  0       folic acid-vit B6-vit B12 0.8-10-0.115 MG Tabs per tablet   Commonly known as:  FOLGARD   Used for:  Anemia of chronic disease        Dose:  1 tablet   Take 1 tablet by mouth daily   Quantity:  30 tablet   Refills:  0       guaiFENesin-codeine 100-10 MG/5ML Soln solution   Commonly known as:  ROBITUSSIN AC   Used for:  Cough        Dose:  1 tsp.   Take 5 mLs by mouth every 4 hours as needed for cough   Quantity:  120 mL   Refills:  0       guaiFENesin-dextromethorphan 100-10 MG/5ML syrup   Commonly known as:  ROBITUSSIN DM   Used for:  Cough        Dose:  10 mL   Take 10 mLs by mouth every 4 hours as needed for cough   Quantity:  560 mL   Refills:  0       hydrALAZINE 50 MG tablet   Commonly known as:  APRESOLINE   Used for:  Benign essential hypertension        Dose:  50 mg   Take 1 tablet (50 mg) by mouth 3 times daily   Quantity:  90 tablet   Refills:  0       INCRUSE ELLIPTA 62.5 MCG/INH oral inhaler   Generic drug:  umeclidinium        Dose:  1 puff   Inhale 1 puff into the lungs daily   Refills:  0       * insulin glargine 100 UNIT/ML injection   Commonly known as:  LANTUS        Dose:  10 Units   Inject 10 Units Subcutaneous every morning   Refills:  0       * insulin glargine 100 UNIT/ML injection   Commonly known as:  LANTUS        Dose:  12 Units   Inject 12 Units Subcutaneous At Bedtime   Refills:  0       isosorbide mononitrate 30 MG 24 hr tablet   Commonly known  as:  IMDUR        Dose:  90 mg   Take 90 mg by mouth daily   Refills:  0       latanoprost 0.005 % ophthalmic solution   Commonly known as:  XALATAN   Used for:  Glaucoma        Dose:  1 drop   Place 1 drop into both eyes At Bedtime   Quantity:  1 Bottle   Refills:  0       lisinopril 40 MG tablet   Commonly known as:  PRINIVIL/ZESTRIL   Used for:  Hypertension due to endocrine disorder        Dose:  40 mg   Take 1 tablet (40 mg) by mouth daily   Quantity:  30 tablet   Refills:  0       NEURONTIN PO        Dose:  200 mg   Take 200 mg by mouth daily   Refills:  0       OXYCODONE HCL PO        Dose:  15 mg   Take 15 mg by mouth every 6 hours (takes scheduled)   Refills:  0       sodium bicarbonate 650 MG tablet   Used for:  Chronic kidney disease, stage IV (severe) (H)        Dose:  650 mg   Take 1 tablet (650 mg) by mouth 2 times daily   Refills:  0       VITAMIN D (CHOLECALCIFEROL) PO        Dose:  1000 Units   Take 1,000 Units by mouth daily   Refills:  0       * Notice:  This list has 4 medication(s) that are the same as other medications prescribed for you. Read the directions carefully, and ask your doctor or other care provider to review them with you.             Protect others around you: Learn how to safely use, store and throw away your medicines at www.disposemymeds.org.             Medication List: This is a list of all your medications and when to take them. Check marks below indicate your daily home schedule. Keep this list as a reference.      Medications           Morning Afternoon Evening Bedtime As Needed    * albuterol (2.5 MG/3ML) 0.083% neb solution   Take 1 vial (2.5 mg) by nebulization every 4 hours as needed for shortness of breath / dyspnea or wheezing                                   * albuterol 108 (90 BASE) MCG/ACT Inhaler   Commonly known as:  PROAIR HFA/PROVENTIL HFA/VENTOLIN HFA   Inhale 2 puffs into the lungs every 4 hours as needed for shortness of breath / dyspnea or wheezing                                    aspirin 81 MG chewable tablet   Take 1 tablet (81 mg) by mouth daily   Next Dose Due:  Tomorrow morning, 4/1/17.                                   atorvastatin 80 MG tablet   Commonly known as:  LIPITOR   Take 1 tablet (80 mg) by mouth daily   Next Dose Due:  Tonight at bedtime, 3/31/17                                   brimonidine 0.15 % ophthalmic solution   Commonly known as:  ALPHAGAN-P   Place 1 drop into both eyes 3 times daily   Next Dose Due:  Continue home schedule                                         bumetanide 2 MG tablet   Commonly known as:  BUMEX   Take 2 tablets (4 mg) by mouth daily   Next Dose Due:  Tomorrow morning, 4/1/17.                                   calcium acetate 667 MG Caps capsule   Commonly known as:  PHOSLO   Take 1 capsule (667 mg) by mouth 3 times daily (with meals)   Last time this was given:  667 mg on 3/31/2017  5:40 PM   Next Dose Due:  Tomorrow morning, 4/1/17.                                         carvedilol 25 MG tablet   Commonly known as:  COREG   Take 1.5 tablets (37.5 mg) by mouth 2 times daily (with meals)   Last time this was given:  37.5 mg on 3/31/2017  4:34 PM   Next Dose Due:  Tomorrow morning, 4/1/17.                                      citalopram 20 MG tablet   Commonly known as:  celeXA   Take 1 tablet (20 mg) by mouth daily   Next Dose Due:  Tomorrow morning, 4/1/17.                                   cloNIDine 0.2 MG/24HR WK patch   Commonly known as:  CATAPRES-TTS2   Place 1 patch onto the skin once a week   Next Dose Due:  Continue home schedule.                                   ferrous sulfate 325 (65 FE) MG tablet   Commonly known as:  IRON   Take 325 mg by mouth 2 times daily   Next Dose Due:  Tonight at bedtime, 3/31/17                                      folic acid-vit B6-vit B12 0.8-10-0.115 MG Tabs per tablet   Commonly known as:  FOLGARD   Take 1 tablet by mouth daily   Next Dose Due:  Tomorrow morning, 4/1/17.                                    guaiFENesin-codeine 100-10 MG/5ML Soln solution   Commonly known as:  ROBITUSSIN AC   Take 5 mLs by mouth every 4 hours as needed for cough                                   guaiFENesin-dextromethorphan 100-10 MG/5ML syrup   Commonly known as:  ROBITUSSIN DM   Take 10 mLs by mouth every 4 hours as needed for cough                                   hydrALAZINE 50 MG tablet   Commonly known as:  APRESOLINE   Take 1 tablet (50 mg) by mouth 3 times daily   Last time this was given:  50 mg on 3/31/2017  4:34 PM   Next Dose Due:  Tonight at bedtime, 3/31/17                                         INCRUSE ELLIPTA 62.5 MCG/INH oral inhaler   Inhale 1 puff into the lungs daily   Generic drug:  umeclidinium   Next Dose Due:  Tomorrow morning, 4/1/17.                                   * insulin glargine 100 UNIT/ML injection   Commonly known as:  LANTUS   Inject 10 Units Subcutaneous every morning   Last time this was given:  10 Units on 3/31/2017  8:40 AM   Next Dose Due:  Tomorrow morning, 4/1/17.                                   * insulin glargine 100 UNIT/ML injection   Commonly known as:  LANTUS   Inject 12 Units Subcutaneous At Bedtime   Last time this was given:  10 Units on 3/31/2017  8:40 AM   Next Dose Due:  Tonight at bedtime, 3/31/17                                   isosorbide mononitrate 30 MG 24 hr tablet   Commonly known as:  IMDUR   Take 90 mg by mouth daily   Last time this was given:  90 mg on 3/31/2017  1:47 PM   Next Dose Due:  Tomorrow morning, 4/1/17.                                   latanoprost 0.005 % ophthalmic solution   Commonly known as:  XALATAN   Place 1 drop into both eyes At Bedtime   Next Dose Due:  Tonight at bedtime, 3/31/17                                   lisinopril 40 MG tablet   Commonly known as:  PRINIVIL/ZESTRIL   Take 1 tablet (40 mg) by mouth daily   Last time this was given:  40 mg on 3/31/2017  1:48 PM   Next Dose Due:  Tomorrow morning, 4/1/17.                                    NEURONTIN PO   Take 200 mg by mouth daily   Last time this was given:  200 mg on 3/31/2017  1:47 PM   Next Dose Due:  Tomorrow morning, 4/1/17.                                   OXYCODONE HCL PO   Take 15 mg by mouth every 6 hours (takes scheduled)   Last time this was given:  15 mg on 3/31/2017  1:47 PM   Next Dose Due:  Continue home schedule, last dose 1:47pm.                                sodium bicarbonate 650 MG tablet   Take 1 tablet (650 mg) by mouth 2 times daily   Next Dose Due:  Continue home schedule                                      VITAMIN D (CHOLECALCIFEROL) PO   Take 1,000 Units by mouth daily   Next Dose Due:  Tomorrow morning, 4/1/17.                                   * Notice:  This list has 4 medication(s) that are the same as other medications prescribed for you. Read the directions carefully, and ask your doctor or other care provider to review them with you.

## 2017-03-31 NOTE — PHARMACY-ADMISSION MEDICATION HISTORY
Admission medication history interview status for the 3/31/2017  admission is complete. See EPIC admission navigator for prior to admission medications     Medication history source reliability:Good    Actions taken by pharmacist (provider contacted, etc):None     Additional medication history information not noted on PTA med list :    Med hx was last completed by pharmacy in Jan 2017.    Clarification was needed on his inhalers. He states that he no longer uses the steroid inhalers (QVar or Flovent). He says that he only uses the Incruse. I verified the medication by showing him a picture of an Incruse inhaler, and he confirmed that this is what he uses.     Updated the Lantus dosing.    He states that he uses the oxycodone around the clock (not PRN).     Medication reconciliation/reorder completed by provider prior to medication history? Yes    Time spent in this activity: 20 mins    Prior to Admission medications    Medication Sig Last Dose Taking? Auth Provider   Gabapentin (NEURONTIN PO) Take 200 mg by mouth daily 3/30/2017 at am Yes Unknown, Entered By History   insulin glargine (LANTUS) 100 UNIT/ML injection Inject 10 Units Subcutaneous every morning 3/31/2017 at ordered in ED (given?) Yes Unknown, Entered By History   insulin glargine (LANTUS) 100 UNIT/ML injection Inject 12 Units Subcutaneous At Bedtime 3/30/2017 at hs Yes Unknown, Entered By History   isosorbide mononitrate (IMDUR) 30 MG 24 hr tablet Take 90 mg by mouth daily 3/30/2017 at am Yes Unknown, Entered By History   umeclidinium (INCRUSE ELLIPTA) 62.5 MCG/INH oral inhaler Inhale 1 puff into the lungs daily 3/30/2017 at am Yes Unknown, Entered By History   OXYCODONE HCL PO Take 15 mg by mouth every 6 hours (takes scheduled) 3/30/2017 at pm Yes Unknown, Entered By History   hydrALAZINE (APRESOLINE) 50 MG tablet Take 1 tablet (50 mg) by mouth 3 times daily 3/30/2017 at pm Yes Tamir Jett MD   bumetanide (BUMEX) 2 MG tablet Take 2 tablets (4 mg)  by mouth daily 3/30/2017 at am Yes Tamir Jett MD   folic acid-vit B6-vit B12 (FOLGARD) 0.8-10-0.115 MG TABS per tablet Take 1 tablet by mouth daily 3/30/2017 at am Yes Tamir Jett MD   atorvastatin (LIPITOR) 80 MG tablet Take 1 tablet (80 mg) by mouth daily 3/30/2017 at am Yes Eduardo Mahmood MD   lisinopril (PRINIVIL/ZESTRIL) 40 MG tablet Take 1 tablet (40 mg) by mouth daily 3/30/2017 at am Yes Eduardo Mahmood MD   carvedilol (COREG) 25 MG tablet Take 1.5 tablets (37.5 mg) by mouth 2 times daily (with meals) 3/30/2017 at am Yes Eduardo Mahmood MD   ferrous sulfate (IRON) 325 (65 FE) MG tablet Take 325 mg by mouth 2 times daily 3/30/2017 at pm Yes Unknown, Entered By History   VITAMIN D, CHOLECALCIFEROL, PO Take 1,000 Units by mouth daily 3/30/2017 at am Yes Unknown, Entered By History   cloNIDine (CATAPRES-TTS2) 0.2 MG/24HR patch Place 1 patch onto the skin once a week 3/24/2017 at Needs change qFri Yes Reported, Patient   aspirin 81 MG chewable tablet Take 1 tablet (81 mg) by mouth daily 3/30/2017 at am Yes Nila Espana DO   sodium bicarbonate 650 MG tablet Take 1 tablet (650 mg) by mouth 2 times daily 3/30/2017 at pm Yes Nila Espana DO   citalopram (CELEXA) 20 MG tablet Take 1 tablet (20 mg) by mouth daily 3/30/2017 at am Yes Nila Espana DO   calcium acetate (PHOSLO) 667 MG CAPS Take 1 capsule (667 mg) by mouth 3 times daily (with meals) 3/30/2017 at pm Yes Nila Espana DO   brimonidine (ALPHAGAN-P) 0.15 % ophthalmic solution Place 1 drop into both eyes 3 times daily 3/30/2017 at pm Yes Nila Espana DO   latanoprost (XALATAN) 0.005 % ophthalmic solution Place 1 drop into both eyes At Bedtime 3/30/2017 at hs Yes Nila Espana DO   albuterol (PROAIR HFA, PROVENTIL HFA, VENTOLIN HFA) 108 (90 BASE) MCG/ACT inhaler Inhale 2 puffs into the lungs every 4 hours as needed for shortness of breath /  dyspnea or wheezing prn at prn  Sundar Lundberg MD guaiFENesin-dextromethorphan (ROBITUSSIN DM) 100-10 MG/5ML syrup Take 10 mLs by mouth every 4 hours as needed for cough More than a month at prn  Sundar Lundberg MD guaiFENesin-codeine (ROBITUSSIN AC) 100-10 MG/5ML SOLN Take 5 mLs by mouth every 4 hours as needed for cough More than a month at prn  Sundar Lundberg MD   albuterol (2.5 MG/3ML) 0.083% nebulizer solution Take 1 vial (2.5 mg) by nebulization every 4 hours as needed for shortness of breath / dyspnea or wheezing prn at prn  Nila Espana DO [facility]

## 2017-03-31 NOTE — H&P
[facility]    History and Physical  Hospitalist       Date of Admission:  3/31/2017    Assessment & Plan   Keagan Wayne is a 71 year old male with a history of diabetes type 2 causing peripheral neuropathy and ESRD on M-W-F dialysis, who missed his session of dialysis 2 days ago because he says the machines were not working and now presents after being found in his car with extreme shortness of breath and saturating 80% on room air.     Acute respiratory failure with hypoxia secondary to fluid overload from missing dialysis and flash pulmonary edema from emergent hypertension: He missed dialysis on Wednesday, March 29 and he says it was because the machines were broken. Isak said they offered him another machine or coming in the next day but he refuse and did not show up for the appointment the next day. Chest x-ray showed pulmonary edema and small bilateral pleural effusions.   -register to observation  -arrange emergent dialysis  -resume home blood pressure medications with IV hydralazine PRN for SBP > 180 but suspect BP will improve dramatically after dialysis  -Continue home PhosLo and bicarb, dialysis diet  -wean oxygen as tolerated and once off oxygen after dialysis can likely go home this evening  Diabetes mellitus type 2 complicated by hyperglycemia, peripheral neuropathy and nephropathy.  Home regimen is Lantus 10 units in the morning and 12 units in the evening.  Glucose on admission was 315. A1c was 7.6 in January 2017.  -resume home lantus 10 units in the morning and 12 units in the evening  -start high SSI while here  -follow up with PCP as previously arranged  Chronic COPD.  Has not been on any home oxygen.  -No wheezing or signs of exacerbation   -Continue home albuterol nebs every 4 hours as needed  -continue home umeclidinium  Peripheral neuropathy.  -Continue home gabapentin and oxycodone    DVT Prophylaxis: Low Risk/Ambulatory with no VTE prophylaxis indicated  Code Status:  Full Code    Disposition: Expected discharge later today after dialysis if dyspnea/hypoxia improves and blood pressure improves.    Victor M Kohli MD  [phone] (C)  [phone] (P)  Text Page (7 am to 6 pm)    Primary Care Physician   Precious Echevarria    Chief Complaint   Shortness of breath after missing dialysis    History is obtained from the patient and review of medical records.    History of Present Illness   Keagan Wayne is a 71 year old male with a history of diabetes type 2 causing peripheral neuropathy and ESRD on M-W-F dialysis, who missed his session of dialysis 2 days ago because he says the machines were not working and now presents after being found in his car with extreme shortness of breath and saturating 80% on room air.  He does say he was trying to cut back on fluid intake the last few days.  He started dialysis back in December.  His dyspnea began last night. He usually does dialysis M-W-F in [place]. He denies any chest pain, cough or fever.  He has some mild leg edema that's been worsening over last few days.  He has not taken his insulin or blood pressure medicines this morning.  After arrival to the ED he was placed on BiPAP with dramatic improvement in his respiratory status.  The BiPAP was now been weaned off and he has been placed on oxygen.  Chest x-ray showed pulmonary edema and small bilateral pleural effusions.  VBG showed pH of 7.29 with pCO2 of 47 and pO2 of 68.  White blood cell count was normal.  Hemoglobin was reduced at 10.6 which is his baseline.  Glucose was elevated at 315.    Past Medical History    I have reviewed this patient's medical history and updated it with pertinent information if needed.   Past Medical History:   Diagnosis Date     COPD (chronic obstructive pulmonary disease) (H)      Depression      Diabetes (H)      ESRD (end stage renal disease) (H)      Hyperlipidemia      Hypertension      Peripheral neuropathy (H)      Past Surgical History   I have  reviewed this patient's surgical history and updated it with pertinent information if needed.  Past Surgical History:   Procedure Laterality Date     CREATE FISTULA ARTERIOVENOUS UPPER EXTREMITY Right 5/8/2016    Procedure: CREATE FISTULA ARTERIOVENOUS UPPER EXTREMITY;  Surgeon: Josias Valente MD;  Location: [facility] OR     CREATE FISTULA ARTERIOVENOUS UPPER EXTREMITY Right 12/14/2016    Procedure: CREATE FISTULA ARTERIOVENOUS UPPER EXTREMITY;  Surgeon: Contreras Bowman MD;  Location: [facility] OR     HERNIA REPAIR       Prior to Admission Medications   Prior to Admission Medications   Prescriptions Last Dose Informant Patient Reported? Taking?   Gabapentin (NEURONTIN PO) 3/30/2017 at am Self Yes Yes   Sig: Take 200 mg by mouth daily   OXYCODONE HCL PO 3/30/2017 at pm Self Yes Yes   Sig: Take 15 mg by mouth every 6 hours (takes scheduled)   VITAMIN D, CHOLECALCIFEROL, PO 3/30/2017 at am Self Yes Yes   Sig: Take 1,000 Units by mouth daily   albuterol (2.5 MG/3ML) 0.083% nebulizer solution prn at prn Self No No   Sig: Take 1 vial (2.5 mg) by nebulization every 4 hours as needed for shortness of breath / dyspnea or wheezing   albuterol (PROAIR HFA, PROVENTIL HFA, VENTOLIN HFA) 108 (90 BASE) MCG/ACT inhaler prn at prn Self No No   Sig: Inhale 2 puffs into the lungs every 4 hours as needed for shortness of breath / dyspnea or wheezing   aspirin 81 MG chewable tablet 3/30/2017 at am Self No Yes   Sig: Take 1 tablet (81 mg) by mouth daily   atorvastatin (LIPITOR) 80 MG tablet 3/30/2017 at am Self No Yes   Sig: Take 1 tablet (80 mg) by mouth daily   brimonidine (ALPHAGAN-P) 0.15 % ophthalmic solution 3/30/2017 at pm Self No Yes   Sig: Place 1 drop into both eyes 3 times daily   bumetanide (BUMEX) 2 MG tablet 3/30/2017 at am Self No Yes   Sig: Take 2 tablets (4 mg) by mouth daily   calcium acetate (PHOSLO) 667 MG CAPS 3/30/2017 at pm Self No Yes   Sig: Take 1 capsule (667 mg) by mouth 3 times daily (with meals)   carvedilol  (COREG) 25 MG tablet 3/30/2017 at am Self No Yes   Sig: Take 1.5 tablets (37.5 mg) by mouth 2 times daily (with meals)   citalopram (CELEXA) 20 MG tablet 3/30/2017 at am Self No Yes   Sig: Take 1 tablet (20 mg) by mouth daily   cloNIDine (CATAPRES-TTS2) 0.2 MG/24HR patch 3/24/2017 at Needs change qFri Self Yes Yes   Sig: Place 1 patch onto the skin once a week   ferrous sulfate (IRON) 325 (65 FE) MG tablet 3/30/2017 at pm Self Yes Yes   Sig: Take 325 mg by mouth 2 times daily   folic acid-vit B6-vit B12 (FOLGARD) 0.8-10-0.115 MG TABS per tablet 3/30/2017 at am Self No Yes   Sig: Take 1 tablet by mouth daily   guaiFENesin-codeine (ROBITUSSIN AC) 100-10 MG/5ML SOLN More than a month at prn Self No No   Sig: Take 5 mLs by mouth every 4 hours as needed for cough   guaiFENesin-dextromethorphan (ROBITUSSIN DM) 100-10 MG/5ML syrup More than a month at prn Self No No   Sig: Take 10 mLs by mouth every 4 hours as needed for cough   hydrALAZINE (APRESOLINE) 50 MG tablet 3/30/2017 at pm Self No Yes   Sig: Take 1 tablet (50 mg) by mouth 3 times daily   insulin glargine (LANTUS) 100 UNIT/ML injection 3/31/2017 at ordered in ED (given?) Self Yes Yes   Sig: Inject 10 Units Subcutaneous every morning   insulin glargine (LANTUS) 100 UNIT/ML injection 3/30/2017 at hs Self Yes Yes   Sig: Inject 12 Units Subcutaneous At Bedtime   isosorbide mononitrate (IMDUR) 30 MG 24 hr tablet 3/30/2017 at am Self Yes Yes   Sig: Take 90 mg by mouth daily   latanoprost (XALATAN) 0.005 % ophthalmic solution 3/30/2017 at hs Self No Yes   Sig: Place 1 drop into both eyes At Bedtime   lisinopril (PRINIVIL/ZESTRIL) 40 MG tablet 3/30/2017 at am Self No Yes   Sig: Take 1 tablet (40 mg) by mouth daily   sodium bicarbonate 650 MG tablet 3/30/2017 at pm Self No Yes   Sig: Take 1 tablet (650 mg) by mouth 2 times daily   umeclidinium (INCRUSE ELLIPTA) 62.5 MCG/INH oral inhaler 3/30/2017 at am Self Yes Yes   Sig: Inhale 1 puff into the lungs daily       Facility-Administered Medications: None     Allergies   Allergies   Allergen Reactions     Morphine Rash     Social History   I have reviewed this patient's social history and updated it with pertinent information if needed.   Keagan  reports that he quit smoking about 2 months ago. His smoking use included Cigarettes. He does not have any smokeless tobacco history on file. He reports that he drinks alcohol. He reports that he uses illicit drugs, including Marijuana. He has a girlfriend who manages most of his medical care.    Family History   I have reviewed this patient's family history and updated it with pertinent information if needed.    Problem (# of Occurrences) Relation (Name,Age of Onset)    Coronary Artery Disease (1) Brother        Review of Systems   The 10 point Review of Systems is negative other than noted in the HPI or here. Peripheral neuropathy pain in his legs and hands is severe and requires narcotics according to the patient.    Physical Exam   Temp: 95.7  F (35.4  C) Temp src: Temporal BP: (!) 181/99   Heart Rate: 79 Resp: 28 SpO2: 95 % O2 Device: BiPAP/CPAP Oxygen Delivery: 15 LPM  Vital Signs with Ranges  Temp:  [95.7  F (35.4  C)] 95.7  F (35.4  C)  Heart Rate:  [] 79  Resp:  [21-29] 28  BP: (154-187)/() 181/99  FiO2 (%):  [35 %] 35 %  SpO2:  [95 %-100 %] 95 %  167 lbs 0 oz    Constitutional: Awake, alert, cooperative, moderate to severe respiratory distress.  Eyes: Conjunctiva and pupils examined and normal.  HEENT: Moist mucous membranes, normal dentition.  Respiratory: Crackles bilaterally at the bases, no wheezing.  Cardiovascular: Regular rate and rhythm, normal S1 and S2, and no murmur noted.  GI: Soft, non-distended, non-tender, normal bowel sounds.  Lymph/Hematologic: No anterior cervical or supraclavicular adenopathy.  Skin: No rashes, no cyanosis, 1+ bilateral pitting edema in the ankles.  Musculoskeletal: No joint swelling, erythema or tenderness.  Neurologic:  Cranial nerves 2-12 intact, normal strength and sensation.  Psychiatric: Alert, oriented to person, place and time, no obvious anxiety or depression.    Data   Data reviewed today:  I personally reviewed the EKG tracing showing T wave inversions in 1, aVL and V6 that are nonspecific and similar to previous with T wave inversion in V4 and V5 noted on previous EKG now resolved.    Recent Labs  Lab 03/31/17  0634   WBC 5.8   HGB 10.6*   MCV 90         POTASSIUM 4.6   CHLORIDE 101   CO2 22   BUN 62*   CR 5.12*   ANIONGAP 12   ALLYSON 8.2*   *       Recent Results (from the past 24 hour(s))   XR Chest Port 1 View    Narrative    CHEST SINGLE VIEW PORTABLE  3/31/2017 6:30 AM     HISTORY: Shortness of breath.    COMPARISON: 1/15/2017.    FINDINGS: Moderate interstitial and air-space opacities in the central  aspects of both lungs. Small bilateral pleural effusions. The size of  the cardiac silhouette is likely within normal limits. Atherosclerotic  calcification in the thoracic aorta. Right internal jugular dual-lumen  central venous catheter with distal catheter tips in the superior vena  cava.      Impression    IMPRESSION:  1. Moderate interstitial and air-space opacities in the central  aspects of both lungs, likely representing pulmonary edema.  2. Small bilateral pleural effusions.    KAYY CARTAGENA MD

## 2017-03-31 NOTE — ED PROVIDER NOTES
History     Chief Complaint:  Respiratory distress     HPI   Keagan Wayne is a 71 year old male with a history of DM nephropathy on dialysis, , CHF, COPD, who was found outside by his car with SOB and saturating at 80% at room air. His dyspnea began last night. He started dialysis in December. He usually does it M-W-F in [place].. He missed a session 2 days ago because the machine was not working. No chest pain, cough, or fever.      Upon later conversation with [place] [facility], they clarified what happened with the patient's appointment on Wednesday. The machine was indeed broken, but they offered to set up another machine, the patient did not want to wait, they gave him the alternative to come in first thing in the morning the next day but he did not show up either.     Allergies:  Morphine     Medications:    Insulin glargine (Lantus) 100 unit/ml injection  Oxycodone HCl PO  Hydralazine (apresoline) 50 mg tablet  Bumetanide (Bumex) 2 mg tablet  Folic acid-vit B6-vit B12 (folgard) 0.8-10-0.115 mg tabs per tablet  Atorvastatin (Lipitor) 80 mg tablet  Lisinopril (Prinivil/Zestril) 40 mg tablet  Carvedilol (coreg) 25 mg tablet  Albuterol (Proair HFA, Proventil HFA, ventolin HFA) 108 (90 base) mcg/act inhaler  Guaifenesin-dextromethorphan (robitussin DM) 100-10 mg/5ml syrup  Guaifenesin-codeine (robitussin ac) 100-10 mg/5ml soln  Fluticasone (Flovent HFA) 110 mcg/act inhaler  Gabapentin PO  Isosorbide mononitrate (Imdur PO)  Ferrous sulfate (iron) 325 (65 Fe) mg tablet  Vitamin d, cholecalciferol, PO  Clonidine (catapres-tts2) 0.2 mg/24hr patch  Aspirin 81 mg chewable tablet  Sodium bicarbonate 650 mg tablet  Albuterol (2.5 mg/3ml) 0.083% nebulizer solution  Beclomethasone (qvar) 40 mcg/act inhaler  Citalopram (Celexa) 20 mg tablet  Calcium acetate (Phoslo) 667 mg caps  Brimonidine (Alphagan-p) 0.15 % ophthalmic solution  Latanoprost (Xalatan) 0.005 % ophthalmic solution    Past Medical History:     ESRD on hemodialysis  COPD with acute respiratory failure with hypoxia  IDDM   HTN  Glaucoma  Anemia of chronic disease   Acute bilateral low back pain without sciatica 02/13/2017  Pneumonia 01/15/2017  Respiratory failure, acute (H) 12/06/2016  Acute respiratory failure (H) 10/07/2016  Acute diastolic CHF (congestive heart failure) (H) 09/29/2016  Respiratory failure (H) 06/29/2016  Acute exacerbation of CHF (congestive heart failure) (H) 05/05/2016  Cognitive impairment 02/11/2016  Chronic diastolic congestive heart failure (H) 02/11/2016  Acute respiratory failure with hypoxia (H) 02/10/2016  Acute kidney injury (H) 02/10/2016  Peripheral neuropathy (H) 02/10/2016  Volume overload 01/27/2016  Colitis 09/26/2015    Past Surgical History:    AVF right upper extremity (5/2016, Sidra and 12/2016, Gracie)  Hernia repair     Family History:    CAD    Social History:  Marital Status:   [4]  Smoking status: current smoker   Alcohol status: occasional   Patient presents alone.  He drove himself to the hospital  PCP: Precious Echevarria      Review of Systems   Constitutional: Negative for fever.   Respiratory: Positive for shortness of breath. Negative for cough.    Cardiovascular: Negative for chest pain.   All other systems reviewed and are negative.      Physical Exam     Patient Vitals for the past 24 hours:   BP Temp Pulse Heart Rate Resp SpO2 Weight   03/31/17 0820 164/75 - 77 - - 96 % -   03/31/17 0813 165/86 97.6  F (36.4  C) - 78 28 96 % -   03/31/17 0745 (!) 181/99 - - 79 28 95 % -   03/31/17 0734 (!) 187/101 - - 79 24 99 % -   03/31/17 0730 (!) 187/101 - - 80 26 98 % -   03/31/17 0708 - - - 82 21 98 % -   03/31/17 0707 - - - 83 21 97 % -   03/31/17 0645 (!) 185/114 - - 86 22 100 % -   03/31/17 0630 - - - - - 97 % -   03/31/17 0620 - - - 100 29 100 % 75.8 kg (167 lb)   03/31/17 0616 - 95.7  F (35.4  C) - 105 29 100 % -        Physical Exam  Constitutional:  Oriented to person, place, and time. Working  to breathe.      Appears well-developed and well-nourished.   HENT:   Head:    Normocephalic and atraumatic.   Right Ear:   Tympanic membrane and external ear normal.   Left Ear:   Tympanic membrane and external ear normal.   Mouth/Throat:   Oropharynx is clear and moist.      Mucous membranes are normal.   Eyes:    Conjunctivae normal and EOM are normal.      Pupils are equal, round, and reactive to light.   Neck:    Positive JVD. Normal range of motion. Neck supple.   Cardiovascular:  Tachycardic, regular rhythm, S1 normal and S2 normal.      No gallop and no friction rub. No murmur heard.  Pulmonary/Chest:  Catheter on right upper chest. Breath sounds normal.       Bilateral expiratory wheezes diffusely. No rhonchi. No rales.   Abdominal:   Soft. No hepatosplenomegaly. No tenderness.      No rebound and no CVA tenderness.   Musculoskeletal:  Normal range of motion.   Neurological:   Alert and oriented to person, place, and time. Normal strength.      GCS eye subscore is 4. GCS verbal subscore is 5.      GCS motor subscore is 6.   Skin:    Skin is warm and dry.   Psychiatric:   Normal mood and affect.      Speech is normal and behavior is normal.      Judgment and thought content normal.      Cognition and memory are normal.      Emergency Department Course     ECG (06:17:16):  Indication: SOB.   Rate 101 bpm. VA interval 194 ms. QRS duration 76 ms. QT/QTc 366/474 ms. R axis -41.   Interpretation: sinus tachycardia with frequent PVC. Possible KATAI. LAD. ST and T wave abnormality, consider lateral ischemia.   Agree with computer interpretation.   No changes from previous ECG dated 12/6/16  Interpreted at 0630 by Dolores Choi MD.     Imaging:  Radiology findings were communicated with the patient who voiced understanding of the findings.    Portable Chest XR, per radiology:     1. Moderate interstitial and air-space opacities in the central aspects of both lungs, likely representing pulmonary edema.  2. Small  bilateral pleural effusions.    Laboratory:  Laboratory findings were communicated with the patient who voiced understanding of the findings.  CBC: WBC 5.8, HGB 10.6,   Venous Blood Gas and OxyHGB: pH: 7.29, HCO3: 23, Base Deficit: 3.8, OxyHGB: 89   BMP: , bun 62, Creatinine 5.12, GFR 14, Ca 8.2    Interventions:  0630, 0650: Duoneb 3ml solution, nebulized x2      Emergency Department Course:  Nursing notes and vitals reviewed.  I performed an exam of the patient as documented above.   The patient was placed on continuous pulse oximetry and cardiac monitoring.  A peripheral IV was established and blood was drawn for laboratory testing, results above}.  CXR obtained while in the emergency department, findings above.      0630, spoke to nephrologist on call, Dr. Williamson.  0655, patient on BiPAP, he is feeling improved. On exam, increased breath sounds, decreased wheezing.   0709, discussed the patient with hospitalist, Dr. Kohli.   0818, contacted [place] [facility].  Patient asks for bipap to be removed which was done shortly before going to dialysis.     I rechecked the patient and the findings were explained to him, who consents to admission. I discussed the patient with Dr. Kohli, who will admit the patient for further monitoring, evaluation and treatment.     Patient was sent to dialysis before being admitted.     Impression & Plan      Medical Decision Making:  Patient on dialysis M-W-F, he missed it on Wednesday as he says the machines were broken. He was on his way to dialysis this morning and appeared on the parking lot extremely SOB, onset of which was last night. He was unable to get out of the car, was found in the car extremely SOB, with low temperature due to being outside with anne-marie RR and hypoxic at 80%. He had JVD and expiratory wheezes. He was placed on BiPAP, feeling much better. CXR shows fluid overload. At the time he is going to dialysis, he is asked to be taken off the  BiPAP. I talked to Dr. Williamson emergently to let them know that he needed dialysis right away, where he is going from here. He may be going home later today.     Diagnosis:  1. Fluid overload due to missed dialysis    Disposition:   Dialysis, then observation.     Scribe Disclosure:  I, Trista Monroy, am serving as a scribe at 6:55 AM on 3/31/2017 to document services personally performed by Dolores Choi MD, based on my observations and the provider's statements to me.     EMERGENCY DEPARTMENT       Dolores Choi MD  03/31/17 [id number]

## 2017-03-31 NOTE — DISCHARGE SUMMARY
[facility]    Discharge Summary  Hospitalist    Date of Admission:  3/31/2017  Date of Discharge:  3/31/2017    Discharge Diagnoses   Acute respiratory failure from hypervolemia secondary to fluid overload from missing dialysis and flash pulmonary edema from hypertensive emergency    History of Present Illness   Keagan Wayne is a 71 year old male with a history of diabetes type 2 causing peripheral neuropathy and ESRD on M-W-F dialysis, who missed his session of dialysis 2 days ago because he says the machines were not working and now presents after being found in his car with extreme shortness of breath and saturating 80% on room air. He does say he was trying to cut back on fluid intake the last few days. He started dialysis back in December. His dyspnea began last night. He usually does dialysis M-W-F in [place]. He denies any chest pain, cough or fever.  He has some mild leg edema that's been worsening over last few days. He has not taken his insulin or blood pressure medicines this morning.  After arrival to the ED he was placed on BiPAP with dramatic improvement in his respiratory status. The BiPAP was now been weaned off and he has been placed on oxygen. Chest x-ray showed pulmonary edema and small bilateral pleural effusions. VBG showed pH of 7.29 with pCO2 of 47 and pO2 of 68. White blood cell count was normal. Hemoglobin was reduced at 10.6 which is his baseline. Glucose was elevated at 315.    Hospital Course   Keagan Wayne was admitted on 3/31/2017.  The following problems were addressed during his hospitalization:    Keagan Wayne is a 71 year old male with a history of diabetes type 2 causing peripheral neuropathy and ESRD on M-W-F dialysis, who missed his session of dialysis 2 days ago because he says the machines were not working and now presents after being found in his car with extreme shortness of breath and saturating 80% on room air.      Acute respiratory failure with  hypoxia secondary to fluid overload from missing dialysis and flash pulmonary edema from emergent hypertension: He missed dialysis on Wednesday, March 29 and he says it was because the machines were broken. Isak said they offered him another machine or coming in the next day but he refuse and did not show up for the appointment the next day. Chest x-ray showed pulmonary edema and small bilateral pleural effusions.   -improved after dialysis and was saturating normally on room air at discharge  -continue home BP meds and follow up with dialysis/nephrology  -Continue home PhosLo and bicarb, dialysis diet  -wean oxygen as tolerated and once off oxygen after dialysis can likely go home this evening  Diabetes mellitus type 2 complicated by hyperglycemia, peripheral neuropathy and nephropathy. Home regimen is Lantus 10 units in the morning and 12 units in the evening. Glucose on admission was 315. A1c was 7.6 in January 2017.  -continue home regimen  -follow up with PCP as previously arranged  Chronic COPD. Has not been on any home oxygen.  -No wheezing or signs of exacerbation   -Continue home albuterol nebs every 4 hours as needed  -continue home umeclidinium  Peripheral neuropathy.  -Continue home gabapentin and oxycodone    Victor M Kohli MD  [phone] (C)  [phone] (P)  Text Page (7 am to 6 pm)    Significant Results and Procedures   Chest x-ray showed bilateral pulmonary edema and small effusions.    Pending Results    None    Code Status   Full Code       Primary Care Physician   Precious Echevarria    Discharge Disposition   Discharged to home  Condition at discharge: Stable    Consultations This Hospital Stay   NEPHROLOGY IP CONSULT    Time Spent on this Encounter   I, Victor M Kohli, personally saw the patient today and spent less than or equal to 30 minutes discharging this patient.    Discharge Orders     Reason for your hospital stay   You were admitted for fluid overload and trouble breathing after not  getting dialysis.  After 4 liters were removed with dialysis your breathing is much improved and you are not needing oxygen.  Your blood pressure was elevated but we expect this to improve with continued dialysis and taking your meds.  Please work with your dialysis team to improve your fluid balance to make your breathing better and to decrease your blood pressure. It was a pleasure for our Hospitalist group and me personally to care for you at [facility].  We wish you a speedy recovery and good health!     Follow-up and recommended labs and tests    Follow up with your nephrologist for continued dialysis as planned and they can work on your fluid intake and blood pressure.     Activity   Your activity upon discharge: activity as tolerated     Full Code     Diet   Follow this diet upon discharge: Dialysis Diet       Discharge Medications   Current Discharge Medication List      CONTINUE these medications which have NOT CHANGED    Details   Gabapentin (NEURONTIN PO) Take 200 mg by mouth daily      !! insulin glargine (LANTUS) 100 UNIT/ML injection Inject 10 Units Subcutaneous every morning      !! insulin glargine (LANTUS) 100 UNIT/ML injection Inject 12 Units Subcutaneous At Bedtime      isosorbide mononitrate (IMDUR) 30 MG 24 hr tablet Take 90 mg by mouth daily      umeclidinium (INCRUSE ELLIPTA) 62.5 MCG/INH oral inhaler Inhale 1 puff into the lungs daily      OXYCODONE HCL PO Take 15 mg by mouth every 6 hours (takes scheduled)      hydrALAZINE (APRESOLINE) 50 MG tablet Take 1 tablet (50 mg) by mouth 3 times daily  Qty: 90 tablet, Refills: 0    Associated Diagnoses: Benign essential hypertension      bumetanide (BUMEX) 2 MG tablet Take 2 tablets (4 mg) by mouth daily  Qty: 60 tablet, Refills: 2    Associated Diagnoses: Acute on chronic systolic congestive heart failure (H)      folic acid-vit B6-vit B12 (FOLGARD) 0.8-10-0.115 MG TABS per tablet Take 1 tablet by mouth daily  Qty: 30 tablet,  Refills: 0    Associated Diagnoses: Anemia of chronic disease      atorvastatin (LIPITOR) 80 MG tablet Take 1 tablet (80 mg) by mouth daily  Qty: 30 tablet, Refills: 0    Associated Diagnoses: Acute diastolic CHF (congestive heart failure) (H)      lisinopril (PRINIVIL/ZESTRIL) 40 MG tablet Take 1 tablet (40 mg) by mouth daily  Qty: 30 tablet, Refills: 0    Associated Diagnoses: Hypertension due to endocrine disorder      carvedilol (COREG) 25 MG tablet Take 1.5 tablets (37.5 mg) by mouth 2 times daily (with meals)  Qty: 100 tablet, Refills: 0    Associated Diagnoses: Acute diastolic CHF (congestive heart failure) (H)      ferrous sulfate (IRON) 325 (65 FE) MG tablet Take 325 mg by mouth 2 times daily      VITAMIN D, CHOLECALCIFEROL, PO Take 1,000 Units by mouth daily      cloNIDine (CATAPRES-TTS2) 0.2 MG/24HR patch Place 1 patch onto the skin once a week      aspirin 81 MG chewable tablet Take 1 tablet (81 mg) by mouth daily  Qty: 36 tablet    Associated Diagnoses: Type 2 diabetes mellitus with diabetic nephropathy (H)      sodium bicarbonate 650 MG tablet Take 1 tablet (650 mg) by mouth 2 times daily    Associated Diagnoses: Chronic kidney disease, stage IV (severe) (H)      citalopram (CELEXA) 20 MG tablet Take 1 tablet (20 mg) by mouth daily  Qty: 30 tablet    Associated Diagnoses: Depression      calcium acetate (PHOSLO) 667 MG CAPS Take 1 capsule (667 mg) by mouth 3 times daily (with meals)  Qty: 180 capsule    Associated Diagnoses: Chronic kidney disease, stage IV (severe) (H)      brimonidine (ALPHAGAN-P) 0.15 % ophthalmic solution Place 1 drop into both eyes 3 times daily  Qty: 1 Bottle    Associated Diagnoses: Glaucoma      latanoprost (XALATAN) 0.005 % ophthalmic solution Place 1 drop into both eyes At Bedtime  Qty: 1 Bottle    Associated Diagnoses: Glaucoma      albuterol (PROAIR HFA, PROVENTIL HFA, VENTOLIN HFA) 108 (90 BASE) MCG/ACT inhaler Inhale 2 puffs into the lungs every 4 hours as needed for  shortness of breath / dyspnea or wheezing  Qty: 1 Inhaler, Refills: 3    Associated Diagnoses: Chronic obstructive pulmonary disease, unspecified COPD type (H)      guaiFENesin-dextromethorphan (ROBITUSSIN DM) 100-10 MG/5ML syrup Take 10 mLs by mouth every 4 hours as needed for cough  Qty: 560 mL, Refills: 0    Associated Diagnoses: Cough      guaiFENesin-codeine (ROBITUSSIN AC) 100-10 MG/5ML SOLN Take 5 mLs by mouth every 4 hours as needed for cough  Qty: 120 mL, Refills: 0    Associated Diagnoses: Cough      albuterol (2.5 MG/3ML) 0.083% nebulizer solution Take 1 vial (2.5 mg) by nebulization every 4 hours as needed for shortness of breath / dyspnea or wheezing  Qty: 360 mL    Associated Diagnoses: Chronic obstructive pulmonary disease, unspecified COPD type (H)       !! - Potential duplicate medications found. Please discuss with provider.        Allergies   Allergies   Allergen Reactions     Morphine Rash     Data   Most Recent 3 CBC's:  Recent Labs   Lab Test  03/31/17   0634  01/18/17   0741  01/16/17   0810   WBC  5.8  5.4  10.5   HGB  10.6*  9.8*  10.7*   MCV  90  89  89   PLT  169  156  129*      Most Recent 3 BMP's:  Recent Labs   Lab Test  03/31/17   0634  01/19/17   0735  01/18/17   0741   NA  135  135  134   POTASSIUM  4.6  3.6  3.9   CHLORIDE  101  98  98   CO2  22  26  27   BUN  62*  45*  37*   CR  5.12*  6.16*  5.57*   ANIONGAP  12  11  9   ALLYSON  8.2*  8.4*  8.1*   GLC  315*  156*  249*     Most Recent 2 LFT's:  Recent Labs   Lab Test  01/18/17   0741  01/15/17   1530   AST  10  9   ALT  9  9   ALKPHOS  57  53   BILITOTAL  0.3  0.8     Most Recent INR's and Anticoagulation Dosing History:  Anticoagulation Dose History     Recent Dosing and Labs Latest Ref Rng & Units 1/3/2016 1/27/2016 5/5/2016 10/7/2016 12/6/2016 12/12/2016 12/15/2016    INR 0.86 - 1.14 1.0 1.00 1.05 1.09 1.18(H) 1.12 1.17(H)        Most Recent 3 Troponin's:  Recent Labs   Lab Test  12/07/16   1125  12/07/16   0526  12/06/16   1642    TROPI  0.176*  0.279*  0.255*     Most Recent Cholesterol Panel:  Recent Labs   Lab Test  12/07/16   0526   CHOL  117   LDL  57   HDL  39*   TRIG  104     Most Recent 6 Bacteria Isolates From Any Culture (See EPIC Reports for Culture Details):  Recent Labs   Lab Test  01/15/17   0910  01/15/17   0624  01/15/17   0600  12/06/16   0815  12/06/16   0812  07/01/16   0950   CULT  Moderate growth Normal memo  No growth  No growth  No growth  No growth  Heavy growth Normal memo  Moderate growth Haemophilus influenzae Beta lactamase negative  Beta-lactamase negative Haemophilus influenzae are usually susceptible to   ampicillin, amoxacillin/clavulanic acid, levofloxacin, and 3rd generation   cephalosporins, such as ceftriaxone.  *  Canceled, Test credited Incorrectly ordered by PCU/Clinic     Most Recent TSH, T4 and A1c Labs:  Recent Labs   Lab Test  01/16/17   0810   01/27/16   0737   TSH   --    --   6.44*   T4   --    --   1.23   A1C  7.6*   < >   --     < > = values in this interval not displayed.       Results for orders placed or performed during the hospital encounter of 03/31/17   XR Chest Port 1 View    Narrative    CHEST SINGLE VIEW PORTABLE  3/31/2017 6:30 AM     HISTORY: Shortness of breath.    COMPARISON: 1/15/2017.    FINDINGS: Moderate interstitial and air-space opacities in the central  aspects of both lungs. Small bilateral pleural effusions. The size of  the cardiac silhouette is likely within normal limits. Atherosclerotic  calcification in the thoracic aorta. Right internal jugular dual-lumen  central venous catheter with distal catheter tips in the superior vena  cava.      Impression    IMPRESSION:  1. Moderate interstitial and air-space opacities in the central  aspects of both lungs, likely representing pulmonary edema.  2. Small bilateral pleural effusions.    KAYY CARTAGENA MD

## 2017-03-31 NOTE — PROGRESS NOTES
"\"What is Observation\" brochure was given and explained to the pt. Pt verbalized understanding and denies any questions at this time.     "

## 2017-03-31 NOTE — PROGRESS NOTES
Renal Medicine Inpatient Dialysis Note                                Keagan Wayne MRN# [id number]   Age: 71 year old YOB: 1945   Date of Admission: 3/31/2017 Hospital LOS: 0          Assessment/Plan:     Admitted via ER with increasing SOB.  Seen for urgent dialysis in a patient with ESRD    1.  ESRD   -right IJ   -dry weight 83.5 kg   -[facility]  2.  Anemia   -[facility]  3.  SOB   -pulmonary edema/fluid overload  4.  HTN   -moderate control  5.  DM    Urgent dialysis this am for volume  Assess BP control when at dry weight    Next dialysis presumably 04/03/17      Interval History:     MWF schedule at [facility].  Missed 03/27/17. Was offered immediate dialysis  (alternate machine) that day when his machine failed conductivity.  He refused    Obvious SOB.  No CP.  Pleasant.  /97.      ROS     GENERAL: NAD, No fever,chills  RESP:SOB/dyspnea  CV: NEGATIVE for chest pain, palpitations  EXT: no change edema  ROS otherwise negative    Dialysis Parameters:     Vitals were reviewed  Patient Vitals for the past 8 hrs:   BP Temp Temp src Pulse Heart Rate Resp SpO2 Weight   03/31/17 0905 (!) 180/97 - - 81 - - 100 % -   03/31/17 0850 (!) 163/92 - - 81 - - 96 % -   03/31/17 0835 169/88 - - 76 - - 94 % -   03/31/17 0820 164/75 - - 77 - - 96 % -   03/31/17 0813 165/86 97.6  F (36.4  C) Axillary - 78 28 96 % -   03/31/17 0745 (!) 181/99 - - - 79 28 95 % -   03/31/17 0734 (!) 187/101 - - - 79 24 99 % -   03/31/17 0730 (!) 187/101 - - - 80 26 98 % -   03/31/17 0708 - - - - 82 21 98 % -   03/31/17 0707 - - - - 83 21 97 % -   03/31/17 0645 (!) 185/114 - - - 86 22 100 % -   03/31/17 0641 (!) 154/97 - - - - - - -   03/31/17 0630 - - - - - - 97 % -   03/31/17 0620 - - - - 100 29 100 % 75.8 kg (167 lb)   03/31/17 0616 - 95.7  F (35.4  C) Temporal - 105 29 100 % -          Vitals:    03/31/17 0620   Weight: 75.8 kg (167 lb)       Current Weight: 75.8 ?  Dry Weight: 83.5 outpatient  Dialysis Temp: 36.5   C  Access Device: IJ  Access Site: right  Dialyzer: Revaclear  Dialysis Bath: 2  Sodium Profile: n  UF Goal: 4  Blood Flow Rate (mL/min): 400  Total Treatment Time (hrs): 3.5  Heparin: Low dose as required      EPO dose: y  Zemplar: n  IV Fe: n      Medications and Allergies:     Reviewed      Physical Exam:     Seen and examined during course of dialysis run    BP (!) 163/92  Pulse 81  Temp 97.6  F (36.4  C) (Axillary)  Resp 28  Wt 75.8 kg (167 lb)  SpO2 96%  BMI 20.87 kg/m2    GENERAL: awake, alert, follows  HEENT: NC/AT, PERRLA, EOMI, non icteric, pharynx moist without lesion  NECK: supple, no masses or adenopathy  RESP: decreased  CV: RRR, normal S1 S2  ABDOMEN: S/NT, BS present  MS: 1 plus edema  SKIN: catheter site clean without drainage  NEURO: speech normal and cranial nerves 2-12 intact  PSYCH: affect normal/bright    Data:         Recent Labs  Lab 03/31/17  0634      POTASSIUM 4.6   CHLORIDE 101   CO2 22   ANIONGAP 12   *   BUN 62*   CR 5.12*   GFRESTIMATED 11*   GFRESTBLACK 14*   ALLYSON 8.2*       Recent Labs  Lab 03/31/17  0634   HGB 10.6*         [facility] - Nephrology  [phone]

## 2017-03-31 NOTE — PROGRESS NOTES
VSS. Tele shows SR with PVC. Pt denies any CP or SOB. Discharge instructions, medication indications/side effects, and follow up instructions discussed w/ pt. Handouts given. All questions answered. All pt belongings are accounted for and sent home w/ pt. Pt left floor via w/c and drove himself home.

## 2017-03-31 NOTE — IP AVS SNAPSHOT
[facility]    [address] [place] [address], [address]    [place] [address]    Phone:  [phone]                                       After Visit Summary   3/31/2017    Keagan Wayne    MRN: [id number]           After Visit Summary Signature Page     I have received my discharge instructions, and my questions have been answered. I have discussed any challenges I see with this plan with the nurse or doctor.    ..........................................................................................................................................  Patient/Patient Representative Signature      ..........................................................................................................................................  Patient Representative Print Name and Relationship to Patient    ..................................................               ................................................  Date                                            Time    ..........................................................................................................................................  Reviewed by Signature/Title    ...................................................              ..............................................  Date                                                            Time

## 2017-04-27 ENCOUNTER — HOSPITAL ENCOUNTER (OUTPATIENT)
Facility: CLINIC | Age: 72
Discharge: HOME | End: 2017-04-27
Attending: RADIOLOGY | Admitting: RADIOLOGY
Payer: MEDICARE

## 2017-04-27 ENCOUNTER — APPOINTMENT (OUTPATIENT)
Dept: INTERVENTIONAL RADIOLOGY/VASCULAR | Facility: CLINIC | Age: 72
End: 2017-04-27
Attending: INTERNAL MEDICINE
Payer: MEDICARE

## 2017-04-27 VITALS
OXYGEN SATURATION: 98 % | DIASTOLIC BLOOD PRESSURE: 66 MMHG | SYSTOLIC BLOOD PRESSURE: 149 MMHG | HEART RATE: 66 BPM | RESPIRATION RATE: 18 BRPM | TEMPERATURE: 97.5 F

## 2017-04-27 DIAGNOSIS — Z51.89 TREATMENT: ICD-10-CM

## 2017-04-27 PROCEDURE — 36589 REMOVAL TUNNELED CV CATH: CPT | Mod: RT

## 2017-04-27 PROCEDURE — 40000854 ZZH STATISTIC SIMPLE TUBE INSERTION/CHARGE, PORT, CATH, FISTULOGRAM

## 2017-04-27 PROCEDURE — 25000125 ZZHC RX 250: Performed by: RADIOLOGY

## 2017-04-27 RX ORDER — LIDOCAINE HYDROCHLORIDE 10 MG/ML
1-30 INJECTION, SOLUTION EPIDURAL; INFILTRATION; INTRACAUDAL; PERINEURAL
Status: COMPLETED | OUTPATIENT
Start: 2017-04-27 | End: 2017-04-27

## 2017-04-27 RX ADMIN — LIDOCAINE HYDROCHLORIDE 70 MG: 10 INJECTION, SOLUTION EPIDURAL; INFILTRATION; INTRACAUDAL; PERINEURAL at 10:13

## 2017-04-27 NOTE — PROGRESS NOTES
[facility] Discharge Summary    Discharge Criteria:   Discharge Criteria met per MD orders: Yes.   Vital signs stable.     Pt demonstrates ability to ambulate safely: Yes.  (See discharge questionnaire for additional information)    Discharge instructions & education:   Discharge instructions reviewed with patient and girl friend. Patient verbalizes  understanding.    Medications:   Patient will be discharging on new medications- No. Patient verbalizes reason for use, start date, and side effects NA.    Items returned to patient:   Home and hospital acquired medications returned to patient NA   Listed belongings gathered and returned to patient: Yes    Patient discharged to home via ambulating with girl friend.    Sana Yates

## 2017-04-27 NOTE — DISCHARGE INSTRUCTIONS
Tunnel Cath Removal Discharge Instructions     After you go home:      You may resume your normal diet    Care of Puncture Site:      Keep the dressing clean & dry for 3 days    You may use a bandaid on the site after 3 days until the wound has healed    No tub baths, whirlpools or swimming until your puncture site has fully healed     Activity       You may go back to normal activity in 24 hours     Avoid heavy lifting (greater than 10 pounds), strenuous activity or the overuse of your shoulder for 3 days    Bleeding:      If you start bleeding from the incision site in your chest or have swelling in your neck, sit down and press on the site for 5-10 minutes.     If bleeding has not stopped after 10 minutes, call your provider.        Call 911 right away if you have heavy bleeding or bleeding that does not stop.      Medicines:      You may resume all your medicines today    For minor pain, you may take Acetaminophen (Tylenol) or Ibuprofen (Advil)                 Call the provider who ordered this procedure if:      The site is red, swollen, hot or tender or there is drainage at the site    You have chills or a fever greater than 100 F (37.8 C)    Swelling in your neck    Any questions or concerns      If you have questions call:          [facility] Radiology Dept @ [phone]        The provider who performed your procedure was Dr Daly.

## 2017-04-27 NOTE — PROGRESS NOTES
Explained tunnel cath removal and gave d/c instructions with verbalized understanding.  IR here to take pt.

## 2017-04-27 NOTE — IP AVS SNAPSHOT
[place] [address]    [place] [address]    Phone:  [phone]                                       After Visit Summary   4/27/2017    Keagan Wayne    MRN: [id number]           After Visit Summary Signature Page     I have received my discharge instructions, and my questions have been answered. I have discussed any challenges I see with this plan with the nurse or doctor.    ..........................................................................................................................................  Patient/Patient Representative Signature      ..........................................................................................................................................  Patient Representative Print Name and Relationship to Patient    ..................................................               ................................................  Date                                            Time    ..........................................................................................................................................  Reviewed by Signature/Title    ...................................................              ..............................................  Date                                                            Time

## 2017-04-27 NOTE — PROGRESS NOTES
Interventional Radiology Intra-procedural Nursing Note    Patient Name: Keagan Wayne  Medical Record Number: [id number]  Today's Date: April 27, 2017    Start Time:  1011  End of procedure time:  1018  Procedure:  Tunneled catheter removal  Report given to:  Sana ESQUIVEL, [facility] RN  Time pt departs:   1025  :  N/A    Other Notes:   Patient into IR#3 at 10:00.  Informed consent for procedure obtained by MD Kaci.  Patient prepped and draped in supine position on cart.  Tunneled catheter removed by MD.  7cc Lidocaine used.  Site C/D/I with dressing.  Transported back to [facility] in stable condition by IR RN.      Dolores Esquivel, RN, BSN, CCRN

## 2017-04-27 NOTE — IP AVS SNAPSHOT
MRN:[id number]                      After Visit Summary   4/27/2017    Keagan Wayne    MRN: [id number]           Visit Information        Department      4/27/2017  9:06 AM [facility]          Review of your medicines      UNREVIEWED medicines. Ask your doctor about these medicines        Dose / Directions    * albuterol (2.5 MG/3ML) 0.083% neb solution   Used for:  Chronic obstructive pulmonary disease, unspecified COPD type (H)        Dose:  1 vial   Take 1 vial (2.5 mg) by nebulization every 4 hours as needed for shortness of breath / dyspnea or wheezing   Quantity:  360 mL   Refills:  0       * albuterol 108 (90 BASE) MCG/ACT Inhaler   Commonly known as:  PROAIR HFA/PROVENTIL HFA/VENTOLIN HFA   Used for:  Chronic obstructive pulmonary disease, unspecified COPD type (H)        Dose:  2 puff   Inhale 2 puffs into the lungs every 4 hours as needed for shortness of breath / dyspnea or wheezing   Quantity:  1 Inhaler   Refills:  3       aspirin 81 MG chewable tablet   Used for:  Type 2 diabetes mellitus with diabetic nephropathy (H)        Dose:  81 mg   Take 1 tablet (81 mg) by mouth daily   Quantity:  36 tablet   Refills:  0       atorvastatin 80 MG tablet   Commonly known as:  LIPITOR   Used for:  Acute diastolic CHF (congestive heart failure) (H)        Dose:  80 mg   Take 1 tablet (80 mg) by mouth daily   Quantity:  30 tablet   Refills:  0       brimonidine 0.15 % ophthalmic solution   Commonly known as:  ALPHAGAN-P   Used for:  Glaucoma        Dose:  1 drop   Place 1 drop into both eyes 3 times daily   Quantity:  1 Bottle   Refills:  0       bumetanide 2 MG tablet   Commonly known as:  BUMEX   Used for:  Acute on chronic systolic congestive heart failure (H)        Dose:  4 mg   Take 2 tablets (4 mg) by mouth daily   Quantity:  60 tablet   Refills:  2       calcium acetate 667 MG Caps capsule   Commonly known as:  PHOSLO   Used for:  Chronic kidney disease, stage IV  (severe) (H)        Dose:  667 mg   Take 1 capsule (667 mg) by mouth 3 times daily (with meals)   Quantity:  180 capsule   Refills:  0       carvedilol 25 MG tablet   Commonly known as:  COREG   Used for:  Acute diastolic CHF (congestive heart failure) (H)        Dose:  37.5 mg   Take 1.5 tablets (37.5 mg) by mouth 2 times daily (with meals)   Quantity:  100 tablet   Refills:  0       citalopram 20 MG tablet   Commonly known as:  celeXA   Used for:  Depression        Dose:  20 mg   Take 1 tablet (20 mg) by mouth daily   Quantity:  30 tablet   Refills:  0       cloNIDine 0.2 MG/24HR WK patch   Commonly known as:  CATAPRES-TTS2        Dose:  1 patch   Place 1 patch onto the skin once a week   Refills:  0       folic acid-vit B6-vit B12 0.8-10-0.115 MG Tabs per tablet   Commonly known as:  FOLGARD   Used for:  Anemia of chronic disease        Dose:  1 tablet   Take 1 tablet by mouth daily   Quantity:  30 tablet   Refills:  0       guaiFENesin-codeine 100-10 MG/5ML Soln solution   Commonly known as:  ROBITUSSIN AC   Used for:  Cough        Dose:  1 tsp.   Take 5 mLs by mouth every 4 hours as needed for cough   Quantity:  120 mL   Refills:  0       guaiFENesin-dextromethorphan 100-10 MG/5ML syrup   Commonly known as:  ROBITUSSIN DM   Used for:  Cough        Dose:  10 mL   Take 10 mLs by mouth every 4 hours as needed for cough   Quantity:  560 mL   Refills:  0       hydrALAZINE 50 MG tablet   Commonly known as:  APRESOLINE   Used for:  Benign essential hypertension        Dose:  50 mg   Take 1 tablet (50 mg) by mouth 3 times daily   Quantity:  90 tablet   Refills:  0       INCRUSE ELLIPTA 62.5 MCG/INH oral inhaler   Generic drug:  umeclidinium        Dose:  1 puff   Inhale 1 puff into the lungs daily   Refills:  0       insulin glargine 100 UNIT/ML injection   Commonly known as:  LANTUS        Dose:  16 Units   Inject 16 Units Subcutaneous every morning   Refills:  0       isosorbide mononitrate 30 MG 24 hr tablet    Commonly known as:  IMDUR        Dose:  90 mg   Take 90 mg by mouth daily   Refills:  0       latanoprost 0.005 % ophthalmic solution   Commonly known as:  XALATAN   Used for:  Glaucoma        Dose:  1 drop   Place 1 drop into both eyes At Bedtime   Quantity:  1 Bottle   Refills:  0       lisinopril 40 MG tablet   Commonly known as:  PRINIVIL/ZESTRIL   Used for:  Hypertension due to endocrine disorder        Dose:  40 mg   Take 1 tablet (40 mg) by mouth daily   Quantity:  30 tablet   Refills:  0       NEURONTIN PO        Dose:  200 mg   Take 200 mg by mouth daily   Refills:  0       OXYCODONE HCL PO        Dose:  15 mg   Take 15 mg by mouth every 6 hours (takes scheduled)   Refills:  0       sodium bicarbonate 650 MG tablet   Used for:  Chronic kidney disease, stage IV (severe) (H)        Dose:  650 mg   Take 1 tablet (650 mg) by mouth 2 times daily   Refills:  0       VITAMIN D (CHOLECALCIFEROL) PO        Dose:  1000 Units   Take 1,000 Units by mouth daily   Refills:  0       * Notice:  This list has 2 medication(s) that are the same as other medications prescribed for you. Read the directions carefully, and ask your doctor or other care provider to review them with you.             Protect others around you: Learn how to safely use, store and throw away your medicines at www.disposemymeds.org.         Follow-ups after your visit         Care Instructions        Further instructions from your care team         Tunnel Cath Removal Discharge Instructions     After you go home:      You may resume your normal diet    Care of Puncture Site:      Keep the dressing clean & dry for 3 days    You may use a bandaid on the site after 3 days until the wound has healed    No tub baths, whirlpools or swimming until your puncture site has fully healed     Activity       You may go back to normal activity in 24 hours     Avoid heavy lifting (greater than 10 pounds), strenuous activity or the overuse of your shoulder for 3  "days    Bleeding:      If you start bleeding from the incision site in your chest or have swelling in your neck, sit down and press on the site for 5-10 minutes.     If bleeding has not stopped after 10 minutes, call your provider.        Call 911 right away if you have heavy bleeding or bleeding that does not stop.      Medicines:      You may resume all your medicines today    For minor pain, you may take Acetaminophen (Tylenol) or Ibuprofen (Advil)                 Call the provider who ordered this procedure if:      The site is red, swollen, hot or tender or there is drainage at the site    You have chills or a fever greater than 100 F (37.8 C)    Swelling in your neck    Any questions or concerns      If you have questions call:          [facility] Radiology Dept @ [phone]        The provider who performed your procedure was Dr Daly.                     Additional Information About Your Visit        MyChart Information     [facility] lets you send messages to your doctor, view your test results, renew your prescriptions, schedule appointments and more. To sign up, go to [email or web address] . Click on \"Log in\" on the left side of the screen, which will take you to the Welcome page. Then click on \"Sign up Now\" on the right side of the page.     You will be asked to enter the access code listed below, as well as some personal information. Please follow the directions to create your username and password.     Your access code is: [email or web address]  Expires: 2017  9:43 AM     Your access code will  in 90 days. If you need help or a new code, please call your [place] clinic or [phone].        Care EveryWhere ID     This is your Care EveryWhere ID. This could be used by other organizations to access your [place] medical records  [id number]        Your Vitals Were     Blood Pressure Pulse Temperature Respirations Pulse Oximetry       139/63 (BP Location: Left arm) 71 97.5  F (36.4  C) " (Oral) 18 98%        Primary Care Provider Office Phone # Fax #    Precious Vinnie Echevarria [phone]      Thank you!     Thank you for choosing [facility] for your care. Our goal is always to provide you with excellent care. Hearing back from our patients is one way we can continue to improve our services. Please take a few minutes to complete the written survey that you may receive in the mail after you visit with us. Thank you!             Medication List: This is a list of all your medications and when to take them. Check marks below indicate your daily home schedule. Keep this list as a reference.      Medications           Morning Afternoon Evening Bedtime As Needed    * albuterol (2.5 MG/3ML) 0.083% neb solution   Take 1 vial (2.5 mg) by nebulization every 4 hours as needed for shortness of breath / dyspnea or wheezing                                * albuterol 108 (90 BASE) MCG/ACT Inhaler   Commonly known as:  PROAIR HFA/PROVENTIL HFA/VENTOLIN HFA   Inhale 2 puffs into the lungs every 4 hours as needed for shortness of breath / dyspnea or wheezing                                aspirin 81 MG chewable tablet   Take 1 tablet (81 mg) by mouth daily                                atorvastatin 80 MG tablet   Commonly known as:  LIPITOR   Take 1 tablet (80 mg) by mouth daily                                brimonidine 0.15 % ophthalmic solution   Commonly known as:  ALPHAGAN-P   Place 1 drop into both eyes 3 times daily                                bumetanide 2 MG tablet   Commonly known as:  BUMEX   Take 2 tablets (4 mg) by mouth daily                                calcium acetate 667 MG Caps capsule   Commonly known as:  PHOSLO   Take 1 capsule (667 mg) by mouth 3 times daily (with meals)                                carvedilol 25 MG tablet   Commonly known as:  COREG   Take 1.5 tablets (37.5 mg) by mouth 2 times daily (with meals)                                citalopram 20 MG tablet    Commonly known as:  celeXA   Take 1 tablet (20 mg) by mouth daily                                cloNIDine 0.2 MG/24HR WK patch   Commonly known as:  CATAPRES-TTS2   Place 1 patch onto the skin once a week                                folic acid-vit B6-vit B12 0.8-10-0.115 MG Tabs per tablet   Commonly known as:  FOLGARD   Take 1 tablet by mouth daily                                guaiFENesin-codeine 100-10 MG/5ML Soln solution   Commonly known as:  ROBITUSSIN AC   Take 5 mLs by mouth every 4 hours as needed for cough                                guaiFENesin-dextromethorphan 100-10 MG/5ML syrup   Commonly known as:  ROBITUSSIN DM   Take 10 mLs by mouth every 4 hours as needed for cough                                hydrALAZINE 50 MG tablet   Commonly known as:  APRESOLINE   Take 1 tablet (50 mg) by mouth 3 times daily                                INCRUSE ELLIPTA 62.5 MCG/INH oral inhaler   Inhale 1 puff into the lungs daily   Generic drug:  umeclidinium                                insulin glargine 100 UNIT/ML injection   Commonly known as:  LANTUS   Inject 16 Units Subcutaneous every morning                                isosorbide mononitrate 30 MG 24 hr tablet   Commonly known as:  IMDUR   Take 90 mg by mouth daily                                latanoprost 0.005 % ophthalmic solution   Commonly known as:  XALATAN   Place 1 drop into both eyes At Bedtime                                lisinopril 40 MG tablet   Commonly known as:  PRINIVIL/ZESTRIL   Take 1 tablet (40 mg) by mouth daily                                NEURONTIN PO   Take 200 mg by mouth daily                                OXYCODONE HCL PO   Take 15 mg by mouth every 6 hours (takes scheduled)                                sodium bicarbonate 650 MG tablet   Take 1 tablet (650 mg) by mouth 2 times daily                                VITAMIN D (CHOLECALCIFEROL) PO   Take 1,000 Units by mouth daily                                * Notice:   This list has 2 medication(s) that are the same as other medications prescribed for you. Read the directions carefully, and ask your doctor or other care provider to review them with you.

## 2017-05-10 PROBLEM — M54.50 ACUTE BILATERAL LOW BACK PAIN WITHOUT SCIATICA: Status: RESOLVED | Noted: 2017-02-13 | Resolved: 2017-05-10

## 2017-05-10 NOTE — PROGRESS NOTES
Subjective:    HPI                    Objective:    System    Physical Exam    General     ROS    Assessment/Plan:      DISCHARGE REPORT    Progress reporting period is from 2/13/17 to 3/13/17.     SUBJECTIVE  Subjective: Ed reports having fall shortly after last session, missed step and landed hard on L side. Pt reports having increased L elbow, bilateral knee, and posterior head. Has had difficulty performing HEP since falling.    Current Pain level: 7/10   Initial Pain level: 7/10   Changes in function: No changes noted in function since last SOAP note   Adverse reactions: Activity:;   , Adverse reaction activity: Fell landing on L side   Patient has failed to return to therapy so current objective findings are unknown.    OBJECTIVE  Objective: Lumbar ROM: flexion to mid tibia +pain. extension 75% +pain, bilateral SB 50%, bilateral rotation 50%      ASSESSMENT/PLAN  Updated problem list and treatment plan: Diagnosis 1:  Low back pain, generalized weakness  Pain -  hot/cold therapy, manual therapy, self management, education and home program  Decreased ROM/flexibility - manual therapy, therapeutic exercise, therapeutic activity and home program  Decreased strength - therapeutic exercise, therapeutic activities and home program  Impaired gait - gait training, assistive devices and home program  Impaired muscle performance - neuro re-education and home program  Decreased function - therapeutic activities and home program  Impaired posture - neuro re-education, therapeutic activities and home program  STG/LTGs have been met or progress has been made towards goals:  None  Assessment of Progress: The patient has not returned to therapy. Current status is unknown.  Self Management Plans:  Patient has been instructed in a home treatment program.  Patient  has been instructed in self management of symptoms.  I have re-evaluated this patient and find that the nature, scope, duration and intensity of the therapy is  appropriate for the medical condition of the patient.  Edward continues to require the following intervention to meet STG and LTG's: PT  The patient failed to complete scheduled/ordered appointments so current information is unknown.  We will discharge this patient from PT.    Recommendations:  This patient would benefit from further evaluation.    Please refer to the daily flowsheet for treatment today, total treatment time and time spent performing 1:1 timed codes.

## 2017-05-16 ENCOUNTER — TELEPHONE (OUTPATIENT)
Dept: TRANSPLANT | Facility: CLINIC | Age: 72
End: 2017-05-16

## 2017-05-16 DIAGNOSIS — Z91.199 PERSONAL HISTORY OF NONCOMPLIANCE WITH MEDICAL TREATMENT, PRESENTING HAZARDS TO HEALTH: ICD-10-CM

## 2017-05-16 DIAGNOSIS — Z12.5 ENCOUNTER FOR SCREENING FOR MALIGNANT NEOPLASM OF PROSTATE: ICD-10-CM

## 2017-05-16 DIAGNOSIS — K76.89 LIVER DYSFUNCTION: ICD-10-CM

## 2017-05-16 DIAGNOSIS — J98.4 DISEASE OF LUNG: ICD-10-CM

## 2017-05-16 DIAGNOSIS — B18.2 HEPATITIS C, CHRONIC (H): ICD-10-CM

## 2017-05-16 DIAGNOSIS — E11.9 DIABETES MELLITUS, TYPE 2 (H): ICD-10-CM

## 2017-05-16 DIAGNOSIS — Z76.82 ORGAN TRANSPLANT CANDIDATE: ICD-10-CM

## 2017-05-16 DIAGNOSIS — E78.5 HYPERLIPIDEMIA: ICD-10-CM

## 2017-05-16 DIAGNOSIS — I25.10 CARDIOVASCULAR DISEASE: ICD-10-CM

## 2017-05-16 DIAGNOSIS — I10 ESSENTIAL HYPERTENSION: ICD-10-CM

## 2017-05-16 DIAGNOSIS — Z87.891 HISTORY OF TOBACCO USE: ICD-10-CM

## 2017-05-16 DIAGNOSIS — N18.6 END STAGE RENAL DISEASE (H): ICD-10-CM

## 2017-05-16 NOTE — Clinical Note
Please see smart set. Dialysis MWF. Call his SO María Miller to schedule [phone]. Please send schedule to Renee. Thanks.

## 2017-05-16 NOTE — LETTER
Keagan Wayne  [address]    [place] [address]          Dear Keagan,    Thank you for your interest in the Transplant Center at [facility], [facility]. We look forward to being a part your care team and assisting you through the transplant process.    As we discussed, your transplant coordinator is Margaret Reardon ([phone]).  You may call your coordinator at any time with questions or concerns.    Please complete the following.    1. Sign up for:    [email or web address], your electronic medical record    [facility], the Transplant Center's website (see enclosed booklet)    You can use these tools to learn more about your transplant, communicate with your care team, and track your medical details    2. Fill out and return the enclosed forms    Authorization for Electronic Communication    Authorization to Discuss Protected Health Information    [employer] Release of Information       Best Wishes,      Solid Organ Transplant Intake  [facility], [facility]    cc: Referring Physician        Dialysis Unit        PCP

## 2017-05-23 NOTE — TELEPHONE ENCOUNTER
Called patient son and left my name,hours, date and number regardng his father referral on his vm. I called Friend Kiley and also lm. Patient phone does accept incoming calls

## 2017-05-24 NOTE — TELEPHONE ENCOUNTER
Intake Progress Note  Organ: Kidney    Referral Came Via fax    Referring Physician (outside provider, if patient does not remember the name of provider contact clinic or dialysis unit to get this information) :  Dr. Sundar Calle                PCP: Dr. Hernandez @ [facility]    Assigned Coordinator:  Renee Reardon    Reported Diagnosis that caused the kidney failure ( not CKD)  Kidney Failure    Best time patient can be reached:  Call María anytime (PCA)    How would you like to be communicated with, through MyChart, phone, or mail?  Phone/mail      Records:  [facility] from: [facility], [place], [place], [facility]      Insurance information:  Medicare/[employer]  Policy winslow:  Self  Subscriber/policy/ID number:  Medicare ([id number]) [employer] ([id number])  Group Number:  [id number]    History of diabetes:  yes  Type 2    If yes, age of onset:  57 years old    Do you have an endocrinologist?: no Name and Location:  n/a         On insulin or oral medication:  Yes,   Huge list of meds please check snap shot          What type of insulin and how many units? Lantus 18 units x1 per day,  Will be starting Novolog soon          History of a kidney biopsy:  no         If Yes,when and where:  n/a    Past Medical and Surgical History (updated in Epic medical / surgical):             History of  cancer personally:  No , What type? n/a              Where and when was it treated? n/a                            History of cardiac events:  No, but MD say he has congestive heart failure             When and where was it was it treated? n/a             History abdominal surgeries other than previous transplant: no  What type? n/a              Where and when? n/a              History of previous transplant: no             If Yes where and estimated date:  n/a             Listed or in eval at another transplant center?  no             History of hospitalization in last 12 months:  yes               If Yes:  [facility] 2016 regarding  kidney issue, [place] 2016 regarding kidney issue               History of blood transfusion:  no               Smoking history:  yes     Current smoker: yes  How much? 3-4 per day      Quit Date: n/a    On dialysis: yes  Where: [facility] in [place]                 Type of Dialysis: hemo  Start date: 12/2016       Dialysis Days:  MWF from 6am-10am       If NYOD, estimated GFR:  n/a  Height:  6'3  Weight:  183  BMI:  23.1    Health Maintenance:  PAP:  n/a  Mammo:  n/a  Colon:  ?  PSA:  ?  Dental: no  Vaccines: ?  Special Needs (ie--wheelchair, assistance, guardian, interpretor): no    I would like to schedule a date for your evaluation approximately 4 weeks from now. I have some options for you to choose from. (Offer them a couple different dates and times and enter into calendar for proposed evaluation date)    I also want to schedule your first call with the coordinator. (Offer a couple choices and schedule patient's preferred date and time.)      Inform patient on the need to arrange age appropriate cancer screening, vaccines up to date and dental clearance    Reviewed evaluation process and reminded patient to complete questionnaire, complete medical records release, and review packet prior to evaluation visit     Informed patient that coordinator will review their chart and insurance coverage and if no concerns they will receive a call from a  to schedule evaluation

## 2017-05-30 ENCOUNTER — MEDICAL CORRESPONDENCE (OUTPATIENT)
Dept: TRANSPLANT | Facility: CLINIC | Age: 72
End: 2017-05-30

## 2017-05-30 NOTE — TELEPHONE ENCOUNTER
Reviewed medical records to date in EPIC. ESRD on HD since 12/20/2016. No kidney biopsy done. DM2 since age 57. Has history of CHF, non ST elevation MI,CAD with coronary angiogram from 12/12/2016 showing no significant obstructive lesions, non ischemic cardiomyopathy with efx of 35%. HTN, anemia, COPD and still smokes, peripheral neuropathy, glaucoma, low back pain, mild-moderate cognitive impairment but is his own guardian, colitis, hyperlipidemia, noncompliance with medical care, history of leaving hospital AMA, Hepatitis C and bipolar disease ( no meds). Only surgery is the AVF and a hernia repair. Has not received blood. Is a smoker. BMI 23.1. All records acceptable to proceed with pre kidney evaluation.    Spoke to both Keagan and his PCA María. Their relationship is more of old boyfriend and girlfriend reconnecting after 50+ years and she takes care of him now. They live together and she helps him with his health and cooks for him. Keagan is able to walk, drive and use the bathroom himself. María helps him with bathing and dressing. She keeps track of his medical appointments and makes sure he takes his medications. She was very helpful in helping me locate additional providers and records. María describes Keagan as very social and has many friends. Records indicate he drinks 1 beer and a shot on a nightly basis. She said that is no longer true. He may have one drink when out with friends. She said he has neuropathy in his feet but no ulcers. They are going to work on scheduling his dental up date. Colonoscopy was done in 2015?? [facility] will [facility]. We discussed the 2 day evaluation process. He will arrive the first day fasting for labs. Once the labs are done he may eat breakfast. He was encouraged to bring breakfast or money to purchase and to bring his morning insulin to take once he is eating . He may take his other morning medications with water before he arrives. We talked about the online group  presentation of [facility] and he was encouraged to bring María. We talked about the list of providers he will see and their roles. Discussed the overall evaluation/approval process and wait listing. María was interested in becoming a living donor for Keagan but she is 69 and also has diabetes. Explained that we will talk more about that once he comes in for evaluation. They are aware the next contact will be from our  to offer them the next available appointments. They both have my contact information.    Smart set orders placed in EPIC and routed to .

## 2017-05-31 ENCOUNTER — MEDICAL CORRESPONDENCE (OUTPATIENT)
Dept: TRANSPLANT | Facility: CLINIC | Age: 72
End: 2017-05-31

## 2017-05-31 ENCOUNTER — TELEPHONE (OUTPATIENT)
Dept: TRANSPLANT | Facility: CLINIC | Age: 72
End: 2017-05-31

## 2017-05-31 NOTE — LETTER
June 1, 2017    Keagan Wayne   [address]    [place] [address]      Dear Mr. Wayne,    Attached is your Pre Kidney Evaluation schedule on June 22, 2017 and returning October 10, 2017. Please feel free to contact me at [phone], if you have any questions.       Sincerely,   Stacie CHEEMA    CC:Renee SNOW                                                  [facility] [address]  [address]      EVALUATION SCHEDULE: KIDNEY TRANSPLANT SLOT 3 EVAL      Patient:   KEAGAN WAYNE  MR#:    [id number]  Coordinator:   Renee SNOW     [phone]   :   Stacie CHEEMA     [phone]  Location:   [facility]  Date:    June 22, 2017 & October 10, 2017      This is your evaluation schedule, please follow dates and times.  You  will receive reminder phone calls for other tests, but please follow this schedule only!  If you have any questions about dates and times,  please call us on number listed above.  Thank you, Transplant Clinic.       NO FOOD or DRINK AFTER MIDNIGHT  (You may only have small amounts of water)      Day/Date:   Thursday, June 22, 2017  Time Location Activity   6:30a.m. [facility]  Imaging and Lab Testing  1st floor Blood tests (fasting, PLEASE)    7:15a.m. BREAKFAST     7:30a.m. [facility]  Transplant Services  3rd floor; Clinic 3A Review evaluation process, vitals, medication review   7:50a.m-  9:00a.m. [facility]  Transplant Services  3rd floor; Clinic 3A Pre-transplant class   9:00a.m. [facility]  Transplant Services  3rd floor; Clinic 3A; Consult Room Appointment with Vicky Dunbar,  Registered Dietitian   9:30a.m. [facility]  Transplant Services  3rd floor; Clinic 3A Appointment with Dr. Greene,   Transplant Surgeon   10:00a.m. [facility]  Transplant Services  3rd floor; Clinic 3A Meet with Renee SNOW  Transplant Coordinator   10:30a.m. [facility]  Transplant Services  3rd floor; Clinic 3B Appointment with Dr. Ornelas,   Transplant Nephrologist   11:15a.m. [facility]  Transplant Services  3rd floor; Clinic 3A; Consult Room Appointment with either Brit Shah  Transplant      12:15p.m.-  12:30p.m. [facility]  Transplant Services  3rd floor; Clinic 3A; Consult Room Research    12:30p.m.-  1:30p.m. LUNCH    1:45p.m. [facility]  Imaging and Lab Testing  1st floor 2nd ABO blood draw - confirmation of 1st draw   2:00p.m. [facility]  Imaging and Lab Testing  1st floor Chest X-ray    2:20p.m. [facility]  Imaging and Lab Testing  1st floor EKG   3:30p.m. [facility]  Cardiology/Heart Care Clinic  3rd floor; Clinic 3L Echocardiogram                           NO FOOD or DRINK AFTER MIDNIGHT  (You may only have small amounts of water)      Day/Date:   Tuesday, October 10, 2017  Time Location Activity   10:00a.m. [facility]  Imaging and Lab Testing  1st floor Aorta/Ivc/Iliac Ultra Sound = NO FOOD or DRINK AFTER MIDNIGHT!!  Aorta Imaging =NO FOOD or DRINK AFTER MIDNIGHT!!   11:30a.m. LUNCH    12:30p.m. [facility]  Pulmonary Function Lab  3rd floor Pulmonary Function Tests   2:00p.m. [facility]  Cardiology/Heart Care Clinic  3rd floor; Clinic 3L Appointment with Dr. Brown,  Cardiology

## 2017-06-02 ENCOUNTER — MEDICAL CORRESPONDENCE (OUTPATIENT)
Dept: TRANSPLANT | Facility: CLINIC | Age: 72
End: 2017-06-02

## 2017-06-05 ENCOUNTER — DOCUMENTATION ONLY (OUTPATIENT)
Dept: TRANSPLANT | Facility: CLINIC | Age: 72
End: 2017-06-05

## 2017-06-05 ENCOUNTER — MEDICAL CORRESPONDENCE (OUTPATIENT)
Dept: TRANSPLANT | Facility: CLINIC | Age: 72
End: 2017-06-05

## 2017-06-13 ENCOUNTER — MEDICAL CORRESPONDENCE (OUTPATIENT)
Dept: TRANSPLANT | Facility: CLINIC | Age: 72
End: 2017-06-13

## 2017-06-14 ENCOUNTER — MEDICAL CORRESPONDENCE (OUTPATIENT)
Dept: TRANSPLANT | Facility: CLINIC | Age: 72
End: 2017-06-14

## 2017-06-22 ENCOUNTER — ALLIED HEALTH/NURSE VISIT (OUTPATIENT)
Dept: TRANSPLANT | Facility: CLINIC | Age: 72
End: 2017-06-22
Attending: INTERNAL MEDICINE
Payer: MEDICARE

## 2017-06-22 ENCOUNTER — OFFICE VISIT (OUTPATIENT)
Dept: TRANSPLANT | Facility: CLINIC | Age: 72
End: 2017-06-22
Attending: TRANSPLANT SURGERY
Payer: MEDICARE

## 2017-06-22 ENCOUNTER — RESULTS ONLY (OUTPATIENT)
Dept: OTHER | Facility: CLINIC | Age: 72
End: 2017-06-22

## 2017-06-22 ENCOUNTER — DOCUMENTATION ONLY (OUTPATIENT)
Dept: TRANSPLANT | Facility: CLINIC | Age: 72
End: 2017-06-22

## 2017-06-22 ENCOUNTER — RADIANT APPOINTMENT (OUTPATIENT)
Dept: CARDIOLOGY | Facility: CLINIC | Age: 72
End: 2017-06-22
Attending: PHYSICIAN ASSISTANT

## 2017-06-22 VITALS
BODY MASS INDEX: 24.56 KG/M2 | TEMPERATURE: 97.8 F | WEIGHT: 191.4 LBS | OXYGEN SATURATION: 99 % | HEART RATE: 68 BPM | SYSTOLIC BLOOD PRESSURE: 190 MMHG | DIASTOLIC BLOOD PRESSURE: 80 MMHG | HEIGHT: 74 IN

## 2017-06-22 VITALS
DIASTOLIC BLOOD PRESSURE: 64 MMHG | SYSTOLIC BLOOD PRESSURE: 138 MMHG | OXYGEN SATURATION: 99 % | WEIGHT: 191.4 LBS | TEMPERATURE: 97.8 F | BODY MASS INDEX: 24.56 KG/M2 | HEART RATE: 68 BPM | HEIGHT: 74 IN

## 2017-06-22 DIAGNOSIS — I10 ESSENTIAL HYPERTENSION: ICD-10-CM

## 2017-06-22 DIAGNOSIS — J98.4 DISEASE OF LUNG: ICD-10-CM

## 2017-06-22 DIAGNOSIS — Z91.199 PERSONAL HISTORY OF NONCOMPLIANCE WITH MEDICAL TREATMENT, PRESENTING HAZARDS TO HEALTH: ICD-10-CM

## 2017-06-22 DIAGNOSIS — I25.10 CARDIOVASCULAR DISEASE: ICD-10-CM

## 2017-06-22 DIAGNOSIS — Z76.82 AWAITING ORGAN TRANSPLANT STATUS: Primary | ICD-10-CM

## 2017-06-22 DIAGNOSIS — B18.2 HEPATITIS C, CHRONIC (H): ICD-10-CM

## 2017-06-22 DIAGNOSIS — Z87.891 HISTORY OF TOBACCO USE: ICD-10-CM

## 2017-06-22 DIAGNOSIS — E11.9 DIABETES MELLITUS, TYPE 2 (H): ICD-10-CM

## 2017-06-22 DIAGNOSIS — Z76.82 AWAITING ORGAN TRANSPLANT: Primary | ICD-10-CM

## 2017-06-22 DIAGNOSIS — Z76.82 ORGAN TRANSPLANT CANDIDATE: ICD-10-CM

## 2017-06-22 DIAGNOSIS — Z12.5 ENCOUNTER FOR SCREENING FOR MALIGNANT NEOPLASM OF PROSTATE: ICD-10-CM

## 2017-06-22 DIAGNOSIS — N18.6 ESRD (END STAGE RENAL DISEASE) (H): Primary | ICD-10-CM

## 2017-06-22 DIAGNOSIS — E78.5 HYPERLIPIDEMIA: ICD-10-CM

## 2017-06-22 DIAGNOSIS — Z01.818 PRE-TRANSPLANT EVALUATION FOR END STAGE RENAL DISEASE: Primary | ICD-10-CM

## 2017-06-22 DIAGNOSIS — K76.89 LIVER DYSFUNCTION: ICD-10-CM

## 2017-06-22 DIAGNOSIS — N18.6 END STAGE RENAL DISEASE (H): ICD-10-CM

## 2017-06-22 DIAGNOSIS — Z76.82 ORGAN TRANSPLANT CANDIDATE: Primary | ICD-10-CM

## 2017-06-22 LAB
ABO + RH BLD: NORMAL
ALBUMIN SERPL-MCNC: 3.9 G/DL (ref 3.4–5)
ALBUMIN UR-MCNC: >499 MG/DL
ALP SERPL-CCNC: 68 U/L (ref 40–150)
ALT SERPL W P-5'-P-CCNC: 21 U/L (ref 0–70)
ANION GAP SERPL CALCULATED.3IONS-SCNC: 9 MMOL/L (ref 3–14)
APPEARANCE UR: CLEAR
APTT PPP: 26 SEC (ref 22–37)
AST SERPL W P-5'-P-CCNC: 11 U/L (ref 0–45)
BASOPHILS # BLD AUTO: 0 10E9/L (ref 0–0.2)
BASOPHILS NFR BLD AUTO: 0.9 %
BILIRUB SERPL-MCNC: 0.7 MG/DL (ref 0.2–1.3)
BILIRUB UR QL STRIP: NEGATIVE
BLD GP AB SCN SERPL QL: NORMAL
BLD GP AB SCN TITR SERPL: NORMAL {TITER}
BLOOD BANK CMNT PATIENT-IMP: NORMAL
BUN SERPL-MCNC: 59 MG/DL (ref 7–30)
C PEPTIDE SERPL-MCNC: 7.9 NG/ML (ref 0.9–6.9)
CALCIUM SERPL-MCNC: 8.7 MG/DL (ref 8.5–10.1)
CHLORIDE SERPL-SCNC: 101 MMOL/L (ref 94–109)
CHOLEST SERPL-MCNC: 119 MG/DL
CO2 SERPL-SCNC: 27 MMOL/L (ref 20–32)
COLOR UR AUTO: YELLOW
CREAT SERPL-MCNC: 6.91 MG/DL (ref 0.66–1.25)
DIFFERENTIAL METHOD BLD: ABNORMAL
EOSINOPHIL # BLD AUTO: 0.5 10E9/L (ref 0–0.7)
EOSINOPHIL NFR BLD AUTO: 9.9 %
ERYTHROCYTE [DISTWIDTH] IN BLOOD BY AUTOMATED COUNT: 14.6 % (ref 10–15)
GFR SERPL CREATININE-BSD FRML MDRD: 8 ML/MIN/1.7M2
GLUCOSE SERPL-MCNC: 169 MG/DL (ref 70–99)
GLUCOSE UR STRIP-MCNC: NEGATIVE MG/DL
HBA1C MFR BLD: 8.1 % (ref 4.3–6)
HCT VFR BLD AUTO: 39.6 % (ref 40–53)
HDLC SERPL-MCNC: 74 MG/DL
HGB BLD-MCNC: 12.3 G/DL (ref 13.3–17.7)
HGB UR QL STRIP: NEGATIVE
HYALINE CASTS #/AREA URNS LPF: 18 /LPF (ref 0–2)
IMM GRANULOCYTES # BLD: 0 10E9/L (ref 0–0.4)
IMM GRANULOCYTES NFR BLD: 0.2 %
INR PPP: 1.15 (ref 0.86–1.14)
KETONES UR STRIP-MCNC: NEGATIVE MG/DL
LDLC SERPL CALC-MCNC: 21 MG/DL
LEUKOCYTE ESTERASE UR QL STRIP: NEGATIVE
LYMPHOCYTES # BLD AUTO: 1.6 10E9/L (ref 0.8–5.3)
LYMPHOCYTES NFR BLD AUTO: 34.9 %
MCH RBC QN AUTO: 29.6 PG (ref 26.5–33)
MCHC RBC AUTO-ENTMCNC: 31.1 G/DL (ref 31.5–36.5)
MCV RBC AUTO: 95 FL (ref 78–100)
MONOCYTES # BLD AUTO: 0.5 10E9/L (ref 0–1.3)
MONOCYTES NFR BLD AUTO: 10.4 %
MUCOUS THREADS #/AREA URNS LPF: PRESENT /LPF
NEUTROPHILS # BLD AUTO: 2 10E9/L (ref 1.6–8.3)
NEUTROPHILS NFR BLD AUTO: 43.7 %
NITRATE UR QL: NEGATIVE
NONHDLC SERPL-MCNC: 44 MG/DL
NRBC # BLD AUTO: 0 10*3/UL
NRBC BLD AUTO-RTO: 0 /100
OVAL FAT BODIES #/AREA URNS HPF: ABNORMAL /HPF
PH UR STRIP: 6 PH (ref 5–7)
PHOSPHATE SERPL-MCNC: 5.4 MG/DL (ref 2.5–4.5)
PLATELET # BLD AUTO: 139 10E9/L (ref 150–450)
POTASSIUM SERPL-SCNC: 4.3 MMOL/L (ref 3.4–5.3)
PROT SERPL-MCNC: 9.3 G/DL (ref 6.8–8.8)
PSA SERPL-ACNC: 0.51 UG/L (ref 0–4)
RBC # BLD AUTO: 4.15 10E12/L (ref 4.4–5.9)
RBC #/AREA URNS AUTO: 3 /HPF (ref 0–2)
SODIUM SERPL-SCNC: 137 MMOL/L (ref 133–144)
SP GR UR STRIP: 1.01 (ref 1–1.03)
SPECIMEN EXP DATE BLD: NORMAL
SPECIMEN EXP DATE BLD: NORMAL
SQUAMOUS #/AREA URNS AUTO: 1 /HPF (ref 0–1)
T PALLIDUM IGG+IGM SER QL: NEGATIVE
THROMBIN TIME: 18.6 SEC (ref 13–19)
TRIGL SERPL-MCNC: 118 MG/DL
URN SPEC COLLECT METH UR: ABNORMAL
UROBILINOGEN UR STRIP-MCNC: 2 MG/DL (ref 0–2)
WBC # BLD AUTO: 4.5 10E9/L (ref 4–11)
WBC #/AREA URNS AUTO: 1 /HPF (ref 0–2)

## 2017-06-22 PROCEDURE — 86706 HEP B SURFACE ANTIBODY: CPT | Performed by: PHYSICIAN ASSISTANT

## 2017-06-22 PROCEDURE — 86803 HEPATITIS C AB TEST: CPT | Performed by: PHYSICIAN ASSISTANT

## 2017-06-22 PROCEDURE — 85730 THROMBOPLASTIN TIME PARTIAL: CPT | Performed by: PHYSICIAN ASSISTANT

## 2017-06-22 PROCEDURE — 87340 HEPATITIS B SURFACE AG IA: CPT | Performed by: PHYSICIAN ASSISTANT

## 2017-06-22 PROCEDURE — 86665 EPSTEIN-BARR CAPSID VCA: CPT | Performed by: PHYSICIAN ASSISTANT

## 2017-06-22 PROCEDURE — 86886 COOMBS TEST INDIRECT TITER: CPT | Performed by: PHYSICIAN ASSISTANT

## 2017-06-22 PROCEDURE — 85610 PROTHROMBIN TIME: CPT | Performed by: PHYSICIAN ASSISTANT

## 2017-06-22 PROCEDURE — 81241 F5 GENE: CPT | Performed by: PHYSICIAN ASSISTANT

## 2017-06-22 PROCEDURE — G0103 PSA SCREENING: HCPCS | Performed by: PHYSICIAN ASSISTANT

## 2017-06-22 PROCEDURE — 80053 COMPREHEN METABOLIC PANEL: CPT | Performed by: PHYSICIAN ASSISTANT

## 2017-06-22 PROCEDURE — 86147 CARDIOLIPIN ANTIBODY EA IG: CPT | Performed by: PHYSICIAN ASSISTANT

## 2017-06-22 PROCEDURE — 81240 F2 GENE: CPT | Performed by: PHYSICIAN ASSISTANT

## 2017-06-22 PROCEDURE — 86780 TREPONEMA PALLIDUM: CPT | Performed by: PHYSICIAN ASSISTANT

## 2017-06-22 PROCEDURE — 40000724 ZZH UMP OPEN ENCOUNTER >70 DAYS

## 2017-06-22 PROCEDURE — 81370 HLA I & II TYPING LR: CPT | Performed by: PHYSICIAN ASSISTANT

## 2017-06-22 PROCEDURE — 86900 BLOOD TYPING SEROLOGIC ABO: CPT | Performed by: PHYSICIAN ASSISTANT

## 2017-06-22 PROCEDURE — 84681 ASSAY OF C-PEPTIDE: CPT | Performed by: PHYSICIAN ASSISTANT

## 2017-06-22 PROCEDURE — 81376 HLA II TYPING 1 LOCUS LR: CPT | Mod: XU | Performed by: PHYSICIAN ASSISTANT

## 2017-06-22 PROCEDURE — 85670 THROMBIN TIME PLASMA: CPT | Performed by: PHYSICIAN ASSISTANT

## 2017-06-22 PROCEDURE — 86833 HLA CLASS II HIGH DEFIN QUAL: CPT | Performed by: PHYSICIAN ASSISTANT

## 2017-06-22 PROCEDURE — 86850 RBC ANTIBODY SCREEN: CPT | Performed by: PHYSICIAN ASSISTANT

## 2017-06-22 PROCEDURE — 81001 URINALYSIS AUTO W/SCOPE: CPT | Performed by: PHYSICIAN ASSISTANT

## 2017-06-22 PROCEDURE — 83036 HEMOGLOBIN GLYCOSYLATED A1C: CPT | Performed by: PHYSICIAN ASSISTANT

## 2017-06-22 PROCEDURE — 86901 BLOOD TYPING SEROLOGIC RH(D): CPT | Performed by: PHYSICIAN ASSISTANT

## 2017-06-22 PROCEDURE — 84100 ASSAY OF PHOSPHORUS: CPT | Performed by: PHYSICIAN ASSISTANT

## 2017-06-22 PROCEDURE — 85025 COMPLETE CBC W/AUTO DIFF WBC: CPT | Performed by: PHYSICIAN ASSISTANT

## 2017-06-22 PROCEDURE — 85613 RUSSELL VIPER VENOM DILUTED: CPT | Performed by: PHYSICIAN ASSISTANT

## 2017-06-22 PROCEDURE — 80061 LIPID PANEL: CPT | Performed by: PHYSICIAN ASSISTANT

## 2017-06-22 PROCEDURE — 40000866 ZZHCL STATISTIC HIV 1/2 ANTIGEN/ANTIBODY PRETRANSPLANT ONLY: Performed by: PHYSICIAN ASSISTANT

## 2017-06-22 PROCEDURE — 86905 BLOOD TYPING RBC ANTIGENS: CPT | Performed by: PHYSICIAN ASSISTANT

## 2017-06-22 PROCEDURE — G0499 HEPB SCREEN HIGH RISK INDIV: HCPCS | Performed by: PHYSICIAN ASSISTANT

## 2017-06-22 PROCEDURE — 86704 HEP B CORE ANTIBODY TOTAL: CPT | Performed by: PHYSICIAN ASSISTANT

## 2017-06-22 PROCEDURE — 86787 VARICELLA-ZOSTER ANTIBODY: CPT | Performed by: PHYSICIAN ASSISTANT

## 2017-06-22 PROCEDURE — 86480 TB TEST CELL IMMUN MEASURE: CPT | Performed by: PHYSICIAN ASSISTANT

## 2017-06-22 PROCEDURE — 36415 COLL VENOUS BLD VENIPUNCTURE: CPT | Performed by: PHYSICIAN ASSISTANT

## 2017-06-22 PROCEDURE — 86644 CMV ANTIBODY: CPT | Performed by: PHYSICIAN ASSISTANT

## 2017-06-22 PROCEDURE — 86832 HLA CLASS I HIGH DEFIN QUAL: CPT | Performed by: PHYSICIAN ASSISTANT

## 2017-06-22 ASSESSMENT — PAIN SCALES - GENERAL
PAINLEVEL: NO PAIN (0)
PAINLEVEL: NO PAIN (0)

## 2017-06-22 NOTE — PROGRESS NOTES
RE: Keagan Wayne    [facility]# [id number]        I saw your patient, Keagan Wayne, in consultation in our pretransplant clinic.  He was at clinic to discuss care for his end-stage renal disease.  He was at clinic with a friend who also lives in the same house (and sets up his meds on a weekly basis).  Prior to clinic, they attended our pretransplant class.  Of note, he was half asleep through most of our discussion. The friend had questions about both transplantation as well as living donation.     We talked about the pros and cons of transplantation vs. dialysis.  We discussed the fact that it was important that he think about the pros and cons of each treatment option and make an active decision.  We also discussed the fact that the two were interconnected and he may need to go on dialysis before transplant (if he chose to have a transplant) and that if the transplant failed, he might need dialysis before another transplant.       We also discussed the fact that if he chose to have a transplant, he would need to decide between going on the wait list for a  donor transplant vs. having a living donor transplant.  We talked about the pros and cons of each option.  Although I didn't express an opinion regarding transplantation or dialysis, I suggested that if he chose to have a transplant, a living donor transplant would be preferable in that the surgery is the same, the immunosuppressive drugs and the risks are the same, but the transplant could be done sooner and the results are better.  I told him that the wait for  donor kidney was approximately five years for patients who are newly put on the waiting list.  In addition, we talked about the fact that the disadvantage of a living donor transplant was the risk to the donor.       Because he was interested in living donation, we spent some time discussing donor risks, including the risks of mortality, morbidity, and long-term risks with a  single kidney. I also discussed the new ( donor) kidney (KDPI) scoring system with him.       Finally, he stated emphatically that he did not want to continue on dialysis and would actively want to move forward with transplantation.     I attempted to answer any remaining questions.  I also told him that should he have any questions, he should feel free to contact us.  We would be glad to answer any questions either over the phone or at another clinic visit.  His transplant coordinator is Renee Reardon and may be reached at [phone].  Thank you for the opportunity to see him.     I spent 20 minutes with this patient.  Over 90% of that time was spent in counseling and coordination of care.             Yours truly,               Franko Greene MD         Professor of Surgery         ([phone])    RAF/

## 2017-06-22 NOTE — LETTER
2017       RE: Keagan Wayne  [address]    [place] [facility] [address]     Dear Colleague,    Thank you for referring your patient, Keagan Wayne, to the [facility] SOLID ORGAN TRANSPLANT at [facility]. Please see a copy of my visit note below.             RE: Keagan Wayne    [facility]# [id number]        I saw your patient, Keagan Wayne, in consultation in our pretransplant clinic.  He was at clinic to discuss care for his end-stage renal disease.  He was at clinic with a friend who also lives in the same house (and sets up his meds on a weekly basis).  Prior to clinic, they attended our pretransplant class.  Of note, he was half asleep through most of our discussion. The friend had questions about both transplantation as well as living donation.     We talked about the pros and cons of transplantation vs. dialysis.  We discussed the fact that it was important that he think about the pros and cons of each treatment option and make an active decision.  We also discussed the fact that the two were interconnected and he may need to go on dialysis before transplant (if he chose to have a transplant) and that if the transplant failed, he might need dialysis before another transplant.       We also discussed the fact that if he chose to have a transplant, he would need to decide between going on the wait list for a  donor transplant vs. having a living donor transplant.  We talked about the pros and cons of each option.  Although I didn't express an opinion regarding transplantation or dialysis, I suggested that if he chose to have a transplant, a living donor transplant would be preferable in that the surgery is the same, the immunosuppressive drugs and the risks are the same, but the transplant could be done sooner and the results are better.  I told him that the wait for  donor kidney was approximately five years for patients who are newly put on the  waiting list.  In addition, we talked about the fact that the disadvantage of a living donor transplant was the risk to the donor.       Because he was interested in living donation, we spent some time discussing donor risks, including the risks of mortality, morbidity, and long-term risks with a single kidney. I also discussed the new ( donor) kidney (KDPI) scoring system with him.       Finally, he stated emphatically that he did not want to continue on dialysis and would actively want to move forward with transplantation.     I attempted to answer any remaining questions.  I also told him that should he have any questions, he should feel free to contact us.  We would be glad to answer any questions either over the phone or at another clinic visit.  His transplant coordinator is Renee Reardon and may be reached at [phone].  Thank you for the opportunity to see him.     I spent 20 minutes with this patient.  Over 90% of that time was spent in counseling and coordination of care.             Yours truly,               Franko Greene MD         Professor of Surgery         ([phone])    RAF/

## 2017-06-22 NOTE — Clinical Note
6/22/2017       RE: Keagan Wayne  [address] [place]    [place] [address]     Dear Colleague,    Thank you for referring your patient, Keagan Wayne, to the [facility] SOLID ORGAN TRANSPLANT at [facility]. Please see a copy of my visit note below.    No notes on file    Again, thank you for allowing me to participate in the care of your patient.      Sincerely,    AUDREY

## 2017-06-22 NOTE — MR AVS SNAPSHOT
After Visit Summary   6/22/2017    Keagan Wayne    MRN: [id number]           Patient Information     Date Of Birth          1945        Visit Information        Provider Department      6/22/2017 10:00 AM Margaret Reardon RN [facility] Solid Organ Transplant        Today's Diagnoses     Awaiting organ transplant    -  1       Follow-ups after your visit        Your next 10 appointments already scheduled     Jun 22, 2017  2:00 PM CDT   (Arrive by 1:45 PM)   XR CHEST 2 VIEWS with UCXR1   [facility] Xray ([facility])    [address] [id number] [phone]           Please bring a list of your current medicines to your exam. (Include vitamins, minerals and over-thecounter medicines.) Leave your valuables at home.  Tell your doctor if there is a chance you may be pregnant.  You do not need to do anything special for this exam.            Jun 22, 2017  2:20 PM CDT   (Arrive by 2:05 PM)   ecg with  CV EKG   [facility] Xray ([facility])     [address] [id number] [phone]            Jun 22, 2017  3:30 PM CDT   Ech Complete with UCECHCR1   [facility] Echo ([facility])    [address] [id number] [phone]           1.  Please bring or wear a comfortable two-piece outfit. 2.  You may eat, drink and take your normal medicines. 3.  For any questions that cannot be answered, please contact the ordering physician            Oct 10, 2017 10:00 AM CDT   US AORTIC IMAGING with UCUSV2   [facility] US ([facility])    [address] [id number] [phone]           Please bring a list of your medicines (including vitamins, minerals and over-the-counter drugs). Also, tell your doctor about any allergies you may have. Wear  comfortable clothes and leave your valuables at home.  Adults: No eating or drinking for 8 hours before the exam. You may take medicine with a small sip of water.  Children: - Children 6+ years: No food or drink for 6 hours before exam. - Children 1-5 years: No food or drink for 4 hours before exam. - Infants, breast-fed: may have breast milk up to 2 hours before exam. - Infants, formula: may have bottle until 4 hours before exam.  Please call the Imaging Department at your exam site with any questions.            Oct 10, 2017 10:30 AM CDT   US AORTA/IVC/ILIAC DUPLEX COMPLETE with UCUSV2   [facility] [place] US ([facility])    [address] [place] [id number] [phone]           Please bring a list of your medicines (including vitamins, minerals and over-the-counter drugs). Also, tell your doctor about any allergies you may have. Wear comfortable clothes and leave your valuables at home.  Adults: No eating or drinking for 8 hours before the exam. You may take medicine with a small sip of water.  Children: - Children 6+ years: No food or drink for 6 hours before exam. - Children 1-5 years: No food or drink for 4 hours before exam. - Infants, breast-fed: may have breast milk up to 2 hours before exam. - Infants, formula: may have bottle until 4 hours before exam.  Please call the Imaging Department at your exam site with any questions.            Oct 10, 2017 12:30 PM CDT   FULL PULMONARY FUNCTION with  PFL C   [facility] Pulmonary Function Testing ([facility])    [address] [place] [id number] [phone]            Oct 10, 2017  2:00 PM CDT   (Arrive by 1:45 PM)   NEW PANCREAS/KIDNEY TRANSPLANT WORK-UP with BUCKY Khan MD   [facility] Heart Care ([facility])    [address] [place] [phone] [phone]              Who to contact     If you  "have questions or need follow up information about today's clinic visit or your schedule please contact [facility] SOLID ORGAN TRANSPLANT directly at [phone].  Normal or non-critical lab and imaging results will be communicated to you by [employer], letter or phone within 4 business days after the clinic has received the results. If you do not hear from us within 7 days, please contact the clinic through [employer] or phone. If you have a critical or abnormal lab result, we will notify you by phone as soon as possible.  Submit refill requests through [employer] or call your pharmacy and they will forward the refill request to us. Please allow 3 business days for your refill to be completed.          Additional Information About Your Visit        [employer] Information     [employer] lets you send messages to your doctor, view your test results, renew your prescriptions, schedule appointments and more. To sign up, go to [email or web address] . Click on \"Log in\" on the left side of the screen, which will take you to the Welcome page. Then click on \"Sign up Now\" on the right side of the page.     You will be asked to enter the access code listed below, as well as some personal information. Please follow the directions to create your username and password.     Your access code is: [email or web address]  Expires: 2017  9:43 AM     Your access code will  in 90 days. If you need help or a new code, please call your [facility] clinic or [phone].        Care EveryWhere ID     This is your Care EveryWhere ID. This could be used by other organizations to access your [facility] medical records  [id number]         Blood Pressure from Last 3 Encounters:   17 190/90   17 190/80   17 149/66    Weight from Last 3 Encounters:   17 86.8 kg (191 lb 6.4 oz)   17 86.8 kg (191 lb 6.4 oz)   17 75.8 kg (167 lb)              Today, you had the following     No orders found for display       Primary Care " Provider Office Phone # Fax #    Yolanda Hernandez [phone]       [facility] [address]  [facility] [address]        Equal Access to Services     JIAN HOROWITZ : Hadii aad ku hadjarodo Sosusanali, waaxda luqadaha, qaybta kaalmada adeegyada, gio moseskelin brown. So [facility] [phone].    ATENCIÓN: Si habla español, tiene a benitez disposición servicios gratuitos de asistencia lingüística. Llame al [phone].    We comply with applicable federal civil rights laws and [place] laws. We do not discriminate on the basis of race, color, national origin, age, disability sex, sexual orientation or gender identity.            Thank you!     Thank you for choosing [facility] SOLID ORGAN TRANSPLANT  for your care. Our goal is always to provide you with excellent care. Hearing back from our patients is one way we can continue to improve our services. Please take a few minutes to complete the written survey that you may receive in the mail after your visit with us. Thank you!             Your Updated Medication List - Protect others around you: Learn how to safely use, store and throw away your medicines at www.disposemymeds.org.          This list is accurate as of: 6/22/17  1:59 PM.  Always use your most recent med list.                   Brand Name Dispense Instructions for use Diagnosis    * albuterol (2.5 MG/3ML) 0.083% neb solution     360 mL    Take 1 vial (2.5 mg) by nebulization every 4 hours as needed for shortness of breath / dyspnea or wheezing    Chronic obstructive pulmonary disease, unspecified COPD type (H)       * albuterol 108 (90 BASE) MCG/ACT Inhaler    PROAIR HFA/PROVENTIL HFA/VENTOLIN HFA    1 Inhaler    Inhale 2 puffs into the lungs every 4 hours as needed for shortness of breath / dyspnea or wheezing    Chronic obstructive pulmonary disease, unspecified COPD type (H)       aspirin 81 MG chewable tablet     36 tablet    Take 1 tablet (81 mg) by mouth daily     Type 2 diabetes mellitus with diabetic nephropathy (H)       atorvastatin 80 MG tablet    LIPITOR    30 tablet    Take 1 tablet (80 mg) by mouth daily    Acute diastolic CHF (congestive heart failure) (H)       brimonidine 0.15 % ophthalmic solution    ALPHAGAN-P    1 Bottle    Place 1 drop into both eyes 3 times daily    Glaucoma       bumetanide 2 MG tablet    BUMEX    60 tablet    Take 2 tablets (4 mg) by mouth daily    Acute on chronic systolic congestive heart failure (H)       calcium acetate 667 MG Caps capsule    PHOSLO    180 capsule    Take 1 capsule (667 mg) by mouth 3 times daily (with meals)    Chronic kidney disease, stage IV (severe) (H)       carvedilol 25 MG tablet    COREG    100 tablet    Take 1.5 tablets (37.5 mg) by mouth 2 times daily (with meals)    Acute diastolic CHF (congestive heart failure) (H)       citalopram 20 MG tablet    celeXA    30 tablet    Take 1 tablet (20 mg) by mouth daily    Depression       cloNIDine 0.2 MG/24HR WK patch    CATAPRES-TTS2     Place 1 patch onto the skin once a week        folic acid-vit B6-vit B12 0.8-10-0.115 MG Tabs per tablet    FOLGARD    30 tablet    Take 1 tablet by mouth daily    Anemia of chronic disease       guaiFENesin-codeine 100-10 MG/5ML Soln solution    ROBITUSSIN AC    120 mL    Take 5 mLs by mouth every 4 hours as needed for cough    Cough       guaiFENesin-dextromethorphan 100-10 MG/5ML syrup    ROBITUSSIN DM    560 mL    Take 10 mLs by mouth every 4 hours as needed for cough    Cough       hydrALAZINE 50 MG tablet    APRESOLINE    90 tablet    Take 1 tablet (50 mg) by mouth 3 times daily    Benign essential hypertension       INCRUSE ELLIPTA 62.5 MCG/INH oral inhaler   Generic drug:  umeclidinium      Inhale 1 puff into the lungs daily        insulin glargine 100 UNIT/ML injection    LANTUS     Inject 16 Units Subcutaneous every morning        isosorbide mononitrate 30 MG 24 hr tablet    IMDUR     Take 90 mg by mouth daily         latanoprost 0.005 % ophthalmic solution    XALATAN    1 Bottle    Place 1 drop into both eyes At Bedtime    Glaucoma       lisinopril 40 MG tablet    PRINIVIL/ZESTRIL    30 tablet    Take 1 tablet (40 mg) by mouth daily    Hypertension due to endocrine disorder       NEURONTIN PO      Take 200 mg by mouth daily        OXYCODONE HCL PO      Take 15 mg by mouth every 6 hours (takes scheduled)        sodium bicarbonate 650 MG tablet      Take 1 tablet (650 mg) by mouth 2 times daily    Chronic kidney disease, stage IV (severe) (H)       VITAMIN D (CHOLECALCIFEROL) PO      Take 1,000 Units by mouth daily        * Notice:  This list has 2 medication(s) that are the same as other medications prescribed for you. Read the directions carefully, and ask your doctor or other care provider to review them with you.

## 2017-06-22 NOTE — PROGRESS NOTES
Psychosocial Assessment  Patient Name/ Age: Keagan Wayne/71 year old   Medical Record #: [id number]  Duration of Interview:     45  min  Process:   Face-to-Face Interview                (counseling < 50%)   Present at Appointment: Keagan and his girlfriend María        : MARAH De Oliveira, [facility] Date:  June 22, 2017        Type of transplant: Kidney    Donor type:   Keagan indicated he does not know of any potential donors at this time.   Cadaver   Prior Transplants:    No Status of Transplant:       Current Living Situation    Location:   [address] [place] [address]    [facility] [id number]  With Whom: María       Family/ Social Support:    Keagan has three adult children from a different relationship: Carlos (52) lives in [place], [place], Stefani (52) [place], [place] and Silvia (33) whereabouts unknown.  He has 11 grandchildren.  Keagan indicated he has three brothers ([place], [place]) and one sister ([place], [place]).   Available, helpful   Committed relationship:  Keagan and María have been friends for majority of their lives, they have been in a relationship since October 2016.   Stable/supportive   Other supports:   Friends Available, helpful       Activities/ Functional Ability    Current level:  Keagan indicated he has three hours of PCA services a day.  María sets up his medications and does need to remind Keagan to take his medications   Ambulatory and independent with ADL's     Transportation Drives self       Vocational/Employment/Financial     Employment   Retired   Job Description      Income   [employer]   Insurance  Medicare Parts A and B and [employer], including Part D, through [place].    At this time, patient can afford medication costs:  Yes  Keagan indicated his family will assist as needed. [place] and [place]       Medical Status    Current mode of treatment for ESRD Dialysis   Complications - Diabetes controlled with oral medication and insulin. Neuropathy       Behavioral    Tobacco Use Yes   "Chemical Dependency No   Keagan indicated he smokes a pack of cigarettes a week and does not have any plans to quit.   Keagan indicated he has 2-3 shots of vodka a week.     Psychiatric Impairment No  Keagan has been diagnosed with Bipolar disorder, is on medication and both Keagan and María believe his mental health is well controlled.  Last psych hospitalization was in the 1980s.    Reading ability Good  Education level: 9th Grade Recent Legal History No              Coping Style/Strategies: Keagan indicated when under stress he will \"walk away from whatever is causing stress\".   Ability to Adhere to Complex Medical Regime: Yes     Adherence History:  Keagan indicated, with María's assistance, will usually follow his physician's recommendations.        Education  _X_ Medicare  _X_ Rehabilitation  _X_ Donor issues  _X_ Community resources  _X_ Post discharge housing  _X_ Financial resources  _X_ Medical insurance options  _X_ Psych adjustment  _X_ Family adjustment  _X_ Health Care Directive - Provided a copy and explained the purpose of the Health Care Directive. Psychosocial Risks of Transplant Reviewed and Discussed:  _X_ Increased stress related to emotional,            family, social, employment or financial           situation  _X_ Affect on work and/or disability benefits  _X_ Affect on future life insurance  _X_ Transplant outcome expectations may           not be met  _X_ Mental Health Risks: anxiety,           depression, PTSD, guilt, grief and           chronic fatigue     Notable Items:   None noted.       Final Evaluation/Assessment   Patient seemed to process information well. Appeared well informed, motivated and able to follow post transplant requirements. Behavior was appropriate during interview. Has adequate income and insurance coverage. Adequate social support. No major contraindications noted for transplant.  At this time patient appears to understand the risks and benefits of transplant.    "   Recommendation  Acceptable    Selection Criteria Met:  Plan for support Yes   Chemical Dependence Yes   Smoking No  Mental Health Yes   Adequate Finances Yes    Signature: MARAH De Oliveira, LICSW   Title: Clinical

## 2017-06-22 NOTE — MR AVS SNAPSHOT
After Visit Summary   6/22/2017    Keagan Wayne    MRN: [id number]           Patient Information     Date Of Birth          1945        Visit Information        Provider Department      6/22/2017 11:15 AM Tracie Aguilar MSW [facility] Solid Organ Transplant        Today's Diagnoses     Awaiting organ transplant status    -  1       Follow-ups after your visit        Your next 10 appointments already scheduled     Oct 10, 2017 10:00 AM CDT   US AORTIC IMAGING with UCUSV2   [facility]  ([facility])    [address] [place] [id number] [phone]           Please bring a list of your medicines (including vitamins, minerals and over-the-counter drugs). Also, tell your doctor about any allergies you may have. Wear comfortable clothes and leave your valuables at home.  Adults: No eating or drinking for 8 hours before the exam. You may take medicine with a small sip of water.  Children: - Children 6+ years: No food or drink for 6 hours before exam. - Children 1-5 years: No food or drink for 4 hours before exam. - Infants, breast-fed: may have breast milk up to 2 hours before exam. - Infants, formula: may have bottle until 4 hours before exam.  Please call the Imaging Department at your exam site with any questions.            Oct 10, 2017 10:30 AM CDT   US AORTA/IVC/ILIAC DUPLEX COMPLETE with UCUSV2   [facility]  ([facility])    [address] [place] [id number] [phone]           Please bring a list of your medicines (including vitamins, minerals and over-the-counter drugs). Also, tell your doctor about any allergies you may have. Wear comfortable clothes and leave your valuables at home.  Adults: No eating or drinking for 8 hours before the exam. You may take medicine with a small sip of water.  Children: - Children 6+ years: No food or drink for 6  "hours before exam. - Children 1-5 years: No food or drink for 4 hours before exam. - Infants, breast-fed: may have breast milk up to 2 hours before exam. - Infants, formula: may have bottle until 4 hours before exam.  Please call the Imaging Department at your exam site with any questions.            Oct 10, 2017 12:30 PM CDT   FULL PULMONARY FUNCTION with  PFL C   [facility] Function Testing ([facility])    [address] [place] [id number] [phone]            Oct 10, 2017  2:00 PM CDT   (Arrive by 1:45 PM)   NEW PANCREAS/KIDNEY TRANSPLANT WORK-UP with BUCKY Khan MD   [facility] ([facility])    [address] [place] [id number] [phone]              Who to contact     If you have questions or need follow up information about today's clinic visit or your schedule please contact [facility] SOLID ORGAN TRANSPLANT directly at [phone].  Normal or non-critical lab and imaging results will be communicated to you by iSironahart, letter or phone within 4 business days after the clinic has received the results. If you do not hear from us within 7 days, please contact the clinic through Stream5t or phone. If you have a critical or abnormal lab result, we will notify you by phone as soon as possible.  Submit refill requests through TidalScale or call your pharmacy and they will forward the refill request to us. Please allow 3 business days for your refill to be completed.          Additional Information About Your Visit        TidalScale Information     TidalScale lets you send messages to your doctor, view your test results, renew your prescriptions, schedule appointments and more. To sign up, go to [email or web address] . Click on \"Log in\" on the left side of the screen, which will take you to the Welcome page. Then click on \"Sign up Now\" on the right side of the page.     You will be asked to " enter the access code listed below, as well as some personal information. Please follow the directions to create your username and password.     Your access code is: [email or web address]  Expires: 2017  9:43 AM     Your access code will  in 90 days. If you need help or a new code, please call your [facility] or [phone].        Care EveryWhere ID     This is your Care EveryWhere ID. This could be used by other organizations to access your [facility] medical records  [id number]         Blood Pressure from Last 3 Encounters:   17 138/64   17 190/80   17 149/66    Weight from Last 3 Encounters:   17 86.8 kg (191 lb 6.4 oz)   17 86.8 kg (191 lb 6.4 oz)   17 75.8 kg (167 lb)              Today, you had the following     No orders found for display       Primary Care Provider Office Phone # Fax #    Yolanda Hernandez [phone]       [facility] [address]  [facility] [address]        Equal Access to Services     [facility]: Hadii aad ku hadasho Soomaali, waaxda luqadaha, qaybta kaalmada adeegyada, gio montoyan kareem cooper . So [facility] [phone].    ATENCIÓN: Si habla español, tiene a benitez disposición servicios gratuitos de asistencia lingüística. Ll[facility] [phone].    We comply with applicable federal civil rights laws and [place] laws. We do not discriminate on the basis of race, color, national origin, age, disability sex, sexual orientation or gender identity.            Thank you!     Thank you for choosing [facility] SOLID ORGAN TRANSPLANT  for your care. Our goal is always to provide you with excellent care. Hearing back from our patients is one way we can continue to improve our services. Please take a few minutes to complete the written survey that you may receive in the mail after your visit with us. Thank you!             Your Updated Medication List - Protect others around you: Learn how to safely use, store and  throw away your medicines at www.disposemymeds.org.          This list is accurate as of: 6/22/17 11:59 PM.  Always use your most recent med list.                   Brand Name Dispense Instructions for use Diagnosis    * albuterol (2.5 MG/3ML) 0.083% neb solution     360 mL    Take 1 vial (2.5 mg) by nebulization every 4 hours as needed for shortness of breath / dyspnea or wheezing    Chronic obstructive pulmonary disease, unspecified COPD type (H)       * albuterol 108 (90 BASE) MCG/ACT Inhaler    PROAIR HFA/PROVENTIL HFA/VENTOLIN HFA    1 Inhaler    Inhale 2 puffs into the lungs every 4 hours as needed for shortness of breath / dyspnea or wheezing    Chronic obstructive pulmonary disease, unspecified COPD type (H)       aspirin 81 MG chewable tablet     36 tablet    Take 1 tablet (81 mg) by mouth daily    Type 2 diabetes mellitus with diabetic nephropathy (H)       atorvastatin 80 MG tablet    LIPITOR    30 tablet    Take 1 tablet (80 mg) by mouth daily    Acute diastolic CHF (congestive heart failure) (H)       brimonidine 0.15 % ophthalmic solution    ALPHAGAN-P    1 Bottle    Place 1 drop into both eyes 3 times daily    Glaucoma       bumetanide 2 MG tablet    BUMEX    60 tablet    Take 2 tablets (4 mg) by mouth daily    Acute on chronic systolic congestive heart failure (H)       calcium acetate 667 MG Caps capsule    PHOSLO    180 capsule    Take 1 capsule (667 mg) by mouth 3 times daily (with meals)    Chronic kidney disease, stage IV (severe) (H)       carvedilol 25 MG tablet    COREG    100 tablet    Take 1.5 tablets (37.5 mg) by mouth 2 times daily (with meals)    Acute diastolic CHF (congestive heart failure) (H)       citalopram 20 MG tablet    celeXA    30 tablet    Take 1 tablet (20 mg) by mouth daily    Depression       cloNIDine 0.2 MG/24HR WK patch    CATAPRES-TTS2     Place 1 patch onto the skin once a week        folic acid-vit B6-vit B12 0.8-10-0.115 MG Tabs per tablet    FOLGARD    30 tablet     Take 1 tablet by mouth daily    Anemia of chronic disease       guaiFENesin-codeine 100-10 MG/5ML Soln solution    ROBITUSSIN AC    120 mL    Take 5 mLs by mouth every 4 hours as needed for cough    Cough       guaiFENesin-dextromethorphan 100-10 MG/5ML syrup    ROBITUSSIN DM    560 mL    Take 10 mLs by mouth every 4 hours as needed for cough    Cough       hydrALAZINE 50 MG tablet    APRESOLINE    90 tablet    Take 1 tablet (50 mg) by mouth 3 times daily    Benign essential hypertension       INCRUSE ELLIPTA 62.5 MCG/INH oral inhaler   Generic drug:  umeclidinium      Inhale 1 puff into the lungs daily        insulin glargine 100 UNIT/ML injection    LANTUS     Inject 16 Units Subcutaneous every morning        isosorbide mononitrate 30 MG 24 hr tablet    IMDUR     Take 90 mg by mouth daily        latanoprost 0.005 % ophthalmic solution    XALATAN    1 Bottle    Place 1 drop into both eyes At Bedtime    Glaucoma       lisinopril 40 MG tablet    PRINIVIL/ZESTRIL    30 tablet    Take 1 tablet (40 mg) by mouth daily    Hypertension due to endocrine disorder       NEURONTIN PO      Take 200 mg by mouth daily        OXYCODONE HCL PO      Take 15 mg by mouth every 6 hours (takes scheduled)        sodium bicarbonate 650 MG tablet      Take 1 tablet (650 mg) by mouth 2 times daily    Chronic kidney disease, stage IV (severe) (H)       VITAMIN D (CHOLECALCIFEROL) PO      Take 1,000 Units by mouth daily        * Notice:  This list has 2 medication(s) that are the same as other medications prescribed for you. Read the directions carefully, and ask your doctor or other care provider to review them with you.

## 2017-06-22 NOTE — NURSING NOTE
"Pre Abdominal Organ Transplant Coordinator Evaluation Note:    MD present: Dr. Greene    Attendees: self and significant other    Type of transplant: kidney    Required Topic(s) Discussed: Evaluation notification document, SRTR data, Multiple wait list brochure, KDPI, Evaluation/approval process, Selection committee process, Wait list process and Living donor process    Teaching: Instruct patient on living donor process/provided contact info, Provided my business card and To call me with any questions    Assessment/Plan: I was not present for the provider visits. Met with Keagan and his significant other María. When I entered the room Keagan was lying down on the exam table sleeping covered with a blanket. I asked him if he was ill and he said no, just tired. I asked him to please sit up and join me by sitting in the chair next to me. He sat down and nodded off. He claims he is listening. I told him I heard he slept through class this morning and he admitted that. I told him we may ask him to repeat the class and he said he would not. I gave an explanation of KDPI and his eyes were closed. I asked him to repeat what he heard and he could not. His significant other stated he is always like this. I asked him if he drives and he became irritated and said that is another story. He became more attentive and more inappropriate in his comments. Some of his remarks had a sexual innuendo to them. His partner would try to \"shhh\" him. He was also sarcastic. We reviewed the overall evaluation/approval process, the wait listing and living donor process. We also discussed PEP. María is hoping to be a living donor for him. He is aware that his evaluation will be discussed at our Multidisciplinary Selection Committee meeting on June 28,2017 and I will be in touch by the end of that week with the outcome. He signed the Receipt of Information for Organ Transplant Recipient and the KDPI>85% as willing to hear of higher offer kidneys. He " "wants to get off of dialysis asap. He does know that he may decline an organ offer for any reason and there is no penalty or consequence. I asked María if Keagan would be able to take care of himself if she was not there. She said absolutely not and that is why she never goes anywhere. I asked her if she became ill and was hospitalized what would happen to Keagan. She thought he would have to have someone come in and set up his meds but he would take them. She said she prefers to do everything herself as she knows it will be done right. There was some tension between the two of them. She stated to Keagan, \"I think she has you figured out and has your number\". Interestingly, she asked me if he were declined for transplant how long would he have to wait to try again. My response was it all depends on why someone is declined. They had no further questions at this time.     Time spent with patient: 45 minutes  "

## 2017-06-22 NOTE — LETTER
6/22/2017      RE: Keagan Wayne  [address] [facility]    [place] [address]       Assessment and Plan:  This is a 72 yo M with multiple comorbidities, significant for : nonischemic cardiomyopathy now with poor exercise tolerance due to SOB, COPD unclear severity, lung nodules not followed up with CT, and still smoking, bipolar disorder with  polysubstance dependency, and adenomatous polyp in 2014.   He is a poor candidate for kidney transplant . We will requires the following work up:   -cardiology consult  -pulmonary consult: r/o lung cancer, PFTs, smoking cession  -HCV: GI consult, treatment   -colonoscopy now to r/o malignancy   -microscopic hematuria in a smoker: r/o malignancy> he needs urology consult , cystoscopy   -compliance contract: he will need to have all the work up done within 6 mo.  Benefits of a living donor transplant were discussed.Patient dose not have a living donor at this time.     Discussed the risks and benefits of a transplant, including the risk of surgery and immunosuppression medications.  Patients overall evaluation will be discussed in the Transplant Program's regular meeting with a final recommendation on the patients suitability for transplant to be made at that time.    Consult:  Keagan Wayne was seen in consultation at the request of Dr. Oracio Matthews for evaluation as a potential Kidney transplant recipient.    Reason for Visit:  Keagan Wayne is a 71 year old male with ESKD from diabetic nephropathy , who presents for Kidney transplant evaluation.    HPI:  Patient is 72 yo M with T2DM complicated by diabetic nephropathy on HD started in December 2016, retinopathy, and neuropathy, and CAD, HCV,  with known compliance issues, on chronic opioids treatment for chronic pain,  here for evaluation for transplant.    His kidney disease failed due to diabetic nephropathy/ FSGS  by Bx done in December 2015. He also has HCV, but his biopsy was not consistent with membranous  nephropathy.  Patient was started on HD in December 2016. He misses hid HD treatment sometimes. He was admitted on a local hospital on Meag[place] 3/2017, for volume overload, as she missed HD for a week. According to his HD unit, he follow with his dialysis schedule but he is not compliant with fluid restriction and his diet.   CV: HF : He has a h/o non- ischemic cardiomyopathy,  although he had an EF as low as 35 %. His last echo showed an EF of 50-55 %, per notes improved after he was started on HD, with improving of his volume status. He had a cardiac cath done in December  he has a very poor cardiac exercise tolerance. He can climb only 1 flight of stairs. In December 2016 had an angiogram, showing mild irregularities with no significant focal CAD. 40% stenosis on LADC , ramus intermedius 40-50% smooth irregularity , proximal 60-70% sterosis of the RCA.   Lungs: active smoker, with COPD, TRACEY not using CPAP and pulmonary nodules of 3 mm by CT in 2015. He told me he dose not smokes anytime, but he later told to  he is still smoking.Her is not following up with pulmonary, last time seen on 3/20/17. No PFTs as she was not able to perform the test.   DM T2 complicated by diabetic neuropathy, diabetic retinopathy. On Insuline 18 UI daily. His Hg A1C was 7.9 on 2/2016. No recent check .   HCV: never treated, was suppose to see  GI in January, but he missed the appointment. He had an U/S of the abdomen in 2015, no cirrhosis.   Colonoscopy: he had a colonoscopy in 2014, was found with 5 sesile polyps, 1-3 mm. He has on microscopic exam fragments of adenoma.   Prostate: PSA normal in our sytem   Bipolar disorder: and opioid dependent: patient was folloing asleep during his visit. Per pulmonary notes he is unhappy with his medical visits sometimes. Medical marihuana use .   He dose not have an living donors.            Kidney Disease Hx:        Kidney Disease Dx: diabetic nephropathy        Biopsy Proven: Yes; see above           On Dialysis: Yes, Date initiated: 2016       Primary Nephrologist: unknown          Medical Hx:       h/o HTN: Yes  Usual BP: 130s       h/o DM:  Yes        h/o Protein in Urine: Yes        h/o Blood in Urine:  No       h/o Kidney Stones:  No       h/o UTI: No       h/o Chronic NSAID Use: No         Previous Transplant Hx:       No         Transplant Sensitization Hx:       Previous Tx: No       Blood Transfusion: No       Pregnancy: Not applicable         Uremic Symptoms:       Fatigue: Yes; Cold: No; Nausea: No;         Cardiovascular Hx:       h/o Cardiac Issues: Yes; HF        Exercise Tolerance: shortness of breath with exertion.         Health Maintenance:       Colonoscopy: Not up to date         Potential Donor(s): No    ROS:  A comprehensive review of systems was obtained and negative, except as noted in the HPI or PMH.    PMH:   Medical records were reviewed  and PMH was discussed with patient and noted below.  Past Medical History:   Diagnosis Date     Acute pulmonary edema (H)      Anemia      Bipolar 1 disorder (H)      CAD (coronary artery disease)      Cognitive impairment      Colitis      COPD (chronic obstructive pulmonary disease) (H)      Depression      Diabetes (H)      ESRD (end stage renal disease) (H)      ESRD (end stage renal disease) on dialysis (H)      Glaucoma      Hepatitis C      Hyperlipidemia      Hyperlipidemia      Hypertension      IV drug abuse 1970's     Non compliance w medication regimen      Non-ischemic cardiomyopathy (H)      Non-ST elevation MI (NSTEMI) (H)      Peripheral neuropathy (H)      Peripheral neuropathy (H)      Pneumonia        PSH:   Past Surgical History:   Procedure Laterality Date     CREATE FISTULA ARTERIOVENOUS UPPER EXTREMITY Right 5/8/2016    Procedure: CREATE FISTULA ARTERIOVENOUS UPPER EXTREMITY;  Surgeon: Josias Valente MD;  Location: [facility] OR     CREATE FISTULA ARTERIOVENOUS UPPER EXTREMITY Right 12/14/2016    Procedure: CREATE FISTULA  ARTERIOVENOUS UPPER EXTREMITY;  Surgeon: Contreras Bowman MD;  Location: [facility] OR     HERNIA REPAIR       Personal or family history of bleeding or anesthesia problems: No    Family Hx:  Family History   Problem Relation Age of Onset     Coronary Artery Disease Brother        Personal Hx:   Social History     Social History     Marital status:      Spouse name: N/A     Number of children: N/A     Years of education: N/A     Occupational History     Not on file.     Social History Main Topics     Smoking status: Current Every Day Smoker     Types: Cigarettes     Last attempt to quit: 1/1/2017     Smokeless tobacco: Not on file     Alcohol use 8.4 oz/week     7 Cans of beer, 7 Shots of liquor per week      Comment: [place] 2-3/month     Drug use: Yes     Special: Marijuana      Comment: marijana     Sexual activity: Not on file     Other Topics Concern     Not on file     Social History Narrative       Allergies:  Allergies   Allergen Reactions     Morphine Rash       Medications:  Prior to Admission medications    Medication Sig Start Date End Date Taking? Authorizing Provider   Gabapentin (NEURONTIN PO) Take 200 mg by mouth daily   Yes Unknown, Entered By History   insulin glargine (LANTUS) 100 UNIT/ML injection Inject 16 Units Subcutaneous every morning    Yes Unknown, Entered By History   isosorbide mononitrate (IMDUR) 30 MG 24 hr tablet Take 90 mg by mouth daily   Yes Unknown, Entered By History   umeclidinium (INCRUSE ELLIPTA) 62.5 MCG/INH oral inhaler Inhale 1 puff into the lungs daily   Yes Unknown, Entered By History   OXYCODONE HCL PO Take 15 mg by mouth every 6 hours (takes scheduled)   Yes Unknown, Entered By History   hydrALAZINE (APRESOLINE) 50 MG tablet Take 1 tablet (50 mg) by mouth 3 times daily 12/19/16  Yes Tamir Jett MD   bumetanide (BUMEX) 2 MG tablet Take 2 tablets (4 mg) by mouth daily 12/19/16  Yes Tamir Jett MD   folic acid-vit B6-vit B12 (FOLGARD) 0.8-10-0.115 MG TABS  per tablet Take 1 tablet by mouth daily 12/19/16  Yes Tamir Jett MD   atorvastatin (LIPITOR) 80 MG tablet Take 1 tablet (80 mg) by mouth daily 12/15/16  Yes Eduardo Mahmood MD   carvedilol (COREG) 25 MG tablet Take 1.5 tablets (37.5 mg) by mouth 2 times daily (with meals) 12/15/16  Yes Eduardo Mahmood MD   albuterol (PROAIR HFA, PROVENTIL HFA, VENTOLIN HFA) 108 (90 BASE) MCG/ACT inhaler Inhale 2 puffs into the lungs every 4 hours as needed for shortness of breath / dyspnea or wheezing 9/30/16  Yes Sundar Lundberg MD   guaiFENesin-dextromethorphan (ROBITUSSIN DM) 100-10 MG/5ML syrup Take 10 mLs by mouth every 4 hours as needed for cough 9/30/16  Yes Sundar Lundberg MD guaiFENesin-codeine (ROBITUSSIN AC) 100-10 MG/5ML SOLN Take 5 mLs by mouth every 4 hours as needed for cough 9/30/16  Yes Sundar Lundberg MD   VITAMIN D, CHOLECALCIFEROL, PO Take 1,000 Units by mouth daily   Yes Unknown, Entered By History   cloNIDine (CATAPRES-TTS2) 0.2 MG/24HR patch Place 1 patch onto the skin once a week   Yes Reported, Patient   aspirin 81 MG chewable tablet Take 1 tablet (81 mg) by mouth daily 2/8/16  Yes Nila Espana DO   albuterol (2.5 MG/3ML) 0.083% nebulizer solution Take 1 vial (2.5 mg) by nebulization every 4 hours as needed for shortness of breath / dyspnea or wheezing 2/8/16  Yes Nila Espana DO   citalopram (CELEXA) 20 MG tablet Take 1 tablet (20 mg) by mouth daily 2/8/16  Yes Nila Espana DO   calcium acetate (PHOSLO) 667 MG CAPS Take 1 capsule (667 mg) by mouth 3 times daily (with meals) 2/8/16  Yes Nila Espana DO   brimonidine (ALPHAGAN-P) 0.15 % ophthalmic solution Place 1 drop into both eyes 3 times daily 2/8/16  Yes Nila Espana DO   latanoprost (XALATAN) 0.005 % ophthalmic solution Place 1 drop into both eyes At Bedtime 2/8/16  Yes Nila Espana DO   lisinopril (PRINIVIL/ZESTRIL) 40 MG tablet  "Take 1 tablet (40 mg) by mouth daily  Patient not taking: Reported on 6/22/2017 12/15/16   Eduardo Mahmood MD   sodium bicarbonate 650 MG tablet Take 1 tablet (650 mg) by mouth 2 times daily  Patient not taking: Reported on 6/22/2017 2/8/16   Nila Espana DO       Vitals:  /64 (BP Location: Left arm, Patient Position: Chair, Cuff Size: Adult Regular)  Pulse 68  Temp 97.8  F (36.6  C) (Oral)  Ht 1.867 m (6' 1.5\")  Wt 86.8 kg (191 lb 6.4 oz)  SpO2 99%  BMI 24.91 kg/m2    Exam:  GENERAL APPEARANCE: alert and no distress  HENT: mouth without ulcers or lesions  LYMPHATICS: no cervical or supraclavicular nodes  RESP: lungs clear to auscultation - no rales, rhonchi or wheezes  CV: regular rhythm, normal rate, no rub, no murmur  EDEMA: no LE edema bilaterally  ABDOMEN: soft, nondistended, nontender, bowel sounds normal  MS: extremities normal - no gross deformities noted, no evidence of inflammation in joints, no muscle tenderness  SKIN: no rash    Results:   Labs and imaging were ordered for this visit and reviewed by me.  Recent Results (from the past 336 hour(s))   Routine UA with microscopic [[id number]]    Collection Time: 06/22/17  7:19 AM   Result Value Ref Range    Color Urine Yellow     Appearance Urine Clear     Glucose Urine Negative NEG mg/dL    Bilirubin Urine Negative NEG    Ketones Urine Negative NEG mg/dL    Specific Gravity Urine 1.014 1.003 - 1.035    Blood Urine Negative NEG    pH Urine 6.0 5.0 - 7.0 pH    Protein Albumin Urine >499 (A) NEG mg/dL    Urobilinogen mg/dL 2.0 0.0 - 2.0 mg/dL    Nitrite Urine Negative NEG    Leukocyte Esterase Urine Negative NEG    Source Midstream Urine     WBC Urine 1 0 - 2 /HPF    RBC Urine 3 (H) 0 - 2 /HPF    Squamous Epithelial /HPF Urine 1 0 - 1 /HPF    Mucous Urine Present (A) NEG /LPF    Hyaline Casts 18 (H) 0 - 2 /LPF    Fat Globules Few  Few   (A) NEG /HPF   Lipid Profile [LAB18]    Collection Time: 06/22/17  7:23 AM   Result Value Ref " Range    Cholesterol 119 <200 mg/dL    Triglycerides 118 <150 mg/dL    HDL Cholesterol 74 >39 mg/dL    LDL Cholesterol Calculated 21 <100 mg/dL    Non HDL Cholesterol 44 <130 mg/dL   Comprehensive metabolic panel [LAB17]    Collection Time: 06/22/17  7:23 AM   Result Value Ref Range    Sodium 137 133 - 144 mmol/L    Potassium 4.3 3.4 - 5.3 mmol/L    Chloride 101 94 - 109 mmol/L    Carbon Dioxide 27 20 - 32 mmol/L    Anion Gap 9 3 - 14 mmol/L    Glucose 169 (H) 70 - 99 mg/dL    Urea Nitrogen 59 (H) 7 - 30 mg/dL    Creatinine 6.91 (H) 0.66 - 1.25 mg/dL    GFR Estimate 8 (L) >60 mL/min/1.7m2    GFR Estimate If Black 10 (L) >60 mL/min/1.7m2    Calcium 8.7 8.5 - 10.1 mg/dL    Bilirubin Total 0.7 0.2 - 1.3 mg/dL    Albumin 3.9 3.4 - 5.0 g/dL    Protein Total 9.3 (H) 6.8 - 8.8 g/dL    Alkaline Phosphatase 68 40 - 150 U/L    ALT 21 0 - 70 U/L    AST 11 0 - 45 U/L   Phosphorus    Collection Time: 06/22/17  7:23 AM   Result Value Ref Range    Phosphorus 5.4 (H) 2.5 - 4.5 mg/dL   C-peptide [QDN955]    Collection Time: 06/22/17  7:23 AM   Result Value Ref Range    C Peptide 7.9 (H) 0.9 - 6.9 ng/mL   Hemoglobin A1c [LAB90]    Collection Time: 06/22/17  7:23 AM   Result Value Ref Range    Hemoglobin A1C 8.1 (H) 4.3 - 6.0 %   Prostate spec antigen screen [[id number]]    Collection Time: 06/22/17  7:23 AM   Result Value Ref Range    PSA 0.51 0 - 4 ug/L   M Tuberculosis by Quantiferon [[id number]]    Collection Time: 06/22/17  7:23 AM   Result Value Ref Range    M Tuberculosis Result Negative NEG    M Tuberculosis Antigen Value 0.00 IU/mL   INR [GDP1460]    Collection Time: 06/22/17  7:23 AM   Result Value Ref Range    INR 1.15 (H) 0.86 - 1.14   Partial thromboplastin time [LAB56]    Collection Time: 06/22/17  7:23 AM   Result Value Ref Range    PTT 26 22 - 37 sec   Thrombin time [QMA423]    Collection Time: 06/22/17  7:23 AM   Result Value Ref Range    Thrombin Time 18.6 13.0 - 19.0 sec   Lupus Anticoagulant Panel (BIP8672)    Collection  Time: 06/22/17  7:23 AM   Result Value Ref Range    Lupus Result  NEG     Negative  (Note)  COMMENTS:  INR is minimally elevated.  APTT ratio is normal.  DRVVT Screen ratio is normal.  Thrombin time is normal.  NEGATIVE TEST; A LUPUS ANTICOAGULANT WAS NOT DETECTED IN THIS  SPECIMEN WITHIN THE LIMITS OF THE TESTING REPERTOIRE.  If the clinical picture is strongly suggestive of an antiphospholipid  syndrome, recommend anticardiolipin and beta-2-glycoprotein (IgG and  IgM) antibody tests.  Results interpreted by Qasim Treviño DO, Pathology, PGY3  I personally reviewed the coagulation test results and agree with the  interpretation documented by the resident /fellow and  edited/confirmed by me.  Georgie Alvarez M.D.  [phone]  6/23/2017    APTT:       Ratio  Patient  =  0.89  1:2 Mix  =  N/A  Reference:  Negative: Less than or equal to 1.16  Positive: Greater than or equal to 1.17     DILUTE FANTASMA VIPER VENOM TEST:  Screen Ratio = 1.06   Normal is less than 1.21       CBC with platelets differential [[id number]]    Collection Time: 06/22/17  7:23 AM   Result Value Ref Range    WBC 4.5 4.0 - 11.0 10e9/L    RBC Count 4.15 (L) 4.4 - 5.9 10e12/L    Hemoglobin 12.3 (L) 13.3 - 17.7 g/dL    Hematocrit 39.6 (L) 40.0 - 53.0 %    MCV 95 78 - 100 fl    MCH 29.6 26.5 - 33.0 pg    MCHC 31.1 (L) 31.5 - 36.5 g/dL    RDW 14.6 10.0 - 15.0 %    Platelet Count 139 (L) 150 - 450 10e9/L    Diff Method Automated Method     % Neutrophils 43.7 %    % Lymphocytes 34.9 %    % Monocytes 10.4 %    % Eosinophils 9.9 %    % Basophils 0.9 %    % Immature Granulocytes 0.2 %    Nucleated RBCs 0 0 /100    Absolute Neutrophil 2.0 1.6 - 8.3 10e9/L    Absolute Lymphocytes 1.6 0.8 - 5.3 10e9/L    Absolute Monocytes 0.5 0.0 - 1.3 10e9/L    Absolute Eosinophils 0.5 0.0 - 0.7 10e9/L    Absolute Basophils 0.0 0.0 - 0.2 10e9/L    Abs Immature Granulocytes 0.0 0 - 0.4 10e9/L    Absolute Nucleated RBC 0.0    HLA Typing Complete SOT Recipient    Collection Time:  06/22/17  7:23 AM   Result Value Ref Range    HLA Typing Complete SOT Recipient       Specimen received - Immunology report to follow upon completion.   PRA Single Antigen IgG Antibody    Collection Time: 06/22/17  7:23 AM   Result Value Ref Range    PRA Single Antigen IgG Antibody       Specimen received - Immunology report to follow upon completion.   Hepatitis B core antibody [[id number]]    Collection Time: 06/22/17  7:23 AM   Result Value Ref Range    Hepatitis B Core Lory (A) NR     Reactive   A reactive result indicates acute, chronic or past/resolved hepatitis B   infection.     Hepatitis B Surface Antibody [[id number]]    Collection Time: 06/22/17  7:23 AM   Result Value Ref Range    Hepatitis B Surface Antibody 81.91 (H) <8.00 m[IU]/mL   Hepatitis B surface antigen [RZD383]    Collection Time: 06/22/17  7:23 AM   Result Value Ref Range    Hep B Surface Agn Nonreactive NR   Hepatitis C antibody [HBV850]    Collection Time: 06/22/17  7:23 AM   Result Value Ref Range    Hepatitis C Antibody (A) NR     Reactive   A reactive result indicates one of the following 1) current HCV infection 2)   past HCV infection that has resolved or 3) false positivity. The CDC recommends   that a reactive result should be followed by Nucleic acid testing for HCV RNA.  If HCV RNA is detected, that indicates current HCV infection. If HCV RNA is not   detected, that indicates either past, resolved HCV infection, or false HCV   antibody positivity.   Assay performance characteristics have not been established for newborns,   infants, and children     HIV Antigen Antibody Combo Pretransplant    Collection Time: 06/22/17  7:23 AM   Result Value Ref Range    HIV Antigen Antibody Combo Pretransplant  NR     Nonreactive   HIV-1 p24 Ag & HIV-1/HIV-2 Ab Not Detected     Anti Treponema [[id number]]    Collection Time: 06/22/17  7:23 AM   Result Value Ref Range    Treponema pallidum Antibody Negative NEG   Factor 2 and 5 mutation analysis     Collection Time: 06/22/17  7:23 AM   Result Value Ref Range    Copath Report       Patient Name: KRISTAL MORALES  MR#: [id number]  Specimen #: [id number]  Collected: 6/22/2017 07:23  Received: 6/22/2017 10:14  Reported: 6/23/2017 18:13  Ordering Phy(s): ROCK MAX    For improved result formatting, select 'View Enhanced Report Format'  under Linked Documents section.  _________________________________________    TEST(S) REQUESTED:  Factor 5 Leiden and Factor 2 by PCR    SPECIMEN DESCRIPTION:  Blood    METHODOLOGY:   The regions of genomic DNA containing the H4168X Factor 5  gene mutation (Factor V Leiden) and the Factor 2(Prothrombin U04078P)  gene mutation were simultaneously amplified using the polymerase chain  reaction.  The amplified products were digested with restriction  endonuclease TaqI and products were analyzed by gel electrophoresis.    RESULTS:    Factor V 1691G>A (Leiden)  RESULTS:  Mutation analyzed:     1691G>A  Factor V 1691G>A (Leiden)  Interpretation:      ABSENT  Factor V 1691G>A (Leiden) mutation  genotype:      G/G    FACTOR 2/PROTHROMBIN  RESULTS:  Mutation analyzed:     53919W>A  Factor 2 Mutation Interpretation:      ABSENT  Factor 2 Mutation genotype:      G/G    INTERPRETATION:  The patient is negative for the Factor V 1691G>A (Leiden) and negative  for the Factor 2 mutation.    COMMENTS:  If a patient is the recipient of an allogeneic bone marrow transplant,  this test must be done on a pre-transplant sample or buccal swab.  A  previous allogeneic bone marrow transplant will interfere with test  results.  Call the [facility]([phone]) for  instructions on sample collection for these patients.    This test was developed and its performance characteristics determined  by the [facility],  Molecular Diagnostics  Laboratory. It has not been cleared or approved by the FDA. The  laboratory is regulated under CLIA as qualified to  perform  high-complexity testing. This test is used for clinical purposes. It  should not be regarded as investigational or for research.    A resid ent/fellow in an accredited training program was involved in the  selection of testing, review of laboratory data, and/or interpretation  of this case.  I, as the senior physician, attest that I: (i) confirmed  appropriate testing, (ii) examined the relevant raw data for the  specimen(s); and (iii) rendered or confirmed the interpretation(s).    Electronically Signed Out By:  Seamus Almonte M.D., PhD  [facility]    CPT Codes:  A: 20203-K5QTJZ, 60925-E7WLXW, -KGAHEX(2)    TESTING LAB LOCATION:  [address] [address]  [phone]    COLLECTION SITE:  Client[facility]  [facility]  [facility]     ABO/Rh type and screen [[id number]]    Collection Time: 06/22/17  7:24 AM   Result Value Ref Range    ABO B     RH(D)  Pos     Antibody Screen Neg     Test Valid Only At       [facility]    Specimen Expires 06/25/2017    Antibody titer red cell [[id number]]    Collection Time: 06/22/17  7:24 AM   Result Value Ref Range    Antibody Titer Anti A: IgM 16, IgG 16    ABO Subtyping [[id number]]    Collection Time: 06/22/17  7:24 AM   Result Value Ref Range    Antigen Type Canceled, Test credited     Blood Bank Comment Patient types as B pos.  6/22/17 JA    EKG 12-lead, tracing only [EKG1]    Collection Time: 06/22/17  1:57 PM   Result Value Ref Range    Interpretation ECG Click View Image link to view waveform and result    ABO type [FOP3114]    Collection Time: 06/22/17  2:18 PM   Result Value Ref Range    ABO B     RH(D)  Pos     Specimen Expires 06/25/2017            VALERYE

## 2017-06-22 NOTE — MR AVS SNAPSHOT
After Visit Summary   6/22/2017    Keagan Wayne    MRN: [id number]           Patient Information     Date Of Birth          1945        Visit Information        Provider Department      6/22/2017 9:30 AM  PKE PATIENT 3 [facility] Solid Organ Transplant        Today's Diagnoses     ESRD (end stage renal disease) (H)    -  1       Follow-ups after your visit        Your next 10 appointments already scheduled     Jun 22, 2017  1:45 PM CDT   Lab with  LAB   [facility] Lab ([facility])    [address] [place] [address] [phone]            Jun 22, 2017  2:00 PM CDT   (Arrive by 1:45 PM)   XR CHEST 2 VIEWS with XR1   [facility] Xray ([facility])    [address] [place] [address] [phone]           Please bring a list of your current medicines to your exam. (Include vitamins, minerals and over-thecounter medicines.) Leave your valuables at home.  Tell your doctor if there is a chance you may be pregnant.  You do not need to do anything special for this exam.            Jun 22, 2017  2:20 PM CDT   (Arrive by 2:05 PM)   ecg with  CV EKG   [facility] Xray ([facility])    [address] [place] [address] [phone]            Jun 22, 2017  3:30 PM CDT   Ech Complete with UCECHCR1    [facility] ([facility])    [address] [place] [address] [phone]           1.  Please bring or wear a comfortable two-piece outfit. 2.  You may eat, drink and take your normal medicines. 3.  For any questions that cannot be answered, please contact the ordering physician            Oct 10, 2017 10:00 AM CDT   US AORTIC IMAGING with UCUSV2   [facility] US ([facility])    [address] [place] [id number]    [phone]           Please bring a list of your medicines (including vitamins, minerals and over-the-counter drugs). Also, tell your doctor about any allergies you may have. Wear comfortable clothes and leave your valuables at home.  Adults: No eating or drinking for 8 hours before the exam. You may take medicine with a small sip of water.  Children: - Children 6+ years: No food or drink for 6 hours before exam. - Children 1-5 years: No food or drink for 4 hours before exam. - Infants, breast-fed: may have breast milk up to 2 hours before exam. - Infants, formula: may have bottle until 4 hours before exam.  Please call the Imaging Department at your exam site with any questions.            Oct 10, 2017 10:30 AM CDT   US AORTA/IVC/ILIAC DUPLEX COMPLETE with [address] [facility] ([facility])    [address] [place] [address] [phone]           Please bring a list of your medicines (including vitamins, minerals and over-the-counter drugs). Also, tell your doctor about any allergies you may have. Wear comfortable clothes and leave your valuables at home.  Adults: No eating or drinking for 8 hours before the exam. You may take medicine with a small sip of water.  Children: - Children 6+ years: No food or drink for 6 hours before exam. - Children 1-5 years: No food or drink for 4 hours before exam. - Infants, breast-fed: may have breast milk up to 2 hours before exam. - Infants, formula: may have bottle until 4 hours before exam.  Please call the Imaging Department at your exam site with any questions.            Oct 10, 2017 12:30 PM CDT   FULL PULMONARY FUNCTION with  PFL [facility] Pulmonary Function Testing ([facility])    [address] [place] [address] [phone]            Oct 10, 2017  2:00 PM CDT   (Arrive by 1:45 PM)   NEW PANCREAS/KIDNEY TRANSPLANT WORK-UP with BUCKY WOOD  "MD Erin   [facility] ([facility])    [address] [facility]  3[address] [facility] [address] [phone]              Who to contact     If you have questions or need follow up information about today's clinic visit or your schedule please contact [facility] SOLID ORGAN TRANSPLANT directly at [phone].  Normal or non-critical lab and imaging results will be communicated to you by MyChart, letter or phone within 4 business days after the clinic has received the results. If you do not hear from us within 7 days, please contact the clinic through MyChart or phone. If you have a critical or abnormal lab result, we will notify you by phone as soon as possible.  Submit refill requests through [employer] or call your pharmacy and they will forward the refill request to us. Please allow 3 business days for your refill to be completed.          Additional Information About Your Visit        [employer] Information     [employer] lets you send messages to your doctor, view your test results, renew your prescriptions, schedule appointments and more. To sign up, go to [email or web address] . Click on \"Log in\" on the left side of the screen, which will take you to the Welcome page. Then click on \"Sign up Now\" on the right side of the page.     You will be asked to enter the access code listed below, as well as some personal information. Please follow the directions to create your username and password.     Your access code is: [email or web address]  Expires: 2017  9:43 AM     Your access code will  in 90 days. If you need help or a new code, please call your [place] clinic or [phone].        Care EveryWhere ID     This is your Care EveryWhere ID. This could be used by other organizations to access your [place] medical records  [id number]        Your Vitals Were     Pulse Temperature Height Pulse Oximetry BMI (Body Mass Index)       68 97.8  F (36.6  C) (Oral) 1.867 m (6' " "1.5\") 99% 24.91 kg/m2        Blood Pressure from Last 3 Encounters:   06/22/17 190/90   06/22/17 190/80   04/27/17 149/66    Weight from Last 3 Encounters:   06/22/17 86.8 kg (191 lb 6.4 oz)   06/22/17 86.8 kg (191 lb 6.4 oz)   03/31/17 75.8 kg (167 lb)              Today, you had the following     No orders found for display       Primary Care Provider Office Phone # Fax #    Yolanda Hernandez [phone]       [facility] [address]  [facility] [address]        Equal Access to Services     JIAN HOROWITZ : Calvin Holm, catie longoria, shawanda kaalmada sal, gio brown. So [facility] [phone].    ATENCIÓN: Si habla español, tiene a benitez disposición servicios gratuitos de asistencia lingüística. Llame al [phone].    We comply with applicable federal civil rights laws and [place] laws. We do not discriminate on the basis of race, color, national origin, age, disability sex, sexual orientation or gender identity.            Thank you!     Thank you for choosing [facility] SOLID ORGAN TRANSPLANT  for your care. Our goal is always to provide you with excellent care. Hearing back from our patients is one way we can continue to improve our services. Please take a few minutes to complete the written survey that you may receive in the mail after your visit with us. Thank you!             Your Updated Medication List - Protect others around you: Learn how to safely use, store and throw away your medicines at www.disposemymeds.org.          This list is accurate as of: 6/22/17  1:02 PM.  Always use your most recent med list.                   Brand Name Dispense Instructions for use Diagnosis    * albuterol (2.5 MG/3ML) 0.083% neb solution     360 mL    Take 1 vial (2.5 mg) by nebulization every 4 hours as needed for shortness of breath / dyspnea or wheezing    Chronic obstructive pulmonary disease, unspecified COPD type (H)       * albuterol " 108 (90 BASE) MCG/ACT Inhaler    PROAIR HFA/PROVENTIL HFA/VENTOLIN HFA    1 Inhaler    Inhale 2 puffs into the lungs every 4 hours as needed for shortness of breath / dyspnea or wheezing    Chronic obstructive pulmonary disease, unspecified COPD type (H)       aspirin 81 MG chewable tablet     36 tablet    Take 1 tablet (81 mg) by mouth daily    Type 2 diabetes mellitus with diabetic nephropathy (H)       atorvastatin 80 MG tablet    LIPITOR    30 tablet    Take 1 tablet (80 mg) by mouth daily    Acute diastolic CHF (congestive heart failure) (H)       brimonidine 0.15 % ophthalmic solution    ALPHAGAN-P    1 Bottle    Place 1 drop into both eyes 3 times daily    Glaucoma       bumetanide 2 MG tablet    BUMEX    60 tablet    Take 2 tablets (4 mg) by mouth daily    Acute on chronic systolic congestive heart failure (H)       calcium acetate 667 MG Caps capsule    PHOSLO    180 capsule    Take 1 capsule (667 mg) by mouth 3 times daily (with meals)    Chronic kidney disease, stage IV (severe) (H)       carvedilol 25 MG tablet    COREG    100 tablet    Take 1.5 tablets (37.5 mg) by mouth 2 times daily (with meals)    Acute diastolic CHF (congestive heart failure) (H)       citalopram 20 MG tablet    celeXA    30 tablet    Take 1 tablet (20 mg) by mouth daily    Depression       cloNIDine 0.2 MG/24HR WK patch    CATAPRES-TTS2     Place 1 patch onto the skin once a week        folic acid-vit B6-vit B12 0.8-10-0.115 MG Tabs per tablet    FOLGARD    30 tablet    Take 1 tablet by mouth daily    Anemia of chronic disease       guaiFENesin-codeine 100-10 MG/5ML Soln solution    ROBITUSSIN AC    120 mL    Take 5 mLs by mouth every 4 hours as needed for cough    Cough       guaiFENesin-dextromethorphan 100-10 MG/5ML syrup    ROBITUSSIN DM    560 mL    Take 10 mLs by mouth every 4 hours as needed for cough    Cough       hydrALAZINE 50 MG tablet    APRESOLINE    90 tablet    Take 1 tablet (50 mg) by mouth 3 times daily    Benign  essential hypertension       INCRUSE ELLIPTA 62.5 MCG/INH oral inhaler   Generic drug:  umeclidinium      Inhale 1 puff into the lungs daily        insulin glargine 100 UNIT/ML injection    LANTUS     Inject 16 Units Subcutaneous every morning        isosorbide mononitrate 30 MG 24 hr tablet    IMDUR     Take 90 mg by mouth daily        latanoprost 0.005 % ophthalmic solution    XALATAN    1 Bottle    Place 1 drop into both eyes At Bedtime    Glaucoma       lisinopril 40 MG tablet    PRINIVIL/ZESTRIL    30 tablet    Take 1 tablet (40 mg) by mouth daily    Hypertension due to endocrine disorder       NEURONTIN PO      Take 200 mg by mouth daily        OXYCODONE HCL PO      Take 15 mg by mouth every 6 hours (takes scheduled)        sodium bicarbonate 650 MG tablet      Take 1 tablet (650 mg) by mouth 2 times daily    Chronic kidney disease, stage IV (severe) (H)       VITAMIN D (CHOLECALCIFEROL) PO      Take 1,000 Units by mouth daily        * Notice:  This list has 2 medication(s) that are the same as other medications prescribed for you. Read the directions carefully, and ask your doctor or other care provider to review them with you.

## 2017-06-22 NOTE — PROGRESS NOTES
NUTRITION KIDNEY TRANSPLANT EVALUATION  Medical Nutrition Therapy    Weight and BMI:  Current BMI: 24.9  BMI is within criteria for kidney transplant    Malnutrition Status:    Pt does not meet malnutrition criteria        Visit Type: Initial Assessment    Keagan Wayne referred by Dr. Greene for MNT related to kidney transplant evaluation    Patient accompanied by his fianceMaría    H/o previous txp: none    Nutrition Assessment:  Anthropometrics  Height:   73.5 in   BMI:    24.9    Weight Status:Normal BMI   Weight:  191 lbs            IBW (lb): 187  % IBW: 102    Wt Hx: Pt reports losing 60 lbs last year before HD and then later reported it was longer, ~3 years ago that he lost weight. Per chart review, wt 4/22/16 of 196 lbs, which does not reflect major weight loss. Wt 9/2015 of 219 lbs, which would be a 28# wt loss (13%) x almost 2 years.      Adj/dosing BW: 191 lbs/87 kg       Recent Labs   Lab Test  06/22/17   0723  12/07/16   0526   04/14/12   0645   CHOL  119  117   < >  163   HDL  74  39*   < >  33*   LDL  21  57   < >  96   TRIG  118  104   < >  171*   CHOLHDLRATIO   --    --    --   4.9    < > = values in this interval not displayed.     Lab Results   Component Value Date    A1C 8.1 06/22/2017    A1C 7.6 01/16/2017    A1C 8.8 12/06/2016    A1C 10.4 08/06/2016    A1C 9.3 06/30/2016     Potassium   Date Value Ref Range Status   06/22/2017 4.3 3.4 - 5.3 mmol/L Final     Phosphorus: 5.4 today       Vitamins, Supplements, Herbals, Pertinent Meds:   Phoslo - pt reports consistency with this and that he brought it today, vit D  Pt's elizabeth has updated med list and folic acid/B6/B12 (folgard) no longer current     Dialysis Modality: HD  Days/Times: MWF 6a-10a  Dialysis Start: 12/2016  Pt receives protein supplement with dialysis: Y    Nutrition History  Pt-reported special diets/eating habits: none    Dining out/food not made at home: 70% meals   Appetite: good/baseline  Pt does not add salt in his cooking. He  uses pepper as main seasoning. He does eat restaurant foods and his sister's cooking, which he reports is high in salt.     Recall:  B: turkey or ham s/w at HD or 2 eggs, 2 pancakes, 2 sausage if not at HD  L: [employer] soup with bread (chix wild rice soup is 1400 mg per serving)  D: rest of soup, ribs, chix, hamburger and fries, other meats   Sn: low K+ fruits  Beverages: water, 12-24 oz ginger ale; limited to 32 oz fluid/day  ETOH (1 drink = 12 oz beer, 5 oz wine, 1.5 oz liquor): 3 shots of vodka/week     Physical Activity  Walks max 10-15 min/day from hallway to car     MALNUTRITION  % Intake:  No decreased intake noted  % Weight Loss:  Weight loss does not meet criteria for malnutrition   Subcutaneous Fat Loss:  None  Muscle Loss:  None  Fluid Accumulation/Edema:  None noted  Malnutrition Diagnosis: Patient does not meet two of the above criteria necessary for diagnosing malnutrition     Nutrition Prescription  Energy:  5776-5037     (25-30 kcal/kg for maintenance)   Protein:  104-122    (1.2-1.4 g/kg for HD/PD needs)         Fluid:  1 ml/kcal or per MD        Micronutrient:  Na+: <2000 mg/day  K+: 3041-7604 mg/day  Phos: 800-1000 mg/day          Nutrition Diagnosis:  Food and nutrition related knowledge deficit r/t pre kidney transplant eval AEB pt verbalized not hearing pre/post transplant diet guidelines.    Nutrition Intervention:  Nutrition education provided:  Discussed sodium intake (low sodium foods and drinks, seasoning food without salt and tips for low sodium diet). Reviewed need for adequate protein & sources with dialysis. Discussed continued consistency with Phos binders d/t high levels.     Reviewed post txp diet guidelines in brief (will review in further detail post txp):   (1) Review of proper food safety measures d/t immunosuppressant therapy post-op and increased risk for food-borne illness (2) Stressed importance of not taking any herbal/Chinese/alternative medicines or supplements post txp  (d/t risk for rejection, unknown effects on the organs, potential interactions with immunosuppresants). (3) Med regimen and possible side effects    Patient Understanding: Pt verbalized understanding of education provided.  Expected Compliance: Good  Follow-Up Plans: PRN     Nutrition Goals:  1. Limit Na+ <2000mg/day  2. Minimum 104 g protein/day with HD     Provided pt with contact info.   Time spent with patient: 15 minutes.  Ramonita Dunbar RD, LD

## 2017-06-22 NOTE — PROGRESS NOTES
Keagan attended the Pre Kidney Transplant Patient Education class today with his significant other.  She was very attentive but Keagan slept through much of class (SO stated that this is typical for him; he falls asleep whenever he sits down).  I introduced the [facility] website and encouraged them to access if for additional education at home.  In addition to the standard video content I reviewed living donation, paired exchange, KDPI, typical length of stay and the need to stay locally post-transplant discharge.  I described typical post-transplant surveillance routines.  They expressed understanding of the information presented.

## 2017-06-22 NOTE — MR AVS SNAPSHOT
"              After Visit Summary   6/22/2017    Keagan Wayne    MRN: [id number]           Patient Information     Date Of Birth          1945        Visit Information        Provider Department      6/22/2017 10:30 AM MARIVEL VENTURA PATIENT 3 [facility] Solid Organ Transplant        Today's Diagnoses     Pre-transplant evaluation for end stage renal disease    -  1       Follow-ups after your visit        Who to contact     If you have questions or need follow up information about today's clinic visit or your schedule please contact [facility] SOLID ORGAN TRANSPLANT directly at [phone].  Normal or non-critical lab and imaging results will be communicated to you by MyChart, letter or phone within 4 business days after the clinic has received the results. If you do not hear from us within 7 days, please contact the clinic through MyChart or phone. If you have a critical or abnormal lab result, we will notify you by phone as soon as possible.  Submit refill requests through [employer] or call your pharmacy and they will forward the refill request to us. Please allow 3 business days for your refill to be completed.          Additional Information About Your Visit        Care EveryWhere ID     This is your Care EveryWhere ID. This could be used by other organizations to access your [place] medical records  [id number]        Your Vitals Were     Pulse Temperature Height Pulse Oximetry BMI (Body Mass Index)       68 97.8  F (36.6  C) (Oral) 1.867 m (6' 1.5\") 99% 24.91 kg/m2        Blood Pressure from Last 3 Encounters:   10/12/17 114/63   10/10/17 108/49   10/10/17 90/50    Weight from Last 3 Encounters:   10/12/17 90.4 kg (199 lb 4.8 oz)   10/10/17 90.4 kg (199 lb 4.7 oz)   10/10/17 90.4 kg (199 lb 3.2 oz)              Today, you had the following     No orders found for display       Primary Care Provider Office Phone # Fax #    Yolanda Travislolischaranjit [phone]       [facility] [address]  [facility] [address]        Equal Access to Services     [facility] : Hadii hien mendez buddychang Pamelaali, waaxda luqadaha, qaybta kaalmagio kim. So [facility] [phone].    ATENCIÓN: Si habla español, tiene a bneitez disposición servicios gratuitos de asistencia lingüística. Greggame al [phone].    We comply with applicable federal civil rights laws and [place] laws. We do not discriminate on the basis of race, color, national origin, age, disability, sex, sexual orientation, or gender identity.            Thank you!     Thank you for choosing [facility] SOLID ORGAN TRANSPLANT  for your care. Our goal is always to provide you with excellent care. Hearing back from our patients is one way we can continue to improve our services. Please take a few minutes to complete the written survey that you may receive in the mail after your visit with us. Thank you!             Your Updated Medication List - Protect others around you: Learn how to safely use, store and throw away your medicines at www.disposemymeds.org.          This list is accurate as of: 6/22/17 11:59 PM.  Always use your most recent med list.                   Brand Name Dispense Instructions for use Diagnosis    aspirin 81 MG chewable tablet     36 tablet    Take 1 tablet (81 mg) by mouth daily    Type 2 diabetes mellitus with diabetic nephropathy (H)       atorvastatin 80 MG tablet    LIPITOR    30 tablet    Take 1 tablet (80 mg) by mouth daily    Acute diastolic CHF (congestive heart failure) (H)       bumetanide 2 MG tablet    BUMEX    60 tablet    Take 2 tablets (4 mg) by mouth daily    Acute on chronic systolic congestive heart failure (H)       citalopram 20 MG tablet    celeXA    30 tablet    Take 1 tablet (20 mg) by mouth daily    Depression       cloNIDine 0.2 MG/24HR WK patch    CATAPRES-TTS2     Place 1 patch onto the skin once a week        INCRUSE ELLIPTA 62.5 MCG/INH oral inhaler   Generic drug:   umeclidinium      Inhale 1 puff into the lungs daily        insulin glargine 100 UNIT/ML injection    LANTUS     Inject 18 Units Subcutaneous every morning        isosorbide mononitrate 30 MG 24 hr tablet    IMDUR     Take 90 mg by mouth daily        NEURONTIN PO      Take 200 mg by mouth daily        OXYCODONE HCL PO      Take 10 mg by mouth 3 times daily as needed (takes scheduled)        VITAMIN D (CHOLECALCIFEROL) PO      Take 1,000 Units by mouth daily

## 2017-06-22 NOTE — MR AVS SNAPSHOT
After Visit Summary   6/22/2017    Keagan Wayne    MRN: [id number]           Patient Information     Date Of Birth          1945        Visit Information        Provider Department      6/22/2017 9:00 AM Ramonita Dunbar RD [facility]        Today's Diagnoses     Organ transplant candidate    -  1       Follow-ups after your visit        Your next 10 appointments already scheduled     Jun 22, 2017 10:00 AM CDT   (Arrive by 9:45 AM)   SOT CARE COORDINATOR EVAL with Margaret Reardon RN   [facility] ([facility])    [address] [place] [address]   [phone]            Jun 22, 2017 10:30 AM CDT   Nephrology Consult with MARIVEL RASMUSSENE PATIENT 3   [facility] ([facility])    [address] [place] [address]   [phone]            Jun 22, 2017 11:15 AM CDT   (Arrive by 11:00 AM)   SOT SOCIAL WORK EVAL with MARAH Chandra   [facility] ([facility])    [address] [place] [address]   [phone]            Jun 22, 2017  1:45 PM CDT   Lab with MARIVEL LAB   [facility] ([facility])    [address] [place] [address]   [phone]            Jun 22, 2017  2:00 PM CDT   (Arrive by 1:45 PM)   XR CHEST 2 VIEWS with [email or web address]   [facility] ([facility])    [address] [place] [address]   [phone]           Please bring a list of your current medicines to your exam. (Include vitamins, minerals and over-thecounter medicines.) Leave your valuables at home.  Tell your doctor if there is a chance you may be pregnant.  You do not need to do anything special for this exam.            Jun 22, 2017  2:20 PM CDT   (Arrive by 2:05 PM)   ecg with UC  CV EKG   [facility] Xray ([facility])    [address] [facility]  [address] [facility] [id number] [phone]            Jun 22, 2017  3:30 PM CDT   Ech Complete with [email or web address]   [facility] Echo ([facility])    [address] [facility]  [address] [facility] [id number] [phone]           1.  Please bring or wear a comfortable two-piece outfit. 2.  You may eat, drink and take your normal medicines. 3.  For any questions that cannot be answered, please contact the ordering physician            Oct 10, 2017 10:00 AM CDT   US AORTIC IMAGING with [address] [facility] ([facility])    [address] [id number] [phone]           Please bring a list of your medicines (including vitamins, minerals and over-the-counter drugs). Also, tell your doctor about any allergies you may have. Wear comfortable clothes and leave your valuables at home.  Adults: No eating or drinking for 8 hours before the exam. You may take medicine with a small sip of water.  Children: - Children 6+ years: No food or drink for 6 hours before exam. - Children 1-5 years: No food or drink for 4 hours before exam. - Infants, breast-fed: may have breast milk up to 2 hours before exam. - Infants, formula: may have bottle until 4 hours before exam.  Please call the Imaging Department at your exam site with any questions.            Oct 10, 2017 10:30 AM CDT   US AORTA/IVC/ILIAC DUPLEX COMPLETE with [address] [facility] ([facility])    [address] [id number] [phone]           Please bring a list of your medicines (including vitamins, minerals and over-the-counter drugs). Also, tell your doctor about any allergies you may have. Wear comfortable clothes and leave your valuables at home.  Adults: No eating or drinking for 8 hours  "before the exam. You may take medicine with a small sip of water.  Children: - Children 6+ years: No food or drink for 6 hours before exam. - Children 1-5 years: No food or drink for 4 hours before exam. - Infants, breast-fed: may have breast milk up to 2 hours before exam. - Infants, formula: may have bottle until 4 hours before exam.  Please call the Imaging Department at your exam site with any questions.            Oct 10, 2017  2:00 PM CDT   (Arrive by 1:45 PM)   NEW PANCREAS/KIDNEY TRANSPLANT WORK-UP with BUCKY Khan MD   [facility] ([facility])    [address] [facility]  3rd Floor  [facility] [address] [phone]              Who to contact     If you have questions or need follow up information about today's clinic visit or your schedule please contact [facility] directly at [phone].  Normal or non-critical lab and imaging results will be communicated to you by [employer], letter or phone within 4 business days after the clinic has received the results. If you do not hear from us within 7 days, please contact the clinic through [employer] or phone. If you have a critical or abnormal lab result, we will notify you by phone as soon as possible.  Submit refill requests through [employer] or call your pharmacy and they will forward the refill request to us. Please allow 3 business days for your refill to be completed.          Additional Information About Your Visit        [employer] Information     [employer] lets you send messages to your doctor, view your test results, renew your prescriptions, schedule appointments and more. To sign up, go to [email or web address] . Click on \"Log in\" on the left side of the screen, which will take you to the Welcome page. Then click on \"Sign up Now\" on the right side of the page.     You will be asked to enter the access code listed below, as well as some personal information. Please follow the directions to " create your username and password.     Your access code is: [email or web address]  Expires: 2017  9:43 AM     Your access code will  in 90 days. If you need help or a new code, please call your [facility] or [phone].        Care EveryWhere ID     This is your Care EveryWhere ID. This could be used by other organizations to access your [place] medical records  [id number]         Blood Pressure from Last 3 Encounters:   17 190/80   17 149/66   17 146/81    Weight from Last 3 Encounters:   17 86.8 kg (191 lb 6.4 oz)   17 75.8 kg (167 lb)   17 75.8 kg (167 lb 1.7 oz)              Today, you had the following     No orders found for display       Primary Care Provider Office Phone # Fax #    Yolanda Hernandez [phone]       [facility] [address]  [facility] [address]        Equal Access to Services     : Hadii aad ku hadasho Soomaali, waaxda luqadaha, qaybta kaalmada adeegyalesli, gio cooper . So [facility] [phone].    ATENCIÓN: Si habla español, tiene a benitez disposición servicios gratuitos de asistencia lingüística. Llame al [phone].    We comply with applicable federal civil rights laws and [place] laws. We do not discriminate on the basis of race, color, national origin, age, disability sex, sexual orientation or gender identity.            Thank you!     Thank you for choosing [facility]  for your care. Our goal is always to provide you with excellent care. Hearing back from our patients is one way we can continue to improve our services. Please take a few minutes to complete the written survey that you may receive in the mail after your visit with us. Thank you!             Your Updated Medication List - Protect others around you: Learn how to safely use, store and throw away your medicines at www.disposemymeds.org.          This list is accurate as of: 17  9:43  AM.  Always use your most recent med list.                   Brand Name Dispense Instructions for use Diagnosis    * albuterol (2.5 MG/3ML) 0.083% neb solution     360 mL    Take 1 vial (2.5 mg) by nebulization every 4 hours as needed for shortness of breath / dyspnea or wheezing    Chronic obstructive pulmonary disease, unspecified COPD type (H)       * albuterol 108 (90 BASE) MCG/ACT Inhaler    PROAIR HFA/PROVENTIL HFA/VENTOLIN HFA    1 Inhaler    Inhale 2 puffs into the lungs every 4 hours as needed for shortness of breath / dyspnea or wheezing    Chronic obstructive pulmonary disease, unspecified COPD type (H)       aspirin 81 MG chewable tablet     36 tablet    Take 1 tablet (81 mg) by mouth daily    Type 2 diabetes mellitus with diabetic nephropathy (H)       atorvastatin 80 MG tablet    LIPITOR    30 tablet    Take 1 tablet (80 mg) by mouth daily    Acute diastolic CHF (congestive heart failure) (H)       brimonidine 0.15 % ophthalmic solution    ALPHAGAN-P    1 Bottle    Place 1 drop into both eyes 3 times daily    Glaucoma       bumetanide 2 MG tablet    BUMEX    60 tablet    Take 2 tablets (4 mg) by mouth daily    Acute on chronic systolic congestive heart failure (H)       calcium acetate 667 MG Caps capsule    PHOSLO    180 capsule    Take 1 capsule (667 mg) by mouth 3 times daily (with meals)    Chronic kidney disease, stage IV (severe) (H)       carvedilol 25 MG tablet    COREG    100 tablet    Take 1.5 tablets (37.5 mg) by mouth 2 times daily (with meals)    Acute diastolic CHF (congestive heart failure) (H)       citalopram 20 MG tablet    celeXA    30 tablet    Take 1 tablet (20 mg) by mouth daily    Depression       cloNIDine 0.2 MG/24HR WK patch    CATAPRES-TTS2     Place 1 patch onto the skin once a week        folic acid-vit B6-vit B12 0.8-10-0.115 MG Tabs per tablet    FOLGARD    30 tablet    Take 1 tablet by mouth daily    Anemia of chronic disease       guaiFENesin-codeine 100-10 MG/5ML Soln  solution    ROBITUSSIN AC    120 mL    Take 5 mLs by mouth every 4 hours as needed for cough    Cough       guaiFENesin-dextromethorphan 100-10 MG/5ML syrup    ROBITUSSIN DM    560 mL    Take 10 mLs by mouth every 4 hours as needed for cough    Cough       hydrALAZINE 50 MG tablet    APRESOLINE    90 tablet    Take 1 tablet (50 mg) by mouth 3 times daily    Benign essential hypertension       INCRUSE ELLIPTA 62.5 MCG/INH oral inhaler   Generic drug:  umeclidinium      Inhale 1 puff into the lungs daily        insulin glargine 100 UNIT/ML injection    LANTUS     Inject 16 Units Subcutaneous every morning        isosorbide mononitrate 30 MG 24 hr tablet    IMDUR     Take 90 mg by mouth daily        latanoprost 0.005 % ophthalmic solution    XALATAN    1 Bottle    Place 1 drop into both eyes At Bedtime    Glaucoma       lisinopril 40 MG tablet    PRINIVIL/ZESTRIL    30 tablet    Take 1 tablet (40 mg) by mouth daily    Hypertension due to endocrine disorder       NEURONTIN PO      Take 200 mg by mouth daily        OXYCODONE HCL PO      Take 15 mg by mouth every 6 hours (takes scheduled)        sodium bicarbonate 650 MG tablet      Take 1 tablet (650 mg) by mouth 2 times daily    Chronic kidney disease, stage IV (severe) (H)       VITAMIN D (CHOLECALCIFEROL) PO      Take 1,000 Units by mouth daily        * Notice:  This list has 2 medication(s) that are the same as other medications prescribed for you. Read the directions carefully, and ask your doctor or other care provider to review them with you.

## 2017-06-23 LAB
COPATH REPORT: NORMAL
HBV CORE AB SERPL QL IA: ABNORMAL
HBV SURFACE AB SERPL IA-ACNC: 81.91 M[IU]/ML
HBV SURFACE AG SERPL QL IA: NONREACTIVE
HCV AB SERPL QL IA: ABNORMAL
HIV 1+2 AB+HIV1 P24 AG SERPL QL IA: NORMAL
HLA TYPING COMPLETE SOT RECIPIENT: NORMAL
LA PPP-IMP: NORMAL
M TB TUBERC IFN-G BLD QL: NEGATIVE
M TB TUBERC IFN-G/MITOGEN IGNF BLD: 0 IU/ML
PRA SINGLE ANTIGEN IGG ANTIBODY: NORMAL

## 2017-06-26 LAB — INTERPRETATION ECG - MUSE: NORMAL

## 2017-06-26 NOTE — PROGRESS NOTES
Assessment and Plan:  This is a 72 yo M with multiple comorbidities, significant for : nonischemic cardiomyopathy now with poor exercise tolerance due to SOB, COPD unclear severity, lung nodules not followed up with CT, and still smoking, bipolar disorder with  polysubstance dependency, and adenomatous polyp in 2014.   He is a poor candidate for kidney transplant . We will requires the following work up:   -cardiology consult  -pulmonary consult: r/o lung cancer, PFTs, smoking cession  -HCV: GI consult, treatment   -colonoscopy now to r/o malignancy   -microscopic hematuria in a smoker: r/o malignancy> he needs urology consult , cystoscopy   -compliance contract: he will need to have all the work up done within 6 mo.  Benefits of a living donor transplant were discussed.Patient dose not have a living donor at this time.     Discussed the risks and benefits of a transplant, including the risk of surgery and immunosuppression medications.  Patients overall evaluation will be discussed in the Transplant Program's regular meeting with a final recommendation on the patients suitability for transplant to be made at that time.    Consult:  Keagan Wayne was seen in consultation at the request of Dr. Oracio Matthews for evaluation as a potential Kidney transplant recipient.    Reason for Visit:  Keagan Wayne is a 71 year old male with ESKD from diabetic nephropathy , who presents for Kidney transplant evaluation.    HPI:  Patient is 72 yo M with T2DM complicated by diabetic nephropathy on HD started in December 2016, retinopathy, and neuropathy, and CAD, HCV,  with known compliance issues, on chronic opioids treatment for chronic pain,  here for evaluation for transplant.    His kidney disease failed due to diabetic nephropathy/ FSGS  by Bx done in December 2015. He also has HCV, but his biopsy was not consistent with membranous nephropathy.  Patient was started on HD in December 2016. He misses hid HD treatment sometimes. He  was admitted on a local hospital on [place] 3/2017, for volume overload, as she missed HD for a week. According to his HD unit, he follow with his dialysis schedule but he is not compliant with fluid restriction and his diet.   CV: HF : He has a h/o non- ischemic cardiomyopathy,  although he had an EF as low as 35 %. His last echo showed an EF of 50-55 %, per notes improved after he was started on HD, with improving of his volume status. He had a cardiac cath done in December  he has a very poor cardiac exercise tolerance. He can climb only 1 flight of stairs. In December 2016 had an angiogram, showing mild irregularities with no significant focal CAD. 40% stenosis on LADC , ramus intermedius 40-50% smooth irregularity , proximal 60-70% sterosis of the RCA.   Lungs: active smoker, with COPD, TRACEY not using CPAP and pulmonary nodules of 3 mm by CT in 2015. He told me he dose not smokes anytime, but he later told to  he is still smoking.Her is not following up with pulmonary, last time seen on 3/20/17. No PFTs as she was not able to perform the test.   DM T2 complicated by diabetic neuropathy, diabetic retinopathy. On Insuline 18 UI daily. His Hg A1C was 7.9 on 2/2016. No recent check .   HCV: never treated, was suppose to see  GI in January, but he missed the appointment. He had an U/S of the abdomen in 2015, no cirrhosis.   Colonoscopy: he had a colonoscopy in 2014, was found with 5 sesile polyps, 1-3 mm. He has on microscopic exam fragments of adenoma.   Prostate: PSA normal in our sytem   Bipolar disorder: and opioid dependent: patient was folloing asleep during his visit. Per pulmonary notes he is unhappy with his medical visits sometimes. Medical marihuana use .   He dose not have an living donors.            Kidney Disease Hx:        Kidney Disease Dx: diabetic nephropathy        Biopsy Proven: Yes; see above          On Dialysis: Yes, Date initiated: 2016       Primary Nephrologist: unknown          Medical  Hx:       h/o HTN: Yes  Usual BP: 130s       h/o DM:  Yes        h/o Protein in Urine: Yes        h/o Blood in Urine:  No       h/o Kidney Stones:  No       h/o UTI: No       h/o Chronic NSAID Use: No         Previous Transplant Hx:       No         Transplant Sensitization Hx:       Previous Tx: No       Blood Transfusion: No       Pregnancy: Not applicable         Uremic Symptoms:       Fatigue: Yes; Cold: No; Nausea: No;         Cardiovascular Hx:       h/o Cardiac Issues: Yes; HF        Exercise Tolerance: shortness of breath with exertion.         Health Maintenance:       Colonoscopy: Not up to date         Potential Donor(s): No    ROS:  A comprehensive review of systems was obtained and negative, except as noted in the HPI or PMH.    PMH:   Medical records were reviewed  and PMH was discussed with patient and noted below.  Past Medical History:   Diagnosis Date     Acute pulmonary edema (H)      Anemia      Bipolar 1 disorder (H)      CAD (coronary artery disease)      Cognitive impairment      Colitis      COPD (chronic obstructive pulmonary disease) (H)      Depression      Diabetes (H)      ESRD (end stage renal disease) (H)      ESRD (end stage renal disease) on dialysis (H)      Glaucoma      Hepatitis C      Hyperlipidemia      Hyperlipidemia      Hypertension      IV drug abuse 1970's     Non compliance w medication regimen      Non-ischemic cardiomyopathy (H)      Non-ST elevation MI (NSTEMI) (H)      Peripheral neuropathy (H)      Peripheral neuropathy (H)      Pneumonia        PSH:   Past Surgical History:   Procedure Laterality Date     CREATE FISTULA ARTERIOVENOUS UPPER EXTREMITY Right 5/8/2016    Procedure: CREATE FISTULA ARTERIOVENOUS UPPER EXTREMITY;  Surgeon: Josias Valente MD;  Location:  OR     CREATE FISTULA ARTERIOVENOUS UPPER EXTREMITY Right 12/14/2016    Procedure: CREATE FISTULA ARTERIOVENOUS UPPER EXTREMITY;  Surgeon: Contreras Bowman MD;  Location: [facility] OR     HERNIA  REPAIR       Personal or family history of bleeding or anesthesia problems: No    Family Hx:  Family History   Problem Relation Age of Onset     Coronary Artery Disease Brother        Personal Hx:   Social History     Social History     Marital status:      Spouse name: N/A     Number of children: N/A     Years of education: N/A     Occupational History     Not on file.     Social History Main Topics     Smoking status: Current Every Day Smoker     Types: Cigarettes     Last attempt to quit: 1/1/2017     Smokeless tobacco: Not on file     Alcohol use 8.4 oz/week     7 Cans of beer, 7 Shots of liquor per week      Comment: coctail 2-3/month     Drug use: Yes     Special: Marijuana      Comment: baldomero     Sexual activity: Not on file     Other Topics Concern     Not on file     Social History Narrative       Allergies:  Allergies   Allergen Reactions     Morphine Rash       Medications:  Prior to Admission medications    Medication Sig Start Date End Date Taking? Authorizing Provider   Gabapentin (NEURONTIN PO) Take 200 mg by mouth daily   Yes Unknown, Entered By History   insulin glargine (LANTUS) 100 UNIT/ML injection Inject 16 Units Subcutaneous every morning    Yes Unknown, Entered By History   isosorbide mononitrate (IMDUR) 30 MG 24 hr tablet Take 90 mg by mouth daily   Yes Unknown, Entered By History   umeclidinium (INCRUSE ELLIPTA) 62.5 MCG/INH oral inhaler Inhale 1 puff into the lungs daily   Yes Unknown, Entered By History   OXYCODONE HCL PO Take 15 mg by mouth every 6 hours (takes scheduled)   Yes Unknown, Entered By History   hydrALAZINE (APRESOLINE) 50 MG tablet Take 1 tablet (50 mg) by mouth 3 times daily 12/19/16  Yes Tamir Jett MD   bumetanide (BUMEX) 2 MG tablet Take 2 tablets (4 mg) by mouth daily 12/19/16  Yes Tamir Jett MD   folic acid-vit B6-vit B12 (FOLGARD) 0.8-10-0.115 MG TABS per tablet Take 1 tablet by mouth daily 12/19/16  Yes Tamir Jett MD   atorvastatin  (LIPITOR) 80 MG tablet Take 1 tablet (80 mg) by mouth daily 12/15/16  Yes Eduardo Mahmood MD   carvedilol (COREG) 25 MG tablet Take 1.5 tablets (37.5 mg) by mouth 2 times daily (with meals) 12/15/16  Yes Eduardo Mahmood MD   albuterol (PROAIR HFA, PROVENTIL HFA, VENTOLIN HFA) 108 (90 BASE) MCG/ACT inhaler Inhale 2 puffs into the lungs every 4 hours as needed for shortness of breath / dyspnea or wheezing 9/30/16  Yes Sundar Lundberg MD   guaiFENesin-dextromethorphan (ROBITUSSIN DM) 100-10 MG/5ML syrup Take 10 mLs by mouth every 4 hours as needed for cough 9/30/16  Yes Sundar Lundberg MD guaiFENesin-codeine (ROBITUSSIN AC) 100-10 MG/5ML SOLN Take 5 mLs by mouth every 4 hours as needed for cough 9/30/16  Yes Sundar Lundberg MD   VITAMIN D, CHOLECALCIFEROL, PO Take 1,000 Units by mouth daily   Yes Unknown, Entered By History   cloNIDine (CATAPRES-TTS2) 0.2 MG/24HR patch Place 1 patch onto the skin once a week   Yes Reported, Patient   aspirin 81 MG chewable tablet Take 1 tablet (81 mg) by mouth daily 2/8/16  Yes Nila Espana DO   albuterol (2.5 MG/3ML) 0.083% nebulizer solution Take 1 vial (2.5 mg) by nebulization every 4 hours as needed for shortness of breath / dyspnea or wheezing 2/8/16  Yes Nila Espana DO   citalopram (CELEXA) 20 MG tablet Take 1 tablet (20 mg) by mouth daily 2/8/16  Yes Nila Espana DO   calcium acetate (PHOSLO) 667 MG CAPS Take 1 capsule (667 mg) by mouth 3 times daily (with meals) 2/8/16  Yes Nila Espana DO   brimonidine (ALPHAGAN-P) 0.15 % ophthalmic solution Place 1 drop into both eyes 3 times daily 2/8/16  Yes Nila Espana DO   latanoprost (XALATAN) 0.005 % ophthalmic solution Place 1 drop into both eyes At Bedtime 2/8/16  Yes Nila Espana DO   lisinopril (PRINIVIL/ZESTRIL) 40 MG tablet Take 1 tablet (40 mg) by mouth daily  Patient not taking: Reported on 6/22/2017 12/15/16    "Eduardo Mahmood MD   sodium bicarbonate 650 MG tablet Take 1 tablet (650 mg) by mouth 2 times daily  Patient not taking: Reported on 6/22/2017 2/8/16   Nila Espana DO       Vitals:  /64 (BP Location: Left arm, Patient Position: Chair, Cuff Size: Adult Regular)  Pulse 68  Temp 97.8  F (36.6  C) (Oral)  Ht 1.867 m (6' 1.5\")  Wt 86.8 kg (191 lb 6.4 oz)  SpO2 99%  BMI 24.91 kg/m2    Exam:  GENERAL APPEARANCE: alert and no distress  HENT: mouth without ulcers or lesions  LYMPHATICS: no cervical or supraclavicular nodes  RESP: lungs clear to auscultation - no rales, rhonchi or wheezes  CV: regular rhythm, normal rate, no rub, no murmur  EDEMA: no LE edema bilaterally  ABDOMEN: soft, nondistended, nontender, bowel sounds normal  MS: extremities normal - no gross deformities noted, no evidence of inflammation in joints, no muscle tenderness  SKIN: no rash    Results:   Labs and imaging were ordered for this visit and reviewed by me.  Recent Results (from the past 336 hour(s))   Routine UA with microscopic [[id number]]    Collection Time: 06/22/17  7:19 AM   Result Value Ref Range    Color Urine Yellow     Appearance Urine Clear     Glucose Urine Negative NEG mg/dL    Bilirubin Urine Negative NEG    Ketones Urine Negative NEG mg/dL    Specific Gravity Urine 1.014 1.003 - 1.035    Blood Urine Negative NEG    pH Urine 6.0 5.0 - 7.0 pH    Protein Albumin Urine >499 (A) NEG mg/dL    Urobilinogen mg/dL 2.0 0.0 - 2.0 mg/dL    Nitrite Urine Negative NEG    Leukocyte Esterase Urine Negative NEG    Source Midstream Urine     WBC Urine 1 0 - 2 /HPF    RBC Urine 3 (H) 0 - 2 /HPF    Squamous Epithelial /HPF Urine 1 0 - 1 /HPF    Mucous Urine Present (A) NEG /LPF    Hyaline Casts 18 (H) 0 - 2 /LPF    Fat Globules Few  Few   (A) NEG /HPF   Lipid Profile [LAB18]    Collection Time: 06/22/17  7:23 AM   Result Value Ref Range    Cholesterol 119 <200 mg/dL    Triglycerides 118 <150 mg/dL    HDL Cholesterol 74 " >39 mg/dL    LDL Cholesterol Calculated 21 <100 mg/dL    Non HDL Cholesterol 44 <130 mg/dL   Comprehensive metabolic panel [LAB17]    Collection Time: 06/22/17  7:23 AM   Result Value Ref Range    Sodium 137 133 - 144 mmol/L    Potassium 4.3 3.4 - 5.3 mmol/L    Chloride 101 94 - 109 mmol/L    Carbon Dioxide 27 20 - 32 mmol/L    Anion Gap 9 3 - 14 mmol/L    Glucose 169 (H) 70 - 99 mg/dL    Urea Nitrogen 59 (H) 7 - 30 mg/dL    Creatinine 6.91 (H) 0.66 - 1.25 mg/dL    GFR Estimate 8 (L) >60 mL/min/1.7m2    GFR Estimate If Black 10 (L) >60 mL/min/1.7m2    Calcium 8.7 8.5 - 10.1 mg/dL    Bilirubin Total 0.7 0.2 - 1.3 mg/dL    Albumin 3.9 3.4 - 5.0 g/dL    Protein Total 9.3 (H) 6.8 - 8.8 g/dL    Alkaline Phosphatase 68 40 - 150 U/L    ALT 21 0 - 70 U/L    AST 11 0 - 45 U/L   Phosphorus    Collection Time: 06/22/17  7:23 AM   Result Value Ref Range    Phosphorus 5.4 (H) 2.5 - 4.5 mg/dL   C-peptide [ZFF745]    Collection Time: 06/22/17  7:23 AM   Result Value Ref Range    C Peptide 7.9 (H) 0.9 - 6.9 ng/mL   Hemoglobin A1c [LAB90]    Collection Time: 06/22/17  7:23 AM   Result Value Ref Range    Hemoglobin A1C 8.1 (H) 4.3 - 6.0 %   Prostate spec antigen screen [TYG5422]    Collection Time: 06/22/17  7:23 AM   Result Value Ref Range    PSA 0.51 0 - 4 ug/L   M Tuberculosis by Quantiferon [[id number]]    Collection Time: 06/22/17  7:23 AM   Result Value Ref Range    M Tuberculosis Result Negative NEG    M Tuberculosis Antigen Value 0.00 IU/mL   INR [[id number]]    Collection Time: 06/22/17  7:23 AM   Result Value Ref Range    INR 1.15 (H) 0.86 - 1.14   Partial thromboplastin time [LAB56]    Collection Time: 06/22/17  7:23 AM   Result Value Ref Range    PTT 26 22 - 37 sec   Thrombin time [DWJ565]    Collection Time: 06/22/17  7:23 AM   Result Value Ref Range    Thrombin Time 18.6 13.0 - 19.0 sec   Lupus Anticoagulant Panel (RRT0579)    Collection Time: 06/22/17  7:23 AM   Result Value Ref Range    Lupus Result  NEG      Negative  (Note)  COMMENTS:  INR is minimally elevated.  APTT ratio is normal.  DRVVT Screen ratio is normal.  Thrombin time is normal.  NEGATIVE TEST; A LUPUS ANTICOAGULANT WAS NOT DETECTED IN THIS  SPECIMEN WITHIN THE LIMITS OF THE TESTING REPERTOIRE.  If the clinical picture is strongly suggestive of an antiphospholipid  syndrome, recommend anticardiolipin and beta-2-glycoprotein (IgG and  IgM) antibody tests.  Results interpreted by Qasim Treviño DO, Pathology, PGY3  I personally reviewed the coagulation test results and agree with the  interpretation documented by the resident /fellow and  edited/confirmed by me.  Georgie Alvarez M.D.  [phone]  6/23/2017    APTT:       Ratio  Patient  =  0.89  1:2 Mix  =  N/A  Reference:  Negative: Less than or equal to 1.16  Positive: Greater than or equal to 1.17     DILUTE FANTASMA VIPER VENOM TEST:  Screen Ratio = 1.06   Normal is less than 1.21       CBC with platelets differential [[id number]]    Collection Time: 06/22/17  7:23 AM   Result Value Ref Range    WBC 4.5 4.0 - 11.0 10e9/L    RBC Count 4.15 (L) 4.4 - 5.9 10e12/L    Hemoglobin 12.3 (L) 13.3 - 17.7 g/dL    Hematocrit 39.6 (L) 40.0 - 53.0 %    MCV 95 78 - 100 fl    MCH 29.6 26.5 - 33.0 pg    MCHC 31.1 (L) 31.5 - 36.5 g/dL    RDW 14.6 10.0 - 15.0 %    Platelet Count 139 (L) 150 - 450 10e9/L    Diff Method Automated Method     % Neutrophils 43.7 %    % Lymphocytes 34.9 %    % Monocytes 10.4 %    % Eosinophils 9.9 %    % Basophils 0.9 %    % Immature Granulocytes 0.2 %    Nucleated RBCs 0 0 /100    Absolute Neutrophil 2.0 1.6 - 8.3 10e9/L    Absolute Lymphocytes 1.6 0.8 - 5.3 10e9/L    Absolute Monocytes 0.5 0.0 - 1.3 10e9/L    Absolute Eosinophils 0.5 0.0 - 0.7 10e9/L    Absolute Basophils 0.0 0.0 - 0.2 10e9/L    Abs Immature Granulocytes 0.0 0 - 0.4 10e9/L    Absolute Nucleated RBC 0.0    HLA Typing Complete SOT Recipient    Collection Time: 06/22/17  7:23 AM   Result Value Ref Range    HLA Typing Complete SOT  Recipient       Specimen received - Immunology report to follow upon completion.   PRA Single Antigen IgG Antibody    Collection Time: 06/22/17  7:23 AM   Result Value Ref Range    PRA Single Antigen IgG Antibody       Specimen received - Immunology report to follow upon completion.   Hepatitis B core antibody [[id number]]    Collection Time: 06/22/17  7:23 AM   Result Value Ref Range    Hepatitis B Core Lory (A) NR     Reactive   A reactive result indicates acute, chronic or past/resolved hepatitis B   infection.     Hepatitis B Surface Antibody [[id number]]    Collection Time: 06/22/17  7:23 AM   Result Value Ref Range    Hepatitis B Surface Antibody 81.91 (H) <8.00 m[IU]/mL   Hepatitis B surface antigen [[id number]]    Collection Time: 06/22/17  7:23 AM   Result Value Ref Range    Hep B Surface Agn Nonreactive NR   Hepatitis C antibody [RNG847]    Collection Time: 06/22/17  7:23 AM   Result Value Ref Range    Hepatitis C Antibody (A) NR     Reactive   A reactive result indicates one of the following 1) current HCV infection 2)   past HCV infection that has resolved or 3) false positivity. The CDC recommends   that a reactive result should be followed by Nucleic acid testing for HCV RNA.  If HCV RNA is detected, that indicates current HCV infection. If HCV RNA is not   detected, that indicates either past, resolved HCV infection, or false HCV   antibody positivity.   Assay performance characteristics have not been established for newborns,   infants, and children     HIV Antigen Antibody Combo Pretransplant    Collection Time: 06/22/17  7:23 AM   Result Value Ref Range    HIV Antigen Antibody Combo Pretransplant  NR     Nonreactive   HIV-1 p24 Ag & HIV-1/HIV-2 Ab Not Detected     Anti Treponema [[id number]]    Collection Time: 06/22/17  7:23 AM   Result Value Ref Range    Treponema pallidum Antibody Negative NEG   Factor 2 and 5 mutation analysis    Collection Time: 06/22/17  7:23 AM   Result Value Ref Range    Copath Report        Patient Name: KRISTAL MORALES  MR#: [id number]  Specimen #: [id number]  Collected: 6/22/2017 07:23  Received: 6/22/2017 10:14  Reported: 6/23/2017 18:13  Ordering Phy(s): ROCK MAX    For improved result formatting, select 'View Enhanced Report Format'  under Linked Documents section.  _________________________________________    TEST(S) REQUESTED:  Factor 5 Leiden and Factor 2 by PCR    SPECIMEN DESCRIPTION:  Blood    METHODOLOGY:   The regions of genomic DNA containing the M5010O Factor 5  gene mutation (Factor V Leiden) and the Factor 2(Prothrombin N12413M)  gene mutation were simultaneously amplified using the polymerase chain  reaction.  The amplified products were digested with restriction  endonuclease TaqI and products were analyzed by gel electrophoresis.    RESULTS:    Factor V 1691G>A (Leiden)  RESULTS:  Mutation analyzed:     1691G>A  Factor V 1691G>A (Leiden)  Interpretation:      ABSENT  Factor V 1691G>A (Leiden) mutation  genotype:      G/G    FACTOR 2/PROTHROMBIN  RESULTS:  Mutation analyzed:     86670I>A  Factor 2 Mutation Interpretation:      ABSENT  Factor 2 Mutation genotype:      G/G    INTERPRETATION:  The patient is negative for the Factor V 1691G>A (Leiden) and negative  for the Factor 2 mutation.    COMMENTS:  If a patient is the recipient of an allogeneic bone marrow transplant,  this test must be done on a pre-transplant sample or buccal swab.  A  previous allogeneic bone marrow transplant will interfere with test  results.  Call the [facility] Lab([phone]) for  instructions on sample collection for these patients.    This test was developed and its performance characteristics determined  by the [facility],  [facility]  Laboratory. It has not been cleared or approved by the FDA. The  laboratory is regulated under CLIA as qualified to perform  high-complexity testing. This test is used for clinical purposes. It  should not be  regarded as investigational or for research.    A resid ent/fellow in an accredited training program was involved in the  selection of testing, review of laboratory data, and/or interpretation  of this case.  I, as the senior physician, attest that I: (i) confirmed  appropriate testing, (ii) examined the relevant raw data for the  specimen(s); and (iii) rendered or confirmed the interpretation(s).    Electronically Signed Out By:  Seamus Almonte M.D., PhD  [facility]    CPT Codes:  A: 03400-T5EZMV, 98498-Q5SPKC, -MMOSJK(2)    TESTING LAB LOCATION:  [address] [address] [phone]    COLLECTION SITE:  Client[facility]  [facility]  [facility]     ABO/Rh type and screen [[id number]]    Collection Time: 06/22/17  7:24 AM   Result Value Ref Range    ABO B     RH(D)  Pos     Antibody Screen Neg     Test Valid Only At       [facility]    Specimen Expires 06/25/2017    Antibody titer red cell [[id number]]    Collection Time: 06/22/17  7:24 AM   Result Value Ref Range    Antibody Titer Anti A: IgM 16, IgG 16    ABO Subtyping [[id number]]    Collection Time: 06/22/17  7:24 AM   Result Value Ref Range    Antigen Type Canceled, Test credited     Blood Bank Comment Patient types as B pos.  6/22/17 JA    EKG 12-lead, tracing only [EKG1]    Collection Time: 06/22/17  1:57 PM   Result Value Ref Range    Interpretation ECG Click View Image link to view waveform and result    ABO type [[id number]]    Collection Time: 06/22/17  2:18 PM   Result Value Ref Range    ABO B     RH(D)  Pos     Specimen Expires 06/25/2017              Patient was seen and evaluated by me, Charanjit Kate MD. I have reviewed the note and agree with the the plan of care as documented by the fellow.

## 2017-06-27 LAB
A* LOCUS: NORMAL
A*: NORMAL
ABTEST METHOD: NORMAL
B* LOCUS: NORMAL
B*: NORMAL
BW-1: NORMAL
BW-2: NORMAL
C* LOCUS: NORMAL
C*: NORMAL
CARDIOLIPIN ANTIBODY IGG: NORMAL GPL-U/ML (ref 0–19.9)
CARDIOLIPIN ANTIBODY IGM: 0.6 MPL-U/ML (ref 0–19.9)
CMV IGG SERPL QL IA: ABNORMAL AI (ref 0–0.8)
DPA1*: NORMAL
DPA1*LOCUS: NORMAL
DPB1*: NORMAL
DPB1*LOCUS: NORMAL
DQA1*LOCUS: NORMAL
DQB1* LOCUS: NORMAL
DQB1*: NORMAL
DRB1* LOCUS: NORMAL
DRB1*: NORMAL
DRB3* LOCUS: NORMAL
DRB3*: NORMAL
DRSSO TEST METHOD: NORMAL
EBV VCA IGG SER QL IA: 4.2 AI (ref 0–0.8)
VZV IGG SER QL IA: 7 AI (ref 0–0.8)

## 2017-06-30 ENCOUNTER — TELEPHONE (OUTPATIENT)
Dept: TRANSPLANT | Facility: CLINIC | Age: 72
End: 2017-06-30

## 2017-06-30 NOTE — TELEPHONE ENCOUNTER
"Called María with the Selection Committee outcome per Keagan's request. She was informed that the Committee has deferred making a final decision on Bigg's candidacy until after he successfully completes his evaluation. She was quite surprised to hear this. She thought Keagan would have been declined and feels like he is being given a second chance. She apologized for his behavior and said he is so used to \"flying by the seat of his pants\" that it is hard for him to get serious and she thinks there is some denial of the seriousness of his health now. She said she had \"her say\" with him after the evaluation and he was quite sheepish. We talked about what Keagan needs to accomplish now and demonstrate that he is serious about a kidney transplant. María was hopeful she could be a living donor for Keagan but she also has a diagnosis of DM and thus would not qualify. We talked about a 6 month standard compliance contract with the added stipulation that he complete all of the items in the evaluation with in the 6 months. He needs to get his screening colonoscopy repeated according to the pathology from 2014. He needs to see a dentist and get any required work done. We are asking him to see Pulmonary for his COPD, abnormal PFTS and abnormal CXR, GI for his untreated Hepatitis C, Urology for his microscopic hematuria and smoking history. We are asking him to stop smoking. We are asking him to complete a CD assessment due to his narcotic usage and alcohol intake and inability to stay awake for his evaluation. We are requiring him to repeat the [facility] class as he slept through the first one. He was mailed a compliance contract and a pre-addressed postage paid envelope and asked to sign and return. He also has scheduled appointments on 10/10/2017 for imaging, pft's and Cardiology. María questioned if Keagan had some component of depression although he denies it. I told her that was a good  and perhaps she " "should mention it to his PCP. I also told María that Keagan is very stephen to have her in his life to help him navigate everything. She said she would take care of all of the above so it is \"done right\".Transplant summary letter and compliance contract generated and routed to administrative staff for mailing.  "

## 2017-07-03 ENCOUNTER — TELEPHONE (OUTPATIENT)
Dept: TRANSPLANT | Facility: CLINIC | Age: 72
End: 2017-07-03

## 2017-07-03 NOTE — TELEPHONE ENCOUNTER
"Called María to let her know that I cannot order a Chemical Dependency Assessment and Keagan must contact one of 4 in the [facility] area. The list was emailed to me by social work and I will email the list to María. She said she got my \"to do list\" in the mail today. I asked her what Philsarah thought of the list and she said he is taking it serious now and said he will do whatever we ask him to do. She will keep me up to date with Keagan's progress.  "

## 2017-07-12 ENCOUNTER — TELEPHONE (OUTPATIENT)
Dept: TRANSPLANT | Facility: CLINIC | Age: 72
End: 2017-07-12

## 2017-07-12 DIAGNOSIS — R91.8 ABNORMAL CT SCAN OF LUNG: Primary | ICD-10-CM

## 2017-07-12 LAB
SA1 CELL: NORMAL
SA1 COMMENTS: NORMAL
SA1 HI RISK ABY: NORMAL
SA1 MOD RISK ABY: NORMAL
SA1 TEST METHOD: NORMAL
SA2 CELL: NORMAL
SA2 COMMENTS: NORMAL
SA2 HI RISK ABY UA: NORMAL
SA2 MOD RISK ABY: NORMAL
SA2 TEST METHOD: NORMAL

## 2017-07-13 ENCOUNTER — PRE VISIT (OUTPATIENT)
Dept: UROLOGY | Facility: CLINIC | Age: 72
End: 2017-07-13

## 2017-07-15 ENCOUNTER — APPOINTMENT (OUTPATIENT)
Dept: GENERAL RADIOLOGY | Facility: CLINIC | Age: 72
End: 2017-07-15
Attending: EMERGENCY MEDICINE
Payer: MEDICARE

## 2017-07-15 ENCOUNTER — HOSPITAL ENCOUNTER (EMERGENCY)
Facility: CLINIC | Age: 72
Discharge: HOME | End: 2017-07-15
Attending: EMERGENCY MEDICINE | Admitting: EMERGENCY MEDICINE
Payer: MEDICARE

## 2017-07-15 VITALS
RESPIRATION RATE: 18 BRPM | HEIGHT: 75 IN | SYSTOLIC BLOOD PRESSURE: 146 MMHG | BODY MASS INDEX: 23.62 KG/M2 | TEMPERATURE: 98.4 F | WEIGHT: 190 LBS | DIASTOLIC BLOOD PRESSURE: 67 MMHG | HEART RATE: 89 BPM | OXYGEN SATURATION: 98 %
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
## 2017-07-15 DIAGNOSIS — S20.20XA CONTUSION OF THORACIC WALL, UNSPECIFIED AREA OF THORACIC WALL, INITIAL ENCOUNTER: ICD-10-CM

## 2017-07-15 DIAGNOSIS — S16.1XXA NECK STRAIN, INITIAL ENCOUNTER: ICD-10-CM

## 2017-07-15 PROCEDURE — 99284 EMERGENCY DEPT VISIT MOD MDM: CPT | Mod: 25

## 2017-07-15 PROCEDURE — 72040 X-RAY EXAM NECK SPINE 2-3 VW: CPT

## 2017-07-15 PROCEDURE — 71020 XR CHEST 2 VW: CPT

## 2017-07-15 NOTE — DISCHARGE INSTRUCTIONS
Understanding Cervical Strain    There are 7 bones (vertebrae) in the neck that are part of the spine. These are called the cervical spine. Cervical strain is a medical term for neck pain. The neck has several layers of muscles. These are connected with tendons to the cervical spine and other bones. Neck pain is often the result of injury to these muscles and tendons.  Causes of cervical strain  Different types of stress on the neck can damage muscles and tendons (soft tissues) and cause cervical strain. Cervical tissues can be damaged by:    The neck being forced past its normal range of motion, such as in a car accident or sports injury    Constant, low-level stress, such as from poor posture or a poorly set-up workspace  Symptoms of cervical strain  These may include:    Neck pain or stiffness    Pain in the shoulders or upper back    Muscle spasms    Headache, often starting at the base of the neck    Irritability, difficulty concentrating, or sleeplessness  Treatment for cervical strain  This problem often gets better on its own. Treatments aim to reduce pain and inflammation and increase the range of motion of the neck. Possible treatments include:    Over-the-counter or prescription pain medicine. These help relieve pain and inflammation.    Stretching exercises to decrease neck stiffness.    Massage to decrease neck stiffness.    Cold or heat pack. These help reduce pain and swelling.  Call 911  Call emergency services right away if you have any of these:    Face drooping or numbness    Numbness or weakness, especially in the arms or on one side    Slurred speech or difficulty speaking    Blurred vision   When to call your healthcare provider  Call your healthcare provider right away if you have any of these:    Fever of 100.4 F (38 C) or higher, or as directed    Pain or stiffness that gets worse    Symptoms that don t get better, or get worse    Numbness, tingling, weakness or shooting pains into the  arms or legs    New symptoms  Date Last Reviewed: 3/10/2016    6389-2669 [employer]. [address] [place] [address], [place] [address]. All rights reserved. This information is not intended as a substitute for professional medical care. Always follow your healthcare professional's instructions.        Bruises (Contusions)    A contusion is a bruise. A bruise happens when a blow to your body doesn't break the skin but does break blood vessels beneath the skin. Blood leaking from the broken vessels causes redness and swelling. As it heals, your bruise is likely to turn colors like purple, green, and yellow. This is normal. The bruise should fade in 2 or 3 weeks.  Factors that make you more likely to bruise  Almost everyone bruises now and then. Certain people do bruise more easily than others. You're more prone to bruising as you get older. That's because blood vessels become more fragile with age. You're also more likely to bruise if you have a clotting disorder such as hemophilia or take medications that reduce clotting, including aspirin, coumadin, newer agents.  When to go to the emergency room (ER)  Bruises almost always heal on their own without special treatment. But for some people, a bad bruise can be serious. Seek medical care if you:    Have a clotting disorder such as hemophilia.    Have cirrhosis or other serious liver disease.    Take blood-thinning medications such as warfarin (Coumadin).  What to expect in the ER  A doctor will examine your bruise and ask about any health conditions you have. In some cases, you may have a test to check how well your blood clots. Other treatment will depend on your needs.  Follow-up care  Sometimes a bruise gets worse instead of better. It may become larger and more swollen. This can occur when your body walls off a small pool of blood under the skin (hematoma). In very rare cases, your doctor may need to drain excess blood from the area.  Tip:  Apply an ice pack  or bag of frozen peas to a bruise (keep a thin cloth between the cold source and your skin). This can help reduce redness and swelling.   Date Last Reviewed: 12/1/2016 2000-2017 [employer]. [address] [place] [address], [place] [address]. All rights reserved. This information is not intended as a substitute for professional medical care. Always follow your healthcare professional's instructions.

## 2017-07-15 NOTE — ED AVS SNAPSHOT
Emergency Department    [address] [facility] [address]    Phone:  [phone]    Fax:  [phone]                                       Keagan Wayne   MRN: [id number]    Department:   Emergency Department   Date of Visit:  7/15/2017           After Visit Summary Signature Page     I have received my discharge instructions, and my questions have been answered. I have discussed any challenges I see with this plan with the nurse or doctor.    ..........................................................................................................................................  Patient/Patient Representative Signature      ..........................................................................................................................................  Patient Representative Print Name and Relationship to Patient    ..................................................               ................................................  Date                                            Time    ..........................................................................................................................................  Reviewed by Signature/Title    ...................................................              ..............................................  Date                                                            Time

## 2017-07-15 NOTE — ED PROVIDER NOTES
History     Chief Complaint:  Fall.  Pain everywhere.     JANETH Wayne is a 71 year old male who presents for evaluation after a fall. The patient [place] a [place] and states he fell asleep on the stool. Not drinking alcohol. He fell to the floor off this stool which was approximately 2-1/2 feet off the ground. States he fell to the left and has pain at all over his left side and strained his neck. He struck his head but no loss of consciousness. Has diffuse tingling all over his body. He also has history of chronic neuropathy. Denies any weakness of the extremities. No slurred speech. No word finding difficulty. No visual changes. No tinnitus. No mid or lower back pain. Complains of left-sided paraspinal neck pain radiating to the trapezius. Denies abdominal pain or injury. No recent black or bloody stools. No change in his urinary output. His wife states that she thought he seemed labored with his breathing. No other complaints.    Allergies:  Morphine     Medications:      Gabapentin (NEURONTIN PO)   insulin glargine (LANTUS) 100 UNIT/ML injection   isosorbide mononitrate (IMDUR) 30 MG 24 hr tablet   umeclidinium (INCRUSE ELLIPTA) 62.5 MCG/INH oral inhaler   OXYCODONE HCL PO   hydrALAZINE (APRESOLINE) 50 MG tablet   bumetanide (BUMEX) 2 MG tablet   folic acid-vit B6-vit B12 (FOLGARD) 0.8-10-0.115 MG TABS per tablet   atorvastatin (LIPITOR) 80 MG tablet   lisinopril (PRINIVIL/ZESTRIL) 40 MG tablet   carvedilol (COREG) 25 MG tablet   albuterol (PROAIR HFA, PROVENTIL HFA, VENTOLIN HFA) 108 (90 BASE) MCG/ACT inhaler   guaiFENesin-dextromethorphan (ROBITUSSIN DM) 100-10 MG/5ML syrup   guaiFENesin-codeine (ROBITUSSIN AC) 100-10 MG/5ML SOLN   VITAMIN D, CHOLECALCIFEROL, PO   cloNIDine (CATAPRES-TTS2) 0.2 MG/24HR patch   aspirin 81 MG chewable tablet   sodium bicarbonate 650 MG tablet   albuterol (2.5 MG/3ML) 0.083% nebulizer solution   citalopram (CELEXA) 20 MG tablet   calcium acetate (PHOSLO) 667 MG CAPS    brimonidine (ALPHAGAN-P) 0.15 % ophthalmic solution   latanoprost (XALATAN) 0.005 % ophthalmic solution     Past Medical History:    Past Medical History:   Diagnosis Date     Acute pulmonary edema (H)      Anemia      Bipolar 1 disorder (H)      CAD (coronary artery disease)      Cognitive impairment      Colitis      COPD (chronic obstructive pulmonary disease) (H)      Depression      Diabetes (H)      ESRD (end stage renal disease) (H)      ESRD (end stage renal disease) on dialysis (H)      Glaucoma      Hepatitis C      Hyperlipidemia      Hyperlipidemia      Hypertension      IV drug abuse 1970's     Non compliance w medication regimen      Non-ischemic cardiomyopathy (H)      Non-ST elevation MI (NSTEMI) (H)      Peripheral neuropathy (H)      Peripheral neuropathy (H)      Pneumonia        Patient Active Problem List    Diagnosis Date Noted     Hepatitis C      Priority: Medium     ESRD (end stage renal disease) on dialysis (H)      Priority: Medium     Anemia      Priority: Medium     Acute pulmonary edema (H)      Priority: Medium     Non-ischemic cardiomyopathy (H)      Priority: Medium     Non-ST elevation MI (NSTEMI) (H)      Priority: Medium     CAD (coronary artery disease)      Priority: Medium     Hyperlipidemia      Priority: Medium     Non compliance w medication regimen      Priority: Medium     Pneumonia 01/15/2017     Priority: Medium     ESRD on hemodialysis (H) 12/15/2016     Priority: Medium     Respiratory failure, acute (H) 12/06/2016     Priority: Medium     Acute respiratory failure (H) 10/07/2016     Priority: Medium     Acute diastolic CHF (congestive heart failure) (H) 09/29/2016     Priority: Medium     Diabetes mellitus type II, uncontrolled (H) 08/05/2016     Priority: Medium     Respiratory failure (H) 06/29/2016     Priority: Medium     Acute exacerbation of CHF (congestive heart failure) (H) 05/05/2016     Priority: Medium     Hypertension due to endocrine disorder 02/16/2016  "    Priority: Medium     Cognitive impairment 02/11/2016     Priority: Medium     Chronic diastolic congestive heart failure (H) 02/11/2016     Priority: Medium     Acute respiratory failure with hypoxia (H) 02/10/2016     Priority: Medium     Acute kidney injury (H) 02/10/2016     Priority: Medium     Chronic kidney disease, stage IV (severe) (H) 02/10/2016     Priority: Medium     HTN (hypertension) 02/10/2016     Priority: Medium     Insulin dependent type 2 diabetes mellitus (H) 02/10/2016     Priority: Medium     Peripheral neuropathy (H) 02/10/2016     Priority: Medium     Anemia of chronic disease 02/10/2016     Priority: Medium     Physical deconditioning 02/10/2016     Priority: Medium     Volume overload 01/27/2016     Priority: Medium     Colitis 09/26/2015     Priority: Medium     Glaucoma 10/23/2014     Priority: Medium     Hyperglycemia 04/14/2012      Past Surgical History:    Past Surgical History:   Procedure Laterality Date     CREATE FISTULA ARTERIOVENOUS UPPER EXTREMITY Right 5/8/2016    Procedure: CREATE FISTULA ARTERIOVENOUS UPPER EXTREMITY;  Surgeon: Josias Valente MD;  Location:  OR     CREATE FISTULA ARTERIOVENOUS UPPER EXTREMITY Right 12/14/2016    Procedure: CREATE FISTULA ARTERIOVENOUS UPPER EXTREMITY;  Surgeon: Contreras Bowman MD;  Location:  OR     HERNIA REPAIR        Family History:    family history includes Coronary Artery Disease in his brother; DIABETES in his mother.    Social History:   reports that he has been smoking Cigarettes.  He does not have any smokeless tobacco history on file. He reports that he drinks about 8.4 oz of alcohol per week  He reports that he uses illicit drugs, including Marijuana.    PCP: Yolanda Hernandez     Review of Systems  Negative or per HPI.    Physical Exam   Patient Vitals for the past 24 hrs:   BP Temp Temp src Pulse Resp SpO2 Height Weight   07/15/17 0414 146/67 98.4  F (36.9  C) Oral 89 18 98 % 1.905 m (6' 3\") 86.2 kg (190 " lb)        Physical Exam  SKIN:  Warm and dry.  No ecchymosis.    HEMATOLOGIC/IMMUNOLOGIC/LYMPHATIC:  No pallor.  No limb edema.  HENT:  No facial or scalp trauma.  No oral or dental trauma.  Full well tolerated ROM head and neck.    EYES:  PERRL, no ocular trauma, normal EOM.  CARDIOVASCULAR:  Normal rate and a regular rhythm.  RESPIRATORY:  No respiratory distress, breath sounds equal and normal.  GASTROINTESTINAL:  Soft nontender abdomen.  MUSCULOSKELETAL:  Full painless ROM of torso and limbs.  Cervical spine non-tender.  NEUROLOGIC:  Alert, conversant, GCS 15.  Oriented.  No motor or sensory deficit.  No ataxia.  PSYCHIATRIC:  Normal mood.    Emergency Department Course     Imaging:    Cervical spine XR, 2-3 views   Final Result   IMPRESSION:   1. No visualized acute fracture of the cervical spine.   2. Mild gradual kyphosis of the cervical spine, possibly related to   patient positioning or muscle spasm. Otherwise, normal alignment of   the cervical spine.   3. Moderate to severe degenerative changes in the mid and inferior   cervical spine.   4. No prevertebral soft tissue swelling.      KAYY CARTAGENA MD      Chest XR,  PA & LAT   Final Result   IMPRESSION:   1. No convincing interval change since 6/22/2017.   2. Small right pleural effusion with adjacent atelectasis and tiny   left pleural effusion.    3. No other evidence of active cardiopulmonary disease.      KAYY CARTAGENA MD          Emergency Department Course:  Past medical records, nursing notes, and vitals reviewed.  I performed an exam of the patient and obtained history, as documented above.  I rechecked the patient. Findings and plan explained to the patient. Patient was discharged.    Impression & Plan      Medical Decision Making:  This patient. Suffered a fall from a seated position. Primarily complaining of pain about the neck of the torso. Imaging performed as above. Painless range of motion of the extremities. Patient convinced he fell  asleep and fell off the stool. Does not sound to be a syncopal episode. Denies current chest pain or dyspnea. Does not use blood thinners so did not think he required brain CT. Advised return if any concerns.    Diagnosis:    ICD-10-CM    1. Fall, initial encounter W19.XXXA    2. Neck strain, initial encounter S16.1XXA    3. Contusion of thoracic wall, unspecified area of thoracic wall, initial encounter S20.20XA         Discharge Medications:  Discharge Medication List as of 7/15/2017  6:31 AM           7/15/2017   No att. providers found        Chas Miramontes MD  07/15/17 0806

## 2017-07-15 NOTE — ED AVS SNAPSHOT
Emergency Department    [address] [place] [id number]    Phone:  [phone]    Fax:  [phone]                                       Keagan Wayne   MRN: [id number]    Department:   Emergency Department   Date of Visit:  7/15/2017           Patient Information     Date Of Birth          1945        Your diagnoses for this visit were:     Fall, initial encounter     Neck strain, initial encounter     Contusion of thoracic wall, unspecified area of thoracic wall, initial encounter        You were seen by Chas Miramontes MD.      Follow-up Information     Please follow up.    Why:  return if any concerns        Discharge Instructions         Understanding Cervical Strain    There are 7 bones (vertebrae) in the neck that are part of the spine. These are called the cervical spine. Cervical strain is a medical term for neck pain. The neck has several layers of muscles. These are connected with tendons to the cervical spine and other bones. Neck pain is often the result of injury to these muscles and tendons.  Causes of cervical strain  Different types of stress on the neck can damage muscles and tendons (soft tissues) and cause cervical strain. Cervical tissues can be damaged by:    The neck being forced past its normal range of motion, such as in a car accident or sports injury    Constant, low-level stress, such as from poor posture or a poorly set-up workspace  Symptoms of cervical strain  These may include:    Neck pain or stiffness    Pain in the shoulders or upper back    Muscle spasms    Headache, often starting at the base of the neck    Irritability, difficulty concentrating, or sleeplessness  Treatment for cervical strain  This problem often gets better on its own. Treatments aim to reduce pain and inflammation and increase the range of motion of the neck. Possible treatments include:    Over-the-counter or prescription pain medicine. These help relieve pain and  inflammation.    Stretching exercises to decrease neck stiffness.    Massage to decrease neck stiffness.    Cold or heat pack. These help reduce pain and swelling.  Call 911  Call emergency services right away if you have any of these:    Face drooping or numbness    Numbness or weakness, especially in the arms or on one side    Slurred speech or difficulty speaking    Blurred vision   When to call your healthcare provider  Call your healthcare provider right away if you have any of these:    Fever of 100.4 F (38 C) or higher, or as directed    Pain or stiffness that gets worse    Symptoms that don t get better, or get worse    Numbness, tingling, weakness or shooting pains into the arms or legs    New symptoms  Date Last Reviewed: 3/10/2016    [id number] [employer]. [address] [place] [address], [place] [address]. All rights reserved. This information is not intended as a substitute for professional medical care. Always follow your healthcare professional's instructions.        Bruises (Contusions)    A contusion is a bruise. A bruise happens when a blow to your body doesn't break the skin but does break blood vessels beneath the skin. Blood leaking from the broken vessels causes redness and swelling. As it heals, your bruise is likely to turn colors like purple, green, and yellow. This is normal. The bruise should fade in 2 or 3 weeks.  Factors that make you more likely to bruise  Almost everyone bruises now and then. Certain people do bruise more easily than others. You're more prone to bruising as you get older. That's because blood vessels become more fragile with age. You're also more likely to bruise if you have a clotting disorder such as hemophilia or take medications that reduce clotting, including aspirin, coumadin, newer agents.  When to go to the emergency room (ER)  Bruises almost always heal on their own without special treatment. But for some people, a bad bruise can be serious. Seek medical  care if you:    Have a clotting disorder such as hemophilia.    Have cirrhosis or other serious liver disease.    Take blood-thinning medications such as warfarin (Coumadin).  What to expect in the ER  A doctor will examine your bruise and ask about any health conditions you have. In some cases, you may have a test to check how well your blood clots. Other treatment will depend on your needs.  Follow-up care  Sometimes a bruise gets worse instead of better. It may become larger and more swollen. This can occur when your body walls off a small pool of blood under the skin (hematoma). In very rare cases, your doctor may need to drain excess blood from the area.  Tip:  Apply an ice pack or bag of frozen peas to a bruise (keep a thin cloth between the cold source and your skin). This can help reduce redness and swelling.   Date Last Reviewed: 12/1/2016 2000-2017 [employer]. [address] [place] [address]. All rights reserved. This information is not intended as a substitute for professional medical care. Always follow your healthcare professional's instructions.            Future Appointments        Provider Department Dept Phone Center    7/27/2017 8:00 AM Transplant Class [facility] Solid Organ Transplant [phone] [facility]    10/10/2017 8:00 AM Francisco Reid MD [facility] Hepatology [phone] [facility]    10/10/2017 10:00 AM [facility] VASCULAR ULTRASOUND ROOM 2 [facility] -[phone] [facility]    10/10/2017 10:30 AM [facility] VASCULAR ULTRASOUND ROOM 2 [facility] -[phone] [facility]    10/10/2017 12:00 PM Moisés Pena MD [facility] Urology and [facility] [phone] [facility]    10/10/2017 2:00 PM BUCKY Khan MD [facility] Heart Care [phone] [facility]    10/12/2017 12:45 PM [facility] XRAY ROOM 1 [facility] Xray [phone] [facility]    10/12/2017 1:00 PM Pulmonary  Function Lab [facility] Pulmonary Function Testing [phone] [facility]    10/12/2017 2:00 PM Suzanne Chao MD [facility] [phone] [facility]      24 Hour Appointment Hotline       To make an appointment at any [facility], call [phone] ([phone]). If you don't have a family doctor or clinic, we will help you find one. [facility] are conveniently located to serve the needs of you and your family.             Review of your medicines      Our records show that you are taking the medicines listed below. If these are incorrect, please call your family doctor or clinic.        Dose / Directions Last dose taken    * albuterol (2.5 MG/3ML) 0.083% neb solution   Dose:  1 vial   Quantity:  360 mL        Take 1 vial (2.5 mg) by nebulization every 4 hours as needed for shortness of breath / dyspnea or wheezing   Refills:  0        * albuterol 108 (90 BASE) MCG/ACT Inhaler   Commonly known as:  PROAIR HFA/PROVENTIL HFA/VENTOLIN HFA   Dose:  2 puff   Quantity:  1 Inhaler        Inhale 2 puffs into the lungs every 4 hours as needed for shortness of breath / dyspnea or wheezing   Refills:  3        aspirin 81 MG chewable tablet   Dose:  81 mg   Quantity:  36 tablet        Take 1 tablet (81 mg) by mouth daily   Refills:  0        atorvastatin 80 MG tablet   Commonly known as:  LIPITOR   Dose:  80 mg   Quantity:  30 tablet        Take 1 tablet (80 mg) by mouth daily   Refills:  0        brimonidine 0.15 % ophthalmic solution   Commonly known as:  ALPHAGAN-P   Dose:  1 drop   Quantity:  1 Bottle        Place 1 drop into both eyes 3 times daily   Refills:  0        bumetanide 2 MG tablet   Commonly known as:  BUMEX   Dose:  4 mg   Quantity:  60 tablet        Take 2 tablets (4 mg) by mouth daily   Refills:  2        calcium acetate 667 MG Caps capsule   Commonly known as:  PHOSLO   Dose:  667 mg   Quantity:  180 capsule        Take 1 capsule (667 mg) by mouth 3 times daily  (with meals)   Refills:  0        carvedilol 25 MG tablet   Commonly known as:  COREG   Dose:  37.5 mg   Quantity:  100 tablet        Take 1.5 tablets (37.5 mg) by mouth 2 times daily (with meals)   Refills:  0        citalopram 20 MG tablet   Commonly known as:  celeXA   Dose:  20 mg   Quantity:  30 tablet        Take 1 tablet (20 mg) by mouth daily   Refills:  0        cloNIDine 0.2 MG/24HR WK patch   Commonly known as:  CATAPRES-TTS2   Dose:  1 patch        Place 1 patch onto the skin once a week   Refills:  0        folic acid-vit B6-vit B12 0.8-10-0.115 MG Tabs per tablet   Commonly known as:  FOLGARD   Dose:  1 tablet   Quantity:  30 tablet        Take 1 tablet by mouth daily   Refills:  0        guaiFENesin-codeine 100-10 MG/5ML Soln solution   Commonly known as:  ROBITUSSIN AC   Dose:  1 tsp.   Quantity:  120 mL        Take 5 mLs by mouth every 4 hours as needed for cough   Refills:  0        guaiFENesin-dextromethorphan 100-10 MG/5ML syrup   Commonly known as:  ROBITUSSIN DM   Dose:  10 mL   Quantity:  560 mL        Take 10 mLs by mouth every 4 hours as needed for cough   Refills:  0        hydrALAZINE 50 MG tablet   Commonly known as:  APRESOLINE   Dose:  50 mg   Quantity:  90 tablet        Take 1 tablet (50 mg) by mouth 3 times daily   Refills:  0        INCRUSE ELLIPTA 62.5 MCG/INH oral inhaler   Dose:  1 puff   Generic drug:  umeclidinium        Inhale 1 puff into the lungs daily   Refills:  0        insulin glargine 100 UNIT/ML injection   Commonly known as:  LANTUS   Dose:  16 Units        Inject 16 Units Subcutaneous every morning   Refills:  0        isosorbide mononitrate 30 MG 24 hr tablet   Commonly known as:  IMDUR   Dose:  90 mg        Take 90 mg by mouth daily   Refills:  0        latanoprost 0.005 % ophthalmic solution   Commonly known as:  XALATAN   Dose:  1 drop   Quantity:  1 Bottle        Place 1 drop into both eyes At Bedtime   Refills:  0        lisinopril 40 MG tablet   Commonly known  as:  PRINIVIL/ZESTRIL   Dose:  40 mg   Quantity:  30 tablet        Take 1 tablet (40 mg) by mouth daily   Refills:  0        NEURONTIN PO   Dose:  200 mg        Take 200 mg by mouth daily   Refills:  0        OXYCODONE HCL PO   Dose:  15 mg        Take 15 mg by mouth every 6 hours (takes scheduled)   Refills:  0        sodium bicarbonate 650 MG tablet   Dose:  650 mg        Take 1 tablet (650 mg) by mouth 2 times daily   Refills:  0        VITAMIN D (CHOLECALCIFEROL) PO   Dose:  1000 Units        Take 1,000 Units by mouth daily   Refills:  0        * Notice:  This list has 2 medication(s) that are the same as other medications prescribed for you. Read the directions carefully, and ask your doctor or other care provider to review them with you.            Procedures and tests performed during your visit     Cervical spine XR, 2-3 views    Chest XR,  PA & LAT      Orders Needing Specimen Collection     None      Pending Results     No orders found from 7/13/2017 to 7/16/2017.            Pending Culture Results     No orders found from 7/13/2017 to 7/16/2017.            Pending Results Instructions     If you had any lab results that were not finalized at the time of your Discharge, you can call the ED Lab Result RN at [phone]. You will be contacted by this team for any positive Lab results or changes in treatment. The nurses are available 7 days a week from 10A to 6:30P.  You can leave a message 24 hours per day and they will return your call.        Test Results From Your Hospital Stay        7/15/2017  6:11 AM      Narrative     CERVICAL SPINE 4 VIEWS  7/15/2017 5:34 AM     HISTORY: Fall. Neck pain.    COMPARISON: None.        Impression     IMPRESSION:  1. No visualized acute fracture of the cervical spine.  2. Mild gradual kyphosis of the cervical spine, possibly related to  patient positioning or muscle spasm. Otherwise, normal alignment of  the cervical spine.  3. Moderate to severe degenerative changes in  the mid and inferior  cervical spine.  4. No prevertebral soft tissue swelling.    KAYY CARTAGENA MD         7/15/2017  6:11 AM      Narrative     CHEST 2 VIEWS  7/15/2017 5:34 AM     HISTORY: Fall. Left-sided thoracic pain.    COMPARISON: 6/22/2017.    FINDINGS: Small right pleural effusion with adjacent atelectasis. Tiny  left pleural effusion. The lungs are otherwise clear. Normal-sized  cardiac silhouette. Atherosclerotic calcification in the thoracic  aorta. No visualized pneumothorax.        Impression     IMPRESSION:  1. No convincing interval change since 6/22/2017.  2. Small right pleural effusion with adjacent atelectasis and tiny  left pleural effusion.   3. No other evidence of active cardiopulmonary disease.    KAYY CARTAGENA MD                Clinical Quality Measure: Blood Pressure Screening     Your blood pressure was checked while you were in the emergency department today. The last reading we obtained was  BP: 146/67 . Please read the guidelines below about what these numbers mean and what you should do about them.  If your systolic blood pressure (the top number) is less than 120 and your diastolic blood pressure (the bottom number) is less than 80, then your blood pressure is normal. There is nothing more that you need to do about it.  If your systolic blood pressure (the top number) is 120-139 or your diastolic blood pressure (the bottom number) is 80-89, your blood pressure may be higher than it should be. You should have your blood pressure rechecked within a year by a primary care provider.  If your systolic blood pressure (the top number) is 140 or greater or your diastolic blood pressure (the bottom number) is 90 or greater, you may have high blood pressure. High blood pressure is treatable, but if left untreated over time it can put you at risk for heart attack, stroke, or kidney failure. You should have your blood pressure rechecked by a primary care provider within the next 4  "weeks.  If your provider in the emergency department today gave you specific instructions to follow-up with your doctor or provider even sooner than that, you should follow that instruction and not wait for up to 4 weeks for your follow-up visit.        Thank you for choosing [facility]       Thank you for choosing [facility] for your care. Our goal is always to provide you with excellent care. Hearing back from our patients is one way we can continue to improve our services. Please take a few minutes to complete the written survey that you may receive in the mail after you visit with us. Thank you!        [employer][x+1] Information     [employer] lets you send messages to your doctor, view your test results, renew your prescriptions, schedule appointments and more. To sign up, go to [email or web address] . Click on \"Log in\" on the left side of the screen, which will take you to the Welcome page. Then click on \"Sign up Now\" on the right side of the page.     You will be asked to enter the access code listed below, as well as some personal information. Please follow the directions to create your username and password.     Your access code is: [email or web address]  Expires: 2017  9:43 AM     Your access code will  in 90 days. If you need help or a new code, please call your [facility] clinic or [phone].        Care EveryWhere ID     This is your Care EveryWhere ID. This could be used by other organizations to access your [facility] medical records  [id number]        Equal Access to Services     JIAN HOROWITZ : Hadii hien Holm, waaxda luqadaha, qaybta kaalmada sal, gio brown. So [facility] [phone].    ATENCIÓN: Si habla español, tiene a benitez disposición servicios gratuitos de asistencia lingüística. Llame al [phone].    We comply with applicable federal civil rights laws and [place] laws. We do not discriminate on the basis of race, color, national origin, age, " disability sex, sexual orientation or gender identity.            After Visit Summary       This is your record. Keep this with you and show to your community pharmacist(s) and doctor(s) at your next visit.

## 2017-07-27 ENCOUNTER — ALLIED HEALTH/NURSE VISIT (OUTPATIENT)
Dept: TRANSPLANT | Facility: CLINIC | Age: 72
End: 2017-07-27
Attending: PHYSICIAN ASSISTANT
Payer: MEDICARE

## 2017-07-27 DIAGNOSIS — Z76.82 AWAITING ORGAN TRANSPLANT: Primary | ICD-10-CM

## 2017-07-27 NOTE — PROGRESS NOTES
"Keagan and María attended our  Pre kidney transplant class today. Keagan is attending this class for a second time. The first time he slept through class. Today he was very attentive and asked a few questions. We watched the online presentation in a group setting of [facility] and they were shown how to access the website and how to register. They were encouraged to view all of the modules at home. We discussed KDPI, living donation and paired exchange. We also talked about length of stay after transplant, daily visits to [facility] and importance of adhering to labs, medications and clinic visits. They both seemed to understand and wished to move forward. Keagan commented he has a lot to do on his \"to do list\" but will get it all done.  "

## 2017-07-27 NOTE — MR AVS SNAPSHOT
After Visit Summary   7/27/2017    Keagan Wayne    MRN: [id number]           Patient Information     Date Of Birth          1945        Visit Information        Provider Department      7/27/2017 8:00 AM  TRANSPLANT [facility] Organ Transplant        Today's Diagnoses     Awaiting organ transplant    -  1       Follow-ups after your visit        Your next 10 appointments already scheduled     Oct 10, 2017  8:00 AM CDT   (Arrive by 7:45 AM)   New General Liver with Francisco Reid MD   [facility] ([facility])    [address]  [address] [facility] [address]   [phone]            Oct 10, 2017  9:00 AM CDT   (Arrive by 8:45 AM)   New Patient Visit with Moisés Pena MD   [facility] ([facility])    [address] [place] [address]  [address] [facility] [id number] [phone]            Oct 10, 2017 10:00 AM CDT   US AORTIC IMAGING with UCUSV2   [facility] ([facility])    [address] [place] [address]  [address]st [facility] [id number] [phone]           Please bring a list of your medicines (including vitamins, minerals and over-the-counter drugs). Also, tell your doctor about any allergies you may have. Wear comfortable clothes and leave your valuables at home.  Adults: No eating or drinking for 8 hours before the exam. You may take medicine with a small sip of water.  Children: - Children 6+ years: No food or drink for 6 hours before exam. - Children 1-5 years: No food or drink for 4 hours before exam. - Infants, breast-fed: may have breast milk up to 2 hours before exam. - Infants, formula: may have bottle until 4 hours before exam.  Please call the Imaging Department at your exam site with any questions.            Oct 10, 2017 10:30 AM CDT   US AORTA/IVC/ILIAC DUPLEX COMPLETE with UCUSV2   [facility] (  [facility])    [address] [place] [id number] [phone]           Please bring a list of your medicines (including vitamins, minerals and over-the-counter drugs). Also, tell your doctor about any allergies you may have. Wear comfortable clothes and leave your valuables at home.  Adults: No eating or drinking for 8 hours before the exam. You may take medicine with a small sip of water.  Children: - Children 6+ years: No food or drink for 6 hours before exam. - Children 1-5 years: No food or drink for 4 hours before exam. - Infants, breast-fed: may have breast milk up to 2 hours before exam. - Infants, formula: may have bottle until 4 hours before exam.  Please call the Imaging Department at your exam site with any questions.            Oct 10, 2017  2:00 PM CDT   (Arrive by 1:45 PM)   NEW PANCREAS/KIDNEY TRANSPLANT WORK-UP with BUCKY Khan MD   [facility] ([facility])    [address] [place] [address] [phone]            Oct 12, 2017 12:45 PM CDT   (Arrive by 12:30 PM)   XR CHEST 2 VIEWS with UCXR1   [facility] [place] [facility] ([facility])    [address] [place] [id number] [phone]           Please bring a list of your current medicines to your exam. (Include vitamins, minerals and over-thecounter medicines.) Leave your valuables at home.  Tell your doctor if there is a chance you may be pregnant.  You do not need to do anything special for this exam.            Oct 12, 2017  1:00 PM CDT   FULL PULMONARY FUNCTION with  PFL C   [facility] Pulmonary Function Testing ([facility])    [address] [place] [id number] [phone]            Oct 12, 2017  2:00 PM CDT   (Arrive by 1:45 PM)   New Patient Visit with Suzanne Chao MD   [facility]  "([facility])    [address]  [address]rd [facility] [address] [phone]              Who to contact     If you have questions or need follow up information about today's clinic visit or your schedule please contact [facility] SOLID ORGAN TRANSPLANT directly at [phone].  Normal or non-critical lab and imaging results will be communicated to you by MyChart, letter or phone within 4 business days after the clinic has received the results. If you do not hear from us within 7 days, please contact the clinic through MyChart or phone. If you have a critical or abnormal lab result, we will notify you by phone as soon as possible.  Submit refill requests through [employer] or call your pharmacy and they will forward the refill request to us. Please allow 3 business days for your refill to be completed.          Additional Information About Your Visit        MyChart Information     [employer] lets you send messages to your doctor, view your test results, renew your prescriptions, schedule appointments and more. To sign up, go to [email or web address] . Click on \"Log in\" on the left side of the screen, which will take you to the Welcome page. Then click on \"Sign up Now\" on the right side of the page.     You will be asked to enter the access code listed below, as well as some personal information. Please follow the directions to create your username and password.     Your access code is: [email or web address]  Expires: 10/25/2017 10:35 AM     Your access code will  in 90 days. If you need help or a new code, please call your [facility] clinic or [phone].        Care EveryWhere ID     This is your Care EveryWhere ID. This could be used by other organizations to access your [facility] medical records  [id number]         Blood Pressure from Last 3 Encounters:   07/15/17 146/67   17 138/64   17 190/80    Weight from Last 3 Encounters:   07/15/17 86.2 kg (190 lb)   17 86.8 " kg (191 lb 6.4 oz)   06/22/17 86.8 kg (191 lb 6.4 oz)              Today, you had the following     No orders found for display       Primary Care Provider Office Phone # Fax Jie Hernandez [phone]       [facility] [address]  [facility] [address]        Equal Access to Services     [facility] : Hadii aad ku hadasho Soomaali, waaxda luqadaha, qaybta kaalmada adeegyada, waxay idiin hayaan adeeg kharash la'aan . So [facility] [phone].    ATENCIÓN: Si habla español, tiene a benitez disposición servicios gratuitos de asistencia lingüística. Greggame al [phone].    We comply with applicable federal civil rights laws and [place] laws. We do not discriminate on the basis of race, color, national origin, age, disability sex, sexual orientation or gender identity.            Thank you!     Thank you for choosing [facility] SOLID ORGAN TRANSPLANT  for your care. Our goal is always to provide you with excellent care. Hearing back from our patients is one way we can continue to improve our services. Please take a few minutes to complete the written survey that you may receive in the mail after your visit with us. Thank you!             Your Updated Medication List - Protect others around you: Learn how to safely use, store and throw away your medicines at www.disposemymeds.org.          This list is accurate as of: 7/27/17 10:35 AM.  Always use your most recent med list.                   Brand Name Dispense Instructions for use Diagnosis    * albuterol (2.5 MG/3ML) 0.083% neb solution     360 mL    Take 1 vial (2.5 mg) by nebulization every 4 hours as needed for shortness of breath / dyspnea or wheezing    Chronic obstructive pulmonary disease, unspecified COPD type (H)       * albuterol 108 (90 BASE) MCG/ACT Inhaler    PROAIR HFA/PROVENTIL HFA/VENTOLIN HFA    1 Inhaler    Inhale 2 puffs into the lungs every 4 hours as needed for shortness of breath / dyspnea or wheezing    Chronic  obstructive pulmonary disease, unspecified COPD type (H)       aspirin 81 MG chewable tablet     36 tablet    Take 1 tablet (81 mg) by mouth daily    Type 2 diabetes mellitus with diabetic nephropathy (H)       atorvastatin 80 MG tablet    LIPITOR    30 tablet    Take 1 tablet (80 mg) by mouth daily    Acute diastolic CHF (congestive heart failure) (H)       brimonidine 0.15 % ophthalmic solution    ALPHAGAN-P    1 Bottle    Place 1 drop into both eyes 3 times daily    Glaucoma       bumetanide 2 MG tablet    BUMEX    60 tablet    Take 2 tablets (4 mg) by mouth daily    Acute on chronic systolic congestive heart failure (H)       calcium acetate 667 MG Caps capsule    PHOSLO    180 capsule    Take 1 capsule (667 mg) by mouth 3 times daily (with meals)    Chronic kidney disease, stage IV (severe) (H)       carvedilol 25 MG tablet    COREG    100 tablet    Take 1.5 tablets (37.5 mg) by mouth 2 times daily (with meals)    Acute diastolic CHF (congestive heart failure) (H)       citalopram 20 MG tablet    celeXA    30 tablet    Take 1 tablet (20 mg) by mouth daily    Depression       cloNIDine 0.2 MG/24HR WK patch    CATAPRES-TTS2     Place 1 patch onto the skin once a week        folic acid-vit B6-vit B12 0.8-10-0.115 MG Tabs per tablet    FOLGARD    30 tablet    Take 1 tablet by mouth daily    Anemia of chronic disease       guaiFENesin-codeine 100-10 MG/5ML Soln solution    ROBITUSSIN AC    120 mL    Take 5 mLs by mouth every 4 hours as needed for cough    Cough       guaiFENesin-dextromethorphan 100-10 MG/5ML syrup    ROBITUSSIN DM    560 mL    Take 10 mLs by mouth every 4 hours as needed for cough    Cough       hydrALAZINE 50 MG tablet    APRESOLINE    90 tablet    Take 1 tablet (50 mg) by mouth 3 times daily    Benign essential hypertension       INCRUSE ELLIPTA 62.5 MCG/INH oral inhaler   Generic drug:  umeclidinium      Inhale 1 puff into the lungs daily        insulin glargine 100 UNIT/ML injection    LANTUS      Inject 16 Units Subcutaneous every morning        isosorbide mononitrate 30 MG 24 hr tablet    IMDUR     Take 90 mg by mouth daily        latanoprost 0.005 % ophthalmic solution    XALATAN    1 Bottle    Place 1 drop into both eyes At Bedtime    Glaucoma       lisinopril 40 MG tablet    PRINIVIL/ZESTRIL    30 tablet    Take 1 tablet (40 mg) by mouth daily    Hypertension due to endocrine disorder       NEURONTIN PO      Take 200 mg by mouth daily        OXYCODONE HCL PO      Take 15 mg by mouth every 6 hours (takes scheduled)        sodium bicarbonate 650 MG tablet      Take 1 tablet (650 mg) by mouth 2 times daily    Chronic kidney disease, stage IV (severe) (H)       VITAMIN D (CHOLECALCIFEROL) PO      Take 1,000 Units by mouth daily        * Notice:  This list has 2 medication(s) that are the same as other medications prescribed for you. Read the directions carefully, and ask your doctor or other care provider to review them with you.

## 2017-09-18 ENCOUNTER — HOSPITAL ENCOUNTER (INPATIENT)
Facility: CLINIC | Age: 72
LOS: 2 days | Discharge: HOME | DRG: 070 | End: 2017-09-20
Attending: INTERNAL MEDICINE | Admitting: INTERNAL MEDICINE
Payer: MEDICARE

## 2017-09-18 ENCOUNTER — APPOINTMENT (OUTPATIENT)
Dept: MRI IMAGING | Facility: CLINIC | Age: 72
DRG: 070 | End: 2017-09-18
Attending: INTERNAL MEDICINE
Payer: MEDICARE

## 2017-09-18 DIAGNOSIS — R56.9 SEIZURES (H): Primary | ICD-10-CM

## 2017-09-18 DIAGNOSIS — F12.10 MARIJUANA ABUSE: ICD-10-CM

## 2017-09-18 LAB
ALBUMIN SERPL-MCNC: 3.3 G/DL (ref 3.4–5)
ALP SERPL-CCNC: 92 U/L (ref 40–150)
ALT SERPL W P-5'-P-CCNC: 11 U/L (ref 0–70)
ANION GAP SERPL CALCULATED.3IONS-SCNC: 10 MMOL/L (ref 3–14)
AST SERPL W P-5'-P-CCNC: 8 U/L (ref 0–45)
BILIRUB SERPL-MCNC: 0.6 MG/DL (ref 0.2–1.3)
BUN SERPL-MCNC: 56 MG/DL (ref 7–30)
CALCIUM SERPL-MCNC: 9.1 MG/DL (ref 8.5–10.1)
CHLORIDE SERPL-SCNC: 97 MMOL/L (ref 94–109)
CK SERPL-CCNC: 74 U/L (ref 30–300)
CO2 SERPL-SCNC: 26 MMOL/L (ref 20–32)
CREAT SERPL-MCNC: 6.36 MG/DL (ref 0.66–1.25)
ERYTHROCYTE [DISTWIDTH] IN BLOOD BY AUTOMATED COUNT: 13.2 % (ref 10–15)
GFR SERPL CREATININE-BSD FRML MDRD: 9 ML/MIN/1.7M2
GLUCOSE BLDC GLUCOMTR-MCNC: 103 MG/DL (ref 70–99)
GLUCOSE BLDC GLUCOMTR-MCNC: 126 MG/DL (ref 70–99)
GLUCOSE BLDC GLUCOMTR-MCNC: 135 MG/DL (ref 70–99)
GLUCOSE BLDC GLUCOMTR-MCNC: 145 MG/DL (ref 70–99)
GLUCOSE BLDC GLUCOMTR-MCNC: 145 MG/DL (ref 70–99)
GLUCOSE BLDC GLUCOMTR-MCNC: 149 MG/DL (ref 70–99)
GLUCOSE BLDC GLUCOMTR-MCNC: 155 MG/DL (ref 70–99)
GLUCOSE BLDC GLUCOMTR-MCNC: 158 MG/DL (ref 70–99)
GLUCOSE BLDC GLUCOMTR-MCNC: 166 MG/DL (ref 70–99)
GLUCOSE BLDC GLUCOMTR-MCNC: 177 MG/DL (ref 70–99)
GLUCOSE BLDC GLUCOMTR-MCNC: 181 MG/DL (ref 70–99)
GLUCOSE BLDC GLUCOMTR-MCNC: 228 MG/DL (ref 70–99)
GLUCOSE BLDC GLUCOMTR-MCNC: 233 MG/DL (ref 70–99)
GLUCOSE BLDC GLUCOMTR-MCNC: 421 MG/DL (ref 70–99)
GLUCOSE BLDC GLUCOMTR-MCNC: 555 MG/DL (ref 70–99)
GLUCOSE BLDC GLUCOMTR-MCNC: 61 MG/DL (ref 70–99)
GLUCOSE BLDC GLUCOMTR-MCNC: 79 MG/DL (ref 70–99)
GLUCOSE BLDC GLUCOMTR-MCNC: 85 MG/DL (ref 70–99)
GLUCOSE SERPL-MCNC: 77 MG/DL (ref 70–99)
HBA1C MFR BLD: 11.9 % (ref 4.3–6)
HCT VFR BLD AUTO: 31.2 % (ref 40–53)
HGB BLD-MCNC: 10.8 G/DL (ref 13.3–17.7)
INR PPP: 1.05 (ref 0.86–1.14)
MAGNESIUM SERPL-MCNC: 1.7 MG/DL (ref 1.6–2.3)
MCH RBC QN AUTO: 30.5 PG (ref 26.5–33)
MCHC RBC AUTO-ENTMCNC: 34.6 G/DL (ref 31.5–36.5)
MCV RBC AUTO: 88 FL (ref 78–100)
MRSA DNA SPEC QL NAA+PROBE: NEGATIVE
PHOSPHATE SERPL-MCNC: 1.8 MG/DL (ref 2.5–4.5)
PLATELET # BLD AUTO: 146 10E9/L (ref 150–450)
POTASSIUM SERPL-SCNC: 2.9 MMOL/L (ref 3.4–5.3)
POTASSIUM SERPL-SCNC: 4.2 MMOL/L (ref 3.4–5.3)
PROCALCITONIN SERPL-MCNC: 0.4 NG/ML
PROT SERPL-MCNC: 8 G/DL (ref 6.8–8.8)
RBC # BLD AUTO: 3.54 10E12/L (ref 4.4–5.9)
SODIUM SERPL-SCNC: 133 MMOL/L (ref 133–144)
SODIUM SERPL-SCNC: 136 MMOL/L (ref 133–144)
SPECIMEN SOURCE: NORMAL
WBC # BLD AUTO: 5.4 10E9/L (ref 4–11)

## 2017-09-18 PROCEDURE — 84132 ASSAY OF SERUM POTASSIUM: CPT | Performed by: INTERNAL MEDICINE

## 2017-09-18 PROCEDURE — 70551 MRI BRAIN STEM W/O DYE: CPT

## 2017-09-18 PROCEDURE — 90937 HEMODIALYSIS REPEATED EVAL: CPT

## 2017-09-18 PROCEDURE — 83735 ASSAY OF MAGNESIUM: CPT | Performed by: INTERNAL MEDICINE

## 2017-09-18 PROCEDURE — 25000132 ZZH RX MED GY IP 250 OP 250 PS 637: Mod: GY | Performed by: INTERNAL MEDICINE

## 2017-09-18 PROCEDURE — 84295 ASSAY OF SERUM SODIUM: CPT | Performed by: INTERNAL MEDICINE

## 2017-09-18 PROCEDURE — 12000000 ZZH R&B MED SURG/OB

## 2017-09-18 PROCEDURE — 82550 ASSAY OF CK (CPK): CPT | Performed by: INTERNAL MEDICINE

## 2017-09-18 PROCEDURE — 36415 COLL VENOUS BLD VENIPUNCTURE: CPT | Performed by: INTERNAL MEDICINE

## 2017-09-18 PROCEDURE — 95813 EEG EXTND MNTR 61-119 MIN: CPT

## 2017-09-18 PROCEDURE — 25000125 ZZHC RX 250: Performed by: INTERNAL MEDICINE

## 2017-09-18 PROCEDURE — 80053 COMPREHEN METABOLIC PANEL: CPT | Performed by: INTERNAL MEDICINE

## 2017-09-18 PROCEDURE — 25000128 H RX IP 250 OP 636: Performed by: INTERNAL MEDICINE

## 2017-09-18 PROCEDURE — 84100 ASSAY OF PHOSPHORUS: CPT | Performed by: INTERNAL MEDICINE

## 2017-09-18 PROCEDURE — 85610 PROTHROMBIN TIME: CPT | Performed by: INTERNAL MEDICINE

## 2017-09-18 PROCEDURE — 99207 ZZC NON-BILLABLE SERV PER CHARTING: CPT | Performed by: INTERNAL MEDICINE

## 2017-09-18 PROCEDURE — 00000146 ZZHCL STATISTIC GLUCOSE BY METER IP

## 2017-09-18 PROCEDURE — 87641 MR-STAPH DNA AMP PROBE: CPT | Performed by: INTERNAL MEDICINE

## 2017-09-18 PROCEDURE — 80048 BASIC METABOLIC PNL TOTAL CA: CPT | Performed by: INTERNAL MEDICINE

## 2017-09-18 PROCEDURE — A9270 NON-COVERED ITEM OR SERVICE: HCPCS | Mod: GY | Performed by: INTERNAL MEDICINE

## 2017-09-18 PROCEDURE — 83036 HEMOGLOBIN GLYCOSYLATED A1C: CPT | Performed by: INTERNAL MEDICINE

## 2017-09-18 PROCEDURE — 25800025 ZZH RX 258: Performed by: INTERNAL MEDICINE

## 2017-09-18 PROCEDURE — 84145 PROCALCITONIN (PCT): CPT | Performed by: INTERNAL MEDICINE

## 2017-09-18 PROCEDURE — 87640 STAPH A DNA AMP PROBE: CPT | Performed by: INTERNAL MEDICINE

## 2017-09-18 PROCEDURE — 99223 1ST HOSP IP/OBS HIGH 75: CPT | Mod: AI | Performed by: INTERNAL MEDICINE

## 2017-09-18 PROCEDURE — 85027 COMPLETE CBC AUTOMATED: CPT | Performed by: INTERNAL MEDICINE

## 2017-09-18 PROCEDURE — 5A1D60Z PERFORMANCE OF URINARY FILTRATION, MULTIPLE: ICD-10-PCS | Performed by: INTERNAL MEDICINE

## 2017-09-18 PROCEDURE — 25000131 ZZH RX MED GY IP 250 OP 636 PS 637: Mod: GY | Performed by: INTERNAL MEDICINE

## 2017-09-18 RX ORDER — DEXTROSE MONOHYDRATE 25 G/50ML
25-50 INJECTION, SOLUTION INTRAVENOUS
Status: DISCONTINUED | OUTPATIENT
Start: 2017-09-18 | End: 2017-09-20 | Stop reason: HOSPADM

## 2017-09-18 RX ORDER — ISOSORBIDE MONONITRATE 30 MG/1
90 TABLET, EXTENDED RELEASE ORAL DAILY
Status: DISCONTINUED | OUTPATIENT
Start: 2017-09-18 | End: 2017-09-20 | Stop reason: HOSPADM

## 2017-09-18 RX ORDER — OXYCODONE HYDROCHLORIDE 5 MG/1
15 TABLET ORAL EVERY 6 HOURS
Status: DISCONTINUED | OUTPATIENT
Start: 2017-09-18 | End: 2017-09-19

## 2017-09-18 RX ORDER — LISINOPRIL 20 MG/1
40 TABLET ORAL DAILY
Status: DISCONTINUED | OUTPATIENT
Start: 2017-09-18 | End: 2017-09-18

## 2017-09-18 RX ORDER — POTASSIUM CHLORIDE 7.45 MG/ML
10 INJECTION INTRAVENOUS
Status: DISCONTINUED | OUTPATIENT
Start: 2017-09-18 | End: 2017-09-20 | Stop reason: HOSPADM

## 2017-09-18 RX ORDER — POTASSIUM CHLORIDE 29.8 MG/ML
20 INJECTION INTRAVENOUS
Status: DISCONTINUED | OUTPATIENT
Start: 2017-09-18 | End: 2017-09-20 | Stop reason: HOSPADM

## 2017-09-18 RX ORDER — NICOTINE POLACRILEX 4 MG
15-30 LOZENGE BUCCAL
Status: DISCONTINUED | OUTPATIENT
Start: 2017-09-18 | End: 2017-09-18

## 2017-09-18 RX ORDER — BUMETANIDE 1 MG/1
4 TABLET ORAL DAILY
Status: DISCONTINUED | OUTPATIENT
Start: 2017-09-18 | End: 2017-09-20 | Stop reason: HOSPADM

## 2017-09-18 RX ORDER — ALBUMIN, HUMAN INJ 5% 5 %
250 SOLUTION INTRAVENOUS
Status: CANCELLED | OUTPATIENT
Start: 2017-09-18

## 2017-09-18 RX ORDER — HYDROMORPHONE HYDROCHLORIDE 1 MG/ML
0.2 INJECTION, SOLUTION INTRAMUSCULAR; INTRAVENOUS; SUBCUTANEOUS
Status: DISCONTINUED | OUTPATIENT
Start: 2017-09-18 | End: 2017-09-18

## 2017-09-18 RX ORDER — DEXTROSE MONOHYDRATE 25 G/50ML
25-50 INJECTION, SOLUTION INTRAVENOUS
Status: DISCONTINUED | OUTPATIENT
Start: 2017-09-18 | End: 2017-09-18

## 2017-09-18 RX ORDER — NALOXONE HYDROCHLORIDE 0.4 MG/ML
.1-.4 INJECTION, SOLUTION INTRAMUSCULAR; INTRAVENOUS; SUBCUTANEOUS
Status: DISCONTINUED | OUTPATIENT
Start: 2017-09-18 | End: 2017-09-20 | Stop reason: HOSPADM

## 2017-09-18 RX ORDER — CARVEDILOL 25 MG/1
25 TABLET ORAL 2 TIMES DAILY WITH MEALS
Status: DISCONTINUED | OUTPATIENT
Start: 2017-09-18 | End: 2017-09-20 | Stop reason: HOSPADM

## 2017-09-18 RX ORDER — HYDRALAZINE HYDROCHLORIDE 50 MG/1
50 TABLET, FILM COATED ORAL 3 TIMES DAILY
Status: DISCONTINUED | OUTPATIENT
Start: 2017-09-18 | End: 2017-09-18

## 2017-09-18 RX ORDER — ALBUTEROL SULFATE 90 UG/1
2 AEROSOL, METERED RESPIRATORY (INHALATION) EVERY 4 HOURS PRN
Status: DISCONTINUED | OUTPATIENT
Start: 2017-09-18 | End: 2017-09-20 | Stop reason: HOSPADM

## 2017-09-18 RX ORDER — SODIUM BICARBONATE 650 MG/1
650 TABLET ORAL 2 TIMES DAILY
Status: DISCONTINUED | OUTPATIENT
Start: 2017-09-18 | End: 2017-09-18

## 2017-09-18 RX ORDER — GABAPENTIN 100 MG/1
200 CAPSULE ORAL DAILY
Status: DISCONTINUED | OUTPATIENT
Start: 2017-09-18 | End: 2017-09-20 | Stop reason: HOSPADM

## 2017-09-18 RX ORDER — CITALOPRAM HYDROBROMIDE 20 MG/1
20 TABLET ORAL DAILY
Status: DISCONTINUED | OUTPATIENT
Start: 2017-09-18 | End: 2017-09-20 | Stop reason: HOSPADM

## 2017-09-18 RX ORDER — ALBUMIN, HUMAN INJ 5% 5 %
250 SOLUTION INTRAVENOUS
Status: CANCELLED | OUTPATIENT
Start: 2017-09-18 | End: 2017-09-18

## 2017-09-18 RX ORDER — LATANOPROST 50 UG/ML
1 SOLUTION/ DROPS OPHTHALMIC AT BEDTIME
Status: DISCONTINUED | OUTPATIENT
Start: 2017-09-18 | End: 2017-09-18 | Stop reason: CLARIF

## 2017-09-18 RX ORDER — LORAZEPAM 2 MG/ML
.5-1 INJECTION INTRAMUSCULAR EVERY 4 HOURS PRN
Status: DISCONTINUED | OUTPATIENT
Start: 2017-09-18 | End: 2017-09-18

## 2017-09-18 RX ORDER — BRIMONIDINE TARTRATE 2 MG/ML
1 SOLUTION/ DROPS OPHTHALMIC 3 TIMES DAILY
Status: DISCONTINUED | OUTPATIENT
Start: 2017-09-18 | End: 2017-09-18

## 2017-09-18 RX ORDER — ONDANSETRON 2 MG/ML
4 INJECTION INTRAMUSCULAR; INTRAVENOUS EVERY 6 HOURS PRN
Status: DISCONTINUED | OUTPATIENT
Start: 2017-09-18 | End: 2017-09-20 | Stop reason: HOSPADM

## 2017-09-18 RX ORDER — POTASSIUM CL/LIDO/0.9 % NACL 10MEQ/0.1L
10 INTRAVENOUS SOLUTION, PIGGYBACK (ML) INTRAVENOUS
Status: DISCONTINUED | OUTPATIENT
Start: 2017-09-18 | End: 2017-09-20 | Stop reason: HOSPADM

## 2017-09-18 RX ORDER — NICOTINE POLACRILEX 4 MG
15-30 LOZENGE BUCCAL
Status: DISCONTINUED | OUTPATIENT
Start: 2017-09-18 | End: 2017-09-20 | Stop reason: HOSPADM

## 2017-09-18 RX ORDER — LOSARTAN POTASSIUM 100 MG/1
100 TABLET ORAL DAILY
Status: DISCONTINUED | OUTPATIENT
Start: 2017-09-18 | End: 2017-09-20 | Stop reason: HOSPADM

## 2017-09-18 RX ORDER — AMOXICILLIN 250 MG
1-2 CAPSULE ORAL 2 TIMES DAILY PRN
Status: DISCONTINUED | OUTPATIENT
Start: 2017-09-18 | End: 2017-09-20 | Stop reason: HOSPADM

## 2017-09-18 RX ORDER — ONDANSETRON 4 MG/1
4 TABLET, ORALLY DISINTEGRATING ORAL EVERY 6 HOURS PRN
Status: DISCONTINUED | OUTPATIENT
Start: 2017-09-18 | End: 2017-09-20 | Stop reason: HOSPADM

## 2017-09-18 RX ORDER — POTASSIUM CHLORIDE 1.5 G/1.58G
20-40 POWDER, FOR SOLUTION ORAL
Status: DISCONTINUED | OUTPATIENT
Start: 2017-09-18 | End: 2017-09-20 | Stop reason: HOSPADM

## 2017-09-18 RX ORDER — ALBUMIN, HUMAN INJ 5% 5 %
250 SOLUTION INTRAVENOUS
Status: DISCONTINUED | OUTPATIENT
Start: 2017-09-18 | End: 2017-09-18

## 2017-09-18 RX ORDER — SODIUM CHLORIDE 9 MG/ML
INJECTION, SOLUTION INTRAVENOUS CONTINUOUS
Status: DISCONTINUED | OUTPATIENT
Start: 2017-09-18 | End: 2017-09-20 | Stop reason: HOSPADM

## 2017-09-18 RX ORDER — ALBUTEROL SULFATE 0.83 MG/ML
1 SOLUTION RESPIRATORY (INHALATION) EVERY 4 HOURS PRN
Status: DISCONTINUED | OUTPATIENT
Start: 2017-09-18 | End: 2017-09-20 | Stop reason: HOSPADM

## 2017-09-18 RX ORDER — ASPIRIN 81 MG/1
81 TABLET, CHEWABLE ORAL DAILY
Status: DISCONTINUED | OUTPATIENT
Start: 2017-09-18 | End: 2017-09-20 | Stop reason: HOSPADM

## 2017-09-18 RX ORDER — ATORVASTATIN CALCIUM 80 MG/1
80 TABLET, FILM COATED ORAL DAILY
Status: DISCONTINUED | OUTPATIENT
Start: 2017-09-18 | End: 2017-09-20 | Stop reason: HOSPADM

## 2017-09-18 RX ORDER — POTASSIUM CHLORIDE 1500 MG/1
20-40 TABLET, EXTENDED RELEASE ORAL
Status: DISCONTINUED | OUTPATIENT
Start: 2017-09-18 | End: 2017-09-20 | Stop reason: HOSPADM

## 2017-09-18 RX ORDER — BISACODYL 10 MG
10 SUPPOSITORY, RECTAL RECTAL DAILY PRN
Status: DISCONTINUED | OUTPATIENT
Start: 2017-09-18 | End: 2017-09-20 | Stop reason: HOSPADM

## 2017-09-18 RX ADMIN — SODIUM CHLORIDE: 9 INJECTION, SOLUTION INTRAVENOUS at 21:02

## 2017-09-18 RX ADMIN — ISOSORBIDE MONONITRATE 90 MG: 30 TABLET, EXTENDED RELEASE ORAL at 09:14

## 2017-09-18 RX ADMIN — CALCIUM ACETATE 1334 MG: 667 CAPSULE ORAL at 21:26

## 2017-09-18 RX ADMIN — OXYCODONE HYDROCHLORIDE 15 MG: 5 TABLET ORAL at 14:57

## 2017-09-18 RX ADMIN — LOSARTAN POTASSIUM 100 MG: 100 TABLET, FILM COATED ORAL at 14:59

## 2017-09-18 RX ADMIN — POTASSIUM CHLORIDE 40 MEQ: 1500 TABLET, EXTENDED RELEASE ORAL at 06:39

## 2017-09-18 RX ADMIN — BRIMONIDINE TARTRATE 1 DROP: 2 SOLUTION/ DROPS OPHTHALMIC at 09:23

## 2017-09-18 RX ADMIN — SODIUM BICARBONATE 650 MG TABLET 650 MG: at 09:14

## 2017-09-18 RX ADMIN — UMECLIDINIUM 1 PUFF: 62.5 AEROSOL, POWDER ORAL at 09:22

## 2017-09-18 RX ADMIN — HYDRALAZINE HYDROCHLORIDE 50 MG: 50 TABLET ORAL at 09:15

## 2017-09-18 RX ADMIN — CITALOPRAM HYDROBROMIDE 20 MG: 20 TABLET ORAL at 09:15

## 2017-09-18 RX ADMIN — CARVEDILOL 37.5 MG: 25 TABLET, FILM COATED ORAL at 09:16

## 2017-09-18 RX ADMIN — POTASSIUM CHLORIDE 40 MEQ: 1500 TABLET, EXTENDED RELEASE ORAL at 08:15

## 2017-09-18 RX ADMIN — SODIUM CHLORIDE 3 UNITS/HR: 9 INJECTION, SOLUTION INTRAVENOUS at 09:13

## 2017-09-18 RX ADMIN — LISINOPRIL 40 MG: 20 TABLET ORAL at 09:16

## 2017-09-18 RX ADMIN — ASPIRIN 81 MG 81 MG: 81 TABLET ORAL at 09:16

## 2017-09-18 RX ADMIN — OXYCODONE HYDROCHLORIDE 15 MG: 5 TABLET ORAL at 21:26

## 2017-09-18 RX ADMIN — SODIUM CHLORIDE: 9 INJECTION, SOLUTION INTRAVENOUS at 01:30

## 2017-09-18 RX ADMIN — CLONIDINE 1 PATCH: 0.2 PATCH TRANSDERMAL at 08:06

## 2017-09-18 RX ADMIN — BUMETANIDE 4 MG: 1 TABLET ORAL at 09:16

## 2017-09-18 RX ADMIN — DEXTROSE MONOHYDRATE 50 ML: 500 INJECTION PARENTERAL at 16:47

## 2017-09-18 RX ADMIN — SODIUM CHLORIDE 3 UNITS/HR: 9 INJECTION, SOLUTION INTRAVENOUS at 19:06

## 2017-09-18 RX ADMIN — CALCIUM ACETATE 667 MG: 667 CAPSULE ORAL at 09:14

## 2017-09-18 RX ADMIN — CARVEDILOL 25 MG: 25 TABLET, FILM COATED ORAL at 17:59

## 2017-09-18 RX ADMIN — VITAMIN D, TAB 1000IU (100/BT) 1000 UNITS: 25 TAB at 09:16

## 2017-09-18 RX ADMIN — LEVETIRACETAM 500 MG: 100 INJECTION, SOLUTION INTRAVENOUS at 05:38

## 2017-09-18 RX ADMIN — GABAPENTIN 200 MG: 100 CAPSULE ORAL at 09:15

## 2017-09-18 RX ADMIN — ATORVASTATIN CALCIUM 80 MG: 80 TABLET, FILM COATED ORAL at 09:15

## 2017-09-18 RX ADMIN — SODIUM CHLORIDE 11 UNITS/HR: 9 INJECTION, SOLUTION INTRAVENOUS at 01:37

## 2017-09-18 RX ADMIN — CALCIUM ACETATE 1334 MG: 667 CAPSULE ORAL at 15:00

## 2017-09-18 RX ADMIN — CALCIUM ACETATE 1334 MG: 667 CAPSULE ORAL at 17:59

## 2017-09-18 RX ADMIN — OXYCODONE HYDROCHLORIDE 15 MG: 5 TABLET ORAL at 05:37

## 2017-09-18 RX ADMIN — SODIUM CHLORIDE 250 ML: 9 INJECTION, SOLUTION INTRAVENOUS at 11:44

## 2017-09-18 RX ADMIN — VITAMINS TABLETS 1 TABLET: 115; 800; 115 TABLET ORAL at 09:17

## 2017-09-18 RX ADMIN — LORAZEPAM 1 MG: 2 INJECTION INTRAMUSCULAR; INTRAVENOUS at 10:35

## 2017-09-18 ASSESSMENT — VISUAL ACUITY
OU: NORMAL ACUITY
OU: NORMAL ACUITY

## 2017-09-18 ASSESSMENT — PAIN DESCRIPTION - DESCRIPTORS: DESCRIPTORS: CRAMPING

## 2017-09-18 NOTE — PLAN OF CARE
Problem: Goal Outcome Summary  Goal: Goal Outcome Summary  Outcome: No Change  A&O. Follows commands. Somnolent, arouses to touch. No seizure activity noted. Neuros intact, generalized weakness. VSS. Insulin gtt on at 0.5 units/hr algorithm 1. C/o headache, along with hand and foot pain which was causing restlessness shortly after admission, gave scheduled oxycodone with results. Plan for HD today. Wife María was here on admission and went home for the night, will be back later on today. K 2.9 this am, hospitalist notified- potassium replacement protocol added.

## 2017-09-18 NOTE — IP AVS SNAPSHOT
[address] [facility]    [address]    [place] [address]    Phone:  [phone]                                       After Visit Summary   9/18/2017    Keagan Wayne    MRN: [id number]           After Visit Summary Signature Page     I have received my discharge instructions, and my questions have been answered. I have discussed any challenges I see with this plan with the nurse or doctor.    ..........................................................................................................................................  Patient/Patient Representative Signature      ..........................................................................................................................................  Patient Representative Print Name and Relationship to Patient    ..................................................               ................................................  Date                                            Time    ..........................................................................................................................................  Reviewed by Signature/Title    ...................................................              ..............................................  Date                                                            Time

## 2017-09-18 NOTE — CONSULTS
RENAL CONSULTATION NOTE    REFERRING MD:  Hamilton Allison MD    REASON FOR CONSULTATION:  ESRD    HPI:  71 y.o man with ESRD (MWF  IHD), hypertension, T2DM and HCV, who was admitted for seizure activity. I reviewed his his chart, and I discussed his case with the medical staff. Pt is currently getting an EEG and his is bit sedated for a head MRI. Patient had altered mental status on the [place] with [place]. She called EMS, and he was found to he hypoglycemic. EMS witnessed a 30 seconds tonic clonic seizure. He had a second episode at [facility]. He was treated with IV ativan and Keppra. He is transferred her for further management.     ROS:  A complete review of systems was performed and is negative except as noted above.    PMH:    Past Medical History:   Diagnosis Date     Acute pulmonary edema (H)      Anemia      Bipolar 1 disorder (H)      CAD (coronary artery disease)      Cognitive impairment      Colitis      COPD (chronic obstructive pulmonary disease) (H)      Depression      Diabetes (H)      ESRD (end stage renal disease) (H)      ESRD (end stage renal disease) on dialysis (H)      Glaucoma      Hepatitis C      Hyperlipidemia      Hyperlipidemia      Hypertension      IV drug abuse 1970's     Non compliance w medication regimen      Non-ischemic cardiomyopathy (H)      Non-ST elevation MI (NSTEMI) (H)      Peripheral neuropathy (H)      Peripheral neuropathy (H)      Pneumonia        PSH:    Past Surgical History:   Procedure Laterality Date     CREATE FISTULA ARTERIOVENOUS UPPER EXTREMITY Right 5/8/2016    Procedure: CREATE FISTULA ARTERIOVENOUS UPPER EXTREMITY;  Surgeon: Josias Valente MD;  Location:  OR     CREATE FISTULA ARTERIOVENOUS UPPER EXTREMITY Right 12/14/2016    Procedure: CREATE FISTULA ARTERIOVENOUS UPPER EXTREMITY;  Surgeon: Contreras Bowman MD;  Location:  OR     HERNIA REPAIR         MEDICATIONS:      aspirin  81 mg Oral  Daily     atorvastatin  80 mg Oral Daily     brimonidine  1 drop Both Eyes TID     bumetanide  4 mg Oral Daily     calcium acetate  667 mg Oral TID w/meals     carvedilol  37.5 mg Oral BID w/meals     citalopram  20 mg Oral Daily     cloNIDine  1 patch Transdermal Weekly     folic acid-vit B6-vit B12  1 tablet Oral Daily     gabapentin (NEURONTIN) capsule 200 mg  200 mg Oral Daily     hydrALAZINE  50 mg Oral TID     isosorbide mononitrate  90 mg Oral Daily     latanoprost  1 drop Both Eyes At Bedtime     lisinopril  40 mg Oral Daily     oxyCODONE (ROXICODONE) IR tablet 15 mg  15 mg Oral Q6H     sodium bicarbonate  650 mg Oral BID     umeclidinium  1 puff Inhalation Daily     cholecalciferol  1,000 Units Oral Daily     levETIRAcetam  500 mg Intravenous Q24H     [START ON 9/25/2017] cloNIDine   Transdermal Weekly     cloNIDine   Transdermal Q8H       ALLERGIES:    Allergies as of 09/17/2017 - Xu as Reviewed 07/15/2017   Allergen Reaction Noted     Morphine Rash 09/29/2016       FH:    Family History   Problem Relation Age of Onset     Coronary Artery Disease Brother      DIABETES Mother        SH:    Social History     Social History     Marital status:      Spouse name: N/A     Number of children: N/A     Years of education: N/A     Occupational History     Not on file.     Social History Main Topics     Smoking status: Current Every Day Smoker     Types: Cigarettes     Last attempt to quit: 1/1/2017     Smokeless tobacco: Not on file     Alcohol use 8.4 oz/week     7 Cans of beer, 7 Shots of liquor per week      Comment: coctail 2-3/month     Drug use: Yes     Special: Marijuana      Comment: baldomero     Sexual activity: Not on file     Other Topics Concern     Not on file     Social History Narrative       PHYSICAL EXAM:    /78  Temp 98.3  F (36.8  C) (Oral)  Resp 10  Wt 93.7 kg (206 lb 9.1 oz)  SpO2 100%  BMI 25.82 kg/m2  GENERAL: NAD.  HEENT:  Normocephalic. No gross abnormalities. Eyes are  closed.  CV: RRR, + murmur, no clicks, gallops, or rubs, no edema  RESP: CTAb anteriorly. No wheezes.  GI: Abdomen soft, NT  MUSCULOSKELETAL: extremities nl - no gross deformities noted  SKIN: no suspicious lesions or rashes, dry to touch  NEURO:  Pt is sleeping. He had some sedation for the MRI.  PSYCH: Unable to evaluate  RAVF    LABS:      CBC RESULTS:     Recent Labs  Lab 09/18/17 0446   WBC 5.4   RBC 3.54*   HGB 10.8*   HCT 31.2*   *       BMP RESULTS:    Recent Labs  Lab 09/18/17 0446      POTASSIUM 2.9*   CHLORIDE 97   CO2 26   BUN 56*   CR 6.36*   GLC 77   ALLYSON 9.1       INR  Recent Labs  Lab 09/18/17 0446   INR 1.05      Head MRI: no acute intracranial process.     DIAGNOSTICS:  Reviewed    A/P:  71 y.o man with ESRD, admitted for new onset of seizure activity.    1. ESRD   -MWF, LAVF    2. New onset of seizure activity   -EEG   -per neuro    3. HTN. He is on clonidine patch, Coreg, lisinopril, hydralazine and Imdur. SBP is low on dialysis.    4. Anemia. LITO with HD    5. HCV.     Plan  1. Decrease UF to 1.5 liters net. Hold LITO for now. Decrease BP medications as needed to avoid hypotension.   2. 4K bath. Recheck K after HD    Neal Nguyen MD  [facility]  Office Phone: [phone]  Pager: [phone]

## 2017-09-18 NOTE — PROGRESS NOTES
VSS, pt had an MRI which was negative, also had 1 hour EEG. Had dialysis today where 1.5L of fluid was taken. No seizures. Possible transfer out of ICU tomorrow .

## 2017-09-18 NOTE — PROGRESS NOTES
1 hour video EEG # , ordered by Dr. Bal.  Pt has periods of awakening and resting.  Partially sedated after MRI.    No activations.  Pt is undergoing dialysis at time of EEG.

## 2017-09-18 NOTE — CONSULTS
[facility]  [facility]    Neurology Consultation Note     Keagan Wayne MRN# [id number]   YOB: 1945 Age: 71 year old    Code Status:Full Code   Date of Admission: 9/18/2017  Date of Consult: 09/18/2017      ADDENDUM: 9/18/2017  5:03 PM  MRI brain:  1. Cerebral atrophy.  2. Scattered nonspecific white matter changes which are probably due  to chronic small vessel ischemic disease.  3. No evidence for intracranial hemorrhage, acute infarct, or any  focal mass lesions.  4. Exam mildly limited by motion artifact.       EEG - no seizures.   ----------------------------------------------------  Padilla Bal MD, PhD, [facility]      _________________________________   Primary Care Physician   Yolanda Hernandez  ______________________________________________         Assessment & Plan   ______________________________________________  (G40.901) Non-convulsive status epilepticus (H)  (primary encounter diagnosis)  --Probable subclinical status  --MRI brain now  ----EEG monitoring.   (F12.10) Marijuana abuse  -- Previously positive for marijuana  --Will check during this admission  #. DVT Prophylaxis  --Mechanical   #. PT/OT/Speech  --Start evaluations  #. Nutrition / GI Prophylaxis  --Per recommendations of speech therapy    #. Code Status: Full Code     TIME:   Neurocritical care time:  50 minutes for evaluation and management of status epilepticus.  Patient is critically ill / has a very high risk of deterioration  ----------------------------------------------------------------------------------  ----------------------------------------------------------------------------------  Reason for consult: I was asked by Dr. Allison to evaluate this patient for seizures.    Chief Complaint   ______________________________________________  Seizure  History is obtained from the patient    History of Present Illness   ______________________________________________  71 year old  male who presents for evaluation after a fall. The patient was at a casino and states he fell asleep on the stool. Not drinking alcohol. He fell to the floor off this stool which was approximately 2-1/2 feet off the ground. States he fell to the left and has pain at all over his left side and strained his neck. He struck his head but no loss of consciousness. Has diffuse tingling all over his body. He also has history of chronic neuropathy. Denies any weakness of the extremities. No slurred speech. No word finding difficulty. No visual changes. No tinnitus. No mid or lower back pain. Complains of left-sided paraspinal neck pain radiating to the trapezius. Denies abdominal pain or injury. No recent black or bloody stools. No change in his urinary output. His wife states that she thought he seemed labored with his breathing. No other complaints.    After that, while EMS was present, the patient had a 30-second, what appeared to be tonic-clonic seizure-like activity.  He was postictal afterward and was subsequently transferred to [facility] where he had a second seizure episode.  The patient was given IV Ativan. A head CT of the brain without contrast was negative.  The patient was given a total of 2 mg of IV Ativan and loaded with Keppra and was transferred to our Neuro ICU for further evaluation.      No further seizures. Patient is still very somnolent and can not provide any additional history.   Past Medical History    ______________________________________________  Past Medical History:   Diagnosis Date     Acute pulmonary edema (H)      Anemia      Bipolar 1 disorder (H)      CAD (coronary artery disease)      Cognitive impairment      Colitis      COPD (chronic obstructive pulmonary disease) (H)      Depression      Diabetes (H)      ESRD (end stage renal disease) (H)      ESRD (end stage renal disease) on dialysis (H)      Glaucoma      Hepatitis C      Hyperlipidemia      Hyperlipidemia       Hypertension      IV drug abuse 1970's     Non compliance w medication regimen      Non-ischemic cardiomyopathy (H)      Non-ST elevation MI (NSTEMI) (H)      Peripheral neuropathy (H)      Peripheral neuropathy (H)      Pneumonia      Past Surgical History   ______________________________________________  Past Surgical History:   Procedure Laterality Date     CREATE FISTULA ARTERIOVENOUS UPPER EXTREMITY Right 5/8/2016    Procedure: CREATE FISTULA ARTERIOVENOUS UPPER EXTREMITY;  Surgeon: Josias Valente MD;  Location: [facility] OR     CREATE FISTULA ARTERIOVENOUS UPPER EXTREMITY Right 12/14/2016    Procedure: CREATE FISTULA ARTERIOVENOUS UPPER EXTREMITY;  Surgeon: Contreras Bowman MD;  Location: [facility] OR     HERNIA REPAIR       Prior to Admission Medications   ______________________________________________  Prior to Admission Medications   Prescriptions Last Dose Informant Patient Reported? Taking?   Gabapentin (NEURONTIN PO)  Self Yes No   Sig: Take 200 mg by mouth daily   OXYCODONE HCL PO  Self Yes No   Sig: Take 15 mg by mouth every 6 hours (takes scheduled)   VITAMIN D, CHOLECALCIFEROL, PO 9/17/2017 at Unknown time Self Yes Yes   Sig: Take 1,000 Units by mouth daily   albuterol (2.5 MG/3ML) 0.083% nebulizer solution  Self No No   Sig: Take 1 vial (2.5 mg) by nebulization every 4 hours as needed for shortness of breath / dyspnea or wheezing   albuterol (PROAIR HFA, PROVENTIL HFA, VENTOLIN HFA) 108 (90 BASE) MCG/ACT inhaler  Self No No   Sig: Inhale 2 puffs into the lungs every 4 hours as needed for shortness of breath / dyspnea or wheezing   aspirin 81 MG chewable tablet  Self No No   Sig: Take 1 tablet (81 mg) by mouth daily   atorvastatin (LIPITOR) 80 MG tablet Past Week at Unknown time Self No Yes   Sig: Take 1 tablet (80 mg) by mouth daily   brimonidine (ALPHAGAN-P) 0.15 % ophthalmic solution Past Week at Unknown time Self No Yes   Sig: Place 1 drop into both eyes 3 times daily   bumetanide (BUMEX) 2 MG  tablet Unknown at Unknown time Self No No   Sig: Take 2 tablets (4 mg) by mouth daily   calcium acetate (PHOSLO) 667 MG CAPS Unknown at Unknown time Self No No   Sig: Take 1 capsule (667 mg) by mouth 3 times daily (with meals)   carvedilol (COREG) 25 MG tablet  Self No No   Sig: Take 1.5 tablets (37.5 mg) by mouth 2 times daily (with meals)   citalopram (CELEXA) 20 MG tablet  Self No No   Sig: Take 1 tablet (20 mg) by mouth daily   cloNIDine (CATAPRES-TTS2) 0.2 MG/24HR patch  Self Yes No   Sig: Place 1 patch onto the skin once a week   folic acid-vit B6-vit B12 (FOLGARD) 0.8-10-0.115 MG TABS per tablet  Self No No   Sig: Take 1 tablet by mouth daily   guaiFENesin-codeine (ROBITUSSIN AC) 100-10 MG/5ML SOLN  Self No No   Sig: Take 5 mLs by mouth every 4 hours as needed for cough   guaiFENesin-dextromethorphan (ROBITUSSIN DM) 100-10 MG/5ML syrup  Self No No   Sig: Take 10 mLs by mouth every 4 hours as needed for cough   hydrALAZINE (APRESOLINE) 50 MG tablet  Self No No   Sig: Take 1 tablet (50 mg) by mouth 3 times daily   insulin glargine (LANTUS) 100 UNIT/ML injection  Self Yes No   Sig: Inject 16 Units Subcutaneous every morning    isosorbide mononitrate (IMDUR) 30 MG 24 hr tablet  Self Yes No   Sig: Take 90 mg by mouth daily   latanoprost (XALATAN) 0.005 % ophthalmic solution  Self No No   Sig: Place 1 drop into both eyes At Bedtime   lisinopril (PRINIVIL/ZESTRIL) 40 MG tablet Unknown at Unknown time Self No No   Sig: Take 1 tablet (40 mg) by mouth daily   Patient not taking: Reported on 6/22/2017   sodium bicarbonate 650 MG tablet  Self No No   Sig: Take 1 tablet (650 mg) by mouth 2 times daily   Patient not taking: Reported on 6/22/2017   umeclidinium (INCRUSE ELLIPTA) 62.5 MCG/INH oral inhaler  Self Yes No   Sig: Inhale 1 puff into the lungs daily      Facility-Administered Medications: None     Allergies   Allergies   Allergen Reactions     Morphine Rash       Social History    ______________________________________________  Social History     Social History     Marital status:      Spouse name: N/A     Number of children: N/A     Years of education: N/A     Social History Main Topics     Smoking status: Current Every Day Smoker     Types: Cigarettes     Last attempt to quit: 1/1/2017     Smokeless tobacco: None     Alcohol use 8.4 oz/week     7 Cans of beer, 7 Shots of liquor per week      Comment: [place] 2-3/month     Drug use: Yes     Special: Marijuana      Comment: baldomero     Sexual activity: Not Asked     Other Topics Concern     None     Social History Narrative         Family History   ______________________________________________  Family History   Problem Relation Age of Onset     Coronary Artery Disease Brother      DIABETES Mother        Review of Systems   ______________________________________________  A comprehensive review of  10 systems was performed and found to be negative except as described in this note  CONSTITUTIONAL: negative for fever, chills, change in weight  INTEGUMENTARY/SKIN: no rash or obvious new lesions  ENT/MOUTH: no sore throat, new sinus pain or nasal drainage, no neck mass noted  RESP: No pleuretic pain, No cough, no hemoptysis, No SOB   CV: negative for chest pain, palpitations or peripheral edema  GI: no nausea, vomiting, change in stools  : no dysuria or hematuria  MUSCULOSKELETAL: no myalgias, arthralgias or join efffusion  ENDOCRINE: no history of polyuria, polydyspsia or symptoms of thyroid dysfunction  PSYCHIATRIC: no change in mood stable  LYMPHATIC: no new lymphadenopathy  HEME: no bleeding or easy bruisability  NEURO: see HPI    Physical Exam   ______________________________________________  Weight:206 lbs 9.14 oz; Height:Data Unavailable  Temp: 98.3  F (36.8  C) Temp src: Oral BP: 140/79   Heart Rate: 70 Resp: 17 SpO2: 100 % O2 Device: (P) None (Room air) Oxygen Delivery: (P) 2 LPM  General Appearance:  No acute  distress  Neuro:       Mental Status Exam:   Somnolent. Speech slurred and language is limited. Mental status is normal       Cranial Nerves:  Pupils 3 mm, reactive. EOMI. Face sensation is normal. Face is symmetric.Tongue and uvula are midline. Other CN are normal           Motor:  5/5 X 4. Tone and bulk are normal           Reflexes:  Normal DTR.Toes downgoing.        Sensory:  Untestable.                  Coordination:   Untestable.        Gait:  Untestable.   Neck: no nuchal rigidity, normal thyroid. No carotid bruits.    Cardiovascular: Regular rate and rhythm, no m/r/g  Lungs: Clear to auscultation  Abdomen: Soft, not tender, not distended  Extremities: No clubbing, no cyanosis, no edema    Data   ______________________________________________  All Data personally reviewed:       Labs:   CBC RESULTS:     Recent Labs  Lab 09/18/17 0446   WBC 5.4   RBC 3.54*   HGB 10.8*   HCT 31.2*   *     Basic Metabolic Panel:   Recent Labs   Lab Test  06/22/17   0723  03/31/17   0634  01/19/17   0735   NA  137  135  135   POTASSIUM  4.3  4.6  3.6   CHLORIDE  101  101  98   CO2  27  22  26   BUN  59*  62*  45*   CR  6.91*  5.12*  6.16*   GLC  169*  315*  156*   ALLYSON  8.7  8.2*  8.4*     Liver panel:  Recent Labs   Lab Test  06/22/17   0723  01/18/17   0741  01/15/17   1530  12/12/16   0542  12/06/16   0740  10/25/16   1630   PROTTOTAL  9.3*  7.9  7.9   --   9.8*  8.4   ALBUMIN  3.9  2.7*  2.6*  3.1*  3.6  3.2*   BILITOTAL  0.7  0.3  0.8   --   0.4  0.6   ALKPHOS  68  57  53   --   116  51   AST  11  10  9   --   36  8   ALT  21  9  9   --   29  8     INR:  Recent Labs   Lab Test  09/18/17   0446  06/22/17   0723  12/15/16   0545  12/12/16   0542  12/06/16   0740   INR  1.05  1.15*  1.17*  1.12  1.18*      Lipid Profile:  Recent Labs   Lab Test  06/22/17   0723  12/07/16   0526 01/04/16 04/14/12   0645   CHOL  119  117  260*  163   HDL  74  39*  47  33*   LDL  21  57  193  96   TRIG  118  104  98  171*   CHOLHDLRATIO    --    --    --   4.9     Thyroid Panel:  Recent Labs   Lab Test  01/27/16   0737 01/06/16 07/12/14   1355  04/14/12   0645   TSH  6.44*  3.4  2.12  2.92   T4  1.23   --    --    --       Vitamin B12:   Recent Labs   Lab Test  12/08/16   0515  04/14/12   0645   B12  365  996      Vitamin D level:   Recent Labs   Lab Test  12/08/16   0515  10/07/16   1525  05/07/16   0515  01/28/16   0650   VITDT  23  24  28  18*     A1C:   Recent Labs   Lab Test  06/22/17   0723  01/16/17   0810  12/06/16   0740  08/06/16   0636  06/30/16   0535   A1C  8.1*  7.6*  8.8*  10.4*  9.3*     Troponin I:   Recent Labs   Lab Test  12/07/16   1125  12/07/16   0526  12/06/16   1645  12/06/16   1242  12/06/16   0740  10/25/16   1630  10/06/16   2039  09/29/16   1051   TROPI  0.176*  0.279*  0.255*  0.098*  <0.015  The 99th percentile for upper reference range is 0.045 ug/L.  Troponin values in   the range of 0.045 - 0.120 ug/L may be associated with risks of adverse   clinical events.    <0.015  The 99th percentile for upper reference range is 0.045 ug/L.  Troponin values in   the range of 0.045 - 0.120 ug/L may be associated with risks of adverse   clinical events.    <0.015  The 99th percentile for upper reference range is 0.045 ug/L.  Troponin values in   the range of 0.045 - 0.120 ug/L may be associated with risks of adverse   clinical events.    0.024     Ammonia: No lab results found.  CK: No lab results found.     CRP inflammation: No lab results found.  ESR: No lab results found.    STANTON: No lab results found.    ANCA: No lab results found.   Drug Screen: Recent Labs   Lab Test  06/29/16   1533   UAMP  Negative   Cutoff for a negative amphetamine is 500 ng/mL or less.     UBARB  Negative   Cutoff for a negative barbiturate is 200 ng/mL or less.     BENZODIAZEUR  Negative   Cutoff for a negative benzodiazepine is 200 ng/mL or less.     UCANN  Positive   Cutoff for a positive cannabinoid is greater than 50 ng/mL. This is an   unconfirmed  screening result to be used for medical purposes only.  *   UCOC  Negative   Cutoff for a negative cocaine is 300 ng/mL or less.     OPIT  Negative   Cutoff for a negative opiate is 300 ng/mL or less.     UPCP  Negative   Cutoff for a negative PCP is 25 ng/mL or less.       Alcohol level:  Recent Labs   Lab Test  01/27/16   0737   ETOH  <0.01     UA Results:  Recent Labs   Lab Test  06/22/17   0719  10/22/16   1546   COLOR  Yellow  Yellow   APPEARANCE  Clear  Clear   URINEGLC  Negative  100*   URINEBILI  Negative  Negative   URINEKETONE  Negative  Negative   SG  1.014  1.020   UBLD  Negative  Negative   URINEPH  6.0  6.0   PROTEIN  >499*  >=300*   UROBILINOGEN   --   0.2   NITRITE  Negative  Negative   LEUKEST  Negative  Negative   RBCU  3*  O - 2   WBCU  1  O - 2     Most Recent 6 Bacteria Isolates From Any Culture (See EPIC Reports for Culture Details):  Recent Labs   Lab Test  01/15/17   0910  01/15/17   0624  01/15/17   0600  12/06/16   0815  12/06/16   0812  07/01/16   0950   CULT  Moderate growth Normal memo  No growth  No growth  No growth  No growth  Heavy growth Normal memo  Moderate growth Haemophilus influenzae Beta lactamase negative  Beta-lactamase negative Haemophilus influenzae are usually susceptible to   ampicillin, amoxacillin/clavulanic acid, levofloxacin, and 3rd generation   cephalosporins, such as ceftriaxone.  *  Canceled, Test credited Incorrectly ordered by PCU/Clinic        Cardiac US:   --       Neurophysiology:   --       Imaging:   No neuroimaging

## 2017-09-18 NOTE — PROGRESS NOTES
Potassium   Date Value Ref Range Status   09/18/2017 2.9 (L) 3.4 - 5.3 mmol/L Final     Lab Results   Component Value Date    HGB 10.8 09/18/2017     Weight: 93.7 kg (206 lb 9.1 oz)  POST WT  DIALYSIS PROCEDURE NOTE    Patient dialyzed for 3.5 hrs. on a 4 K bath with a net fluid removal of  1.5L.  A BFR of 450 ml/min was obtained via a RUE AVF using 15gauge needles.    The patient was seen by Dr. Nguyen during the treatment.  Total heparin received during the treatment: 0 units. Sites held x 10 min then  pressure drsgs applied.  Meds  Given: None. Complications: None..  Procedure and ESRD teaching done and questions answered. See flowsheet in EPIC for further details and post assessment.  Machine water alarm in place and functioning.  Consent for dialysis signed  Pt dialyzed at bedside in ICU  Vascular Access: Aseptic prep done for both on/off.  Report received from: AWA Scott RN  Report given to: AWA Scott RN  HEPATITIS B SURFACE ANTIGEN Non-Reactive DATE 6/22/17   HEPATITIS B SURFACE ANTIBODY Immune DATE 6/22/17  Chlorine/Chloramine water system checked every 4 hours.

## 2017-09-18 NOTE — H&P
DATE OF ADMISSION:  09/18/2017       PRIMARY CARE PHYSICIAN:  Yolanda Hernandez       PRIMARY NEPHROLOGIST:  Dr. Mason Calle      CHIEF COMPLAINT:  Seizure episode x2.      HISTORY OF PRESENT ILLNESS:  Keagan Wayne is a 71-year-old -American gentleman who has a history of type 1 diabetes, end-stage renal disease for which he undergoes Monday, Wednesday, Friday hemodialysis, hypertension, COPD, hyperlipidemia, hepatitis C, nonischemic cardiomyopathy, history of NSTEMI, coronary artery disease who presents to [facility] after being transferred from [facility] after the patient reportedly had 2 seizure episodes.      The patient, according to his wife who accompanies him, has been complaining of some vague headaches for the last 2-3 days.  The patient wanted to eat liver and so he and his wife went to the casino and had a normal dinner and when they were going home while the patient was a passenger, he started acting odd.  He started pressing buttons and was not responsive and not interactive with the wife.  She had contacted EMS who had come and checked his blood glucose and it was about 586.  A second measure was over 600.  After that, while EMS was present, the patient had a 30-second, what appeared to be tonic-clonic seizure-like activity.  He was postictal afterward and was subsequently transferred to [facility] where he had a second seizure episode.  The patient was given IV Ativan.  Blood work revealed white count 5.7, hemoglobin of 11.4, platelets of 127.  Sodium was 122 but his glucose was 757 so corrected was about 128.  Potassium is 4.3, BUN 55, creatinine 0.83, noting the patient is on hemodialysis.  INR is 1.1.  Troponin is negative.  Alcohol was negative.  Phosphorus 2.6, mag was 1.5.  Troponin level was negative.  EKG showed first degree block, normal sinus rhythm, left atrial enlargement, left axis deviation, incomplete right bundle branch block and some T-wave  abnormalities.  A head CT of the brain without contrast was negative.  The patient was given a total of 2 mg of IV Ativan and loaded with Keppra and was transferred to our Neuro ICU for further evaluation.      In ICU, the patient was accompanied by his wife.  The patient was oriented but was quite lethargic.  The only thing he complains of is a headache.  The wife denies any fevers, chills or sweats or recent travel.  Denies any bowel or bladder problems or any syncope or presyncope prior to this.      PAST MEDICAL HISTORY:   1.  End-stage renal disease on Monday, Wednesday, Friday hemodialysis.   2.  Type 2 diabetes.   3.  Chronic hepatitis C.   4.  Cataract surgery.   5.  Hypertension.   6.  Vitamin D deficiency.     7.  Diabetic nephropathy.    8.  Dyslipidemia.    9.  Erectile dysfunction.   10.  History of drug abuse.   11.  History of bilateral pleural effusions.   12.  Anemia   13.  History of hyperkalemia.   14.  History of marijuana use.     15.  History of pulmonary nodule.      FAMILY HISTORY:  Mother with diabetes, glaucoma and hypertension.      SOCIAL HISTORY:  The patient was a former smoker, quit in 2014.  No alcohol.      ALLERGIES:  Morphine.      REVIEW OF SYSTEMS:  Ten-point review of systems reviewed and as dictated in the history of present illness.      PHYSICAL EXAMINATION:   VITAL SIGNS:  Temperature 98.3, heart rate 70, respirations 17, blood pressure 140/79, sats of 100% on room air.   GENERAL:  The patient is somnolent but does arouse and is oriented.   HEENT:  Pupils equal.  Sclerae are anicteric.  Mucous membranes are moist.   NECK:  No nuchal rigidity.   LUNGS:  Clear to auscultation.   CARDIOVASCULAR:  S1, S2, regular rate and rhythm.   ABDOMEN:  Soft, nontender, normoactive bowel sounds.   EXTREMITIES:  No edema.   NEUROLOGIC:  He is able to have equal  strength and no facial asymmetry.  He does move his lower extremities, upper extremities equally.  Gait was not assessed.   Coordination was not done.      LABORATORY AND IMAGING STUDIES:  As dictated in the history of present illness.      ASSESSMENT:  Keagan Wayne is a 71-year-old gentleman with a history of end-stage renal disease on Monday, Wednesday, Friday hemodialysis as well as diabetes with nephropathy, chronic hepatitis C without any prior history of seizures, neither in he nor his family, admitted now with 2 episodes of seizures.      PLAN:   1.  New onset of seizures x2.  The patient's head CT shows no acute finding.  Metabolically the patient's sodium corrects to almost near normal at 128.  The basis of his seizure is not quite evident at this time.  Suspect this is likely more metabolic but the patient will undergo an MRI with and without contrast.  The patient will have neuro checks q.4 h.  The patient will be given IV Keppra.  He was loaded at the outside facility.  We are going to give him 5 mg IV Keppra daily.  Will obtain an EEG and a formal neurology consultation.  Will avoid IV fluids given the patient is a dialysis patient.  No antibiotics have been given.  We will defer to Neurology with respect to whether they would like to pursue a lumbar puncture as well.  The patient does complain of headache for the last few days.   2.  Diabetes with severe hyperglycemia.  I suspect part of the patient's blood sugar is elevated because of his breakthrough seizure episode.  The patient has been started on an insulin drip.  We will hold the patient's Lantus and sliding scale.  Will keep the patient n.p.o.   3.  End-stage renal disease.  The patient is on Monday, Wednesday, Friday hemodialysis, underwent uneventful hemodialysis last week.  Will obtain a formal nephrology consultation for likely hemodialysis later on today.   4.  History of chronic obstructive pulmonary disease.  Will make albuterol nebs available as needed.   5.  Hypertension.  Will continue the patient on his Coreg, hydralazine and lisinopril.   6.  History of  depression.  Will continue the patient on Celexa.     7.  History of glaucoma.  Will continue the patient on his eye drops.   8.  Hyperlipidemia.  Will continue the patient on Lipitor.   9.  Deep vein thrombosis prophylaxis.  The patient will receive compression boots      CODE STATUS:  Full.         ANGELICA BRITTON MD             D: 2017 06:54   T: 2017 07:56   MT: ROBERT      Name:     KRISTAL MORALES   MRN:      [id number]        Account:      [id number]   :      1945           Admitted:     [id number]      Document: [id number]       cc: MICHELLE Hernandez MD

## 2017-09-18 NOTE — PHARMACY-ADMISSION MEDICATION HISTORY
Admission medication history interview status for the 9/18/2017  admission is complete. See EPIC admission navigator for prior to admission medications     Medication history source reliability:Good    Actions taken by pharmacist (provider contacted, etc):Called pts sig other María for med list.     Additional medication history information not noted on PTA med list :Multiple changes made to medication list.    Medication reconciliation/reorder completed by provider prior to medication history? Yes    Time spent in this activity: 30 minutes    Prior to Admission medications    Medication Sig Last Dose Taking? Auth Provider   calcium acetate (PHOSLO) 667 MG CAPS capsule Take 1,334 mg by mouth 4 times daily (with meals and nightly)  Yes Unknown, Entered By History   calcium acetate (PHOSLO) 667 MG CAPS capsule Take 667 mg by mouth daily With snack  Yes Unknown, Entered By History   Carvedilol (COREG PO) Take 25 mg by mouth 2 times daily (with meals)  Yes Unknown, Entered By History   LOSARTAN POTASSIUM PO Take 100 mg by mouth daily  Yes Unknown, Entered By History   Gabapentin (NEURONTIN PO) Take 200 mg by mouth daily  Yes Unknown, Entered By History   insulin glargine (LANTUS) 100 UNIT/ML injection Inject 16 Units Subcutaneous every morning   Yes Unknown, Entered By History   isosorbide mononitrate (IMDUR) 30 MG 24 hr tablet Take 90 mg by mouth daily  Yes Unknown, Entered By History   umeclidinium (INCRUSE ELLIPTA) 62.5 MCG/INH oral inhaler Inhale 1 puff into the lungs daily  Yes Unknown, Entered By History   OXYCODONE HCL PO Take 10 mg by mouth 3 times daily as needed (takes scheduled)  Yes Unknown, Entered By History   bumetanide (BUMEX) 2 MG tablet Take 2 tablets (4 mg) by mouth daily  Yes Tamir Jett MD   atorvastatin (LIPITOR) 80 MG tablet Take 1 tablet (80 mg) by mouth daily  Yes Eduardo Mahmood MD   VITAMIN D, CHOLECALCIFEROL, PO Take 1,000 Units by mouth daily 9/17/2017 at Unknown time Yes  Unknown, Entered By History   cloNIDine (CATAPRES-TTS2) 0.2 MG/24HR patch Place 1 patch onto the skin once a week Past Month at does not have patch on Yes Reported, Patient   aspirin 81 MG chewable tablet Take 1 tablet (81 mg) by mouth daily  Yes Nila Espana DO   citalopram (CELEXA) 20 MG tablet Take 1 tablet (20 mg) by mouth daily  Yes Nila Espana DO

## 2017-09-18 NOTE — PROGRESS NOTES
ICU Multi-Disciplinary Note  71-year-old gentleman with a history of end-stage renal disease on hemodialysis as well as diabetes with nephropathy, chronic hepatitis C without any prior history of seizures, admitted on 9/18 with 2 episodes of seizures. Loaded with keppra. Undergoing MRI brain today. Start EEG. No further seizure activity.   Patient condition reviewed and discussed while on multidisciplinary rounds today.     The Critical Care service will continue to follow peripherally while patient is within the ICU. We are readily available should issues arise.  Please feel free to contact us for anything with which we may be of assistance.    Karlee Garcia

## 2017-09-18 NOTE — PROGRESS NOTES
[facility]    Hospitalist Progress note  Eduardo Mahmood MD    AM ADMISSION  Chart, labs, vitals and medications reviewed.  PTA medications updated.    ASSESSMENT AN PLAN: Keagan Wayne is a 71-year-old gentleman with a history of end-stage renal disease on Monday, Wednesday, Friday hemodialysis as well as diabetes with nephropathy, chronic hepatitis C without any prior history of seizures, neither in he nor his family, admitted now with 2 episodes of seizures.       1.  New onset of seizures x2.  Head CT showed no acute finding.  MRI of the brain also negative for acute intra cranial pathology.Metabolically the patient's sodium corrects to almost near normal at 128.  The basis of his seizure is not quite evident at this time.  Suspect this is likely more metabolic but the patient will undergo an MRI with and without contrast.  The patient will have   --Continue neuro checks q.4 h.  The patient will be given   --- Loading dose of Keppra was given outside facility. Further management per neurology  ---Neurology consulted and input appreciated  -- MRI of the brain, EEG and drug screen  2.  Diabetes with severe hyperglycemia.  Suspected part of the patient's blood sugar is elevated because of his breakthrough seizure episode.    --- started on an insulin drip.   -- Resume PTA regimen and d/c insulin drip once able to tolerate po intake  3.  End-stage renal disease.  The patient is on Monday, Wednesday, Friday hemodialysis, underwent uneventful hemodialysis last week.    -- Nephrology consulted   -- Hemodialysis ongoing.   4.  History of chronic obstructive pulmonary disease.    ---albuterol nebs available as needed.   5.  Hypertension.  Will   ----continue the patient on his Coreg and Losartan. Patient not on hydralazine and lisinopril.--> d/c both  6.  History of depression.  -continue the patient on Celexa.     7.  History of glaucoma. - continue the patient on his eye drops.   8.   Hyperlipidemia.  -continue the patient on Lipitor.   9.  Deep vein thrombosis prophylaxis.  The patient will receive compression boots       CODE STATUS:  Full.       Disposition: Depends on the clinical course.      Eduardo Mahmood MD  [facility] Hospitalist  Pager # [phone]  9/18/2017      SUBJECTIVE: Patient seen and assessed. On HD. Sleepy but arousal. Report leg pain.     OBJECTIVE:    /60  Temp 98.3  F (36.8  C) (Oral)  Resp 16  Wt 93.7 kg (206 lb 9.1 oz)  SpO2 100%  BMI 25.82 kg/m2         Labs:    Most Recent 3 CBC's:  Recent Labs   Lab Test  09/18/17   0446  06/22/17   0723  03/31/17   0634   WBC  5.4  4.5  5.8   HGB  10.8*  12.3*  10.6*   MCV  88  95  90   PLT  146*  139*  169      Most Recent 3 BMP's:  Recent Labs   Lab Test  09/18/17 0446  06/22/17   0723  03/31/17   0634   NA  133  137  135   POTASSIUM  2.9*  4.3  4.6   CHLORIDE  97  101  101   CO2  26  27  22   BUN  56*  59*  62*   CR  6.36*  6.91*  5.12*   ANIONGAP  10  9  12   ALLYSON  9.1  8.7  8.2*   GLC  77  169*  315*     Most Recent 3 Troponin's:  Recent Labs   Lab Test  12/07/16   1125  12/07/16   0526  12/06/16   1645   TROPI  0.176*  0.279*  0.255*     Most Recent 3 INR's:  Recent Labs   Lab Test  09/18/17   0446  06/22/17   0723  12/15/16   0545   INR  1.05  1.15*  1.17*     Most Recent 2 LFT's:  Recent Labs   Lab Test  09/18/17   0446  06/22/17   0723   AST  8  11   ALT  11  21   ALKPHOS  92  68   BILITOTAL  0.6  0.7     Most Recent Cholesterol Panel:  Recent Labs   Lab Test  06/22/17   0723   CHOL  119   LDL  21   HDL  74   TRIG  118     Most Recent 6 Bacteria Isolates From Any Culture (See EPIC Reports for Culture Details):  Recent Labs   Lab Test  01/15/17   0910  01/15/17   0624  01/15/17   0600  12/06/16   0815  12/06/16   0812  07/01/16   0950   CULT  Moderate growth Normal memo  No growth  No growth  No growth  No growth  Heavy growth Normal memo  Moderate growth Haemophilus influenzae Beta lactamase  negative  Beta-lactamase negative Haemophilus influenzae are usually susceptible to   ampicillin, amoxacillin/clavulanic acid, levofloxacin, and 3rd generation   cephalosporins, such as ceftriaxone.  *  Canceled, Test credited Incorrectly ordered by PCU/Clinic     Most Recent TSH, T4 and HgbA1c: Recent Labs   Lab Test  09/18/17   0446   01/27/16   0737   TSH   --    --   6.44*   T4   --    --   1.23   A1C  11.9*   < >   --     < > = values in this interval not displayed.       Prescriptions Prior to Admission   Medication Sig Dispense Refill Last Dose     calcium acetate (PHOSLO) 667 MG CAPS capsule Take 1,334 mg by mouth 4 times daily (with meals and nightly)        calcium acetate (PHOSLO) 667 MG CAPS capsule Take 667 mg by mouth daily With snack        Carvedilol (COREG PO) Take 25 mg by mouth 2 times daily (with meals)        LOSARTAN POTASSIUM PO Take 100 mg by mouth daily        Gabapentin (NEURONTIN PO) Take 200 mg by mouth daily   Taking     insulin glargine (LANTUS) 100 UNIT/ML injection Inject 16 Units Subcutaneous every morning    Taking     isosorbide mononitrate (IMDUR) 30 MG 24 hr tablet Take 90 mg by mouth daily   Taking     umeclidinium (INCRUSE ELLIPTA) 62.5 MCG/INH oral inhaler Inhale 1 puff into the lungs daily   Taking     OXYCODONE HCL PO Take 10 mg by mouth 3 times daily as needed (takes scheduled)   Taking     bumetanide (BUMEX) 2 MG tablet Take 2 tablets (4 mg) by mouth daily 60 tablet 2      atorvastatin (LIPITOR) 80 MG tablet Take 1 tablet (80 mg) by mouth daily 30 tablet 0      VITAMIN D, CHOLECALCIFEROL, PO Take 1,000 Units by mouth daily   9/17/2017 at Unknown time     cloNIDine (CATAPRES-TTS2) 0.2 MG/24HR patch Place 1 patch onto the skin once a week   Past Month at does not have patch on     aspirin 81 MG chewable tablet Take 1 tablet (81 mg) by mouth daily 36 tablet  Taking     citalopram (CELEXA) 20 MG tablet Take 1 tablet (20 mg) by mouth daily 30 tablet  Taking       Scheduled  medications:    aspirin  81 mg Oral Daily     atorvastatin  80 mg Oral Daily     bumetanide  4 mg Oral Daily     calcium acetate  667 mg Oral TID w/meals     citalopram  20 mg Oral Daily     cloNIDine  1 patch Transdermal Weekly     folic acid-vit B6-vit B12  1 tablet Oral Daily     gabapentin (NEURONTIN) capsule 200 mg  200 mg Oral Daily     isosorbide mononitrate  90 mg Oral Daily     latanoprost  1 drop Both Eyes At Bedtime     oxyCODONE (ROXICODONE) IR tablet 15 mg  15 mg Oral Q6H     umeclidinium  1 puff Inhalation Daily     cholecalciferol  1,000 Units Oral Daily     levETIRAcetam  500 mg Intravenous Q24H     [START ON 9/25/2017] cloNIDine   Transdermal Weekly     cloNIDine   Transdermal Q8H     sodium chloride 0.9%  250-1,000 mL Intravenous Once in dialysis     - MEDICATION INSTRUCTIONS for Dialysis Patients -   Does not apply See Admin Instructions     losartan (COZAAR) tablet 100 mg  100 mg Oral Daily     carvedilol (COREG) tablet 25 mg  25 mg Oral BID w/meals     calcium acetate  667 mg Oral Daily     calcium acetate  1,334 mg Oral 4x Daily w/meals       I/O last 3 completed shifts:  In: 180.31 [P.O.:120; I.V.:60.31]  Out: 350 [Urine:350]  Data   Data reviewed today:  I personally reviewed the brain MRI image(s) showing chronic small vessel diseases.    Recent Labs  Lab 09/18/17  0446   WBC 5.4   HGB 10.8*   MCV 88   *   INR 1.05      POTASSIUM 2.9*   CHLORIDE 97   CO2 26   BUN 56*   CR 6.36*   ANIONGAP 10   ALLYSON 9.1   GLC 77   ALBUMIN 3.3*   PROTTOTAL 8.0   BILITOTAL 0.6   ALKPHOS 92   ALT 11   AST 8       Recent Results (from the past 24 hour(s))   MR Brain w/o Contrast    Narrative    MRI BRAIN WITHOUT CONTRAST  9/18/2017 11:05 AM    HISTORY:  Seizures.    TECHNIQUE:  Multiplanar, multisequence MRI of the brain without  gadolinium IV contrast material.    COMPARISON: 1/14/2015.    FINDINGS: Diffusion-weighted images are normal. There is no evidence  for intracranial hemorrhage or acute  infarct. Exam is slightly limited  by motion artifact. There is some mild-to-moderate cerebral atrophy.  Some patchy white matter changes are seen in both hemispheres without  mass effect. Images through the temporal lobes do not show any  hippocampal abnormalities or any temporal lobe focal lesions. Vascular  structures appear patent at the skull base.      Impression    IMPRESSION:  1. Cerebral atrophy.  2. Scattered nonspecific white matter changes which are probably due  to chronic small vessel ischemic disease.  3. No evidence for intracranial hemorrhage, acute infarct, or any  focal mass lesions.  4. Exam mildly limited by motion artifact.    SHERIDAN CRUZ MD

## 2017-09-19 LAB
ANION GAP SERPL CALCULATED.3IONS-SCNC: 6 MMOL/L (ref 3–14)
BUN SERPL-MCNC: 34 MG/DL (ref 7–30)
CALCIUM SERPL-MCNC: 9.2 MG/DL (ref 8.5–10.1)
CHLORIDE SERPL-SCNC: 96 MMOL/L (ref 94–109)
CO2 SERPL-SCNC: 29 MMOL/L (ref 20–32)
CREAT SERPL-MCNC: 5.54 MG/DL (ref 0.66–1.25)
GFR SERPL CREATININE-BSD FRML MDRD: 10 ML/MIN/1.7M2
GLUCOSE BLDC GLUCOMTR-MCNC: 265 MG/DL (ref 70–99)
GLUCOSE BLDC GLUCOMTR-MCNC: 320 MG/DL (ref 70–99)
GLUCOSE BLDC GLUCOMTR-MCNC: 331 MG/DL (ref 70–99)
GLUCOSE BLDC GLUCOMTR-MCNC: 349 MG/DL (ref 70–99)
GLUCOSE BLDC GLUCOMTR-MCNC: 350 MG/DL (ref 70–99)
GLUCOSE BLDC GLUCOMTR-MCNC: 379 MG/DL (ref 70–99)
GLUCOSE SERPL-MCNC: 363 MG/DL (ref 70–99)
PHOSPHATE SERPL-MCNC: 2.2 MG/DL (ref 2.5–4.5)
POTASSIUM SERPL-SCNC: 4.6 MMOL/L (ref 3.4–5.3)
SODIUM SERPL-SCNC: 131 MMOL/L (ref 133–144)

## 2017-09-19 PROCEDURE — 25000128 H RX IP 250 OP 636: Performed by: INTERNAL MEDICINE

## 2017-09-19 PROCEDURE — 84100 ASSAY OF PHOSPHORUS: CPT | Performed by: INTERNAL MEDICINE

## 2017-09-19 PROCEDURE — 80048 BASIC METABOLIC PNL TOTAL CA: CPT | Performed by: INTERNAL MEDICINE

## 2017-09-19 PROCEDURE — A9270 NON-COVERED ITEM OR SERVICE: HCPCS | Mod: GY | Performed by: INTERNAL MEDICINE

## 2017-09-19 PROCEDURE — 25000131 ZZH RX MED GY IP 250 OP 636 PS 637: Mod: GY | Performed by: INTERNAL MEDICINE

## 2017-09-19 PROCEDURE — 00000146 ZZHCL STATISTIC GLUCOSE BY METER IP

## 2017-09-19 PROCEDURE — 36415 COLL VENOUS BLD VENIPUNCTURE: CPT | Performed by: INTERNAL MEDICINE

## 2017-09-19 PROCEDURE — 12000000 ZZH R&B MED SURG/OB

## 2017-09-19 PROCEDURE — 99232 SBSQ HOSP IP/OBS MODERATE 35: CPT | Performed by: INTERNAL MEDICINE

## 2017-09-19 PROCEDURE — 25000132 ZZH RX MED GY IP 250 OP 250 PS 637: Mod: GY | Performed by: INTERNAL MEDICINE

## 2017-09-19 RX ORDER — OXYCODONE HYDROCHLORIDE 5 MG/1
15 TABLET ORAL EVERY 6 HOURS PRN
Status: DISCONTINUED | OUTPATIENT
Start: 2017-09-19 | End: 2017-09-20 | Stop reason: HOSPADM

## 2017-09-19 RX ADMIN — INSULIN GLARGINE 16 UNITS: 100 INJECTION, SOLUTION SUBCUTANEOUS at 15:06

## 2017-09-19 RX ADMIN — VITAMIN D, TAB 1000IU (100/BT) 1000 UNITS: 25 TAB at 09:14

## 2017-09-19 RX ADMIN — OXYCODONE HYDROCHLORIDE 15 MG: 5 TABLET ORAL at 15:05

## 2017-09-19 RX ADMIN — OXYCODONE HYDROCHLORIDE 15 MG: 5 TABLET ORAL at 09:15

## 2017-09-19 RX ADMIN — INSULIN ASPART 4 UNITS: 100 INJECTION, SOLUTION INTRAVENOUS; SUBCUTANEOUS at 09:17

## 2017-09-19 RX ADMIN — GABAPENTIN 200 MG: 100 CAPSULE ORAL at 09:14

## 2017-09-19 RX ADMIN — CARVEDILOL 25 MG: 25 TABLET, FILM COATED ORAL at 18:50

## 2017-09-19 RX ADMIN — VITAMINS TABLETS 1 TABLET: 115; 800; 115 TABLET ORAL at 09:13

## 2017-09-19 RX ADMIN — CARVEDILOL 25 MG: 25 TABLET, FILM COATED ORAL at 09:13

## 2017-09-19 RX ADMIN — INSULIN ASPART 5 UNITS: 100 INJECTION, SOLUTION INTRAVENOUS; SUBCUTANEOUS at 12:52

## 2017-09-19 RX ADMIN — CALCIUM ACETATE 1334 MG: 667 CAPSULE ORAL at 12:07

## 2017-09-19 RX ADMIN — OXYCODONE HYDROCHLORIDE 15 MG: 5 TABLET ORAL at 02:25

## 2017-09-19 RX ADMIN — LEVETIRACETAM 500 MG: 100 INJECTION, SOLUTION INTRAVENOUS at 05:59

## 2017-09-19 RX ADMIN — ATORVASTATIN CALCIUM 80 MG: 80 TABLET, FILM COATED ORAL at 09:11

## 2017-09-19 RX ADMIN — CITALOPRAM HYDROBROMIDE 20 MG: 20 TABLET ORAL at 09:13

## 2017-09-19 RX ADMIN — UMECLIDINIUM 1 PUFF: 62.5 AEROSOL, POWDER ORAL at 09:21

## 2017-09-19 RX ADMIN — LOSARTAN POTASSIUM 100 MG: 100 TABLET, FILM COATED ORAL at 09:14

## 2017-09-19 RX ADMIN — ISOSORBIDE MONONITRATE 90 MG: 30 TABLET, EXTENDED RELEASE ORAL at 09:12

## 2017-09-19 RX ADMIN — INSULIN ASPART 4 UNITS: 100 INJECTION, SOLUTION INTRAVENOUS; SUBCUTANEOUS at 18:50

## 2017-09-19 RX ADMIN — ASPIRIN 81 MG 81 MG: 81 TABLET ORAL at 09:12

## 2017-09-19 RX ADMIN — CALCIUM ACETATE 1334 MG: 667 CAPSULE ORAL at 09:14

## 2017-09-19 RX ADMIN — BUMETANIDE 4 MG: 1 TABLET ORAL at 09:20

## 2017-09-19 ASSESSMENT — VISUAL ACUITY
OU: NORMAL ACUITY
OU: NORMAL ACUITY
OU: BLURRED VISION

## 2017-09-19 NOTE — PROGRESS NOTES
[facility]  [facility] [place]  Neurology Daily Note      Admission Date:9/18/2017   Date of service: 09/19/2017   Hospital Day: 2      Assessment & Plan   _______________________________  #. (R56.9) Seizures (H)  (primary encounter diagnosis)  --seizures x2  -----likely metabolic in nature  --loaded with keppra in [place]   ----continue on keppra 500mg QHS for now  --EEG negative for seizures after admission  --MRI with no acute abnormality  #. (F12.10) Marijuana abuse  --history of marijuana use  --urine drug screen not obtained yet  #. PT/OT/Speech  --continue evaluations  #. Nutrition  --Per speech therapy evaluation   #. DVT Prophylaxis  --defer to primary service    Code Status: Full Code    Disposition: pending     Interval History   _______________________________  Patient presented after evaluation for fall. [place] but denied loss of consciousness. With EMS present, had a 30-second episode of what appeared to be tonic-clonic seizure-like activity. He was postictal after and was transferred to [facility] where he had a second seizure episode. He was given IV ativan. CT head without acute abnormalities. He was loaded with keppra and transferred to [facility] for further evaluation. No further seizures since admission. Has remained on keppra. MRI brain negative for acute abnormality. EEG negative for seizures. Patient with diabetes and end-stage renal disease on dialysis.   Today feeling more normal, but still slightly lightheaded and dizzy. Does not recall seizures and denies history of seizures that he is aware of. No headache. Feels slightly weak all over, otherwise feels significantly better than yesterday.    Review of Systems   _______________________________  The Review of Systems is negative other than noted in the HPI  Physical Exam   _______________________________  Vitals: Temp: 97.7  F (36.5  C) Temp src: Oral BP: 145/69    Heart Rate: 62 Resp: 16 SpO2: 98 % O2 Device: None (Room air)    Vital Signs with Ranges: Temp:  [97.7  F (36.5  C)-98.1  F (36.7  C)] 97.7  F (36.5  C)  Heart Rate:  [58-71] 62  Resp:  [7-28] 16  BP: ()/(49-98) 145/69  SpO2:  [90 %-100 %] 98 %    General Appearance:  No acute distress  Neuro:       Mental Status Exam:   Awake, alert, oriented X3. Speech and language are intact. Mental status is normal       Cranial Nerves:  Pupils 3 mm, reactive. EOMI. Face sensation is normal. Face is symmetric. Tongue and uvula are midline. Other CN are normal           Motor:  Generalized weakness. Tone and bulk are normal           Reflexes:  Normal DTR. Toes downgoing.        Sensory:  Normal to light touch                   Coordination:   Intact finger-to-nose        Gait:  Up with assistanct  Cardiovascular: Regular rate and rhythm, no m/r/g  Lungs: Clear to auscultation  Abdomen: Soft, not tender, not distended  Extremities: No clubbing, no cyanosis, no edema    Medications   _______________________________    IV fluid REPLACEMENT ONLY       NaCl 10 mL/hr at 09/18/17 2102       [START ON 9/20/2017] pneumococcal  0.5 mL Intramuscular Prior to discharge     aspirin  81 mg Oral Daily     atorvastatin  80 mg Oral Daily     bumetanide  4 mg Oral Daily     citalopram  20 mg Oral Daily     cloNIDine  1 patch Transdermal Weekly     folic acid-vit B6-vit B12  1 tablet Oral Daily     gabapentin (NEURONTIN) capsule 200 mg  200 mg Oral Daily     isosorbide mononitrate  90 mg Oral Daily     oxyCODONE (ROXICODONE) IR tablet 15 mg  15 mg Oral Q6H     umeclidinium  1 puff Inhalation Daily     cholecalciferol  1,000 Units Oral Daily     levETIRAcetam  500 mg Intravenous Q24H     [START ON 9/25/2017] cloNIDine   Transdermal Weekly     cloNIDine   Transdermal Q8H     - MEDICATION INSTRUCTIONS for Dialysis Patients -   Does not apply See Admin Instructions     losartan (COZAAR) tablet 100 mg  100 mg Oral Daily     carvedilol (COREG)  tablet 25 mg  25 mg Oral BID w/meals     calcium acetate  1,334 mg Oral 4x Daily w/meals     insulin aspart  1-7 Units Subcutaneous TID AC     insulin aspart  1-5 Units Subcutaneous At Bedtime       Data   _______________________________      Lab Data:   All data was reviewed by me personally  CBC RESULTS:  Recent Labs   Lab Test  17   0446  17   0723  17   0634   WBC  5.4  4.5  5.8   RBC  3.54*  4.15*  3.69*   HGB  10.8*  12.3*  10.6*   HCT  31.2*  39.6*  33.1*   PLT  146*  139*  169     Basic Metabolic Panel:  Recent Labs   Lab Test  17   1950  17   0723  17   0634   NA  136  133  137  135   POTASSIUM  4.2  2.9*  4.3  4.6   CHLORIDE   --   97  101  101   CO2   --   26  27  22   BUN   --   56*  59*  62*   CR   --   6.36*  6.91*  5.12*   GLC   --   77  169*  315*   ALLYSON   --   9.1  8.7  8.2*     Liver panel:  Recent Labs   Lab Test  17   0723  17   0741  01/15/17   1530  16   0542  16   0740   PROTTOTAL  8.0  9.3*  7.9  7.9   --   9.8*   ALBUMIN  3.3*  3.9  2.7*  2.6*  3.1*  3.6   BILITOTAL  0.6  0.7  0.3  0.8   --   0.4   ALKPHOS  92  68  57  53   --   116   AST  8  11  10  9   --   36   ALT  11  21  9  9   --   29     Coagulation  Recent Labs   Lab Test  17   0446  17   0723  12/15/16   0545  16   0542  16   0652  16   0515  16   2325  16   1455  16   0526  16   0740   INR  1.05  1.15*  1.17*  1.12   --    --    --    --    --   1.18*   PTT   --   26   --    --    --    --    --    --    --    --    AXA   --    --    --    --   <0.10  Therapeutic Range:     UFH:   0.15-0.35 IU/mL for low              intensity dosing            0.30-0.70 IU/mL for high              intensity dosing for              DVT and PE     Enoxaparin: If administered              once daily with dose              1.5mg/k.0-2.0 IU/mL                 If administered              twice daily  with dose              1mg/k.60-1.0 IU/mL    0.16  0.17  0.12  0.21   --       Lipid Profile:  Recent Labs   Lab Test  17   0723  16   0526   12   0645   CHOL  119  117   < >  163   HDL  74  39*   < >  33*   LDL  21  57   < >  96   TRIG  118  104   < >  171*   CHOLHDLRATIO   --    --    --   4.9    < > = values in this interval not displayed.     Thyroid Panel:  Recent Labs   Lab Test  16   0737 16   1355   TSH  6.44*  3.4  2.12   T4  1.23   --    --       Vitamin B12:   Recent Labs   Lab Test  16   0515  12   0645   B12  365  996      Vitamin D level:   Recent Labs   Lab Test  16   0515  10/07/16   1525  16   0515  16   0650   VITDT  23  24  28  18*     A1C:   Recent Labs   Lab Test  17   0446  17   0723  17   0810  16   0740  16   0636   A1C  11.9*  8.1*  7.6*  8.8*  10.4*     Troponin I:   Recent Labs   Lab Test  16   1125  16   0526  16   1645  16   1242  16   0740  10/25/16   1630  10/06/16   2039  16   1051   TROPI  0.176*  0.279*  0.255*  0.098*  <0.015  The 99th percentile for upper reference range is 0.045 ug/L.  Troponin values in   the range of 0.045 - 0.120 ug/L may be associated with risks of adverse   clinical events.    <0.015  The 99th percentile for upper reference range is 0.045 ug/L.  Troponin values in   the range of 0.045 - 0.120 ug/L may be associated with risks of adverse   clinical events.    <0.015  The 99th percentile for upper reference range is 0.045 ug/L.  Troponin values in   the range of 0.045 - 0.120 ug/L may be associated with risks of adverse   clinical events.    0.024     CK:   Recent Labs   Lab Test  17   0446   CKT  74       UA Results:  Recent Labs   Lab Test  17   0719  10/22/16   1546   COLOR  Yellow  Yellow   APPEARANCE  Clear  Clear   URINEGLC  Negative  100*   URINEBILI  Negative  Negative   URINEKETONE  Negative  Negative    SG  1.014  1.020   UBLD  Negative  Negative   URINEPH  6.0  6.0   PROTEIN  >499*  >=300*   UROBILINOGEN   --   0.2   NITRITE  Negative  Negative   LEUKEST  Negative  Negative   RBCU  3*  O - 2   WBCU  1  O - 2        Cardiac US:   --       Neurophysiology:   EEG negative for seizures       Imaging:   All imaging studies were reviewed personally  MRI brain 9/18/17:   1. Cerebral atrophy.  2. Scattered nonspecific white matter changes which are probably due to chronic small vessel ischemic disease.  3. No evidence for intracranial hemorrhage, acute infarct, or any focal mass lesions.  4. Exam mildly limited by motion artifact.        Gilma S Counters, FNP-BC

## 2017-09-19 NOTE — PLAN OF CARE
Problem: Goal Outcome Summary  Goal: Goal Outcome Summary  Outcome: No Change  A&O. Numbness and tingling in BLE and BUE d/t neuropathy. Seizure precautions in place. VSS. Tele SR with first degree AVB. Mod CHO diet. Pt on dialysis, no BPs on R arm- has fistula. Up with A1/GB, unsteady. C/o generalized pain, receiving scheduled oxycodone. Discharge plan pending.

## 2017-09-19 NOTE — PROVIDER NOTIFICATION
"Paged Gilma, \"Pt is a hemodialysis. Unsure when I'll be able to get a urine sample. Do you want the test ordered as a lab draw? \"  "

## 2017-09-19 NOTE — PROVIDER NOTIFICATION
"Repaged Dr. Mahmood, \"Need phos replacement order. Pt is appears more disoriented, confused, illogical speech. Pt is getting 15mg oxycodone q6h scheduled. Per PTA meds pt usually takes 10mg oxycodone TID. No focal deficits. I think its related to this. VS stable. Please advise\"    ADDENDUM: Dr. Mahmood called back, stated that since pt is taking phoslo will hold PM & HS dose & will NOT replace phosphorus. Will recheck phosphorus level tomorrow morning. Change oxycodone to PRN. No further imaging or interventions needed at this time.   "

## 2017-09-19 NOTE — PROGRESS NOTES
[facility]    Hospitalist Progress note  Eduardo Mahmood MD      ASSESSMENT AN PLAN: Keagan Wayne is a 71-year-old gentleman with a history of end-stage renal disease on Monday, Wednesday, Friday hemodialysis as well as diabetes with nephropathy, chronic hepatitis C without any prior history of seizures, neither in he nor his family, admitted now with 2 episodes of seizures.       1.  New onset of seizures x 2.  Head CT showed no acute finding.  MRI of the brain also negative for acute intra cranial pathology. Metabolically the patient's sodium corrects to almost near normal at 128.  --- Suspect this is likely more metabolic.   ---Neurology consulted and input appreciated  -- MRI of the brain without acute intra cranial abnormalities  --- EEG negative for Seizure  --- Drug screen pending  -- Loading dose of Keppra was given outside facility.   -- Keppra 500 mg BID  per neurology    2.  Diabetes with severe hyperglycemia.  Suspected part of the patient's blood sugar is elevated because of his breakthrough seizure episode.    --- placed on insulin drip upon admission. Insulin drip discontinued  -- Resume PTA Lantus and monitor  -- Continue ISS    3.  End-stage renal disease.  The patient is on Monday, Wednesday, Friday hemodialysis, underwent uneventful hemodialysis last week.    -- Nephrology consulted and input appreciated  -- Hemodialysis on 9/18.     4. Hypophosphatemia: On PhosLO  -- Phos 1.8 yesterday. Repeat phos check today  -- On PhosLo and might reduce dose if Phos continues to be to low. Nephrology to give recommendation    4.  History of chronic obstructive pulmonary disease.    ---albuterol nebs available as needed.   5.  Hypertension.   ----continue the patient on his Coreg and Losartan. Patient not on hydralazine and lisinopril.--> d/c both  6.  History of depression.  -continue the patient on Celexa.     7.  History of glaucoma. - continue the patient on his eye drops.   8.   Hyperlipidemia.  -continue the patient on Lipitor.   9.  Deep vein thrombosis prophylaxis.  The patient will receive compression boots       CODE STATUS:  Full.       Disposition: Anticipate home tomorrow. Resume PTA lantus and monitor BG and low phos corrected.       Eduardo Mahmood MD  [facility]  Pager # [phone]  9/19/2017      SUBJECTIVE: Patient seen and assessed. Report feeling fatigued and tired.     OBJECTIVE:    /64  Temp 97.4  F (36.3  C) (Oral)  Resp 16  Wt 93.7 kg (206 lb 9.1 oz)  SpO2 98%  BMI 25.82 kg/m2         Labs:    Most Recent 3 CBC's:  Recent Labs   Lab Test  09/18/17 0446 06/22/17   0723  03/31/17   0634   WBC  5.4  4.5  5.8   HGB  10.8*  12.3*  10.6*   MCV  88  95  90   PLT  146*  139*  169      Most Recent 3 BMP's:  Recent Labs   Lab Test  09/18/17   1950  09/18/17 0446 06/22/17   0723  03/31/17   0634   NA  136  133  137  135   POTASSIUM  4.2  2.9*  4.3  4.6   CHLORIDE   --   97  101  101   CO2   --   26  27  22   BUN   --   56*  59*  62*   CR   --   6.36*  6.91*  5.12*   ANIONGAP   --   10  9  12   ALLYSON   --   9.1  8.7  8.2*   GLC   --   77  169*  315*     Most Recent 3 Troponin's:  Recent Labs   Lab Test  12/07/16   1125  12/07/16   0526  12/06/16   1645   TROPI  0.176*  0.279*  0.255*     Most Recent 3 INR's:  Recent Labs   Lab Test  09/18/17 0446  06/22/17   0723  12/15/16   0545   INR  1.05  1.15*  1.17*     Most Recent 2 LFT's:  Recent Labs   Lab Test  09/18/17 0446 06/22/17   0723   AST  8  11   ALT  11  21   ALKPHOS  92  68   BILITOTAL  0.6  0.7     Most Recent Cholesterol Panel:  Recent Labs   Lab Test  06/22/17   0723   CHOL  119   LDL  21   HDL  74   TRIG  118     Most Recent 6 Bacteria Isolates From Any Culture (See EPIC Reports for Culture Details):  Recent Labs   Lab Test  01/15/17   0910  01/15/17   0624  01/15/17   0600  12/06/16   0815  12/06/16   0812  07/01/16   0950   CULT  Moderate growth Normal memo  No growth  No growth  No growth   No growth  Heavy growth Normal memo  Moderate growth Haemophilus influenzae Beta lactamase negative  Beta-lactamase negative Haemophilus influenzae are usually susceptible to   ampicillin, amoxacillin/clavulanic acid, levofloxacin, and 3rd generation   cephalosporins, such as ceftriaxone.  *  Canceled, Test credited Incorrectly ordered by PCU/Clinic     Most Recent TSH, T4 and HgbA1c:   Recent Labs   Lab Test  09/18/17   0446   01/27/16   0737   TSH   --    --   6.44*   T4   --    --   1.23   A1C  11.9*   < >   --     < > = values in this interval not displayed.       Prescriptions Prior to Admission   Medication Sig Dispense Refill Last Dose     calcium acetate (PHOSLO) 667 MG CAPS capsule Take 1,334 mg by mouth 4 times daily (with meals and nightly)        calcium acetate (PHOSLO) 667 MG CAPS capsule Take 667 mg by mouth daily With snack        Carvedilol (COREG PO) Take 25 mg by mouth 2 times daily (with meals)        LOSARTAN POTASSIUM PO Take 100 mg by mouth daily        Gabapentin (NEURONTIN PO) Take 200 mg by mouth daily   Taking     insulin glargine (LANTUS) 100 UNIT/ML injection Inject 16 Units Subcutaneous every morning    Taking     isosorbide mononitrate (IMDUR) 30 MG 24 hr tablet Take 90 mg by mouth daily   Taking     umeclidinium (INCRUSE ELLIPTA) 62.5 MCG/INH oral inhaler Inhale 1 puff into the lungs daily   Taking     OXYCODONE HCL PO Take 10 mg by mouth 3 times daily as needed (takes scheduled)   Taking     bumetanide (BUMEX) 2 MG tablet Take 2 tablets (4 mg) by mouth daily 60 tablet 2      atorvastatin (LIPITOR) 80 MG tablet Take 1 tablet (80 mg) by mouth daily 30 tablet 0      VITAMIN D, CHOLECALCIFEROL, PO Take 1,000 Units by mouth daily   9/17/2017 at Unknown time     cloNIDine (CATAPRES-TTS2) 0.2 MG/24HR patch Place 1 patch onto the skin once a week   Past Month at does not have patch on     aspirin 81 MG chewable tablet Take 1 tablet (81 mg) by mouth daily 36 tablet  Taking     citalopram  (CELEXA) 20 MG tablet Take 1 tablet (20 mg) by mouth daily 30 tablet  Taking       Scheduled medications:    [START ON 9/20/2017] pneumococcal  0.5 mL Intramuscular Prior to discharge     insulin glargine  16 Units Subcutaneous QAM     aspirin  81 mg Oral Daily     atorvastatin  80 mg Oral Daily     bumetanide  4 mg Oral Daily     citalopram  20 mg Oral Daily     cloNIDine  1 patch Transdermal Weekly     folic acid-vit B6-vit B12  1 tablet Oral Daily     gabapentin (NEURONTIN) capsule 200 mg  200 mg Oral Daily     isosorbide mononitrate  90 mg Oral Daily     oxyCODONE (ROXICODONE) IR tablet 15 mg  15 mg Oral Q6H     umeclidinium  1 puff Inhalation Daily     cholecalciferol  1,000 Units Oral Daily     levETIRAcetam  500 mg Intravenous Q24H     [START ON 9/25/2017] cloNIDine   Transdermal Weekly     cloNIDine   Transdermal Q8H     - MEDICATION INSTRUCTIONS for Dialysis Patients -   Does not apply See Admin Instructions     losartan (COZAAR) tablet 100 mg  100 mg Oral Daily     carvedilol (COREG) tablet 25 mg  25 mg Oral BID w/meals     calcium acetate  1,334 mg Oral 4x Daily w/meals     insulin aspart  1-7 Units Subcutaneous TID AC     insulin aspart  1-5 Units Subcutaneous At Bedtime       I/O last 3 completed shifts:  In: 334.99 [P.O.:200; I.V.:134.99]  Out: 1500 [Other:1500]  Data   Data reviewed today:  I personally reviewed the brain MRI image(s) showing chronic small vessel diseases.    Recent Labs  Lab 09/18/17  1950 09/18/17  0446   WBC  --  5.4   HGB  --  10.8*   MCV  --  88   PLT  --  146*   INR  --  1.05    133   POTASSIUM 4.2 2.9*   CHLORIDE  --  97   CO2  --  26   BUN  --  56*   CR  --  6.36*   ANIONGAP  --  10   ALLYSON  --  9.1   GLC  --  77   ALBUMIN  --  3.3*   PROTTOTAL  --  8.0   BILITOTAL  --  0.6   ALKPHOS  --  92   ALT  --  11   AST  --  8       No results found for this or any previous visit (from the past 24 hour(s)).

## 2017-09-19 NOTE — PROVIDER NOTIFICATION
"Paged Dr. Mahmood, \"Pt is appears more disoriented, confused, illogical speech. Pt is getting 15mg oxycodone q6h scheduled. Per PTA meds pt usually takes 10mg oxycodone TID. No focal deficits. I think its related to this. VS stable. Please advise. \"    ADDENDUM: No reply  "

## 2017-09-19 NOTE — PROVIDER NOTIFICATION
"Paged Dr. Mahmood, \"Pt phosphorus was 1.8 & was not addressed. Do you want a redraw & phosphorus replacement protocol?\"    Phosphorus was 1.8--9/18/17  "

## 2017-09-19 NOTE — PROVIDER NOTIFICATION
"Paged Dr. Mahmood, \"Phosphorus 2.2. Don't have the phosphorus replacement order. \"    ADDENDUM: No reply  "

## 2017-09-19 NOTE — PLAN OF CARE
Problem: Goal Outcome Summary  Goal: Goal Outcome Summary  Outcome: No Change  A&O but forgetful. Neuros intact except baseline neuropathy in BUE hands and BLE. VSS. LS coarse. Tele SR with 1st degree AVB and PAC. Mod Cho diet. Fistula in Right, bruit and thrill present. C/o generalized pain, scheduled oxycodone given. Continue to monitor.

## 2017-09-19 NOTE — PROGRESS NOTES
HOSPITALIST CROSS COVER    Called by RN regarding PO status.    Will change from insulin infusion to ISS.     Patient hemodynamically appropriate for transfer to neuro floor.    Transfer orders written.    HINA BECKMAN MD

## 2017-09-20 VITALS
WEIGHT: 205.25 LBS | OXYGEN SATURATION: 95 % | TEMPERATURE: 97.5 F | SYSTOLIC BLOOD PRESSURE: 180 MMHG | RESPIRATION RATE: 15 BRPM | BODY MASS INDEX: 25.65 KG/M2 | DIASTOLIC BLOOD PRESSURE: 78 MMHG

## 2017-09-20 LAB
ANION GAP SERPL CALCULATED.3IONS-SCNC: 8 MMOL/L (ref 3–14)
BUN SERPL-MCNC: 43 MG/DL (ref 7–30)
CALCIUM SERPL-MCNC: 9.2 MG/DL (ref 8.5–10.1)
CHLORIDE SERPL-SCNC: 100 MMOL/L (ref 94–109)
CO2 SERPL-SCNC: 27 MMOL/L (ref 20–32)
CREAT SERPL-MCNC: 6.77 MG/DL (ref 0.66–1.25)
GFR SERPL CREATININE-BSD FRML MDRD: 8 ML/MIN/1.7M2
GLUCOSE BLDC GLUCOMTR-MCNC: 146 MG/DL (ref 70–99)
GLUCOSE BLDC GLUCOMTR-MCNC: 153 MG/DL (ref 70–99)
GLUCOSE SERPL-MCNC: 155 MG/DL (ref 70–99)
PHOSPHATE SERPL-MCNC: 2.9 MG/DL (ref 2.5–4.5)
POTASSIUM SERPL-SCNC: 4.6 MMOL/L (ref 3.4–5.3)
SODIUM SERPL-SCNC: 135 MMOL/L (ref 133–144)

## 2017-09-20 PROCEDURE — 84100 ASSAY OF PHOSPHORUS: CPT | Performed by: INTERNAL MEDICINE

## 2017-09-20 PROCEDURE — 80048 BASIC METABOLIC PNL TOTAL CA: CPT | Performed by: INTERNAL MEDICINE

## 2017-09-20 PROCEDURE — 90670 PCV13 VACCINE IM: CPT | Performed by: INTERNAL MEDICINE

## 2017-09-20 PROCEDURE — 25000128 H RX IP 250 OP 636: Performed by: INTERNAL MEDICINE

## 2017-09-20 PROCEDURE — 36415 COLL VENOUS BLD VENIPUNCTURE: CPT | Performed by: INTERNAL MEDICINE

## 2017-09-20 PROCEDURE — 25000132 ZZH RX MED GY IP 250 OP 250 PS 637: Mod: GY | Performed by: INTERNAL MEDICINE

## 2017-09-20 PROCEDURE — 99239 HOSP IP/OBS DSCHRG MGMT >30: CPT | Performed by: INTERNAL MEDICINE

## 2017-09-20 PROCEDURE — 25000131 ZZH RX MED GY IP 250 OP 636 PS 637: Mod: GY | Performed by: INTERNAL MEDICINE

## 2017-09-20 PROCEDURE — A9270 NON-COVERED ITEM OR SERVICE: HCPCS | Mod: GY | Performed by: INTERNAL MEDICINE

## 2017-09-20 PROCEDURE — 90937 HEMODIALYSIS REPEATED EVAL: CPT

## 2017-09-20 PROCEDURE — 00000146 ZZHCL STATISTIC GLUCOSE BY METER IP

## 2017-09-20 RX ORDER — ALBUMIN (HUMAN) 12.5 G/50ML
50 SOLUTION INTRAVENOUS
Status: DISCONTINUED | OUTPATIENT
Start: 2017-09-20 | End: 2017-09-20

## 2017-09-20 RX ORDER — LEVETIRACETAM 500 MG/1
500 TABLET ORAL EVERY 24 HOURS
Status: DISCONTINUED | OUTPATIENT
Start: 2017-09-20 | End: 2017-09-20 | Stop reason: HOSPADM

## 2017-09-20 RX ORDER — ACETAMINOPHEN 650 MG/1
650 SUPPOSITORY RECTAL EVERY 4 HOURS PRN
Status: DISCONTINUED | OUTPATIENT
Start: 2017-09-20 | End: 2017-09-20 | Stop reason: HOSPADM

## 2017-09-20 RX ORDER — LEVETIRACETAM 500 MG/1
500 TABLET ORAL EVERY 24 HOURS
Qty: 60 TABLET | Refills: 0 | Status: ON HOLD | OUTPATIENT
Start: 2017-09-20 | End: 2018-03-29

## 2017-09-20 RX ORDER — ALBUMIN, HUMAN INJ 5% 5 %
250 SOLUTION INTRAVENOUS
Status: DISCONTINUED | OUTPATIENT
Start: 2017-09-20 | End: 2017-09-20

## 2017-09-20 RX ORDER — LEVETIRACETAM 500 MG/1
500 TABLET ORAL DAILY
Status: DISCONTINUED | OUTPATIENT
Start: 2017-09-20 | End: 2017-09-20

## 2017-09-20 RX ORDER — OXYCODONE HYDROCHLORIDE 5 MG/1
5 TABLET ORAL EVERY 8 HOURS
Status: DISCONTINUED | OUTPATIENT
Start: 2017-09-20 | End: 2017-09-20 | Stop reason: HOSPADM

## 2017-09-20 RX ORDER — ACETAMINOPHEN 325 MG/1
650 TABLET ORAL EVERY 4 HOURS PRN
Status: DISCONTINUED | OUTPATIENT
Start: 2017-09-20 | End: 2017-09-20 | Stop reason: HOSPADM

## 2017-09-20 RX ADMIN — UMECLIDINIUM 1 PUFF: 62.5 AEROSOL, POWDER ORAL at 09:20

## 2017-09-20 RX ADMIN — INSULIN ASPART 1 UNITS: 100 INJECTION, SOLUTION INTRAVENOUS; SUBCUTANEOUS at 09:21

## 2017-09-20 RX ADMIN — OXYCODONE HYDROCHLORIDE 15 MG: 5 TABLET ORAL at 16:00

## 2017-09-20 RX ADMIN — VITAMINS TABLETS 1 TABLET: 115; 800; 115 TABLET ORAL at 09:19

## 2017-09-20 RX ADMIN — VITAMIN D, TAB 1000IU (100/BT) 1000 UNITS: 25 TAB at 09:19

## 2017-09-20 RX ADMIN — LEVETIRACETAM 500 MG: 100 INJECTION, SOLUTION INTRAVENOUS at 05:42

## 2017-09-20 RX ADMIN — CITALOPRAM HYDROBROMIDE 20 MG: 20 TABLET ORAL at 09:19

## 2017-09-20 RX ADMIN — SODIUM CHLORIDE 250 ML: 9 INJECTION, SOLUTION INTRAVENOUS at 10:57

## 2017-09-20 RX ADMIN — PNEUMOCOCCAL 13-VALENT CONJUGATE VACCINE 0.5 ML: 2.2; 2.2; 2.2; 2.2; 2.2; 4.4; 2.2; 2.2; 2.2; 2.2; 2.2; 2.2; 2.2 INJECTION, SUSPENSION INTRAMUSCULAR at 10:42

## 2017-09-20 RX ADMIN — ATORVASTATIN CALCIUM 80 MG: 80 TABLET, FILM COATED ORAL at 09:19

## 2017-09-20 RX ADMIN — INSULIN GLARGINE 16 UNITS: 100 INJECTION, SOLUTION SUBCUTANEOUS at 09:22

## 2017-09-20 RX ADMIN — ACETAMINOPHEN 650 MG: 325 TABLET, FILM COATED ORAL at 13:34

## 2017-09-20 ASSESSMENT — VISUAL ACUITY
OU: NORMAL ACUITY

## 2017-09-20 NOTE — PROGRESS NOTES
Inpatient Dialysis Progress Note        Assessment and Plan:     71 y.o man with ESRD, admitted for new onset of seizure activity.     1. ESRD                         -MWF, LAVF, eDW 89.5 kg     2. New onset of seizure activity.  -keppra     3. HTN. He is on clonidine patch, Coreg, lisinopril, hydralazine and Imdur. SBP is low on dialysis.     4. Anemia. LITO with HD     5. HCV.      Plan  1. UF 2 liters, Epogen 2000 units         Interval History:     NO new complaints. He is getting HD treatment.         Dialysis Parameters:     Vitals:    09/18/17 0400 09/20/17 0704   Weight: 93.7 kg (206 lb 9.1 oz) 93.1 kg (205 lb 4 oz)     I/O last 3 completed shifts:  In: 330 [P.O.:150; I.V.:180]  Out: -   Temp:  [97.6  F (36.4  C)-98.4  F (36.9  C)] 98.4  F (36.9  C)  Heart Rate:  [64-79] 71  Resp:  [15-16] 15  BP: (131-174)/(67-85) 168/78  SpO2:  [95 %-99 %] 95 %    Current Weight: 93.1  Dry Weight: 89.5  Dialysis Temp: 36.5  C  Access Device: AVF  Access Site: R arm  Dialyzer: revaclear  Dialysis Bath: 2K  Sodium Profile: none  UF Goal: 2 liters  Blood Flow Rate (mL/min): 400  Total Treatment Time (hrs): 3.5  Heparin: none    Review of Systems:        Medications:     EPO dose: 2000    Physical Exam:     Vitals were reviewed  Patient Vitals for the past 24 hrs:   BP Temp Temp src Heart Rate Resp SpO2 Weight   09/20/17 1145 168/78 - - 71 - - -   09/20/17 1130 152/78 - - 70 - - -   09/20/17 1115 174/78 - - 78 - - -   09/20/17 1100 160/74 - - 78 - - -   09/20/17 1045 152/74 - - 74 15 - -   09/20/17 1040 161/84 - - 72 15 - -   09/20/17 1015 167/80 98.4  F (36.9  C) - 71 15 95 % -   09/20/17 0745 154/78 97.9  F (36.6  C) Oral 68 16 95 % -   09/20/17 0704 - - - - - - 93.1 kg (205 lb 4 oz)   09/20/17 0403 161/85 98.1  F (36.7  C) Oral 79 16 96 % -   09/20/17 0025 152/76 98.1  F (36.7  C) Oral 69 16 98 % -   09/19/17 2005 138/68 97.6  F (36.4  C) Oral 64 16 99 % -   09/19/17 1700 - - - - 16 - -   09/19/17 1541 131/67 97.6  F (36.4   C) Oral 67 16 98 % -   17 1206 - - - - 16 - -       Temperatures:  Current - Temp: 98.4  F (36.9  C); Max - Temp  Av  F (36.7  C)  Min: 97.6  F (36.4  C)  Max: 98.4  F (36.9  C)  Respiration range: Resp  Avg: 15.7  Min: 15  Max: 16  Pulse range: No Data Recorded  Blood pressure range: Systolic (24hrs), Av , Min:131 , Max:174   ; Diastolic (24hrs), Av, Min:67, Max:85    Pulse oximetry range: SpO2  Av.8 %  Min: 95 %  Max: 99 %  I/O last 3 completed shifts:  In: 330 [P.O.:150; I.V.:180]  Out: -     Intake/Output Summary (Last 24 hours) at 17 1200  Last data filed at 17 0638   Gross per 24 hour   Intake              330 ml   Output                0 ml   Net              330 ml     Gen: NAD  CV: RRR, + soft murmur  Pulm: CTab  Abd: soft, NT  Ext: no edema  Neuro: tired, oriented to self and place      Data:     Recent Labs   Lab Test  17   1010  17   1302   NA  135  131*   POTASSIUM  4.6  4.6   CHLORIDE  100  96   CO2  27  29   ANIONGAP  8  6   GLC  155*  363*   BUN  43*  34*   CR  6.77*  5.54*   ALLYSON  9.2  9.2       Hemoglobin   Date Value Ref Range Status   2017 10.8 (L) 13.3 - 17.7 g/dL Final              Neal Nguyen MD
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
## 2017-09-20 NOTE — PLAN OF CARE
Problem: Goal Outcome Summary  Goal: Goal Outcome Summary  Outcome: No Change  Ox3. Neuros intact except L foot dorsi/flexion slightly weaker than R & blurred vision (baseline blurred vision for the last week per pt. VSS. Tele SR w/ PVC. MCHO diet. Up with Ax1GB. C/o HA, tylenol given.  No seizure activity noted. Dialyzed today. Plan for discharge today.

## 2017-09-20 NOTE — CONSULTS
BRIEF NUTRITION ASSESSMENT      REASON FOR ASSESSMENT:  Admission screen - New/uncontrolled diabetes      NUTRITION HISTORY:  Pt follows a dialysis diet and meets regularly with an RD at dialysis.  He eats 5-6 times/day, states he's always hungry.    CURRENT DIET AND INTAKE:  Diet:  Mod carb, dialysis diet.            Good appetite, had an omelet, muffin, and grapes for breakfast    ANTHROPOMETRICS:  Height: 6' 3'  Weight: 93.1 kg  BMI: 25.65 kg/m2  IBW:  89.1kg  Weight Status: Normal BMI  %IBW: 104%  Weight History: Stable per pt    LABS:  A1C 11.9    MALNUTRITION:  Patient does not meet two of the following criteria necessary for diagnosing malnutrition: significant weight loss, reduced intake, subcutaneous fat loss, muscle loss or fluid retention    NUTRITION INTERVENTION:  Nutrition Diagnosis:  No nutrition diagnosis at this time.    Implementation:  Nutrition Education:  No education planned, pt meets regularly with dietitian at dialyis    FOLLOW UP/MONITORING:   Will re-evaluate in 7 - 10 days, or sooner, if re-consulted.    Marlen Kinsey RD  Pager [phone] (M-F)            [phone] (W/E & Hol)

## 2017-09-20 NOTE — DISCHARGE SUMMARY
VSS. AVS printed and reviewed with the patient and pt's PCA (María). New medications discussed and all instructions understood by the patient and PCA. Patient had all belongings at the time of discharge. Discharged home with PCA.

## 2017-09-20 NOTE — PLAN OF CARE
Alert, disoriented to time, situation. Neuros intact except baseline numbness/tingling in BLE/BUE and left dorsi/plantar flexion weaker than right. Seizure precautions maintained. VSS. Tele SR with first degree AVB. Mod CHO diet. Up with A1/GB, unsteady. Possible d/c to home tomorrow.

## 2017-09-20 NOTE — PROCEDURES
ONE-HOUR ELECTROENCEPHALOGRAM WITH VIDEO      ORDERING PHYSICIAN:  Dain Tan MD      EEG #  one-hour with video      EEG was done on Keagan Morales on  for evaluation of seizure.  EEG demonstrated predominantly beta activity throughout the brain.  There are no epileptiform discharges and no electrographic seizures seen on this EEG.  There is no asymmetry between hemispheres.      IMPRESSION:  Somewhat encephalopathic EEG, no seizures seen.         DAIN ATN MD, PHD, ROSLYN             D: 2017 09:28   T: 2017 10:23   MT: CD      Name:     KEAGAN MORALES   MRN:      [id number]        Account:        [id number]   :      1945           Procedure Date: 2017      Document: [id number]       cc: Dain Tan MD, PhD, ROSLYN

## 2017-09-20 NOTE — PROGRESS NOTES
Potassium   Date Value Ref Range Status   09/20/2017 4.6 3.4 - 5.3 mmol/L Final     Lab Results   Component Value Date    HGB 10.8 09/18/2017     Weight: 93.1 kg (205 lb 4 oz)  POST WT 91.1kg  DIALYSIS PROCEDURE NOTE    Patient dialyzed for 3.5 hrs. on a 2 K bath with a net fluid removal of  2L.  A BFR of 400 ml/min was obtained via a TAWNY using 15gauge needles.    The patient was seen by Dr. Nguyen during the treatment.  Total heparin received during the treatment: 0 units. Sites held x 15 min then  pressure drsgs applied.  Meds  given:NONE Complications: NONE.  Procedure and ESRD teaching done and questions answered. See flowsheet in EPIC for further details and post assessment.  Machine water alarm in place and functioning.  Consent for dialysis signed 9/18/17  Pt returned via wheelchair  Vascular Access: Aseptic prep done for both on/off.  Report received from:Anita Clark R.N.  Report given to:Lissette Gifford R.N.  HEPATITIS B SURFACE ANTIGEN neg DATE 6/22/17 HEPATITIS B SURFACE ANTIBODY Immune DATE 6/22/17  Chlorine/Chloramine water system checked every 4 hours.  Outpatient Dialysis at [facility] [place]

## 2017-09-20 NOTE — PROGRESS NOTES
[facility]  [facility] [place]  Neurology Daily Note      Admission Date:9/18/2017   Date of service: 09/20/2017   Hospital Day: 3      Assessment & Plan   _______________________________  #. (R56.9) Seizures (H)  (primary encounter diagnosis)  --seizures x2  -----likely metabolic in nature  --loaded with keppra in [place]   ----continue on keppra 500mg QHS for now  --EEG negative for seizures after admission  --MRI with no acute abnormality  --NO DRIVING FOR 3 MONTHS, per [place] law - discussed with patient and significant other  #. (F12.10) Marijuana abuse  --history of marijuana use  --urine drug screen not obtained yet  #. PT/OT/Speech  --continue evaluations  #. Nutrition  --Per speech therapy evaluation   #. DVT Prophylaxis  --defer to primary service    Code Status: Full Code    Disposition: pending     Interval History   _______________________________  Patient presented after evaluation for fall. [place] but denied loss of consciousness. With EMS present, had a 30-second episode of what appeared to be tonic-clonic seizure-like activity. He was postictal after and was transferred to [facility] where he had a second seizure episode. He was given IV ativan. CT head without acute abnormalities. He was loaded with keppra and transferred to [facility] for further evaluation. No further seizures since admission. Has remained on keppra. MRI brain negative for acute abnormality. EEG negative for seizures. Patient with diabetes and end-stage renal disease on dialysis.   Does not recall seizures and denies history of seizures that he is aware of. No headache. Feels slightly weak all over, otherwise feels significantly better than yesterday. Had some confusion yesterday - possibly related to narcotic use. This has been stopped and confusion is improved. No further seizures.     Review of Systems   _______________________________  The Review of  Systems is negative other than noted in the HPI  Physical Exam   _______________________________  Vitals: Temp: 97.9  F (36.6  C) Temp src: Oral BP: 154/78   Heart Rate: 68 Resp: 16 SpO2: 95 % O2 Device: None (Room air)    Vital Signs with Ranges: Temp:  [97.4  F (36.3  C)-98.1  F (36.7  C)] 97.9  F (36.6  C)  Heart Rate:  [62-79] 68  Resp:  [16] 16  BP: (128-161)/(64-85) 154/78  SpO2:  [95 %-99 %] 95 %    General Appearance:  No acute distress  Neuro:       Mental Status Exam:   Awake, alert, oriented X3. Speech and language are intact. Mental status is normal       Cranial Nerves:  Pupils 3 mm, reactive. EOMI. Face sensation is normal. Face is symmetric. Tongue and uvula are midline. Other CN are normal           Motor:  Generalized weakness. Tone and bulk are normal           Reflexes:  Normal DTR. Toes downgoing.        Sensory:  Normal to light touch                   Coordination:   Intact finger-to-nose        Gait:  Up with assistance  Cardiovascular: Regular rate and rhythm, no m/r/g  Lungs: Clear to auscultation  Abdomen: Soft, not tender, not distended  Extremities: No clubbing, no cyanosis, no edema    Medications   _______________________________    IV fluid REPLACEMENT ONLY       NaCl 10 mL/hr at 09/18/17 2102       pneumococcal  0.5 mL Intramuscular Prior to discharge     insulin glargine  16 Units Subcutaneous QAM     aspirin  81 mg Oral Daily     atorvastatin  80 mg Oral Daily     bumetanide  4 mg Oral Daily     citalopram  20 mg Oral Daily     cloNIDine  1 patch Transdermal Weekly     folic acid-vit B6-vit B12  1 tablet Oral Daily     gabapentin (NEURONTIN) capsule 200 mg  200 mg Oral Daily     isosorbide mononitrate  90 mg Oral Daily     umeclidinium  1 puff Inhalation Daily     cholecalciferol  1,000 Units Oral Daily     levETIRAcetam  500 mg Intravenous Q24H     [START ON 9/25/2017] cloNIDine   Transdermal Weekly     cloNIDine   Transdermal Q8H     - MEDICATION INSTRUCTIONS for Dialysis Patients  -   Does not apply See Admin Instructions     losartan (COZAAR) tablet 100 mg  100 mg Oral Daily     carvedilol (COREG) tablet 25 mg  25 mg Oral BID w/meals     calcium acetate  1,334 mg Oral 4x Daily w/meals     insulin aspart  1-7 Units Subcutaneous TID AC     insulin aspart  1-5 Units Subcutaneous At Bedtime       Data   _______________________________      Lab Data:   All data was reviewed by me personally  CBC RESULTS:  Recent Labs   Lab Test  09/18/17   0446  06/22/17   0723  03/31/17   0634   WBC  5.4  4.5  5.8   RBC  3.54*  4.15*  3.69*   HGB  10.8*  12.3*  10.6*   HCT  31.2*  39.6*  33.1*   PLT  146*  139*  169     Basic Metabolic Panel:  Recent Labs   Lab Test  09/19/17   1302  09/18/17   1950  09/18/17   0446  06/22/17   0723   NA  131*  136  133  137   POTASSIUM  4.6  4.2  2.9*  4.3   CHLORIDE  96   --   97  101   CO2  29   --   26  27   BUN  34*   --   56*  59*   CR  5.54*   --   6.36*  6.91*   GLC  363*   --   77  169*   ALLYSON  9.2   --   9.1  8.7     Liver panel:  Recent Labs   Lab Test  09/18/17   0446  06/22/17   0723  01/18/17   0741  01/15/17   1530  12/12/16   0542  12/06/16   0740   PROTTOTAL  8.0  9.3*  7.9  7.9   --   9.8*   ALBUMIN  3.3*  3.9  2.7*  2.6*  3.1*  3.6   BILITOTAL  0.6  0.7  0.3  0.8   --   0.4   ALKPHOS  92  68  57  53   --   116   AST  8  11  10  9   --   36   ALT  11  21  9  9   --   29     Coagulation  Recent Labs   Lab Test  09/18/17   0446  06/22/17   0723  12/15/16   0545  12/12/16   0542  12/09/16   0652  12/08/16   0515  12/07/16   2325  12/07/16   1455  12/07/16   0526  12/06/16   0740   INR  1.05  1.15*  1.17*  1.12   --    --    --    --    --   1.18*   PTT   --   26   --    --    --    --    --    --    --    --    AXA   --    --    --    --   <0.10  Therapeutic Range:     UFH:   0.15-0.35 IU/mL for low              intensity dosing            0.30-0.70 IU/mL for high              intensity dosing for              DVT and PE     Enoxaparin: If administered               once daily with dose              1.5mg/k.0-2.0 IU/mL                 If administered              twice daily with dose              1mg/k.60-1.0 IU/mL    0.16  0.17  0.12  0.21   --       Lipid Profile:  Recent Labs   Lab Test  17   0723  16   0526   12   0645   CHOL  119  117   < >  163   HDL  74  39*   < >  33*   LDL  21  57   < >  96   TRIG  118  104   < >  171*   CHOLHDLRATIO   --    --    --   4.9    < > = values in this interval not displayed.     Thyroid Panel:  Recent Labs   Lab Test  16   0737 16   1355   TSH  6.44*  3.4  2.12   T4  1.23   --    --       Vitamin B12:   Recent Labs   Lab Test  16   0515  12   0645   B12  365  996      Vitamin D level:   Recent Labs   Lab Test  16   0515  10/07/16   1525  16   0515  16   0650   VITDT  23  24  28  18*     A1C:   Recent Labs   Lab Test  17   0446  17   0723  17   0810  16   0740  16   0636   A1C  11.9*  8.1*  7.6*  8.8*  10.4*     Troponin I:   Recent Labs   Lab Test  16   1125  16   0526  16   1645  16   1242  16   0740  10/25/16   1630  10/06/16   2039  16   1051   TROPI  0.176*  0.279*  0.255*  0.098*  <0.015  The 99th percentile for upper reference range is 0.045 ug/L.  Troponin values in   the range of 0.045 - 0.120 ug/L may be associated with risks of adverse   clinical events.    <0.015  The 99th percentile for upper reference range is 0.045 ug/L.  Troponin values in   the range of 0.045 - 0.120 ug/L may be associated with risks of adverse   clinical events.    <0.015  The 99th percentile for upper reference range is 0.045 ug/L.  Troponin values in   the range of 0.045 - 0.120 ug/L may be associated with risks of adverse   clinical events.    0.024     CK:   Recent Labs   Lab Test  17   0446   CKT  74       UA Results:  Recent Labs   Lab Test  17   0719  10/22/16   1546   COLOR  Yellow  Yellow    APPEARANCE  Clear  Clear   URINEGLC  Negative  100*   URINEBILI  Negative  Negative   URINEKETONE  Negative  Negative   SG  1.014  1.020   UBLD  Negative  Negative   URINEPH  6.0  6.0   PROTEIN  >499*  >=300*   UROBILINOGEN   --   0.2   NITRITE  Negative  Negative   LEUKEST  Negative  Negative   RBCU  3*  O - 2   WBCU  1  O - 2        Cardiac US:   --       Neurophysiology:   EEG negative for seizures       Imaging:   All imaging studies were reviewed personally  MRI brain 9/18/17:   1. Cerebral atrophy.  2. Scattered nonspecific white matter changes which are probably due to chronic small vessel ischemic disease.  3. No evidence for intracranial hemorrhage, acute infarct, or any focal mass lesions.  4. Exam mildly limited by motion artifact.        Gilma MAN Counters, FNP-BC

## 2017-09-20 NOTE — CODE/RAPID RESPONSE
1514 - Wed 20 Sep 2017   714     Code 21     # resisting staff direction wanting leave now -     -- no danger to self or others   -- discharge in place   -- no need or basis for intervening - only need facilitate discharge     --> remove iv   --> given personal belongings   --> waiting for meds and friend to      - discharged without further incident       DORA Merlos MD / 0 min   [place] Physician / [phone] (p)

## 2017-09-20 NOTE — PLAN OF CARE
Problem: Goal Outcome Summary  Goal: Goal Outcome Summary  Outcome: No Change  Disoriented to situation & time, more confused & illogical speech this evening(see previous note--r/t narcotics). Neuros intact except L foot dorsi/flexion slightly weaker than R & blurred vision (baseline blurred vision for the last week per pt. VSS. Tele SR w/ PVC. MCHO diet. Up with Ax1GB. Now denying pain, limit narcotics. Plan for possible discharge tomorrow. Phosphorus recheck in AM. No seizure activity noted. Understanding seizure handout given to pt.

## 2017-09-20 NOTE — PLAN OF CARE
Problem: Goal Outcome Summary  Goal: Goal Outcome Summary  Outcome: No Change  A&O, confused. VSS on RA. Denied Pain. Neuros intact, except baseline numbness/tingling to BUE/BLE. Tele- SB with 1st degree AVB. Fistula to Rt forearm. Seizure precautions maintained. Plan for hemodialysis today.

## 2017-09-20 NOTE — DISCHARGE SUMMARY
[facility]  Discharge Summary        Keagan Wayne MRN# [id number]   YOB: 1945 Age: 71 year old     Date of Admission:  9/18/2017  Date of Discharge:  9/20/2017  Admitting Physician:  Hamilton Allison MD  Discharge Physician: Eduardo Mahmood MD  Discharging Service: Hospitalist     Primary Provider: Yolanda Fong  Primary Care Physician Phone Number: [phone]         Discharge Diagnoses:      Seizure   Acute metabolic encephalopathy  ESRD on HD  Hyponatremia  Uncontrolled type DM          Problem Oriented Hospital Course (Providers):    Keagan Wayne was admitted on 9/18/2017 by Hamilton Allison MD and I would refer you to their history and physical.  The following problems were addressed during his hospitalization:  1.  New onset of seizures x 2.  Head CT showed no acute finding.  MRI of the brain also negative for acute intra cranial pathology. Metabolically the patient's sodium corrects to almost near normal at 128.  --- Suspected this is likely more metabolic.   ---Neurology consulted and followed  -- MRI of the brain without acute intra cranial abnormalities  --- EEG negative for Seizure  -- Loading dose of Keppra was given outside facility.   -- Keppra 500 mg BID and changed to daily per neurology  -- NO DRIVING FOR 3 MONTHS  -- Out patient follow up per neurology recommendations     2.  Diabetes with severe hyperglycemia.  Suspected part of the patient's blood sugar is elevated because of his breakthrough seizure episode.    --- placed on insulin drip upon admission. Insulin drip discontinued  -- Resumed PTA Lantus     3.  End-stage renal disease.  The patient is on Monday, Wednesday, Friday hemodialysis, underwent uneventful hemodialysis last week.    -- Nephrology consulted and input appreciated  -- On Hemodialysis      4. Hypophosphatemia and hyponatremia: On PhosLO  -- Per nephrolog-->Continue PTA regimen     4.  History of chronic obstructive  pulmonary disease.    ---albuterol nebs available as needed.   5.  Hypertension.   ----continue the patient on his Coreg and Losartan  6.  History of depression.  -continue the patient on Celexa.     7.  History of glaucoma. - continue the patient on his eye drops.   8.  Hyperlipidemia.  -continue the patient on Lipitor.     Patient was seen prior to discharge while on HD and report feeling better and stable. He was advised to cutdown oxycodone dose and follow up with PCP and Renal in out patient clinic.             Code Status:      Full Code        Brief Hospital Stay Summary Sent Home With Patient in AVS:        Reason for your hospital stay       Seizure                        Important Results:      See below.        Pending Results:        Unresulted Labs Ordered in the Past 30 Days of this Admission     No orders found from 7/20/2017 to 9/19/2017.            Discharge Instructions and Follow-Up:      Follow-up Appointments     Follow Up ([facility])       Follow up with Dr. Bal/Gilma Oreilly NP , at [facility]   Neurology, within 4-6 weeks  for hospital follow- up. No follow up labs or   test are needed.    Appointments on [place] and/or [facility] (with [facility] or [facility]   provider or service). Call [phone] if you haven't heard regarding   these appointments within 7 days of discharge.            Follow-up and recommended labs and tests        Follow up with primary care provider, Yolanda Hernandez, within 7 days for   hospital follow- up.  The following labs/tests are recommended: BMP.                      Discharge Disposition:      Discharged to home        Discharge Medications:        Current Discharge Medication List      START taking these medications    Details   levETIRAcetam (KEPPRA) 500 MG tablet Take 1 tablet (500 mg) by mouth every 24 hours  Qty: 60 tablet, Refills: 0    Associated Diagnoses: Seizures (H)         CONTINUE these medications which have NOT CHANGED    Details    calcium acetate (PHOSLO) 667 MG CAPS capsule Take 1,334 mg by mouth 4 times daily (with meals and nightly)      Carvedilol (COREG PO) Take 25 mg by mouth 2 times daily (with meals)      LOSARTAN POTASSIUM PO Take 100 mg by mouth daily      Gabapentin (NEURONTIN PO) Take 200 mg by mouth daily      insulin glargine (LANTUS) 100 UNIT/ML injection Inject 16 Units Subcutaneous every morning       isosorbide mononitrate (IMDUR) 30 MG 24 hr tablet Take 90 mg by mouth daily      umeclidinium (INCRUSE ELLIPTA) 62.5 MCG/INH oral inhaler Inhale 1 puff into the lungs daily      OXYCODONE HCL PO Take 10 mg by mouth 3 times daily as needed (takes scheduled)      bumetanide (BUMEX) 2 MG tablet Take 2 tablets (4 mg) by mouth daily  Qty: 60 tablet, Refills: 2    Associated Diagnoses: Acute on chronic systolic congestive heart failure (H)      atorvastatin (LIPITOR) 80 MG tablet Take 1 tablet (80 mg) by mouth daily  Qty: 30 tablet, Refills: 0    Associated Diagnoses: Acute diastolic CHF (congestive heart failure) (H)      VITAMIN D, CHOLECALCIFEROL, PO Take 1,000 Units by mouth daily      cloNIDine (CATAPRES-TTS2) 0.2 MG/24HR patch Place 1 patch onto the skin once a week      aspirin 81 MG chewable tablet Take 1 tablet (81 mg) by mouth daily  Qty: 36 tablet    Associated Diagnoses: Type 2 diabetes mellitus with diabetic nephropathy (H)      citalopram (CELEXA) 20 MG tablet Take 1 tablet (20 mg) by mouth daily  Qty: 30 tablet    Associated Diagnoses: Depression         STOP taking these medications       folic acid-vit B6-vit B12 (FOLGARD) 0.8-10-0.115 MG TABS per tablet Comments:   Reason for Stopping:         albuterol (PROAIR HFA, PROVENTIL HFA, VENTOLIN HFA) 108 (90 BASE) MCG/ACT inhaler Comments:   Reason for Stopping:         albuterol (2.5 MG/3ML) 0.083% nebulizer solution Comments:   Reason for Stopping:                 Allergies:         Allergies   Allergen Reactions     Morphine Rash           Consultations [facility]  Stay:      Consultation during this admission received from neurology and nephrology        Condition and Physical on Discharge:      Discharge condition: Stable   Vitals: Heart Rate: 76, Blood pressure 173/85, temperature 98.4  F (36.9  C), resp. rate 15, weight 93.1 kg (205 lb 4 oz), SpO2 95 %.     Constitutional: Awake, alert. No apparent distress   Lungs: Clear to auscultation bilaterally, no crackles or wheezing   Cardiovascular: Regular HR, normal S1 and S2, and no murmur noted   Abdomen: Normal bowel sounds, soft, non-distended, non-tender   Skin: No rashes, no cyanosis.   Other: LE:                  Discharge Time:      Greater than 30 minutes.        Image Results From This Hospital Stay (For Non-EPIC Providers):        Results for orders placed or performed during the hospital encounter of 09/18/17   MR Brain w/o Contrast    Narrative    MRI BRAIN WITHOUT CONTRAST  9/18/2017 11:05 AM    HISTORY:  Seizures.    TECHNIQUE:  Multiplanar, multisequence MRI of the brain without  gadolinium IV contrast material.    COMPARISON: 1/14/2015.    FINDINGS: Diffusion-weighted images are normal. There is no evidence  for intracranial hemorrhage or acute infarct. Exam is slightly limited  by motion artifact. There is some mild-to-moderate cerebral atrophy.  Some patchy white matter changes are seen in both hemispheres without  mass effect. Images through the temporal lobes do not show any  hippocampal abnormalities or any temporal lobe focal lesions. Vascular  structures appear patent at the skull base.      Impression    IMPRESSION:  1. Cerebral atrophy.  2. Scattered nonspecific white matter changes which are probably due  to chronic small vessel ischemic disease.  3. No evidence for intracranial hemorrhage, acute infarct, or any  focal mass lesions.  4. Exam mildly limited by motion artifact.    SHERIDAN CRUZ MD           Most Recent Lab Results In EPIC (For Non-EPIC Providers):    Most Recent 3 CBC's:  Recent Labs    Lab Test  09/18/17   0446  06/22/17   0723  03/31/17   0634   WBC  5.4  4.5  5.8   HGB  10.8*  12.3*  10.6*   MCV  88  95  90   PLT  146*  139*  169      Most Recent 3 BMP's:  Recent Labs   Lab Test  09/20/17   1010  09/19/17   1302  09/18/17   1950  09/18/17   0446   NA  135  131*  136  133   POTASSIUM  4.6  4.6  4.2  2.9*   CHLORIDE  100  96   --   97   CO2  27  29   --   26   BUN  43*  34*   --   56*   CR  6.77*  5.54*   --   6.36*   ANIONGAP  8  6   --   10   ALLYSON  9.2  9.2   --   9.1   GLC  155*  363*   --   77     Most Recent 3 Troponin's:  Recent Labs   Lab Test  12/07/16   1125  12/07/16   0526  12/06/16   1645   TROPI  0.176*  0.279*  0.255*     Most Recent 3 INR's:  Recent Labs   Lab Test  09/18/17   0446  06/22/17   0723  12/15/16   0545   INR  1.05  1.15*  1.17*     Most Recent 2 LFT's:  Recent Labs   Lab Test  09/18/17   0446  06/22/17   0723   AST  8  11   ALT  11  21   ALKPHOS  92  68   BILITOTAL  0.6  0.7     Most Recent Cholesterol Panel:  Recent Labs   Lab Test  06/22/17   0723   CHOL  119   LDL  21   HDL  74   TRIG  118     Most Recent 6 Bacteria Isolates From Any Culture (See EPIC Reports for Culture Details):  Recent Labs   Lab Test  01/15/17   0910  01/15/17   0624  01/15/17   0600  12/06/16   0815  12/06/16   0812  07/01/16   0950   CULT  Moderate growth Normal memo  No growth  No growth  No growth  No growth  Heavy growth Normal memo  Moderate growth Haemophilus influenzae Beta lactamase negative  Beta-lactamase negative Haemophilus influenzae are usually susceptible to   ampicillin, amoxacillin/clavulanic acid, levofloxacin, and 3rd generation   cephalosporins, such as ceftriaxone.  *  Canceled, Test credited Incorrectly ordered by PCU/Clinic     Most Recent TSH, T4 and HgbA1c:   Recent Labs   Lab Test  09/18/17   0446   01/27/16   0737   TSH   --    --   6.44*   T4   --    --   1.23   A1C  11.9*   < >   --     < > = values in this interval not displayed.            Emil Mahmood,  MD  Internal medicine Hospitalist:   Pager [phone]    9/20/2017

## 2017-09-22 DIAGNOSIS — R94.5 ABNORMAL RESULTS OF LIVER FUNCTION STUDIES: Primary | ICD-10-CM

## 2017-09-26 ENCOUNTER — TELEPHONE (OUTPATIENT)
Dept: TRANSPLANT | Facility: CLINIC | Age: 72
End: 2017-09-26

## 2017-09-26 NOTE — TELEPHONE ENCOUNTER
María left me a message wondering about Edsarah's colonoscopy and the amount of fluid required for the prep and his fluid restriction. His PCP is reluctant to order the colonoscopy due to the amount of fluid he would be required to drink. I did call [facility] and they recommended patient be seen before the colonoscopy to customize the bowel prep. When I returned María's call she did not answer. I left the above information.

## 2017-09-27 ENCOUNTER — PRE VISIT (OUTPATIENT)
Dept: UROLOGY | Facility: CLINIC | Age: 72
End: 2017-09-27

## 2017-10-04 ENCOUNTER — PRE VISIT (OUTPATIENT)
Dept: PULMONOLOGY | Facility: CLINIC | Age: 72
End: 2017-10-04

## 2017-10-04 NOTE — TELEPHONE ENCOUNTER
1.  Date/reason for appt: 10/12/17, abnormal CT    2.  Referring provider: DC SHIN    3.  Call to patient (Yes / No - short description): no, referred     4.  Previous care at / records requested from:    CT- imaging in pacs

## 2017-10-09 ENCOUNTER — PRE VISIT (OUTPATIENT)
Dept: CARDIOLOGY | Facility: CLINIC | Age: 72
End: 2017-10-09

## 2017-10-09 NOTE — TELEPHONE ENCOUNTER
6/22/17--Echo  Interpretation Summary  Borderline (EF 50-55%) reduced left ventricular function is present. Basal to  mid lateral wall hypokinesis.  Right ventricular function, chamber size, wall motion, and thickness are  normal.  Pulmonary artery systolic pressure cannot be assessed.  There is mild dilatation of the proximal ascending aorta: 3.9 cm (indexed for  body surface area: 1.9 cm/m^2; z-score +2.25).  Small inferior vena cava size consistent with hypovolemia.     Compared to the study dated 12/6/2016, global left ventricular systolic  function has improved to low-normal range, the IVC is now small, and the  proximal aorta is stable in dimensions.    12/12/16--Coronary angiogram  Catheterization results  1-likely nonischemic cardiomyopathy. One-vessel coronary disease with  60-70% proximal right coronary lesion, however FFR is not  significantly abnormal.    2-coronary angiography      - left main coronary artery. Very large in caliber with moderate  ostial calcifications superiorly. Widely patent. Trifurcates into LAD,  ramus intermedius, circumflex.     -Left anterior descending coronary artery. Moderate proximal  calcification. Very large caliber type III LAD. Straddling the first  diagonal branch is a smooth 40% stenosis. Otherwise trivial  irregularity. Diagonal has minimal irregularity and septals are widely  patent     -Ramus intermedius. Large caliber vessel reaching out to the  lateral apex. Mid 40-50% smooth irregularity.     -Left circumflex coronary. Nondominant dominant. Moderate first and  second and small third marginals. Large caliber system with minimal  irregularity.     -Right coronary artery. Morphologically dominant but relatively  small territory with a moderate sized PDA and no significant GAVI.  Normal caliber vessel with moderate calcification. There is a focal  proximal smooth 60-70% stenosis in mild mid to distal irregularity.    3-physiologic lesion assessment of the RCA. FFR  across the proximal  and mid RCA was 0.86 which is not significant.    4-left heart catheterization. Left ventricular pressure 121/28. No  gradient across aortic valve    Comment  This gentleman's global hypokinesis, although reported to be more  severe inferiorly, cannot be explained on the basis of epicardial  coronary disease. Continued risk factor intervention is warranted as  well as management of hypertension and nonischemic cardiomyopathy.  Suspect troponin elevation due to is pulmonary edema

## 2017-10-10 ENCOUNTER — OFFICE VISIT (OUTPATIENT)
Dept: GASTROENTEROLOGY | Facility: CLINIC | Age: 72
End: 2017-10-10
Attending: INTERNAL MEDICINE
Payer: MEDICARE

## 2017-10-10 ENCOUNTER — OFFICE VISIT (OUTPATIENT)
Dept: UROLOGY | Facility: CLINIC | Age: 72
End: 2017-10-10

## 2017-10-10 ENCOUNTER — OFFICE VISIT (OUTPATIENT)
Dept: CARDIOLOGY | Facility: CLINIC | Age: 72
End: 2017-10-10
Attending: INTERNAL MEDICINE
Payer: MEDICARE

## 2017-10-10 VITALS
HEIGHT: 75 IN | BODY MASS INDEX: 24.77 KG/M2 | DIASTOLIC BLOOD PRESSURE: 50 MMHG | SYSTOLIC BLOOD PRESSURE: 90 MMHG | HEART RATE: 76 BPM | WEIGHT: 199.2 LBS

## 2017-10-10 VITALS
SYSTOLIC BLOOD PRESSURE: 89 MMHG | DIASTOLIC BLOOD PRESSURE: 54 MMHG | WEIGHT: 199.4 LBS | OXYGEN SATURATION: 97 % | RESPIRATION RATE: 18 BRPM | TEMPERATURE: 97.7 F | HEIGHT: 75 IN | BODY MASS INDEX: 24.79 KG/M2 | HEART RATE: 71 BPM

## 2017-10-10 VITALS
WEIGHT: 199.3 LBS | OXYGEN SATURATION: 99 % | DIASTOLIC BLOOD PRESSURE: 49 MMHG | BODY MASS INDEX: 24.78 KG/M2 | HEART RATE: 90 BPM | HEIGHT: 75 IN | SYSTOLIC BLOOD PRESSURE: 108 MMHG

## 2017-10-10 DIAGNOSIS — J98.4 DISEASE OF LUNG: ICD-10-CM

## 2017-10-10 DIAGNOSIS — N18.6 END STAGE RENAL DISEASE (H): ICD-10-CM

## 2017-10-10 DIAGNOSIS — I10 ESSENTIAL HYPERTENSION: ICD-10-CM

## 2017-10-10 DIAGNOSIS — I25.10 CARDIOVASCULAR DISEASE: ICD-10-CM

## 2017-10-10 DIAGNOSIS — Z76.82 ORGAN TRANSPLANT CANDIDATE: ICD-10-CM

## 2017-10-10 DIAGNOSIS — B18.2 CHRONIC HEPATITIS C WITHOUT HEPATIC COMA (H): Primary | ICD-10-CM

## 2017-10-10 DIAGNOSIS — Z87.891 HISTORY OF TOBACCO USE: ICD-10-CM

## 2017-10-10 DIAGNOSIS — E78.49 OTHER HYPERLIPIDEMIA: ICD-10-CM

## 2017-10-10 DIAGNOSIS — K76.89 LIVER DYSFUNCTION: ICD-10-CM

## 2017-10-10 DIAGNOSIS — R94.5 ABNORMAL RESULTS OF LIVER FUNCTION STUDIES: ICD-10-CM

## 2017-10-10 DIAGNOSIS — B18.2 CHRONIC HEPATITIS C WITHOUT HEPATIC COMA (H): ICD-10-CM

## 2017-10-10 DIAGNOSIS — Z91.199 PERSONAL HISTORY OF NONCOMPLIANCE WITH MEDICAL TREATMENT, PRESENTING HAZARDS TO HEALTH: ICD-10-CM

## 2017-10-10 DIAGNOSIS — R31.29 MICROSCOPIC HEMATURIA: Primary | ICD-10-CM

## 2017-10-10 LAB
ALBUMIN SERPL-MCNC: 3.4 G/DL (ref 3.4–5)
ALP SERPL-CCNC: 73 U/L (ref 40–150)
ALT SERPL W P-5'-P-CCNC: 14 U/L (ref 0–70)
ANION GAP SERPL CALCULATED.3IONS-SCNC: 6 MMOL/L (ref 3–14)
AST SERPL W P-5'-P-CCNC: 9 U/L (ref 0–45)
BILIRUB DIRECT SERPL-MCNC: 0.2 MG/DL (ref 0–0.2)
BILIRUB SERPL-MCNC: 0.7 MG/DL (ref 0.2–1.3)
BUN SERPL-MCNC: 43 MG/DL (ref 7–30)
CALCIUM SERPL-MCNC: 9.5 MG/DL (ref 8.5–10.1)
CHLORIDE SERPL-SCNC: 96 MMOL/L (ref 94–109)
CO2 SERPL-SCNC: 31 MMOL/L (ref 20–32)
CREAT SERPL-MCNC: 6.87 MG/DL (ref 0.66–1.25)
ERYTHROCYTE [DISTWIDTH] IN BLOOD BY AUTOMATED COUNT: 13.3 % (ref 10–15)
GFR SERPL CREATININE-BSD FRML MDRD: 8 ML/MIN/1.7M2
GLUCOSE SERPL-MCNC: 212 MG/DL (ref 70–99)
HBV SURFACE AB SERPL IA-ACNC: 47.22 M[IU]/ML
HBV SURFACE AG SERPL QL IA: NONREACTIVE
HCT VFR BLD AUTO: 35.9 % (ref 40–53)
HGB BLD-MCNC: 11.1 G/DL (ref 13.3–17.7)
INR PPP: 1.08 (ref 0.86–1.14)
MCH RBC QN AUTO: 30 PG (ref 26.5–33)
MCHC RBC AUTO-ENTMCNC: 30.9 G/DL (ref 31.5–36.5)
MCV RBC AUTO: 97 FL (ref 78–100)
PLATELET # BLD AUTO: 114 10E9/L (ref 150–450)
POTASSIUM SERPL-SCNC: 4.1 MMOL/L (ref 3.4–5.3)
PROT SERPL-MCNC: 8.8 G/DL (ref 6.8–8.8)
RBC # BLD AUTO: 3.7 10E12/L (ref 4.4–5.9)
SODIUM SERPL-SCNC: 133 MMOL/L (ref 133–144)
WBC # BLD AUTO: 5.3 10E9/L (ref 4–11)

## 2017-10-10 PROCEDURE — 86704 HEP B CORE ANTIBODY TOTAL: CPT | Performed by: INTERNAL MEDICINE

## 2017-10-10 PROCEDURE — 85027 COMPLETE CBC AUTOMATED: CPT | Performed by: INTERNAL MEDICINE

## 2017-10-10 PROCEDURE — 80076 HEPATIC FUNCTION PANEL: CPT | Performed by: INTERNAL MEDICINE

## 2017-10-10 PROCEDURE — 80048 BASIC METABOLIC PNL TOTAL CA: CPT | Performed by: INTERNAL MEDICINE

## 2017-10-10 PROCEDURE — 88112 CYTOPATH CELL ENHANCE TECH: CPT | Performed by: UROLOGY

## 2017-10-10 PROCEDURE — 86706 HEP B SURFACE ANTIBODY: CPT | Performed by: INTERNAL MEDICINE

## 2017-10-10 PROCEDURE — 99213 OFFICE O/P EST LOW 20 MIN: CPT | Mod: ZF

## 2017-10-10 PROCEDURE — 85610 PROTHROMBIN TIME: CPT | Performed by: INTERNAL MEDICINE

## 2017-10-10 PROCEDURE — 36415 COLL VENOUS BLD VENIPUNCTURE: CPT | Performed by: INTERNAL MEDICINE

## 2017-10-10 PROCEDURE — G0499 HEPB SCREEN HIGH RISK INDIV: HCPCS | Performed by: INTERNAL MEDICINE

## 2017-10-10 PROCEDURE — 99212 OFFICE O/P EST SF 10 MIN: CPT | Mod: ZF

## 2017-10-10 PROCEDURE — 99213 OFFICE O/P EST LOW 20 MIN: CPT | Mod: 27

## 2017-10-10 PROCEDURE — 87522 HEPATITIS C REVRS TRNSCRPJ: CPT | Performed by: INTERNAL MEDICINE

## 2017-10-10 PROCEDURE — 99203 OFFICE O/P NEW LOW 30 MIN: CPT | Mod: ZP | Performed by: INTERNAL MEDICINE

## 2017-10-10 RX ORDER — NIFEDIPINE 30 MG/1
TABLET, FILM COATED, EXTENDED RELEASE ORAL
Status: ON HOLD | COMMUNITY
Start: 2017-02-15 | End: 2018-03-29

## 2017-10-10 RX ORDER — BLOOD PRESSURE TEST KIT-MEDIUM
KIT MISCELLANEOUS
COMMUNITY
Start: 2017-09-26

## 2017-10-10 RX ORDER — POTASSIUM CHLORIDE 750 MG/1
10 TABLET, EXTENDED RELEASE ORAL
Status: ON HOLD | COMMUNITY
Start: 2015-12-17 | End: 2018-03-29

## 2017-10-10 RX ORDER — INSULIN ASPART 100 [IU]/ML
INJECTION, SUSPENSION SUBCUTANEOUS
Status: ON HOLD | COMMUNITY
Start: 2016-10-24 | End: 2018-03-29

## 2017-10-10 RX ORDER — HYDRALAZINE HYDROCHLORIDE 50 MG/1
TABLET, FILM COATED ORAL
Status: ON HOLD | COMMUNITY
Start: 2017-02-15 | End: 2018-03-29

## 2017-10-10 RX ORDER — ALBUTEROL SULFATE 0.83 MG/ML
SOLUTION RESPIRATORY (INHALATION)
Status: ON HOLD | COMMUNITY
Start: 2016-01-26 | End: 2018-03-29

## 2017-10-10 RX ORDER — CODEINE PHOSPHATE AND GUAIFENESIN 10; 100 MG/5ML; MG/5ML
LIQUID ORAL
Refills: 0 | Status: ON HOLD | COMMUNITY
Start: 2017-03-21 | End: 2018-03-29

## 2017-10-10 RX ORDER — METOLAZONE 5 MG/1
5 TABLET ORAL
Status: ON HOLD | COMMUNITY
Start: 2016-04-26 | End: 2018-03-29

## 2017-10-10 RX ORDER — HYDRALAZINE HYDROCHLORIDE 100 MG/1
TABLET, FILM COATED ORAL
Status: ON HOLD | COMMUNITY
Start: 2017-07-20 | End: 2018-03-29

## 2017-10-10 RX ORDER — LATANOPROST 50 UG/ML
1 SOLUTION/ DROPS OPHTHALMIC AT BEDTIME
COMMUNITY
Start: 2015-10-08

## 2017-10-10 RX ORDER — LISINOPRIL 40 MG/1
40 TABLET ORAL
Status: ON HOLD | COMMUNITY
Start: 2015-11-10 | End: 2018-03-29

## 2017-10-10 RX ORDER — HYDRALAZINE HYDROCHLORIDE 25 MG/1
TABLET, FILM COATED ORAL
Status: ON HOLD | COMMUNITY
Start: 2016-12-15 | End: 2018-03-29

## 2017-10-10 RX ORDER — AMLODIPINE BESYLATE 10 MG/1
10 TABLET ORAL
Status: ON HOLD | COMMUNITY
Start: 2015-12-09 | End: 2018-03-29

## 2017-10-10 RX ORDER — LISINOPRIL 40 MG/1
TABLET ORAL
Status: ON HOLD | COMMUNITY
Start: 2017-02-19 | End: 2018-03-29

## 2017-10-10 RX ORDER — BRIMONIDINE TARTRATE 1 MG/ML
1 SOLUTION/ DROPS OPHTHALMIC
Status: ON HOLD | COMMUNITY
End: 2018-03-29

## 2017-10-10 RX ORDER — PEN NEEDLE, DIABETIC 32GX 5/32"
NEEDLE, DISPOSABLE MISCELLANEOUS
COMMUNITY
Start: 2016-10-24

## 2017-10-10 RX ORDER — ALBUTEROL SULFATE 0.63 MG/3ML
0.63 SOLUTION RESPIRATORY (INHALATION)
Status: ON HOLD | COMMUNITY
Start: 2017-02-06 | End: 2018-03-29

## 2017-10-10 RX ORDER — PEN NEEDLE, DIABETIC 30 GX5/16"
NEEDLE, DISPOSABLE MISCELLANEOUS
COMMUNITY
Start: 2015-01-30

## 2017-10-10 RX ORDER — LANOLIN ALCOHOL/MO/W.PET/CERES
6 CREAM (GRAM) TOPICAL
Status: ON HOLD | COMMUNITY
Start: 2015-12-30 | End: 2018-03-29

## 2017-10-10 RX ORDER — HYDROXYZINE PAMOATE 25 MG/1
25 CAPSULE ORAL
Status: ON HOLD | COMMUNITY
Start: 2015-12-30 | End: 2018-03-29

## 2017-10-10 RX ORDER — BROMFENAC SODIUM 0.7 MG/ML
1 SOLUTION/ DROPS OPHTHALMIC DAILY
Status: ON HOLD | COMMUNITY
End: 2018-03-29

## 2017-10-10 RX ORDER — ALBUTEROL SULFATE 90 UG/1
2 AEROSOL, METERED RESPIRATORY (INHALATION)
Status: ON HOLD | COMMUNITY
Start: 2015-11-06 | End: 2018-03-29

## 2017-10-10 RX ORDER — LANCETS
EACH MISCELLANEOUS
COMMUNITY
Start: 2015-01-30

## 2017-10-10 RX ORDER — TORSEMIDE 20 MG/1
40 TABLET ORAL
Status: ON HOLD | COMMUNITY
Start: 2016-04-26 | End: 2018-03-29

## 2017-10-10 RX ORDER — HYDRALAZINE HYDROCHLORIDE 100 MG/1
TABLET, FILM COATED ORAL
Status: ON HOLD | COMMUNITY
Start: 2017-02-24 | End: 2018-03-29

## 2017-10-10 RX ORDER — BLOOD-GLUCOSE METER
EACH MISCELLANEOUS
COMMUNITY
Start: 2015-12-08

## 2017-10-10 ASSESSMENT — PAIN SCALES - GENERAL
PAINLEVEL: SEVERE PAIN (7)

## 2017-10-10 NOTE — PROGRESS NOTES
Hepatology Clinic note  Keagan Wayne   Date of Birth 1945  Date of Service 10/10/2017    Reason for consult: History of Hepatitis C infection   Requesting provider: Silvia Robbins PA-C       Impression and plan:   Keagan Wayne is a 71 year old male with history of end-stage renal disease on hemodialysis, undergoing evaluation for kidney transplant, COPD, CAD, diabetes, hypertension and cured hepatitis C with minimal liver fibrosis.  He presents for clearance from a liver standpoint prior to transplantation consideration.      Overall, he is doing well and has normal liver enzymes and synthetic function.        He's had negative hepatitis C viral load since 11/2015 per [facility] labs, as well as our own, which were checked during the visit.      His liver has minimal fibrosis per fibrosis scan performed in 02/2017 showing 1-2.  His current ultrasound shows no signs of portal hypertension or splenomegaly.     -- Based on the above, he is at minimal risk of progressing to more advanced forms of liver disease. In fact, we hope that he would regress in terms of fibrosis the longer he clear of active virus.      -- He is positive for hepatitis B core antibody with a negative hepatitis B surface antigen and a very robust and active immunity with a positive hepatitis B surface antibody.  This suggests immunity and prior exposure to hepatitis B.    -- He would not require anti-hepatitis B viral therapy based on the level of immunity.        Therefore, there are no contraindications from a liver standpoint to proceeding with kidney transplantation and completing the evaluation and deemed appropriate.   No further workup or followup is required from a liver standpoint.   He may return to clinic on an as needed basis.     Francisco Reid MD  [facility] Transplant Hepatology clinic    -----------------------------------------------------       HPI:   Keagan Wayne is a 71 year old male with history of end-stage  kidney disease on dialysis and previous hepatitis C infection who presents for evaluation of his liver condition as part of kidney transplant evaluation.      Briefly, Mr. Wayen reports finding out about his hepatitis C over 20 years ago, however, he was not aware of advanced liver disease.  It appears that over the years he has received care at multiple locations including [facility] and [facility].      He was not aware if he received treatment for the virus and cured it; however, review of records suggests that he in fact received treatment in 2015 and had achieved a sustained viral logical response since 11/2015.  He has been viral load negative since that time and has had fibrosis assessment with a FibroScan scan showing F1-2, performed at [facility] in 02/2017.      At this time, he denies acute problems and notes to be stable.  He has no chest pain, shortness of breath, nausea, vomiting, fever, chills, fatigue, jaundice, rash, pruritus, bleeding, confusion or change in bowel habits.          Past Medical History:     Past Medical History:   Diagnosis Date     Acute pulmonary edema (H)      Anemia      Bipolar 1 disorder (H)      CAD (coronary artery disease)      Cognitive impairment      Colitis      COPD (chronic obstructive pulmonary disease) (H)      Depression      Diabetes (H)      ESRD (end stage renal disease) (H)      ESRD (end stage renal disease) on dialysis (H)      Glaucoma      Hepatitis C      Hyperlipidemia      Hyperlipidemia      Hypertension      IV drug abuse 1970's     Non compliance w medication regimen      Non-ischemic cardiomyopathy (H)      Non-ST elevation MI (NSTEMI) (H)      Peripheral neuropathy      Peripheral neuropathy      Pneumonia             Past Surgical History:     Past Surgical History:   Procedure Laterality Date     CREATE FISTULA ARTERIOVENOUS UPPER EXTREMITY Right 5/8/2016    Procedure: CREATE FISTULA ARTERIOVENOUS UPPER EXTREMITY;   "Surgeon: Josias Valente MD;  Location: [facility] OR     CREATE FISTULA ARTERIOVENOUS UPPER EXTREMITY Right 12/14/2016    Procedure: CREATE FISTULA ARTERIOVENOUS UPPER EXTREMITY;  Surgeon: Contreras Bowman MD;  Location: [facility] OR     HERNIA REPAIR              Medications:     Current Outpatient Prescriptions   Medication     insulin aspart (NOVOLOG FLEXPEN) 100 UNIT/ML injection     bromfenac (PROLENSA) 0.07 % ophthalmic solution     levETIRAcetam (KEPPRA) 500 MG tablet     calcium acetate (PHOSLO) 667 MG CAPS capsule     Carvedilol (COREG PO)     LOSARTAN POTASSIUM PO     Gabapentin (NEURONTIN PO)     insulin glargine (LANTUS) 100 UNIT/ML injection     isosorbide mononitrate (IMDUR) 30 MG 24 hr tablet     umeclidinium (INCRUSE ELLIPTA) 62.5 MCG/INH oral inhaler     OXYCODONE HCL PO     bumetanide (BUMEX) 2 MG tablet     atorvastatin (LIPITOR) 80 MG tablet     VITAMIN D, CHOLECALCIFEROL, PO     cloNIDine (CATAPRES-TTS2) 0.2 MG/24HR patch     aspirin 81 MG chewable tablet     citalopram (CELEXA) 20 MG tablet     No current facility-administered medications for this visit.             Allergies:     Allergies   Allergen Reactions     Morphine Rash            Social History:      reports that he has been smoking Cigarettes.  He has never used smokeless tobacco. He reports that he drinks about 8.4 oz of alcohol per week  He reports that he does not use illicit drugs.   has no sexual activity history on file.           Family History:     Family History   Problem Relation Age of Onset     Coronary Artery Disease Brother      DIABETES Mother             Review of Systems:   10 points ROS was obtained and highlighted in the HPI, otherwise negative.          Physical Exam:   VS:  BP (!) 89/54 (BP Location: Left arm, Patient Position: Sitting, Cuff Size: Adult Large)  Pulse 71  Temp 97.7  F (36.5  C) (Oral)  Resp 18  Ht 1.905 m (6' 3\")  Wt 90.4 kg (199 lb 6.4 oz)  SpO2 97%  BMI 24.92 kg/m2      Gen: A&Ox3, " NAD  HEENT: non-icteric, oropharynx clear  CV: RRR  Lung: CTAB  Abd: soft, NT, ND  Ext: no edema, intact pulses.   Skin: no stigmata of chronic liver disease.   Neuro: no focal deficits, grossly intact, no asterixis   Psych: appropriate mood and affects       Data:   Reviewed in person and significant for:  Lab Results   Component Value Date    WBC 5.4 09/18/2017     Lab Results   Component Value Date    RBC 3.54 09/18/2017     Lab Results   Component Value Date    HGB 10.8 09/18/2017     Lab Results   Component Value Date    HCT 31.2 09/18/2017     No components found for: MCT  Lab Results   Component Value Date    MCV 88 09/18/2017     Lab Results   Component Value Date    MCH 30.5 09/18/2017     Lab Results   Component Value Date    MCHC 34.6 09/18/2017     Lab Results   Component Value Date    RDW 13.2 09/18/2017     Lab Results   Component Value Date     09/18/2017     Last Basic Metabolic Panel:  Lab Results   Component Value Date     09/20/2017      Lab Results   Component Value Date    POTASSIUM 4.6 09/20/2017     Lab Results   Component Value Date    CHLORIDE 100 09/20/2017     Lab Results   Component Value Date    ALLYSON 9.2 09/20/2017     Lab Results   Component Value Date    CO2 27 09/20/2017     Lab Results   Component Value Date    BUN 43 09/20/2017     Lab Results   Component Value Date    CR 6.77 09/20/2017     Lab Results   Component Value Date     09/20/2017     Liver Function Studies -   Recent Labs   Lab Test  09/18/17   0446   PROTTOTAL  8.0   ALBUMIN  3.3*   BILITOTAL  0.6   ALKPHOS  92   AST  8   ALT  11

## 2017-10-10 NOTE — PATIENT INSTRUCTIONS
Thank you for your visit today.  Please call me with any questions or concerns.   Xu Desir RN  Cardiology Care Coordinator  [phone], press option 1 then option 3

## 2017-10-10 NOTE — LETTER
10/10/2017       RE: Keagan Wayne  [address]   [place] [place] [facility] [address]     Dear Colleague,    Thank you for referring your patient, Keagan Wayne, to the [facility] HEPATOLOGY at [facility]. Please see a copy of my visit note below.    Hepatology Clinic note  Keagan Wayne   Date of Birth 1945  Date of Service 10/10/2017    Reason for consult: History of Hepatitis C infection   Requesting provider: Silvia Robbins PA-C       Impression and plan:   Keagan Wayne is a 71 year old male with history of end-stage renal disease on hemodialysis, undergoing evaluation for kidney transplant, COPD, CAD, diabetes, hypertension and cured hepatitis C with minimal liver fibrosis.  He presents for clearance from a liver standpoint prior to transplantation consideration.      Overall, he is doing well and has normal liver enzymes and synthetic function.        He's had negative hepatitis C viral load since 11/2015 per [facility] labs, as well as our own, which were checked during the visit.      His liver has minimal fibrosis per fibrosis scan performed in 02/2017 showing 1-2.  His current ultrasound shows no signs of portal hypertension or splenomegaly.     -- Based on the above, he is at minimal risk of progressing to more advanced forms of liver disease. In fact, we hope that he would regress in terms of fibrosis the longer he clear of active virus.      -- He is positive for hepatitis B core antibody with a negative hepatitis B surface antigen and a very robust and active immunity with a positive hepatitis B surface antibody.  This suggests immunity and prior exposure to hepatitis B.    -- He would not require anti-hepatitis B viral therapy based on the level of immunity.        Therefore, there are no contraindications from a liver standpoint to proceeding with kidney transplantation and completing the evaluation and deemed appropriate.   No further workup or followup is  required from a liver standpoint.   He may return to clinic on an as needed basis.     Francisco Reid MD  [facility] Transplant Hepatology clinic    -----------------------------------------------------       HPI:   Keagan Wayne is a 71 year old male with history of end-stage kidney disease on dialysis and previous hepatitis C infection who presents for evaluation of his liver condition as part of kidney transplant evaluation.      Briefly, Mr. Wayne reports finding out about his hepatitis C over 20 years ago, however, he was not aware of advanced liver disease.  It appears that over the years he has received care at multiple locations including [facility] and [facility].      He was not aware if he received treatment for the virus and cured it; however, review of records suggests that he in fact received treatment in 2015 and had achieved a sustained viral logical response since 11/2015.  He has been viral load negative since that time and has had fibrosis assessment with a FibroScan scan showing F1-2, performed at [facility] in 02/2017.      At this time, he denies acute problems and notes to be stable.  He has no chest pain, shortness of breath, nausea, vomiting, fever, chills, fatigue, jaundice, rash, pruritus, bleeding, confusion or change in bowel habits.          Past Medical History:     Past Medical History:   Diagnosis Date     Acute pulmonary edema (H)      Anemia      Bipolar 1 disorder (H)      CAD (coronary artery disease)      Cognitive impairment      Colitis      COPD (chronic obstructive pulmonary disease) (H)      Depression      Diabetes (H)      ESRD (end stage renal disease) (H)      ESRD (end stage renal disease) on dialysis (H)      Glaucoma      Hepatitis C      Hyperlipidemia      Hyperlipidemia      Hypertension      IV drug abuse 1970's     Non compliance w medication regimen      Non-ischemic cardiomyopathy (H)      Non-ST elevation MI (NSTEMI)  (H)      Peripheral neuropathy      Peripheral neuropathy      Pneumonia             Past Surgical History:     Past Surgical History:   Procedure Laterality Date     CREATE FISTULA ARTERIOVENOUS UPPER EXTREMITY Right 5/8/2016    Procedure: CREATE FISTULA ARTERIOVENOUS UPPER EXTREMITY;  Surgeon: Josias Valente MD;  Location: [facility] OR     CREATE FISTULA ARTERIOVENOUS UPPER EXTREMITY Right 12/14/2016    Procedure: CREATE FISTULA ARTERIOVENOUS UPPER EXTREMITY;  Surgeon: Contreras Bowman MD;  Location: [facility] OR     HERNIA REPAIR              Medications:     Current Outpatient Prescriptions   Medication     insulin aspart (NOVOLOG FLEXPEN) 100 UNIT/ML injection     bromfenac (PROLENSA) 0.07 % ophthalmic solution     levETIRAcetam (KEPPRA) 500 MG tablet     calcium acetate (PHOSLO) 667 MG CAPS capsule     Carvedilol (COREG PO)     LOSARTAN POTASSIUM PO     Gabapentin (NEURONTIN PO)     insulin glargine (LANTUS) 100 UNIT/ML injection     isosorbide mononitrate (IMDUR) 30 MG 24 hr tablet     umeclidinium (INCRUSE ELLIPTA) 62.5 MCG/INH oral inhaler     OXYCODONE HCL PO     bumetanide (BUMEX) 2 MG tablet     atorvastatin (LIPITOR) 80 MG tablet     VITAMIN D, CHOLECALCIFEROL, PO     cloNIDine (CATAPRES-TTS2) 0.2 MG/24HR patch     aspirin 81 MG chewable tablet     citalopram (CELEXA) 20 MG tablet     No current facility-administered medications for this visit.             Allergies:     Allergies   Allergen Reactions     Morphine Rash            Social History:      reports that he has been smoking Cigarettes.  He has never used smokeless tobacco. He reports that he drinks about 8.4 oz of alcohol per week  He reports that he does not use illicit drugs.   has no sexual activity history on file.           Family History:     Family History   Problem Relation Age of Onset     Coronary Artery Disease Brother      DIABETES Mother             Review of Systems:   10 points ROS was obtained and highlighted in the HPI,  "otherwise negative.          Physical Exam:   VS:  BP (!) 89/54 (BP Location: Left arm, Patient Position: Sitting, Cuff Size: Adult Large)  Pulse 71  Temp 97.7  F (36.5  C) (Oral)  Resp 18  Ht 1.905 m (6' 3\")  Wt 90.4 kg (199 lb 6.4 oz)  SpO2 97%  BMI 24.92 kg/m2      Gen: A&Ox3, NAD  HEENT: non-icteric, oropharynx clear  CV: RRR  Lung: CTAB  Abd: soft, NT, ND  Ext: no edema, intact pulses.   Skin: no stigmata of chronic liver disease.   Neuro: no focal deficits, grossly intact, no asterixis   Psych: appropriate mood and affects       Data:   Reviewed in person and significant for:  Lab Results   Component Value Date    WBC 5.4 09/18/2017     Lab Results   Component Value Date    RBC 3.54 09/18/2017     Lab Results   Component Value Date    HGB 10.8 09/18/2017     Lab Results   Component Value Date    HCT 31.2 09/18/2017     No components found for: MCT  Lab Results   Component Value Date    MCV 88 09/18/2017     Lab Results   Component Value Date    MCH 30.5 09/18/2017     Lab Results   Component Value Date    MCHC 34.6 09/18/2017     Lab Results   Component Value Date    RDW 13.2 09/18/2017     Lab Results   Component Value Date     09/18/2017     Last Basic Metabolic Panel:  Lab Results   Component Value Date     09/20/2017      Lab Results   Component Value Date    POTASSIUM 4.6 09/20/2017     Lab Results   Component Value Date    CHLORIDE 100 09/20/2017     Lab Results   Component Value Date    ALLYSON 9.2 09/20/2017     Lab Results   Component Value Date    CO2 27 09/20/2017     Lab Results   Component Value Date    BUN 43 09/20/2017     Lab Results   Component Value Date    CR 6.77 09/20/2017     Lab Results   Component Value Date     09/20/2017     Liver Function Studies -   Recent Labs   Lab Test  09/18/17   0446   PROTTOTAL  8.0   ALBUMIN  3.3*   BILITOTAL  0.6   ALKPHOS  92   AST  8   ALT  11       Again, thank you for allowing me to participate in the care of your patient.  "     Sincerely,    Francisco Reid MD

## 2017-10-10 NOTE — PROGRESS NOTES
SUBJECTIVE:  Keagan Wayne is a 71 year old male who presents for Renal Tx evaluation.  CKD stage 5 due to DM2.On HD for 1yr.  DM2 for over 12 years. HTN+. On statin. Current smoker.  Not very active. Denied cardiac symptoms.    Adnitted with mild Troponin elevation/Pulmonary edema in Dec 2016. Had angiogram. No flow limiting lesions. RCA had 60% disease. EF was low in the past. Non ischemic cardiomyopathy,probably due to HTN. Also had admission with pneumonia and resp. Failure.    Hep C positive.  Patient Active Problem List    Diagnosis Date Noted     Seizures (H) 09/18/2017     Priority: Medium     Hepatitis C      Priority: Medium     ESRD (end stage renal disease) on dialysis (H)      Priority: Medium     Anemia      Priority: Medium     Acute pulmonary edema (H)      Priority: Medium     Non-ischemic cardiomyopathy (H)      Priority: Medium     Non-ST elevation MI (NSTEMI) (H)      Priority: Medium     CAD (coronary artery disease)      Priority: Medium     Hyperlipidemia      Priority: Medium     Non compliance w medication regimen      Priority: Medium     Pneumonia 01/15/2017     Priority: Medium     ESRD on hemodialysis (H) 12/15/2016     Priority: Medium     Respiratory failure, acute (H) 12/06/2016     Priority: Medium     Acute respiratory failure (H) 10/07/2016     Priority: Medium     Acute diastolic CHF (congestive heart failure) (H) 09/29/2016     Priority: Medium     Diabetes mellitus type II, uncontrolled (H) 08/05/2016     Priority: Medium     Respiratory failure (H) 06/29/2016     Priority: Medium     Acute exacerbation of CHF (congestive heart failure) (H) 05/05/2016     Priority: Medium     Hypertension due to endocrine disorder 02/16/2016     Priority: Medium     Cognitive impairment 02/11/2016     Priority: Medium     Chronic diastolic congestive heart failure (H) 02/11/2016     Priority: Medium     Acute respiratory failure with hypoxia (H) 02/10/2016     Priority: Medium     Acute  "kidney injury (H) 02/10/2016     Priority: Medium     Chronic kidney disease, stage IV (severe) (H) 02/10/2016     Priority: Medium     HTN (hypertension) 02/10/2016     Priority: Medium     Insulin dependent type 2 diabetes mellitus (H) 02/10/2016     Priority: Medium     Peripheral neuropathy 02/10/2016     Priority: Medium     Anemia of chronic disease 02/10/2016     Priority: Medium     Physical deconditioning 02/10/2016     Priority: Medium     Volume overload 01/27/2016     Priority: Medium     Colitis 09/26/2015     Priority: Medium     Glaucoma 10/23/2014     Priority: Medium     Hyperglycemia 04/14/2012     Priority: Medium    .  Current Outpatient Prescriptions   Medication Sig     insulin aspart (NOVOLOG FLEXPEN) 100 UNIT/ML injection Inject Subcutaneous 3 times daily (with meals) sliding scale up to 7 units before meals     bromfenac (PROLENSA) 0.07 % ophthalmic solution Place 1 drop Into the left eye daily     blood glucose monitoring (ACCU-CHEK FASTCLIX) lancets Test 2 times per day.     blood glucose monitoring (ACCU-CHEK MINISTERIO PLUS) meter device kit [email or web address] Lesly glucometer     albuterol (2.5 MG/3ML) 0.083% neb solution INHALE 3 MLS (1 VIAL) BY NEBULIZATION ROUTE EVERY 4 HOURS AS NEEDED FOR WHEEZING     albuterol (PROAIR HFA/PROVENTIL HFA/VENTOLIN HFA) 108 (90 BASE) MCG/ACT Inhaler Inhale 2 puffs into the lungs     amLODIPine (NORVASC) 10 MG tablet Take 10 mg by mouth     brimonidine (ALPHAGAN P) 0.1 % ophthalmic solution 1 drop     Insulin Pen Needle (PEN NEEDLES 5/16\") 31G X 8 MM MISC 31g x 8 mm     hydrOXYzine (VISTARIL) 25 MG capsule Take 25 mg by mouth     latanoprost (XALATAN) 0.005 % ophthalmic solution 1 drop     lisinopril (PRINIVIL/ZESTRIL) 40 MG tablet Take 40 mg by mouth     melatonin 3 MG tablet Take 6 mg by mouth     ombitasvir-paritaprevir-ritonavir;dasabuvir (VIEKIRA MAYANK) 12.5-75-50 &250 MG tablets Take twice daily according to package instructions for 12 weeks total.     potassium " chloride (K-TAB,KLOR-CON) 10 MEQ tablet Take 10 mEq by mouth     albuterol (ACCUNEB) 0.63 MG/3ML nebulizer solution Inhale 0.63 mg into the lungs     Blood Pressure Monitoring (RA BLOOD PRESSURE CUFF MONITOR) MISC by Misc.(Non-Drug; Combo Route) route once daily.     hydrALAZINE (APRESOLINE) 100 MG TABS tablet TAKE ONE TABLET BY MOUTH THREE TIMES A DAY     beclomethasone (QVAR) 40 MCG/ACT Inhaler Inhale 1 puff into the lungs     insulin NPH (HUMULIN N/NOVOLIN N) 100 UNIT/ML injection Inject 14 Units Subcutaneous     metolazone (ZAROXOLYN) 5 MG tablet Take 5 mg by mouth     torsemide (DEMADEX) 20 MG tablet Take 40 mg by mouth     QVAR 40 MCG/ACT Inhaler      VIRTUSSIN A/C 100-10 MG/5ML SOLN solution TK 5-10ML PO Q 4 H PRN     hydrALAZINE (APRESOLINE) 100 MG TABS tablet      hydrALAZINE (APRESOLINE) 50 MG tablet      hydrALAZINE (APRESOLINE) 25 MG tablet      NOVOLOG MIX 70/30 FLEXPEN (70-30) 100 UNIT/ML susp      ULTICARE PEN NEEDLES 29G X 12MM      ULTICARE MICRO 32G X 4 MM insulin pen needle      lisinopril (PRINIVIL/ZESTRIL) 40 MG tablet      AFEDITAB CR 30 MG TB24      levETIRAcetam (KEPPRA) 500 MG tablet Take 1 tablet (500 mg) by mouth every 24 hours     calcium acetate (PHOSLO) 667 MG CAPS capsule Take 1,334 mg by mouth 4 times daily (with meals and nightly)     Carvedilol (COREG PO) Take 25 mg by mouth 2 times daily (with meals)     LOSARTAN POTASSIUM PO Take 100 mg by mouth daily     Gabapentin (NEURONTIN PO) Take 200 mg by mouth daily     insulin glargine (LANTUS) 100 UNIT/ML injection Inject 18 Units Subcutaneous every morning      isosorbide mononitrate (IMDUR) 30 MG 24 hr tablet Take 90 mg by mouth daily     umeclidinium (INCRUSE ELLIPTA) 62.5 MCG/INH oral inhaler Inhale 1 puff into the lungs daily     OXYCODONE HCL PO Take 10 mg by mouth 3 times daily as needed (takes scheduled)     bumetanide (BUMEX) 2 MG tablet Take 2 tablets (4 mg) by mouth daily     atorvastatin (LIPITOR) 80 MG tablet Take 1 tablet  (80 mg) by mouth daily     VITAMIN D, CHOLECALCIFEROL, PO Take 1,000 Units by mouth daily     cloNIDine (CATAPRES-TTS2) 0.2 MG/24HR patch Place 1 patch onto the skin once a week     aspirin 81 MG chewable tablet Take 1 tablet (81 mg) by mouth daily     citalopram (CELEXA) 20 MG tablet Take 1 tablet (20 mg) by mouth daily     No current facility-administered medications for this visit.      Past Medical History:   Diagnosis Date     Acute pulmonary edema (H)      Anemia      Bipolar 1 disorder (H)      CAD (coronary artery disease)      Cognitive impairment      Colitis      COPD (chronic obstructive pulmonary disease) (H)      Depression      Diabetes (H)      ESRD (end stage renal disease) (H)      ESRD (end stage renal disease) on dialysis (H)      Glaucoma      Hepatitis C      Hyperlipidemia      Hyperlipidemia      Hypertension      IV drug abuse 1970's     Non compliance w medication regimen      Non-ischemic cardiomyopathy (H)      Non-ST elevation MI (NSTEMI) (H)      Peripheral neuropathy      Peripheral neuropathy      Pneumonia      Past Surgical History:   Procedure Laterality Date     CREATE FISTULA ARTERIOVENOUS UPPER EXTREMITY Right 5/8/2016    Procedure: CREATE FISTULA ARTERIOVENOUS UPPER EXTREMITY;  Surgeon: Josias Valente MD;  Location: [facility] OR     CREATE FISTULA ARTERIOVENOUS UPPER EXTREMITY Right 12/14/2016    Procedure: CREATE FISTULA ARTERIOVENOUS UPPER EXTREMITY;  Surgeon: Contreras Bowman MD;  Location: [facility] OR     HERNIA REPAIR       Allergies   Allergen Reactions     Morphine Rash     Dry skin     Social History     Social History     Marital status:      Spouse name: N/A     Number of children: N/A     Years of education: N/A     Occupational History     Not on file.     Social History Main Topics     Smoking status: Current Every Day Smoker     Types: Cigarettes     Smokeless tobacco: Never Used     Alcohol use 8.4 oz/week     7 Cans of beer, 7 Shots of liquor per week  "     Comment: coctail 2-3/month     Drug use: No      Comment: past marijuana use     Sexual activity: Not on file     Other Topics Concern     Not on file     Social History Narrative     Family History   Problem Relation Age of Onset     Coronary Artery Disease Brother      DIABETES Mother           REVIEW OF SYSTEMS:  General: negative, fever, chills, night sweats  Skin: negative, acne, rash and scaling  Eyes: negative, double vision, eye pain and photophobia  Ears/Nose/Throat: negative, nasal congestion and purulent rhinorrhea  Respiratory: No dyspnea on exertion, No cough, No hemoptysis and negative  Cardiovascular: negative, palpitations, tachycardia, irregular heart beat, chest pain, exertional chest pain or pressure, paroxysmal nocturnal dyspnea, dyspnea on exertion and orthopnea  Gastrointestinal: negative, dysphagia, nausea and vomiting  Genitourinary: negative, nocturia, dysuria and frequency  Musculoskeletal: negative, fracture, back pain and neck pain  Neurologic: negative, headaches, syncope, stroke and seizures  Psychiatric: negative, nervous breakdown, thoughts of self-harm and thoughts of hurting someone else  Hematologic/Lymphatic/Immunologic: negative, bleeding disorder, chills and fever  Endocrine: negative, cold intolerance, heat intolerance and hot flashes       OBJECTIVE:  Blood pressure 108/49, pulse 90, height 1.905 m (6' 3\"), weight 90.4 kg (199 lb 4.7 oz), SpO2 99 %.  General Appearance: alert and no distress  Head: Normocephalic. No masses, lesions, tenderness or abnormalities  Eyes: conjuctiva clear, PERRL, EOM intact  Ears: External ears normal. Canals clear. TM's normal.  Nose: Nares normal  Mouth: normal  Neck: Supple, no cervical adenopathy, no thyromegaly  Lungs: clear to auscultation  Cardiac: regular rate and rhythm, normal S1 and S2, no murmur  Abdomen: Soft, nontender.  Normal bowel sounds.  No hepatosplenomegaly or abnormal masses  Extremities: no peripheral edema, peripheral " pulses normal  Musculoskeletal: negative  Neurological: Cranial nerves 2-12 intact, motor strength intact       ASSESSMENT/PLAN:  71 yr old male with CKD stage 5 on HD for 1 yr. CKD due to DM2 and HTN. DM2 for over 12 years.  Not very active. Denied cardiac symptoms.  Smoker. On statin. HepC+.  Prior NSTEMI,mild troponin elevation,pulmonary edema. Angiogram reviewed. No flow limiting lesions. Pulmonary edema due to CKD/HTN. HD started at that time.  Reviewed recent echo. Low normal function. EF 50-55%. Aortic root 3.9cms. Normal for patient.  From cardiac standp[place],he may proceed with transplant. If he remain on active list need a Lexiscan stress test next year.  Per orders.   Return to Clinic PRN.

## 2017-10-10 NOTE — PROGRESS NOTES
CYSTOSCOPY PROCEDURE NOTE    Reason for cystoscopy: Microscopic Hematuria    Brief History: 70 yo M with hx of ESRD on HD.  Noted to have microscopic hematuria on 2 U/A's over the past 2 years.  Has never had gross hematuria.  Does have history of tobacco use.  No family hx of prostate or urologic malignancy. Denies hx of recurrent UTI.  Does void 2-4 times per day, no issues with emptying.    CYSTOSCOPY  After obtaining informed consent, the patient was prepped and draped in the standard sterile fashion.  The 15 Mohawk flexible cystoscope was inserted through the urethral meatus.      The anterior urethra was:  normal without stricture.    The external sphincter was  appropriately coapted.   The prostatic urethra demonstrated bilobar hypertrophy, approximately 4 cm long    The bladder neck was  nonocclusive.    The bladder was  unremarkable for tumors, erythema or stones.    The ureteral orifices  were identified on each side in orthotopic position   There were mild trabeculations.    On retroflexion there was the usual bladder neck hyperemia.    There was mild intravesical protrusion of the prostate.      The patient tolerated the procedure well without complication.        UA RESULTS:   Recent Labs   Lab Test  06/22/17   0719  10/22/16   1546  10/06/16   2235   SG  1.014  1.020  1.020   URINEPH  6.0  6.0  7.0   NITRITE  Negative  Negative  Negative   RBCU  3*  O - 2  2-5*   WBCU  1  O - 2  O - 2       PSA RESULTS  PSA   Date Value Ref Range Status   06/22/2017 0.51 0 - 4 ug/L Final     Comment:     Assay Method:  Chemiluminescence using Siemens Vista analyzer     Noncontrast CT 2016 (report revieweD) no evidence of stones or collecting system dilation    Assessment/Plan: 70 yo M with hx of microscopic hematuria, awaiting transplant candidacy evaluation.  -No identifiable cause of microscopic hematuria based on CT/cysto.  Urographic phase imaging generally recommended for evaluation of microscopic hematuria but  would have to be coordinated with dialysis.  -If urine cytology suspicious will proceed with CTU prior to formal clearance for doroteo Pena

## 2017-10-10 NOTE — NURSING NOTE
Invasive Procedure Safety Checklist:    Procedure:     Action: Complete sections and checkboxes as appropriate.    Pre-procedure:  1. Patient ID Verified with 2 identifiers (Brittney and  or MRN) : YES    2. Procedure and site verified with patient/designee (when able) : YES    3. Accurate consent documentation in medical record : YES    4. H&P (or appropriate assessment) documented in medical record : YES  H&P must be up to 30 days prior to procedure an updated within 24 hours of                 Procedure as applicable.     5. Relevant diagnostic and radiology test results appropriately labeled and displayed as applicable : YES    6. Blood products, implants, devices, and/or special equipment available for the procedure as applicable : YES    7. Procedure site(s) marked with provider initials [Exclusions: N/A] : NO    8. Marking not required. Reason : Yes  Procedure does not require site marking    Time Out:     Time-Out performed immediately prior to starting procedure, including verbal and active participation of all team members addressing: YES    1. Correct patient identity.  2. Confirmed that the correct side and site are marked.  3. An accurate procedure to be done.  4. Agreement on the procedure to be done.  5. Correct patient position.  6. Relevant images and results are properly labeled and appropriately displayed.  7. The need to administer antibiotics or fluids for irrigation purposes during the procedure as applicable.  8. Safety precautions based on patient history or medication use.    During Procedure: Verification of correct person, site, and procedure occurs any time the responsibility for care of the patient is transferred to another member of the care team.

## 2017-10-10 NOTE — PATIENT INSTRUCTIONS
Call the clinic with any concerns or questions.     It was a pleasure meeting with you today.  Thank you for allowing me and my team the privilege of caring for you today.  YOU are the reason we are here, and I truly hope we provided you with the excellent service you deserve.  Please let us know if there is anything else we can do for you so that we can be sure you are leaving completely satisfied with your care experience.       Jose A Garvey LPN

## 2017-10-10 NOTE — MR AVS SNAPSHOT
After Visit Summary   10/10/2017    Keagan Wayne    MRN: [id number]           Patient Information     Date Of Birth          1945        Visit Information        Provider Department      10/10/2017 8:00 AM Francisco Reid MD [facility] Hepatology        Today's Diagnoses     Hepatitis C, chronic (H)    -  1    Cardiovascular disease        End stage renal disease (H)        History of tobacco use        Organ transplant candidate        Personal history of noncompliance with medical treatment, presenting hazards to health        Essential hypertension        Hyperlipidemia        Disease of lung           Follow-ups after your visit        Your next 10 appointments already scheduled     Oct 10, 2017  9:00 AM CDT   (Arrive by 8:45 AM)   New Patient Visit with Moisés Pena MD   [facility] Urology and [facility] ([facility])    [address]  4th Floor  [facility] [address] [phone]            Oct 10, 2017 10:00 AM CDT   US AORTIC IMAGING with UCUSV2   [facility] Imaging [place] US ([facility])    [address]  [address] [facility] [address] [phone]           Please bring a list of your medicines (including vitamins, minerals and over-the-counter drugs). Also, tell your doctor about any allergies you may have. Wear comfortable clothes and leave your valuables at home.  Adults: No eating or drinking for 8 hours before the exam. You may take medicine with a small sip of water.  Children: - Children 6+ years: No food or drink for 6 hours before exam. - Children 1-5 years: No food or drink for 4 hours before exam. - Infants, breast-fed: may have breast milk up to 2 hours before exam. - Infants, formula: may have bottle until 4 hours before exam.  Please call the Imaging Department at your exam site with any questions.            Oct 10, 2017 10:30 AM CDT   US AORTA/IVC/ILIAC DUPLEX  COMPLETE with UCUSV2   [facility] US ([facility])    [address] [id number] [phone]           Please bring a list of your medicines (including vitamins, minerals and over-the-counter drugs). Also, tell your doctor about any allergies you may have. Wear comfortable clothes and leave your valuables at home.  Adults: No eating or drinking for 8 hours before the exam. You may take medicine with a small sip of water.  Children: - Children 6+ years: No food or drink for 6 hours before exam. - Children 1-5 years: No food or drink for 4 hours before exam. - Infants, breast-fed: may have breast milk up to 2 hours before exam. - Infants, formula: may have bottle until 4 hours before exam.  Please call the Imaging Department at your exam site with any questions.            Oct 10, 2017 11:30 AM CDT   Lab with  LAB   [facility] Lab ([facility])    [address] [place] [id number] [phone]            Oct 10, 2017  2:00 PM CDT   (Arrive by 1:45 PM)   NEW PANCREAS/KIDNEY TRANSPLANT WORK-UP with BUCKY Khan MD   [facility] ([facility])    [address] [place] [id number] [phone]            Oct 12, 2017 12:00 PM CDT   (Arrive by 11:45 AM)   XR CHEST 2 VIEWS with XR1   [facility] Xray ([facility])    [address] [place] [id number] [phone]           Please bring a list of your current medicines to your exam. (Include vitamins, minerals and over-thecounter medicines.) Leave your valuables at home.  Tell your doctor if there is a chance you may be pregnant.  You do not need to do anything special for this exam.            Oct 12, 2017 12:15 PM CDT   US ABDOMEN/PELVIS DUPLEX COMPLETE with [address] [place] [address] Imaging Center US ([facility])    [address] [phone] [phone]           Please bring a list of your medicines (including vitamins, minerals and over-the-counter drugs). Also, tell your doctor about any allergies you may have. Wear comfortable clothes and leave your valuables at home.  Adults: No eating or drinking for 8 hours before the exam. You may take medicine with a small sip of water.  Children: - Children 6+ years: No food or drink for 6 hours before exam. - Children 1-5 years: No food or drink for 4 hours before exam. - Infants, breast-fed: may have breast milk up to 2 hours before exam. - Infants, formula: may have bottle until 4 hours before exam.  Please call the Imaging Department at your exam site with any questions.            Oct 12, 2017  1:00 PM CDT   FULL PULMONARY FUNCTION with  PFL C   [facility] Pulmonary Function Testing ([facility])    [address] [id number] [phone]            Oct 12, 2017  2:00 PM CDT   (Arrive by 1:45 PM)   New Patient Visit with Suzanne Chao MD   [facility] ([facility])    [address] [place] [id number] [phone]              Future tests that were ordered for you today     Open Future Orders        Priority Expected Expires Ordered    US Abdomen/Pelvis Duplex Complete Routine  10/10/2018 10/10/2017            Who to contact     If you have questions or need follow up information about today's clinic visit or your schedule please contact [facility] HEPATOLOGY directly at [phone].  Normal or non-critical lab and imaging results will be communicated to you by MyChart, letter or phone within 4 business days after the clinic has received the results. If you do not hear from us within 7 days, please contact the clinic through MyChart or phone. If you have a critical or abnormal lab result,  "we will notify you by phone as soon as possible.  Submit refill requests through [employer] or call your pharmacy and they will forward the refill request to us. Please allow 3 business days for your refill to be completed.          Additional Information About Your Visit        Care EveryWhere ID     This is your Care EveryWhere ID. This could be used by other organizations to access your [place] medical records  [id number]        Your Vitals Were     Pulse Temperature Respirations Height Pulse Oximetry BMI (Body Mass Index)    71 97.7  F (36.5  C) (Oral) 18 1.905 m (6' 3\") 97% 24.92 kg/m2       Blood Pressure from Last 3 Encounters:   10/10/17 (!) 89/54   09/20/17 180/78   07/15/17 146/67    Weight from Last 3 Encounters:   10/10/17 90.4 kg (199 lb 6.4 oz)   09/20/17 93.1 kg (205 lb 4 oz)   07/15/17 86.2 kg (190 lb)              We Performed the Following     CBC with platelets     Hepatic panel     Hepatitis B core antibody     Hepatitis B Surface Antibody     Hepatitis B surface antigen     Hepatitis C RNA quantitative     INR        Primary Care Provider Office Phone # Fax #    Yolanda Hernandez [phone]       [facility] [address] [address]  [facility] [address]        Equal Access to Services     JIAN HOROWITZ : Hadii hien ku hadasho Soomaali, waaxda luqadaha, qaybta kaalmada adeegyada, gio liuin henna brown. So [facility] [phone].    ATENCIÓN: Si habla español, tiene a benitez disposición servicios gratuitos de asistencia lingüística. ame al [phone].    We comply with applicable federal civil rights laws and [place] laws. We do not discriminate on the basis of race, color, national origin, age, disability, sex, sexual orientation, or gender identity.            Thank you!     Thank you for choosing [facility] HEPATOLOGY  for your care. Our goal is always to provide you with excellent care. Hearing back from our patients is one way we can continue to improve our " services. Please take a few minutes to complete the written survey that you may receive in the mail after your visit with us. Thank you!             Your Updated Medication List - Protect others around you: Learn how to safely use, store and throw away your medicines at www.disposemymeds.org.          This list is accurate as of: 10/10/17  8:45 AM.  Always use your most recent med list.                   Brand Name Dispense Instructions for use Diagnosis    aspirin 81 MG chewable tablet     36 tablet    Take 1 tablet (81 mg) by mouth daily    Type 2 diabetes mellitus with diabetic nephropathy (H)       atorvastatin 80 MG tablet    LIPITOR    30 tablet    Take 1 tablet (80 mg) by mouth daily    Acute diastolic CHF (congestive heart failure) (H)       bumetanide 2 MG tablet    BUMEX    60 tablet    Take 2 tablets (4 mg) by mouth daily    Acute on chronic systolic congestive heart failure (H)       calcium acetate 667 MG Caps capsule    PHOSLO     Take 1,334 mg by mouth 4 times daily (with meals and nightly)        citalopram 20 MG tablet    celeXA    30 tablet    Take 1 tablet (20 mg) by mouth daily    Depression       cloNIDine 0.2 MG/24HR WK patch    CATAPRES-TTS2     Place 1 patch onto the skin once a week        COREG PO      Take 25 mg by mouth 2 times daily (with meals)        INCRUSE ELLIPTA 62.5 MCG/INH oral inhaler   Generic drug:  umeclidinium      Inhale 1 puff into the lungs daily        insulin glargine 100 UNIT/ML injection    LANTUS     Inject 18 Units Subcutaneous every morning        isosorbide mononitrate 30 MG 24 hr tablet    IMDUR     Take 90 mg by mouth daily        levETIRAcetam 500 MG tablet    KEPPRA    60 tablet    Take 1 tablet (500 mg) by mouth every 24 hours    Seizures (H)       LOSARTAN POTASSIUM PO      Take 100 mg by mouth daily        NEURONTIN PO      Take 200 mg by mouth daily        NovoLOG FLEXPEN 100 UNIT/ML injection   Generic drug:  insulin aspart      Inject Subcutaneous 3  times daily (with meals) sliding scale up to 7 units before meals        OXYCODONE HCL PO      Take 10 mg by mouth 3 times daily as needed (takes scheduled)        PROLENSA 0.07 % ophthalmic solution   Generic drug:  bromfenac      Place 1 drop Into the left eye daily        VITAMIN D (CHOLECALCIFEROL) PO      Take 1,000 Units by mouth daily

## 2017-10-10 NOTE — MR AVS SNAPSHOT
After Visit Summary   10/10/2017    Keagan Wayne    MRN: [id number]           Patient Information     Date Of Birth          1945        Visit Information        Provider Department      10/10/2017 2:00 PM BUCKY Khan MD [facility]        Today's Diagnoses     Cardiovascular disease        Diabetes mellitus, type 2 (H)        End stage renal disease (H)        History of tobacco use        Liver dysfunction        Organ transplant candidate        Personal history of noncompliance with medical treatment, presenting hazards to health        Essential hypertension        Hyperlipidemia        Disease of lung        Hepatitis C, chronic (H)          Care Instructions    Thank you for your visit today.  Please call me with any questions or concerns.   Xu Desir RN  Cardiology Care Coordinator  [phone], press option 1 then option 3          Follow-ups after your visit        Follow-up notes from your care team     Return if symptoms worsen or fail to improve.      Your next 10 appointments already scheduled     Oct 12, 2017 12:00 PM CDT   (Arrive by 11:45 AM)   XR CHEST 2 VIEWS with XR1   [facility] Xray ([facility])    [address] [place] [address] [phone]           Please bring a list of your current medicines to your exam. (Include vitamins, minerals and over-thecounter medicines.) Leave your valuables at home.  Tell your doctor if there is a chance you may be pregnant.  You do not need to do anything special for this exam.            Oct 12, 2017 12:15 PM CDT   US ABDOMEN/PELVIS DUPLEX COMPLETE with UCUS1   [facility] ([facility])    [address] [place] [address] [phone]           Please bring a list of your medicines (including vitamins, minerals and over-the-counter drugs). Also, tell your doctor about any allergies  you may have. Wear comfortable clothes and leave your valuables at home.  Adults: No eating or drinking for 8 hours before the exam. You may take medicine with a small sip of water.  Children: - Children 6+ years: No food or drink for 6 hours before exam. - Children 1-5 years: No food or drink for 4 hours before exam. - Infants, breast-fed: may have breast milk up to 2 hours before exam. - Infants, formula: may have bottle until 4 hours before exam.  Please call the Imaging Department at your exam site with any questions.            Oct 12, 2017  1:00 PM CDT   FULL PULMONARY FUNCTION with  PFL C   [facility] Pulmonary Function Testing ([facility])    [address] [place] [address] [phone]            Oct 12, 2017  2:00 PM CDT   (Arrive by 1:45 PM)   New Patient Visit with Suzanne Chao MD   [facility] for [facility] Health ([facility])    [address] [place] [address] [phone]              Future tests that were ordered for you today     Open Future Orders        Priority Expected Expires Ordered    US Abdomen/Pelvis Duplex Complete Routine  10/10/2018 10/10/2017            Who to contact     If you have questions or need follow up information about today's clinic visit or your schedule please contact [facility] HEART CARE directly at [phone].  Normal or non-critical lab and imaging results will be communicated to you by [employer], letter or phone within 4 business days after the clinic has received the results. If you do not hear from us within 7 days, please contact the clinic through [employer] or phone. If you have a critical or abnormal lab result, we will notify you by phone as soon as possible.  Submit refill requests through [employer] or call your pharmacy and they will forward the refill request to us. Please allow 3 business days for your refill to be completed.           "Additional Information About Your Visit        Care EveryWhere ID     This is your Care EveryWhere ID. This could be used by other organizations to access your [place] medical records  [id number]        Your Vitals Were     Pulse Height Pulse Oximetry BMI (Body Mass Index)          90 1.905 m (6' 3\") 99% 24.91 kg/m2         Blood Pressure from Last 3 Encounters:   10/10/17 108/49   10/10/17 90/50   10/10/17 (!) 89/54    Weight from Last 3 Encounters:   10/10/17 90.4 kg (199 lb 4.7 oz)   10/10/17 90.4 kg (199 lb 3.2 oz)   10/10/17 90.4 kg (199 lb 6.4 oz)              Today, you had the following     No orders found for display       Primary Care Provider Office Phone # Fax Jie Hernandez [phone]       [facility] [address]  [facility] [address]        Equal Access to Services     JIAN HOROWITZ : Hadii aad ku hadasho Soomaali, waaxda luqadaha, qaybta kaalmada adeegyada, waxay idiin hayaan adeeg kharahalley la'mindyn . So [facility] [phone].    ATENCIÓN: Si habla español, tiene a benitez disposición servicios gratuitos de asistencia lingüística. Llame al [phone].    We comply with applicable federal civil rights laws and [place] laws. We do not discriminate on the basis of race, color, national origin, age, disability, sex, sexual orientation, or gender identity.            Thank you!     Thank you for choosing [facility]  for your care. Our goal is always to provide you with excellent care. Hearing back from our patients is one way we can continue to improve our services. Please take a few minutes to complete the written survey that you may receive in the mail after your visit with us. Thank you!             Your Updated Medication List - Protect others around you: Learn how to safely use, store and throw away your medicines at www.disposemymeds.org.          This list is accurate as of: 10/10/17  2:03 PM.  Always use your most recent med list.                   Brand " Name Dispense Instructions for use Diagnosis    AFEDITAB CR 30 MG Tb24   Generic drug:  NIFEdipine ER           * albuterol 108 (90 BASE) MCG/ACT Inhaler    PROAIR HFA/PROVENTIL HFA/VENTOLIN HFA     Inhale 2 puffs into the lungs        * albuterol (2.5 MG/3ML) 0.083% neb solution      INHALE 3 MLS (1 VIAL) BY NEBULIZATION ROUTE EVERY 4 HOURS AS NEEDED FOR WHEEZING        * albuterol 0.63 MG/3ML nebulizer solution    ACCUNEB     Inhale 0.63 mg into the lungs        amLODIPine 10 MG tablet    NORVASC     Take 10 mg by mouth        aspirin 81 MG chewable tablet     36 tablet    Take 1 tablet (81 mg) by mouth daily    Type 2 diabetes mellitus with diabetic nephropathy (H)       atorvastatin 80 MG tablet    LIPITOR    30 tablet    Take 1 tablet (80 mg) by mouth daily    Acute diastolic CHF (congestive heart failure) (H)       * beclomethasone 40 MCG/ACT Inhaler    QVAR     Inhale 1 puff into the lungs        * QVAR 40 MCG/ACT Inhaler   Generic drug:  beclomethasone           blood glucose monitoring lancets      Test 2 times per day.        blood glucose monitoring meter device kit      accucheck Lesly glucometer        brimonidine 0.1 % ophthalmic solution    ALPHAGAN P     1 drop        bumetanide 2 MG tablet    BUMEX    60 tablet    Take 2 tablets (4 mg) by mouth daily    Acute on chronic systolic congestive heart failure (H)       calcium acetate 667 MG Caps capsule    PHOSLO     Take 1,334 mg by mouth 4 times daily (with meals and nightly)        citalopram 20 MG tablet    celeXA    30 tablet    Take 1 tablet (20 mg) by mouth daily    Depression       cloNIDine 0.2 MG/24HR WK patch    CATAPRES-TTS2     Place 1 patch onto the skin once a week        COREG PO      Take 25 mg by mouth 2 times daily (with meals)        * hydrALAZINE 25 MG tablet    APRESOLINE          * hydrALAZINE 50 MG tablet    APRESOLINE          * hydrALAZINE 100 MG Tabs tablet    APRESOLINE          * hydrALAZINE 100 MG Tabs tablet    APRESOLINE     " TAKE ONE TABLET BY MOUTH THREE TIMES A DAY        hydrOXYzine 25 MG capsule    VISTARIL     Take 25 mg by mouth        INCRUSE ELLIPTA 62.5 MCG/INH oral inhaler   Generic drug:  umeclidinium      Inhale 1 puff into the lungs daily        insulin glargine 100 UNIT/ML injection    LANTUS     Inject 18 Units Subcutaneous every morning        insulin  UNIT/ML injection    HumuLIN N/NovoLIN N     Inject 14 Units Subcutaneous        isosorbide mononitrate 30 MG 24 hr tablet    IMDUR     Take 90 mg by mouth daily        latanoprost 0.005 % ophthalmic solution    XALATAN     1 drop        levETIRAcetam 500 MG tablet    KEPPRA    60 tablet    Take 1 tablet (500 mg) by mouth every 24 hours    Seizures (H)       * lisinopril 40 MG tablet    PRINIVIL/ZESTRIL     Take 40 mg by mouth        * lisinopril 40 MG tablet    PRINIVIL/ZESTRIL          LOSARTAN POTASSIUM PO      Take 100 mg by mouth daily        melatonin 3 MG tablet      Take 6 mg by mouth        metolazone 5 MG tablet    ZAROXOLYN     Take 5 mg by mouth        NEURONTIN PO      Take 200 mg by mouth daily        NovoLOG FLEXPEN 100 UNIT/ML injection   Generic drug:  insulin aspart      Inject Subcutaneous 3 times daily (with meals) sliding scale up to 7 units before meals        NovoLOG MIX 70/30 FLEXPEN injection   Generic drug:  insulin aspart prot & aspart           ombitasvir-paritaprevir-ritonavir;dasabuvir 12.5-75-50 &250 MG tablets    EKNormanna MAYANK     Take twice daily according to package instructions for 12 weeks total.        OXYCODONE HCL PO      Take 10 mg by mouth 3 times daily as needed (takes scheduled)        * pen needles 5/16\" 31G X 8 MM Misc      31g x 8 mm        * ULTICARE MICRO 32G X 4 MM   Generic drug:  insulin pen needle           * ULTICARE PEN NEEDLES 29G X 12MM   Generic drug:  insulin pen needle           potassium chloride 10 MEQ tablet    K-TAB,KLOR-CON     Take 10 mEq by mouth        PROLENSA 0.07 % ophthalmic solution   Generic " drug:  bromfenac      Place 1 drop Into the left eye daily        RA BLOOD PRESSURE CUFF MONITOR Misc      by Misc.(Non-Drug; Combo Route) route once daily.        torsemide 20 MG tablet    DEMADEX     Take 40 mg by mouth        VIRTUSSIN A/C 100-10 MG/5ML Soln solution   Generic drug:  guaiFENesin-codeine      TK 5-10ML PO Q 4 H PRN        VITAMIN D (CHOLECALCIFEROL) PO      Take 1,000 Units by mouth daily        * Notice:  This list has 14 medication(s) that are the same as other medications prescribed for you. Read the directions carefully, and ask your doctor or other care provider to review them with you.

## 2017-10-10 NOTE — NURSING NOTE
Chief Complaint   Patient presents with     Consult     microscopic hematuria    Jose A Garvey LPN

## 2017-10-10 NOTE — LETTER
10/10/2017       RE: Keagan Wayne  [address]   [facility] [address]     Dear Colleague,    Thank you for referring your patient, Keagan Wayne, to the [facility] UR[facility] AND [facility]ST FOR PROSTATE AND UROLOGIC CANCERS at [facility]. Please see a copy of my visit note below.    CYSTOSCOPY PROCEDURE NOTE    Reason for cystoscopy: Microscopic Hematuria    Brief History: 72 yo M with hx of ESRD on HD.  Noted to have microscopic hematuria on 2 U/A's over the past 2 years.  Has never had gross hematuria.  Does have history of tobacco use.  No family hx of prostate or urologic malignancy. Denies hx of recurrent UTI.  Does void 2-4 times per day, no issues with emptying.    CYSTOSCOPY  After obtaining informed consent, the patient was prepped and draped in the standard sterile fashion.  The 15 [place] flexible cystoscope was inserted through the urethral meatus.      The anterior urethra was:  normal without stricture.    The external sphincter was  appropriately coapted.   The prostatic urethra demonstrated bilobar hypertrophy, approximately 4 cm long    The bladder neck was  nonocclusive.    The bladder was  unremarkable for tumors, erythema or stones.    The ureteral orifices  were identified on each side in orthotopic position   There were mild trabeculations.    On retroflexion there was the usual bladder neck hyperemia.    There was mild intravesical protrusion of the prostate.      The patient tolerated the procedure well without complication.        UA RESULTS:   Recent Labs   Lab Test  06/22/17   0719  10/22/16   1546  10/06/16   2235   SG  1.014  1.020  1.020   URINEPH  6.0  6.0  7.0   NITRITE  Negative  Negative  Negative   RBCU  3*  O - 2  2-5*   WBCU  1  O - 2  O - 2       PSA RESULTS  PSA   Date Value Ref Range Status   06/22/2017 0.51 0 - 4 ug/L Final     Comment:     Assay Method:  Chemiluminescence using Siemens Vista analyzer     Noncontrast CT 2016 (report  revieweD) no evidence of stones or collecting system dilation    Assessment/Plan: 70 yo M with hx of microscopic hematuria, awaiting transplant candidacy evaluation.  -No identifiable cause of microscopic hematuria based on CT/cysto.  Urographic phase imaging generally recommended for evaluation of microscopic hematuria but would have to be coordinated with dialysis.  -If urine cytology suspicious will proceed with CTU prior to formal clearance for trasnplant      Again, thank you for allowing me to participate in the care of your patient.      Sincerely,    Moisés Pena MD

## 2017-10-10 NOTE — MR AVS SNAPSHOT
After Visit Summary   10/10/2017    Keagan Wayne    MRN: [id number]           Patient Information     Date Of Birth          1945        Visit Information        Provider Department      10/10/2017 9:00 AM Moisés Pena MD [facility] Urology and [facility] for Prostate and Urologic Cancers        Care Instructions    Call the clinic with any concerns or questions.     It was a pleasure meeting with you today.  Thank you for allowing me and my team the privilege of caring for you today.  YOU are the reason we are here, and I truly hope we provided you with the excellent service you deserve.  Please let us know if there is anything else we can do for you so that we can be sure you are leaving completely satisfied with your care experience.       Jose A Garvey LPN          Follow-ups after your visit        Your next 10 appointments already scheduled     Oct 10, 2017 11:30 AM CDT   Lab with  LAB    Health Lab ([facility])    [address]  [address] [facility] [id number] [phone]            Oct 10, 2017  2:00 PM CDT   (Arrive by 1:45 PM)   NEW PANCREAS/KIDNEY TRANSPLANT WORK-UP with BUCKY Khan MD   [facility] Care ([facility])    [address]  3rd Floor  [place] [id number] [phone]            Oct 12, 2017 12:00 PM CDT   (Arrive by 11:45 AM)   XR CHEST 2 VIEWS with XR[address] [place] [address] Xray ([facility])    [address]  [address] [facility] [id number] [phone]           Please bring a list of your current medicines to your exam. (Include vitamins, minerals and over-thecounter medicines.) Leave your valuables at home.  Tell your doctor if there is a chance you may be pregnant.  You do not need to do anything special for this exam.            Oct 12, 2017 12:15 PM CDT   US ABDOMEN/PELVIS DUPLEX COMPLETE with [address] [place] [address] US  ([facility])    [address] [place] [id number] [phone]           Please bring a list of your medicines (including vitamins, minerals and over-the-counter drugs). Also, tell your doctor about any allergies you may have. Wear comfortable clothes and leave your valuables at home.  Adults: No eating or drinking for 8 hours before the exam. You may take medicine with a small sip of water.  Children: - Children 6+ years: No food or drink for 6 hours before exam. - Children 1-5 years: No food or drink for 4 hours before exam. - Infants, breast-fed: may have breast milk up to 2 hours before exam. - Infants, formula: may have bottle until 4 hours before exam.  Please call the Imaging Department at your exam site with any questions.            Oct 12, 2017  1:00 PM CDT   FULL PULMONARY FUNCTION with  PFL C   [facility] Pulmonary Function Testing ([facility])    [address] [place] [id number] [phone]            Oct 12, 2017  2:00 PM CDT   (Arrive by 1:45 PM)   New Patient Visit with Suzanne Chao MD   [facility] ([facility])    [address] [place] [id number] [phone]              Future tests that were ordered for you today     Open Future Orders        Priority Expected Expires Ordered    US Abdomen/Pelvis Duplex Complete Routine  10/10/2018 10/10/2017            Who to contact     Please call your clinic at [phone] to:    Ask questions about your health    Make or cancel appointments    Discuss your medicines    Learn about your test results    Speak to your doctor   If you have compliments or concerns about an experience at your clinic, or if you wish to file a complaint, please contact [facility] Patient Relations at [phone] or email us at [email or web address]    "      Additional Information About Your Visit        Care EveryWhere ID     This is your Care EveryWhere ID. This could be used by other organizations to access your [facility] medical records  [id number]        Your Vitals Were     Pulse Height BMI (Body Mass Index)             76 1.905 m (6' 3\") 24.9 kg/m2          Blood Pressure from Last 3 Encounters:   10/10/17 90/50   10/10/17 (!) 89/54   09/20/17 180/78    Weight from Last 3 Encounters:   10/10/17 90.4 kg (199 lb 3.2 oz)   10/10/17 90.4 kg (199 lb 6.4 oz)   09/20/17 93.1 kg (205 lb 4 oz)              Today, you had the following     No orders found for display       Primary Care Provider Office Phone # Fax #    Yolanda Hernandez [phone]       [facility] [address]  [facility] [address]        Equal Access to Services     JIAN HOROWITZ : Hadii aad ku hadasho Soomaali, waaxda luqadaha, qaybta kaalmada adeegyada, waxay idiin hayaan kareem cooper . So [facility] [phone].    ATENCIÓN: Si arthur parra, tiene a benitez disposición servicios gratuitos de asistencia lingüística. Llame al [phone].    We comply with applicable federal civil rights laws and [place] laws. We do not discriminate on the basis of race, color, national origin, age, disability, sex, sexual orientation, or gender identity.            Thank you!     Thank you for choosing [facility] UROLOGY AND [facility] FOR PROSTATE AND UROLOGIC CANCERS  for your care. Our goal is always to provide you with excellent care. Hearing back from our patients is one way we can continue to improve our services. Please take a few minutes to complete the written survey that you may receive in the mail after your visit with us. Thank you!             Your Updated Medication List - Protect others around you: Learn how to safely use, store and throw away your medicines at www.disposemymeds.org.          This list is accurate as of: 10/10/17 10:31 AM.  Always use your most recent med " list.                   Brand Name Dispense Instructions for use Diagnosis    AFEDITAB CR 30 MG Tb24   Generic drug:  NIFEdipine ER           * albuterol 108 (90 BASE) MCG/ACT Inhaler    PROAIR HFA/PROVENTIL HFA/VENTOLIN HFA     Inhale 2 puffs into the lungs        * albuterol (2.5 MG/3ML) 0.083% neb solution      INHALE 3 MLS (1 VIAL) BY NEBULIZATION ROUTE EVERY 4 HOURS AS NEEDED FOR WHEEZING        * albuterol 0.63 MG/3ML nebulizer solution    ACCUNEB     Inhale 0.63 mg into the lungs        amLODIPine 10 MG tablet    NORVASC     Take 10 mg by mouth        aspirin 81 MG chewable tablet     36 tablet    Take 1 tablet (81 mg) by mouth daily    Type 2 diabetes mellitus with diabetic nephropathy (H)       atorvastatin 80 MG tablet    LIPITOR    30 tablet    Take 1 tablet (80 mg) by mouth daily    Acute diastolic CHF (congestive heart failure) (H)       * beclomethasone 40 MCG/ACT Inhaler    QVAR     Inhale 1 puff into the lungs        * QVAR 40 MCG/ACT Inhaler   Generic drug:  beclomethasone           blood glucose monitoring lancets      Test 2 times per day.        blood glucose monitoring meter device kit      accucheck Lesly glucometer        brimonidine 0.1 % ophthalmic solution    ALPHAGAN P     1 drop        bumetanide 2 MG tablet    BUMEX    60 tablet    Take 2 tablets (4 mg) by mouth daily    Acute on chronic systolic congestive heart failure (H)       calcium acetate 667 MG Caps capsule    PHOSLO     Take 1,334 mg by mouth 4 times daily (with meals and nightly)        citalopram 20 MG tablet    celeXA    30 tablet    Take 1 tablet (20 mg) by mouth daily    Depression       cloNIDine 0.2 MG/24HR WK patch    CATAPRES-TTS2     Place 1 patch onto the skin once a week        COREG PO      Take 25 mg by mouth 2 times daily (with meals)        * hydrALAZINE 25 MG tablet    APRESOLINE          * hydrALAZINE 50 MG tablet    APRESOLINE          * hydrALAZINE 100 MG Tabs tablet    APRESOLINE          * hydrALAZINE 100  "MG Tabs tablet    APRESOLINE     TAKE ONE TABLET BY MOUTH THREE TIMES A DAY        hydrOXYzine 25 MG capsule    VISTARIL     Take 25 mg by mouth        INCRUSE ELLIPTA 62.5 MCG/INH oral inhaler   Generic drug:  umeclidinium      Inhale 1 puff into the lungs daily        insulin glargine 100 UNIT/ML injection    LANTUS     Inject 18 Units Subcutaneous every morning        insulin  UNIT/ML injection    HumuLIN N/NovoLIN N     Inject 14 Units Subcutaneous        isosorbide mononitrate 30 MG 24 hr tablet    IMDUR     Take 90 mg by mouth daily        latanoprost 0.005 % ophthalmic solution    XALATAN     1 drop        levETIRAcetam 500 MG tablet    KEPPRA    60 tablet    Take 1 tablet (500 mg) by mouth every 24 hours    Seizures (H)       * lisinopril 40 MG tablet    PRINIVIL/ZESTRIL     Take 40 mg by mouth        * lisinopril 40 MG tablet    PRINIVIL/ZESTRIL          LOSARTAN POTASSIUM PO      Take 100 mg by mouth daily        melatonin 3 MG tablet      Take 6 mg by mouth        metolazone 5 MG tablet    ZAROXOLYN     Take 5 mg by mouth        NEURONTIN PO      Take 200 mg by mouth daily        NovoLOG FLEXPEN 100 UNIT/ML injection   Generic drug:  insulin aspart      Inject Subcutaneous 3 times daily (with meals) sliding scale up to 7 units before meals        NovoLOG MIX 70/30 FLEXPEN injection   Generic drug:  insulin aspart prot & aspart           ombitasvir-paritaprevir-ritonavir;dasabuvir 12.5-75-50 &250 MG tablets    VIEKIRA MAYANK     Take twice daily according to package instructions for 12 weeks total.        OXYCODONE HCL PO      Take 10 mg by mouth 3 times daily as needed (takes scheduled)        * pen needles 5/16\" 31G X 8 MM Misc      31g x 8 mm        * ULTICARE MICRO 32G X 4 MM   Generic drug:  insulin pen needle           * ULTICARE PEN NEEDLES 29G X 12MM   Generic drug:  insulin pen needle           potassium chloride 10 MEQ tablet    K-TAB,KLOR-CON     Take 10 mEq by mouth        PROLENSA 0.07 % " ophthalmic solution   Generic drug:  bromfenac      Place 1 drop Into the left eye daily        RA BLOOD PRESSURE CUFF MONITOR Misc      by Misc.(Non-Drug; Combo Route) route once daily.        torsemide 20 MG tablet    DEMADEX     Take 40 mg by mouth        VIRTUSSIN A/C 100-10 MG/5ML Soln solution   Generic drug:  guaiFENesin-codeine      TK 5-10ML PO Q 4 H PRN        VITAMIN D (CHOLECALCIFEROL) PO      Take 1,000 Units by mouth daily        * Notice:  This list has 14 medication(s) that are the same as other medications prescribed for you. Read the directions carefully, and ask your doctor or other care provider to review them with you.

## 2017-10-10 NOTE — NURSING NOTE
"Chief Complaint   Patient presents with     Consult     Hep C, never treated        Initial BP (!) 89/54 (BP Location: Left arm, Patient Position: Sitting, Cuff Size: Adult Large)  Pulse 71  Temp 97.7  F (36.5  C) (Oral)  Resp 18  Ht 1.905 m (6' 3\")  Wt 90.4 kg (199 lb 6.4 oz)  SpO2 97%  BMI 24.92 kg/m2 Estimated body mass index is 24.92 kg/(m^2) as calculated from the following:    Height as of this encounter: 1.905 m (6' 3\").    Weight as of this encounter: 90.4 kg (199 lb 6.4 oz).  Medication Reconciliation: complete     Rosa Maria Pickering [facility]  10/10/2017 7:50 AM        "

## 2017-10-11 DIAGNOSIS — R31.29 MICROSCOPIC HEMATURIA: Primary | ICD-10-CM

## 2017-10-11 LAB
COPATH REPORT: NORMAL
HBV CORE AB SERPL QL IA: REACTIVE

## 2017-10-12 ENCOUNTER — OFFICE VISIT (OUTPATIENT)
Dept: PULMONOLOGY | Facility: CLINIC | Age: 72
End: 2017-10-12
Attending: INTERNAL MEDICINE
Payer: MEDICARE

## 2017-10-12 VITALS
HEART RATE: 82 BPM | SYSTOLIC BLOOD PRESSURE: 114 MMHG | DIASTOLIC BLOOD PRESSURE: 63 MMHG | RESPIRATION RATE: 16 BRPM | WEIGHT: 199.3 LBS | OXYGEN SATURATION: 97 % | BODY MASS INDEX: 24.91 KG/M2

## 2017-10-12 DIAGNOSIS — R91.8 ABNORMAL CT SCAN OF LUNG: ICD-10-CM

## 2017-10-12 DIAGNOSIS — R94.2 ABNORMAL PFT: Primary | ICD-10-CM

## 2017-10-12 LAB
HCV RNA SERPL NAA+PROBE-ACNC: NORMAL [IU]/ML
HCV RNA SERPL NAA+PROBE-LOG IU: NORMAL LOG IU/ML

## 2017-10-12 ASSESSMENT — PAIN SCALES - GENERAL: PAINLEVEL: NO PAIN (0)

## 2017-10-12 NOTE — MR AVS SNAPSHOT
After Visit Summary   10/12/2017    Keagan Wayne    MRN: [id number]           Patient Information     Date Of Birth          1945        Visit Information        Provider Department      10/12/2017 2:00 PM Suzanne Chao MD [facility] Science and Health         Follow-ups after your visit        Future tests that were ordered for you today     Open Future Orders        Priority Expected Expires Ordered    CT Urogram Routine  10/11/2018 10/11/2017            Who to contact     If you have questions or need follow up information about today's clinic visit or your schedule please contact [facility] HE[facility] directly at [phone].  Normal or non-critical lab and imaging results will be communicated to you by MyChart, letter or phone within 4 business days after the clinic has received the results. If you do not hear from us within 7 days, please contact the clinic through MyChart or phone. If you have a critical or abnormal lab result, we will notify you by phone as soon as possible.  Submit refill requests through [employer] or call your pharmacy and they will forward the refill request to us. Please allow 3 business days for your refill to be completed.          Additional Information About Your Visit        Care EveryWhere ID     This is your Care EveryWhere ID. This could be used by other organizations to access your [place] medical records  [id number]        Your Vitals Were     Pulse Respirations Pulse Oximetry BMI (Body Mass Index)          82 16 97% 24.91 kg/m2         Blood Pressure from Last 3 Encounters:   10/12/17 114/63   10/10/17 108/49   10/10/17 90/50    Weight from Last 3 Encounters:   10/12/17 90.4 kg (199 lb 4.8 oz)   10/10/17 90.4 kg (199 lb 4.7 oz)   10/10/17 90.4 kg (199 lb 3.2 oz)              Today, you had the following     No orders found for display       Primary Care Provider Office Phone # Fax #    Yolanda Hernandez  [phone]       [facility] [address]  [facility] [address]        Equal Access to Services     JIAN HOROWITZ : Hadii aad ku hadhugo Holm, jenniferda mandiekelley, lorrita kapraveen huang, gio mcguirecheryl brown. So [facility] [phone].    ATENCIÓN: Si habla español, tiene a benitez disposición servicios gratuitos de asistencia lingüística. Llame al [phone].    We comply with applicable federal civil rights laws and [place] laws. We do not discriminate on the basis of race, color, national origin, age, disability, sex, sexual orientation, or gender identity.            Thank you!     Thank you for choosing [facility] FOR LUNG SCIENCE AND HEALTH  for your care. Our goal is always to provide you with excellent care. Hearing back from our patients is one way we can continue to improve our services. Please take a few minutes to complete the written survey that you may receive in the mail after your visit with us. Thank you!             Your Updated Medication List - Protect others around you: Learn how to safely use, store and throw away your medicines at www.disposemymeds.org.          This list is accurate as of: 10/12/17  2:58 PM.  Always use your most recent med list.                   Brand Name Dispense Instructions for use Diagnosis    AFEDITAB CR 30 MG Tb24   Generic drug:  NIFEdipine ER           * albuterol 108 (90 BASE) MCG/ACT Inhaler    PROAIR HFA/PROVENTIL HFA/VENTOLIN HFA     Inhale 2 puffs into the lungs        * albuterol (2.5 MG/3ML) 0.083% neb solution      INHALE 3 MLS (1 VIAL) BY NEBULIZATION ROUTE EVERY 4 HOURS AS NEEDED FOR WHEEZING        * albuterol 0.63 MG/3ML nebulizer solution    ACCUNEB     Inhale 0.63 mg into the lungs        amLODIPine 10 MG tablet    NORVASC     Take 10 mg by mouth        aspirin 81 MG chewable tablet     36 tablet    Take 1 tablet (81 mg) by mouth daily    Type 2 diabetes mellitus with diabetic nephropathy (H)        atorvastatin 80 MG tablet    LIPITOR    30 tablet    Take 1 tablet (80 mg) by mouth daily    Acute diastolic CHF (congestive heart failure) (H)       * beclomethasone 40 MCG/ACT Inhaler    QVAR     Inhale 1 puff into the lungs        * QVAR 40 MCG/ACT Inhaler   Generic drug:  beclomethasone           blood glucose monitoring lancets      Test 2 times per day.        blood glucose monitoring meter device kit      accucheck Lesly glucometer        brimonidine 0.1 % ophthalmic solution    ALPHAGAN P     1 drop        bumetanide 2 MG tablet    BUMEX    60 tablet    Take 2 tablets (4 mg) by mouth daily    Acute on chronic systolic congestive heart failure (H)       calcium acetate 667 MG Caps capsule    PHOSLO     Take 1,334 mg by mouth 4 times daily (with meals and nightly)        citalopram 20 MG tablet    celeXA    30 tablet    Take 1 tablet (20 mg) by mouth daily    Depression       cloNIDine 0.2 MG/24HR WK patch    CATAPRES-TTS2     Place 1 patch onto the skin once a week        COREG PO      Take 25 mg by mouth 2 times daily (with meals)        * hydrALAZINE 25 MG tablet    APRESOLINE          * hydrALAZINE 50 MG tablet    APRESOLINE          * hydrALAZINE 100 MG Tabs tablet    APRESOLINE          * hydrALAZINE 100 MG Tabs tablet    APRESOLINE     TAKE ONE TABLET BY MOUTH THREE TIMES A DAY        hydrOXYzine 25 MG capsule    VISTARIL     Take 25 mg by mouth        INCRUSE ELLIPTA 62.5 MCG/INH oral inhaler   Generic drug:  umeclidinium      Inhale 1 puff into the lungs daily        insulin glargine 100 UNIT/ML injection    LANTUS     Inject 18 Units Subcutaneous every morning        insulin  UNIT/ML injection    HumuLIN N/NovoLIN N     Inject 14 Units Subcutaneous        isosorbide mononitrate 30 MG 24 hr tablet    IMDUR     Take 90 mg by mouth daily        latanoprost 0.005 % ophthalmic solution    XALATAN     1 drop        levETIRAcetam 500 MG tablet    KEPPRA    60 tablet    Take 1 tablet (500 mg) by mouth  "every 24 hours    Seizures (H)       * lisinopril 40 MG tablet    PRINIVIL/ZESTRIL     Take 40 mg by mouth        * lisinopril 40 MG tablet    PRINIVIL/ZESTRIL          LOSARTAN POTASSIUM PO      Take 100 mg by mouth daily        melatonin 3 MG tablet      Take 6 mg by mouth        metolazone 5 MG tablet    ZAROXOLYN     Take 5 mg by mouth        NEURONTIN PO      Take 200 mg by mouth daily        NovoLOG FLEXPEN 100 UNIT/ML injection   Generic drug:  insulin aspart      Inject Subcutaneous 3 times daily (with meals) sliding scale up to 7 units before meals        NovoLOG MIX 70/30 FLEXPEN injection   Generic drug:  insulin aspart prot & aspart           ombitasvir-paritaprevir-ritonavir;dasabuvir 12.5-75-50 &250 MG tablets    VIEKIRA MAYANK     Take twice daily according to package instructions for 12 weeks total.        OXYCODONE HCL PO      Take 10 mg by mouth 3 times daily as needed (takes scheduled)        * pen needles 5/16\" 31G X 8 MM Misc      31g x 8 mm        * ULTICARE MICRO 32G X 4 MM   Generic drug:  insulin pen needle           * ULTICARE PEN NEEDLES 29G X 12MM   Generic drug:  insulin pen needle           potassium chloride 10 MEQ tablet    K-TAB,KLOR-CON     Take 10 mEq by mouth        PROLENSA 0.07 % ophthalmic solution   Generic drug:  bromfenac      Place 1 drop Into the left eye daily        RA BLOOD PRESSURE CUFF MONITOR Misc      by Misc.(Non-Drug; Combo Route) route once daily.        torsemide 20 MG tablet    DEMADEX     Take 40 mg by mouth        VIRTUSSIN A/C 100-10 MG/5ML Soln solution   Generic drug:  guaiFENesin-codeine      TK 5-10ML PO Q 4 H PRN        VITAMIN D (CHOLECALCIFEROL) PO      Take 1,000 Units by mouth daily        * Notice:  This list has 14 medication(s) that are the same as other medications prescribed for you. Read the directions carefully, and ask your doctor or other care provider to review them with you.      "

## 2017-10-12 NOTE — PROGRESS NOTES
"Reason for Visit  Keagan Wayne is a 71 year old male who is referred by Dr Kate for pretransplant eval  Pulmonary HPI  Keagan is not sure why he is here today.  He is being evaluated for kidney transplant, denies any respiratory issues.  No history of tuberculosis exposure or risk factors.  HD MWF, kidney transplant eval. Has been on Incruse for several months, it has helped his breathing. He quit smoking yesterday, he has quit a few times before. Had a flu shot yesterday.  He reports breathing is better since he done her inhaler for several months.  Around the time of initiation of dialysis at the end of 2015 /early 2016 he was very sick with volume overload and had a pleural effusion at that time.  He has a pretty minimal smoking history.    The patient was seen and examined by Suzanne Chao MD   Current Outpatient Prescriptions   Medication     insulin aspart (NOVOLOG FLEXPEN) 100 UNIT/ML injection     bromfenac (PROLENSA) 0.07 % ophthalmic solution     blood glucose monitoring (ACCU-CHEK FASTCLIX) lancets     blood glucose monitoring (ACCU-CHEK MINISTERIO PLUS) meter device kit     albuterol (PROAIR HFA/PROVENTIL HFA/VENTOLIN HFA) 108 (90 BASE) MCG/ACT Inhaler     amLODIPine (NORVASC) 10 MG tablet     Insulin Pen Needle (PEN NEEDLES 5/16\") 31G X 8 MM MISC     hydrOXYzine (VISTARIL) 25 MG capsule     latanoprost (XALATAN) 0.005 % ophthalmic solution     lisinopril (PRINIVIL/ZESTRIL) 40 MG tablet     melatonin 3 MG tablet     ombitasvir-paritaprevir-ritonavir;dasabuvir (VIEKIRA MAYANK) 12.5-75-50 &250 MG tablets     potassium chloride (K-TAB,KLOR-CON) 10 MEQ tablet     albuterol (ACCUNEB) 0.63 MG/3ML nebulizer solution     Blood Pressure Monitoring (RA BLOOD PRESSURE CUFF MONITOR) MISC     beclomethasone (QVAR) 40 MCG/ACT Inhaler     insulin NPH (HUMULIN N/NOVOLIN N) 100 UNIT/ML injection     metolazone (ZAROXOLYN) 5 MG tablet     torsemide (DEMADEX) 20 MG tablet     QVAR 40 MCG/ACT Inhaler     VIRTUSSIN A/C " 100-10 MG/5ML SOLN solution     hydrALAZINE (APRESOLINE) 100 MG TABS tablet     hydrALAZINE (APRESOLINE) 50 MG tablet     hydrALAZINE (APRESOLINE) 25 MG tablet     NOVOLOG MIX 70/30 FLEXPEN (70-30) 100 UNIT/ML susp     ULTICARE PEN NEEDLES 29G X 12MM     ULTICARE MICRO 32G X 4 MM insulin pen needle     lisinopril (PRINIVIL/ZESTRIL) 40 MG tablet     AFEDITAB CR 30 MG TB24     levETIRAcetam (KEPPRA) 500 MG tablet     calcium acetate (PHOSLO) 667 MG CAPS capsule     Carvedilol (COREG PO)     LOSARTAN POTASSIUM PO     insulin glargine (LANTUS) 100 UNIT/ML injection     isosorbide mononitrate (IMDUR) 30 MG 24 hr tablet     umeclidinium (INCRUSE ELLIPTA) 62.5 MCG/INH oral inhaler     OXYCODONE HCL PO     bumetanide (BUMEX) 2 MG tablet     atorvastatin (LIPITOR) 80 MG tablet     VITAMIN D, CHOLECALCIFEROL, PO     cloNIDine (CATAPRES-TTS2) 0.2 MG/24HR patch     aspirin 81 MG chewable tablet     citalopram (CELEXA) 20 MG tablet     albuterol (2.5 MG/3ML) 0.083% neb solution     brimonidine (ALPHAGAN P) 0.1 % ophthalmic solution     hydrALAZINE (APRESOLINE) 100 MG TABS tablet     Gabapentin (NEURONTIN PO)     No current facility-administered medications for this visit.      Allergies   Allergen Reactions     Morphine Rash     Dry skin     Social History     Social History     Marital status:      Spouse name: N/A     Number of children: N/A     Years of education: N/A     Occupational History     Not on file.     Social History Main Topics     Smoking status: Current Every Day Smoker     Types: Cigarettes     Smokeless tobacco: Never Used     Alcohol use 8.4 oz/week     7 Cans of beer, 7 Shots of liquor per week      Comment: coctail 2-3/month     Drug use: No      Comment: past marijuana use     Sexual activity: Not on file     Other Topics Concern     Not on file     Social History Narrative     Past Medical History:   Diagnosis Date     Acute pulmonary edema (H)      Anemia      Bipolar 1 disorder (H)      CAD  (coronary artery disease)      Cognitive impairment      Colitis      COPD (chronic obstructive pulmonary disease) (H)      Depression      Diabetes (H)      ESRD (end stage renal disease) (H)      ESRD (end stage renal disease) on dialysis (H)      Glaucoma      Hepatitis C      Hyperlipidemia      Hyperlipidemia      Hypertension      IV drug abuse 1970's     Non compliance w medication regimen      Non-ischemic cardiomyopathy (H)      Non-ST elevation MI (NSTEMI) (H)      Peripheral neuropathy      Peripheral neuropathy      Pneumonia      Past Surgical History:   Procedure Laterality Date     CREATE FISTULA ARTERIOVENOUS UPPER EXTREMITY Right 5/8/2016    Procedure: CREATE FISTULA ARTERIOVENOUS UPPER EXTREMITY;  Surgeon: Josias Valente MD;  Location: [facility]     CREATE FISTULA ARTERIOVENOUS UPPER EXTREMITY Right 12/14/2016    Procedure: CREATE FISTULA ARTERIOVENOUS UPPER EXTREMITY;  Surgeon: Contreras Bowman MD;  Location: [facility] OR     HERNIA REPAIR       Family History   Problem Relation Age of Onset     Coronary Artery Disease Brother      DIABETES Mother      ROS Pulmonary  Dyspnea: No, Cough: No, Chest pain: No, Wheezing: No, Sputum Production: No, Hemoptysis: No  A complete ROS was otherwise negative except as noted in the HPI.  /63 (BP Location: Left arm, Patient Position: Chair, Cuff Size: Adult Large)  Pulse 82  Resp 16  Wt 90.4 kg (199 lb 4.8 oz)  SpO2 97%  BMI 24.91 kg/m2  Exam:   GENERAL APPEARANCE: Well developed, well nourished, alert, and in no apparent distress.  EYES: PERRL, EOMI  HENT: Nasal mucosa with no edema and no hyperemia. No nasal polyps.  EARS: Canals clear, TMs normal  MOUTH: Oral mucosa is moist, without any lesions, no tonsillar enlargement, no oropharyngeal exudate.  NECK: supple, no masses, no thyromegaly.  LYMPHATICS: No significant axillary, cervical, or supraclavicular nodes.  RESP: normal percussion, good air flow throughout.  No crackles. No rhonchi. No  wheezes.  CV: Normal S1, S2, regular rhythm, normal rate. No murmur.  No rub. No gallop. No LE edema.   ABDOMEN:  Bowel sounds normal, soft, nontender, no HSM or masses.   MS: extremities normal. No clubbing. No cyanosis.  SKIN: no rash on limited exam  NEURO: Mentation intact, speech normal, normal strength and tone, normal gait and stance  PSYCH: mentation appears normal. and affect normal/bright  Results:  Pleural effusion Jan 2016 transudate effusion  Pulmonary function test today at the lower limits of normal on spirometry, lung volumes, and diffusion capacity when corrected for hemoglobin.    Assessment and plan: Keagan is a 71-year-old male being evaluated for kidney transplant.  He is new to our pulmonology group but his recently seen Dr. Martin at [facility].  He is  COPD?  Normal PFTs, has been on Incruse for 6-8 months, with improvement in symptoms.  He has a somewhat minimal smoking history.  Pleural effusion-he had a large pleural effusion in early 2016 around the time of initiating hemodialysis.  At that time it was tapped and was transudate.  No cytology was sent.  Chest imaging today shows stable small bilateral pleural effusions.  No apparent contraindications for renal transplantation.  He normally follows with Dr. Martin at [facility] so he can continue to follow-up with her.

## 2017-10-12 NOTE — LETTER
"10/12/2017       RE: Keagan Wayne  [address] [place]   [place] [address]     Dear Colleague,    Thank you for referring your patient, Keagan Wayne, to the [facility] at [facility]. Please see a copy of my visit note below.    Reason for Visit  Keagan Wayne is a 71 year old male who is referred by Dr Kate for pretransplant eval  Pulmonary HPI  Keagan is not sure why he is here today.  He is being evaluated for kidney transplant, denies any respiratory issues.  No history of tuberculosis exposure or risk factors.  HD MWF, kidney transplant eval. Has been on Incruse for several months, it has helped his breathing. He quit smoking yesterday, he has quit a few times before. Had a flu shot yesterday.  He reports breathing is better since he done her inhaler for several months.  Around the time of initiation of dialysis at the end of 2015 /early 2016 he was very sick with volume overload and had a pleural effusion at that time.  He has a pretty minimal smoking history.    The patient was seen and examined by Suzanne Chao MD   Current Outpatient Prescriptions   Medication     insulin aspart (NOVOLOG FLEXPEN) 100 UNIT/ML injection     bromfenac (PROLENSA) 0.07 % ophthalmic solution     blood glucose monitoring (ACCU-CHEK FASTCLIX) lancets     blood glucose monitoring (ACCU-CHEK MINISTERIO PLUS) meter device kit     albuterol (PROAIR HFA/PROVENTIL HFA/VENTOLIN HFA) 108 (90 BASE) MCG/ACT Inhaler     amLODIPine (NORVASC) 10 MG tablet     Insulin Pen Needle (PEN NEEDLES 5/16\") 31G X 8 MM MISC     hydrOXYzine (VISTARIL) 25 MG capsule     latanoprost (XALATAN) 0.005 % ophthalmic solution     lisinopril (PRINIVIL/ZESTRIL) 40 MG tablet     melatonin 3 MG tablet     ombitasvir-paritaprevir-ritonavir;dasabuvir (VIEKIRA MAYANK) 12.5-75-50 &250 MG tablets     potassium chloride (K-TAB,KLOR-CON) 10 MEQ tablet     albuterol (ACCUNEB) 0.63 MG/3ML " nebulizer solution     Blood Pressure Monitoring (RA BLOOD PRESSURE CUFF MONITOR) MISC     beclomethasone (QVAR) 40 MCG/ACT Inhaler     insulin NPH (HUMULIN N/NOVOLIN N) 100 UNIT/ML injection     metolazone (ZAROXOLYN) 5 MG tablet     torsemide (DEMADEX) 20 MG tablet     QVAR 40 MCG/ACT Inhaler     VIRTUSSIN A/C 100-10 MG/5ML SOLN solution     hydrALAZINE (APRESOLINE) 100 MG TABS tablet     hydrALAZINE (APRESOLINE) 50 MG tablet     hydrALAZINE (APRESOLINE) 25 MG tablet     NOVOLOG MIX 70/30 FLEXPEN (70-30) 100 UNIT/ML susp     ULTICARE PEN NEEDLES 29G X 12MM     ULTICARE MICRO 32G X 4 MM insulin pen needle     lisinopril (PRINIVIL/ZESTRIL) 40 MG tablet     AFEDITAB CR 30 MG TB24     levETIRAcetam (KEPPRA) 500 MG tablet     calcium acetate (PHOSLO) 667 MG CAPS capsule     Carvedilol (COREG PO)     LOSARTAN POTASSIUM PO     insulin glargine (LANTUS) 100 UNIT/ML injection     isosorbide mononitrate (IMDUR) 30 MG 24 hr tablet     umeclidinium (INCRUSE ELLIPTA) 62.5 MCG/INH oral inhaler     OXYCODONE HCL PO     bumetanide (BUMEX) 2 MG tablet     atorvastatin (LIPITOR) 80 MG tablet     VITAMIN D, CHOLECALCIFEROL, PO     cloNIDine (CATAPRES-TTS2) 0.2 MG/24HR patch     aspirin 81 MG chewable tablet     citalopram (CELEXA) 20 MG tablet     albuterol (2.5 MG/3ML) 0.083% neb solution     brimonidine (ALPHAGAN P) 0.1 % ophthalmic solution     hydrALAZINE (APRESOLINE) 100 MG TABS tablet     Gabapentin (NEURONTIN PO)     No current facility-administered medications for this visit.      Allergies   Allergen Reactions     Morphine Rash     Dry skin     Social History     Social History     Marital status:      Spouse name: N/A     Number of children: N/A     Years of education: N/A     Occupational History     Not on file.     Social History Main Topics     Smoking status: Current Every Day Smoker     Types: Cigarettes     Smokeless tobacco: Never Used     Alcohol use 8.4 oz/week     7 Cans of beer, 7 Shots of liquor per  week      Comment: coctail 2-3/month     Drug use: No      Comment: past marijuana use     Sexual activity: Not on file     Other Topics Concern     Not on file     Social History Narrative     Past Medical History:   Diagnosis Date     Acute pulmonary edema (H)      Anemia      Bipolar 1 disorder (H)      CAD (coronary artery disease)      Cognitive impairment      Colitis      COPD (chronic obstructive pulmonary disease) (H)      Depression      Diabetes (H)      ESRD (end stage renal disease) (H)      ESRD (end stage renal disease) on dialysis (H)      Glaucoma      Hepatitis C      Hyperlipidemia      Hyperlipidemia      Hypertension      IV drug abuse 1970's     Non compliance w medication regimen      Non-ischemic cardiomyopathy (H)      Non-ST elevation MI (NSTEMI) (H)      Peripheral neuropathy      Peripheral neuropathy      Pneumonia      Past Surgical History:   Procedure Laterality Date     CREATE FISTULA ARTERIOVENOUS UPPER EXTREMITY Right 5/8/2016    Procedure: CREATE FISTULA ARTERIOVENOUS UPPER EXTREMITY;  Surgeon: Josias Valente MD;  Location: [facility] OR     CREATE FISTULA ARTERIOVENOUS UPPER EXTREMITY Right 12/14/2016    Procedure: CREATE FISTULA ARTERIOVENOUS UPPER EXTREMITY;  Surgeon: Contreras Bowman MD;  Location:  OR     HERNIA REPAIR       Family History   Problem Relation Age of Onset     Coronary Artery Disease Brother      DIABETES Mother      ROS Pulmonary  Dyspnea: No, Cough: No, Chest pain: No, Wheezing: No, Sputum Production: No, Hemoptysis: No  A complete ROS was otherwise negative except as noted in the HPI.  /63 (BP Location: Left arm, Patient Position: Chair, Cuff Size: Adult Large)  Pulse 82  Resp 16  Wt 90.4 kg (199 lb 4.8 oz)  SpO2 97%  BMI 24.91 kg/m2  Exam:   GENERAL APPEARANCE: Well developed, well nourished, alert, and in no apparent distress.  EYES: PERRL, EOMI  HENT: Nasal mucosa with no edema and no hyperemia. No nasal polyps.  EARS: Canals clear, TMs  normal  MOUTH: Oral mucosa is moist, without any lesions, no tonsillar enlargement, no oropharyngeal exudate.  NECK: supple, no masses, no thyromegaly.  LYMPHATICS: No significant axillary, cervical, or supraclavicular nodes.  RESP: normal percussion, good air flow throughout.  No crackles. No rhonchi. No wheezes.  CV: Normal S1, S2, regular rhythm, normal rate. No murmur.  No rub. No gallop. No LE edema.   ABDOMEN:  Bowel sounds normal, soft, nontender, no HSM or masses.   MS: extremities normal. No clubbing. No cyanosis.  SKIN: no rash on limited exam  NEURO: Mentation intact, speech normal, normal strength and tone, normal gait and stance  PSYCH: mentation appears normal. and affect normal/bright  Results:  Pleural effusion Jan 2016 transudate effusion  Pulmonary function test today at the lower limits of normal on spirometry, lung volumes, and diffusion capacity when corrected for hemoglobin.    Assessment and plan: Keagan is a 71-year-old male being evaluated for kidney transplant.  He is new to our pulmonology group but his recently seen Dr. Martin at [facility].  He is  COPD?  Normal PFTs, has been on Incruse for 6-8 months, with improvement in symptoms.  He has a somewhat minimal smoking history.  Pleural effusion-he had a large pleural effusion in early 2016 around the time of initiating hemodialysis.  At that time it was tapped and was transudate.  No cytology was sent.  Chest imaging today shows stable small bilateral pleural effusions.  No apparent contraindications for renal transplantation.  He normally follows with Dr. Martin at [facility] so he can continue to follow-up with her.        Again, thank you for allowing me to participate in the care of your patient.      Sincerely,    Suzanne Chao MD

## 2017-10-17 ENCOUNTER — TRANSFERRED RECORDS (OUTPATIENT)
Dept: HEALTH INFORMATION MANAGEMENT | Facility: CLINIC | Age: 72
End: 2017-10-17

## 2017-10-18 PROBLEM — R31.29 MICROSCOPIC HEMATURIA: Status: ACTIVE | Noted: 2017-10-18

## 2017-10-24 LAB
DLCOCOR-%PRED-PRE: 73 %
DLCOCOR-PRE: 20.57 ML/MIN/MMHG
DLCOUNC-%PRED-PRE: 64 %
DLCOUNC-PRE: 18.21 ML/MIN/MMHG
DLCOUNC-PRED: 28.03 ML/MIN/MMHG
ERV-%PRED-PRE: 59 %
ERV-PRE: 0.93 L
ERV-PRED: 1.57 L
EXPTIME-PRE: 6.73 SEC
FEF2575-%PRED-POST: 69 %
FEF2575-%PRED-PRE: 74 %
FEF2575-POST: 1.8 L/SEC
FEF2575-PRE: 1.92 L/SEC
FEF2575-PRED: 2.57 L/SEC
FEFMAX-%PRED-PRE: 63 %
FEFMAX-PRE: 6.02 L/SEC
FEFMAX-PRED: 9.44 L/SEC
FEV1-%PRED-PRE: 73 %
FEV1-PRE: 2.55 L
FEV1FEV6-PRE: 74 %
FEV1FEV6-PRED: 78 %
FEV1FVC-PRE: 74 %
FEV1FVC-PRED: 76 %
FEV1SVC-PRE: 72 %
FEV1SVC-PRED: 62 %
FIFMAX-PRE: 5.43 L/SEC
FRCPLETH-%PRED-PRE: 86 %
FRCPLETH-PRE: 3.47 L
FRCPLETH-PRED: 4.01 L
FVC-%PRED-PRE: 74 %
FVC-PRE: 3.44 L
FVC-PRED: 4.59 L
IC-%PRED-PRE: 64 %
IC-PRE: 2.61 L
IC-PRED: 4.05 L
RVPLETH-%PRED-PRE: 89 %
RVPLETH-PRE: 2.54 L
RVPLETH-PRED: 2.83 L
TLCPLETH-%PRED-PRE: 74 %
TLCPLETH-PRE: 6.08 L
TLCPLETH-PRED: 8.14 L
VA-%PRED-PRE: 64 %
VA-PRE: 5.03 L
VC-%PRED-PRE: 63 %
VC-PRE: 3.55 L
VC-PRED: 5.62 L

## 2017-10-26 ENCOUNTER — MEDICAL CORRESPONDENCE (OUTPATIENT)
Dept: TRANSPLANT | Facility: CLINIC | Age: 72
End: 2017-10-26

## 2017-11-07 ENCOUNTER — DOCUMENTATION ONLY (OUTPATIENT)
Dept: DENTISTRY | Facility: CLINIC | Age: 72
End: 2017-11-07

## 2017-11-07 NOTE — PROGRESS NOTES
"Dental Service Progress Note        Keagan Wayne MRN# [id number]   YOB: 1945 Age: 71 year old              Chief Complaint:   \" I have to get my teeth looked at.\"         History of Present Illness:   This patient is a 71 year old male who presents with end stage renal disease on hemodialysis [facility] for pre-kidney transplant dental clearance.             Past Medical History:     Past Medical History:   Diagnosis Date     Acute pulmonary edema (H)      Anemia      Bipolar 1 disorder (H)      CAD (coronary artery disease)      Cognitive impairment      Colitis      COPD (chronic obstructive pulmonary disease) (H)      Depression      Diabetes (H)      ESRD (end stage renal disease) (H)      ESRD (end stage renal disease) on dialysis (H)      Glaucoma      Hepatitis C      Hyperlipidemia      Hyperlipidemia      Hypertension      IV drug abuse 1970's     Non compliance w medication regimen      Non-ischemic cardiomyopathy (H)      Non-ST elevation MI (NSTEMI) (H)      Peripheral neuropathy      Peripheral neuropathy      Pneumonia              Past Surgical History:     Past Surgical History:   Procedure Laterality Date     CREATE FISTULA ARTERIOVENOUS UPPER EXTREMITY Right 5/8/2016    Procedure: CREATE FISTULA ARTERIOVENOUS UPPER EXTREMITY;  Surgeon: Josias Valente MD;  Location: [facility] OR     CREATE FISTULA ARTERIOVENOUS UPPER EXTREMITY Right 12/14/2016    Procedure: CREATE FISTULA ARTERIOVENOUS UPPER EXTREMITY;  Surgeon: Contreras Bowman MD;  Location: [facility] OR     HERNIA REPAIR                 Social History:     Social History   Substance Use Topics     Smoking status: Light Tobacco Smoker     Packs/day: 0.20     Years: 50.00     Types: Cigarettes     Start date: 10/12/1957     Smokeless tobacco: Never Used     Alcohol use 8.4 oz/week     7 Cans of beer, 7 Shots of liquor per week      Comment: cocktail 2-3/month             Family History:     Family History   Problem Relation " "Age of Onset     Coronary Artery Disease Brother      DIABETES Mother              Immunizations:     Immunization History   Administered Date(s) Administered     Influenza (IIV3) 10/11/2017     Pneumococcal (PCV 13) 09/20/2017     Pneumococcal 23 valent 01/29/2016             Allergies:     Allergies   Allergen Reactions     Morphine Rash     Dry skin             Medications:     Current Outpatient Prescriptions Ordered in Epic   Medication     insulin aspart (NOVOLOG FLEXPEN) 100 UNIT/ML injection     bromfenac (PROLENSA) 0.07 % ophthalmic solution     blood glucose monitoring (ACCU-CHEK FASTCLIX) lancets     blood glucose monitoring (ACCU-CHEK MINISTERIO PLUS) meter device kit     albuterol (2.5 MG/3ML) 0.083% neb solution     albuterol (PROAIR HFA/PROVENTIL HFA/VENTOLIN HFA) 108 (90 BASE) MCG/ACT Inhaler     amLODIPine (NORVASC) 10 MG tablet     brimonidine (ALPHAGAN P) 0.1 % ophthalmic solution     Insulin Pen Needle (PEN NEEDLES 5/16\") 31G X 8 MM MISC     hydrOXYzine (VISTARIL) 25 MG capsule     latanoprost (XALATAN) 0.005 % ophthalmic solution     lisinopril (PRINIVIL/ZESTRIL) 40 MG tablet     melatonin 3 MG tablet     ombitasvir-paritaprevir-ritonavir;dasabuvir (VIEKIRA MAYANK) 12.5-75-50 &250 MG tablets     potassium chloride (K-TAB,KLOR-CON) 10 MEQ tablet     albuterol (ACCUNEB) 0.63 MG/3ML nebulizer solution     Blood Pressure Monitoring (RA BLOOD PRESSURE CUFF MONITOR) MISC     hydrALAZINE (APRESOLINE) 100 MG TABS tablet     beclomethasone (QVAR) 40 MCG/ACT Inhaler     insulin NPH (HUMULIN N/NOVOLIN N) 100 UNIT/ML injection     metolazone (ZAROXOLYN) 5 MG tablet     torsemide (DEMADEX) 20 MG tablet     QVAR 40 MCG/ACT Inhaler     VIRTUSSIN A/C 100-10 MG/5ML SOLN solution     hydrALAZINE (APRESOLINE) 100 MG TABS tablet     hydrALAZINE (APRESOLINE) 50 MG tablet     hydrALAZINE (APRESOLINE) 25 MG tablet     NOVOLOG MIX 70/30 FLEXPEN (70-30) 100 UNIT/ML susp     ULTICARE PEN NEEDLES 29G X 12MM     ULTICARE MICRO 32G " X 4 MM insulin pen needle     lisinopril (PRINIVIL/ZESTRIL) 40 MG tablet     AFEDITAB CR 30 MG TB24     levETIRAcetam (KEPPRA) 500 MG tablet     calcium acetate (PHOSLO) 667 MG CAPS capsule     Carvedilol (COREG PO)     LOSARTAN POTASSIUM PO     Gabapentin (NEURONTIN PO)     insulin glargine (LANTUS) 100 UNIT/ML injection     isosorbide mononitrate (IMDUR) 30 MG 24 hr tablet     umeclidinium (INCRUSE ELLIPTA) 62.5 MCG/INH oral inhaler     OXYCODONE HCL PO     bumetanide (BUMEX) 2 MG tablet     atorvastatin (LIPITOR) 80 MG tablet     VITAMIN D, CHOLECALCIFEROL, PO     cloNIDine (CATAPRES-TTS2) 0.2 MG/24HR patch     aspirin 81 MG chewable tablet     citalopram (CELEXA) 20 MG tablet     No current Epic-ordered facility-administered medications on file.             Review of Systems:   The 10 point Review of Systems is negative other than noted in the HPI          Physical Exam:   Head and neck exam:  Unremarkable.  No swellings masses, or cervical lymphadenopathy.  TMJs are free of clicking, popping, crepitus or tenderness to palpation.     Soft Tissue exam:  Tongue, floor of mouth, hard and soft palate and buccal mucosa are free of disease.  Generalized moderate plaque and calculus deposits. Probing depths range from 2-5 mm.     Hard Tissue exam:  Tooth #5- mobile, with a periapical radiolucency, #11, class I mobility, #29- extensive D caries, #31- supraerupted, non functional.  Generalized heavy attrition.          Assessment and Plan:   Assessment:  Chronic generalized moderate periodontitis  Caries risk: high    Plan:  Tooth #5, 11, 29 and 31 require extraction prior to transplantation.  We discussed the risk of infection once he is immunosuppressed.  Also discussed tooth replacement options once he is clear. He received a preliminary cleaning today.         Next Appointment:   Thursday Nov. 9th at 3 pm for the extraction of tooth #5, 11, 29 and 31 at the [facility]        SPARKLE Cabello    Pager: [phone]

## 2017-11-09 ENCOUNTER — TELEPHONE (OUTPATIENT)
Dept: TRANSPLANT | Facility: CLINIC | Age: 72
End: 2017-11-09

## 2017-11-09 NOTE — TELEPHONE ENCOUNTER
"Received a call from Alice the care coordinator at [facility]. She wanted to express her frustrations dealing with Keagan. She said they have invested so much time and effort into trying to get Keagan to take his health care seriously and cooperate. He is telling them he is actively listed and will be getting a kidney in January. I clarified again that this is not true. Keagan has not been approved as he still needs to complete his evaluation and a compliance contract. He developed seizures and was told he could not drive for at least 3 months. He continued to drive and was witnessed driving by clinic staff. They wrote a letter to the state and the police visited Keagan and he no longer has driving privileges. He will need to be cleared by neurology to have his 's license re-instated. He has been counseled by his care coordinator and PCP to \"change his ways if he is serious about getting a transplant.\" She said Pride's response was to pull his sun glasses down on his nose and say \" I will always be a bad boy\". She said he is disrespectful and lays down on the exam table rather than sit up and speak to the provider face to face. He wants his PCA/SO María to speak for him. He admits to \"partying\" and not eating properly. His bs has been better since he has not been able to drive and must eat at home and María is there to supervise his medications. Alice states Keagan continues to smoke and is dating 24 year old twins. She wanted me to know about Keagan's behaviors and the general feeling amongst the staff at the clinic that Keagan is not a good transplant candidate and wanted to know when we will say enough is enough. I told her we will honor the compliance contract duration but I will document her comments and concerns and these will be re-discussed before he is considered a candidate. Keagan has made some progress on his evaluation to do list. He still needs to see [facility] and Alice does " not believe that will ever happen.She will send the provider notes from today's visit to me.

## 2017-12-01 DIAGNOSIS — Z76.82 AWAITING ORGAN TRANSPLANT: Primary | ICD-10-CM

## 2017-12-01 DIAGNOSIS — N18.9 CHRONIC KIDNEY DISEASE: ICD-10-CM

## 2017-12-19 ENCOUNTER — TELEPHONE (OUTPATIENT)
Dept: TRANSPLANT | Facility: CLINIC | Age: 72
End: 2017-12-19

## 2017-12-19 NOTE — TELEPHONE ENCOUNTER
"Skyla nurse coordinator at Keagan's PCP clinic of [facility] called me to give me an update. She wanted me to know that Keagan has gotten his 's license returned to him and is often not at home and is not taking his insulin. He told his providers it makes him \"sick\" and he does not want to carry it around with him while he is out. His blood sugars have been running in the 300's. His A1c has gone from 8.6 up to 9.2. He has seen the diabetic educator and Skyla will forward those notes to me. He was supposed to quit smoking and he was consulting the Quit Plan for assistance. The Quit Plan has notified Skyla that Keagan is not participating and refuses to answer his telephone. Keagan saw a pain provider at [facility] and is taking oxycodone 10 mg tid and medical marijuana but it is unclear who is providing the medical marijuana. Skyla states she will forward a drug screen from June 2017.   "

## 2018-01-30 ENCOUNTER — TRANSFERRED RECORDS (OUTPATIENT)
Dept: HEALTH INFORMATION MANAGEMENT | Facility: CLINIC | Age: 73
End: 2018-01-30

## 2018-01-30 ENCOUNTER — OFFICE VISIT - HEALTHEAST (OUTPATIENT)
Dept: ADDICTION MEDICINE | Facility: CLINIC | Age: 73
End: 2018-01-30

## 2018-01-30 DIAGNOSIS — F12.10 CANNABIS USE DISORDER, MILD, ABUSE: ICD-10-CM

## 2018-02-21 ENCOUNTER — TELEPHONE (OUTPATIENT)
Dept: UROLOGY | Facility: CLINIC | Age: 73
End: 2018-02-21

## 2018-02-21 DIAGNOSIS — R31.29 MICROSCOPIC HEMATURIA: Primary | ICD-10-CM

## 2018-02-21 NOTE — TELEPHONE ENCOUNTER
Spoke to the patient's SANDRA Daniel. Surgery scheduled for 03/08/18 at 4:20pm with a 2:20pm check in. She is aware that the surgery time can change. Surgery packet will be mailed out today.

## 2018-02-22 ENCOUNTER — CARE COORDINATION (OUTPATIENT)
Dept: UROLOGY | Facility: CLINIC | Age: 73
End: 2018-02-22

## 2018-02-22 RX ORDER — LOSARTAN POTASSIUM 100 MG/1
TABLET ORAL
Qty: 30 TABLET | Refills: 11 | OUTPATIENT
Start: 2018-02-22

## 2018-02-22 NOTE — PROGRESS NOTES
Left a detailed message that patient needs a pre-op H&P and urine culture early next week. Asked that they call and confirm. Edita Esquivel RN

## 2018-03-05 RX ORDER — LOSARTAN POTASSIUM 100 MG/1
TABLET ORAL
Qty: 30 TABLET | Refills: 11 | OUTPATIENT
Start: 2018-03-05

## 2018-03-05 NOTE — TELEPHONE ENCOUNTER
Contacted pharmacy to let them know that Dr. Martin will not rx losartan, she sees pt in [place] and suggest contacting pt's nephrologist.

## 2018-03-06 NOTE — TELEPHONE ENCOUNTER
Spoke to María surgery R/S to 03/29/18 at 12:40pm with a 10:40am check in at [facility]. Pre-op is still good will need to get a urine sample before surgery. María had no further questions.

## 2018-03-19 ENCOUNTER — CARE COORDINATION (OUTPATIENT)
Dept: UROLOGY | Facility: CLINIC | Age: 73
End: 2018-03-19

## 2018-03-19 DIAGNOSIS — R31.29 MICROSCOPIC HEMATURIA: ICD-10-CM

## 2018-03-19 LAB
ALBUMIN UR-MCNC: >=300 MG/DL
AMORPH CRY #/AREA URNS HPF: ABNORMAL /HPF
APPEARANCE UR: CLEAR
BACTERIA #/AREA URNS HPF: ABNORMAL /HPF
BILIRUB UR QL STRIP: NEGATIVE
COLOR UR AUTO: YELLOW
GLUCOSE UR STRIP-MCNC: NEGATIVE MG/DL
HGB UR QL STRIP: ABNORMAL
KETONES UR STRIP-MCNC: ABNORMAL MG/DL
LEUKOCYTE ESTERASE UR QL STRIP: NEGATIVE
NITRATE UR QL: NEGATIVE
NON-SQ EPI CELLS #/AREA URNS LPF: ABNORMAL /LPF
PH UR STRIP: 5.5 PH (ref 5–7)
RBC #/AREA URNS AUTO: ABNORMAL /HPF
SOURCE: ABNORMAL
SP GR UR STRIP: 1.02 (ref 1–1.03)
UROBILINOGEN UR STRIP-ACNC: 0.2 EU/DL (ref 0.2–1)
WBC #/AREA URNS AUTO: ABNORMAL /HPF

## 2018-03-19 PROCEDURE — 87086 URINE CULTURE/COLONY COUNT: CPT | Performed by: UROLOGY

## 2018-03-19 PROCEDURE — 81001 URINALYSIS AUTO W/SCOPE: CPT | Performed by: UROLOGY

## 2018-03-19 NOTE — PROGRESS NOTES
Spoke with patient's care giver María. He will leave a specimen today at the [facility]. Edita Esquivel RN

## 2018-03-20 LAB
BACTERIA SPEC CULT: NORMAL
SPECIMEN SOURCE: NORMAL

## 2018-03-28 ENCOUNTER — ANESTHESIA EVENT (OUTPATIENT)
Dept: SURGERY | Facility: CLINIC | Age: 73
End: 2018-03-28

## 2018-03-28 ENCOUNTER — TRANSFERRED RECORDS (OUTPATIENT)
Dept: HEALTH INFORMATION MANAGEMENT | Facility: CLINIC | Age: 73
End: 2018-03-28

## 2018-03-28 RX ORDER — LOSARTAN POTASSIUM 100 MG/1
TABLET ORAL
Qty: 30 TABLET | Refills: 0 | OUTPATIENT
Start: 2018-03-28

## 2018-03-29 ENCOUNTER — ANESTHESIA (OUTPATIENT)
Dept: SURGERY | Facility: CLINIC | Age: 73
End: 2018-03-29

## 2018-03-29 ENCOUNTER — HOSPITAL ENCOUNTER (OUTPATIENT)
Facility: CLINIC | Age: 73
Discharge: HOME | End: 2018-03-29
Attending: UROLOGY | Admitting: UROLOGY
Payer: COMMERCIAL

## 2018-03-29 ENCOUNTER — SURGERY (OUTPATIENT)
Age: 73
End: 2018-03-29

## 2018-03-29 ENCOUNTER — APPOINTMENT (OUTPATIENT)
Dept: GENERAL RADIOLOGY | Facility: CLINIC | Age: 73
End: 2018-03-29
Attending: UROLOGY
Payer: COMMERCIAL

## 2018-03-29 VITALS
SYSTOLIC BLOOD PRESSURE: 120 MMHG | DIASTOLIC BLOOD PRESSURE: 79 MMHG | RESPIRATION RATE: 14 BRPM | TEMPERATURE: 97.5 F | WEIGHT: 218.48 LBS | HEIGHT: 74 IN | BODY MASS INDEX: 28.04 KG/M2 | OXYGEN SATURATION: 96 %

## 2018-03-29 LAB
GLUCOSE BLDC GLUCOMTR-MCNC: 130 MG/DL (ref 70–99)
GLUCOSE SERPL-MCNC: 135 MG/DL (ref 70–99)
POTASSIUM SERPL-SCNC: 5 MMOL/L (ref 3.4–5.3)

## 2018-03-29 PROCEDURE — 36000053 ZZH SURGERY LEVEL 2 EA 15 ADDTL MIN - UMMC: Performed by: UROLOGY

## 2018-03-29 PROCEDURE — 71000014 ZZH RECOVERY PHASE 1 LEVEL 2 FIRST HR: Performed by: UROLOGY

## 2018-03-29 PROCEDURE — 25000128 H RX IP 250 OP 636: Performed by: NURSE ANESTHETIST, CERTIFIED REGISTERED

## 2018-03-29 PROCEDURE — 40000278 XR SURGERY CARM FLUORO LESS THAN 5 MIN: Mod: TC

## 2018-03-29 PROCEDURE — 36415 COLL VENOUS BLD VENIPUNCTURE: CPT | Performed by: ANESTHESIOLOGY

## 2018-03-29 PROCEDURE — 82947 ASSAY GLUCOSE BLOOD QUANT: CPT | Performed by: ANESTHESIOLOGY

## 2018-03-29 PROCEDURE — 40000170 ZZH STATISTIC PRE-PROCEDURE ASSESSMENT II: Performed by: UROLOGY

## 2018-03-29 PROCEDURE — 25000128 H RX IP 250 OP 636: Performed by: UROLOGY

## 2018-03-29 PROCEDURE — 88112 CYTOPATH CELL ENHANCE TECH: CPT | Performed by: UROLOGY

## 2018-03-29 PROCEDURE — 37000009 ZZH ANESTHESIA TECHNICAL FEE, EACH ADDTL 15 MIN: Performed by: UROLOGY

## 2018-03-29 PROCEDURE — 84132 ASSAY OF SERUM POTASSIUM: CPT | Performed by: ANESTHESIOLOGY

## 2018-03-29 PROCEDURE — 37000008 ZZH ANESTHESIA TECHNICAL FEE, 1ST 30 MIN: Performed by: UROLOGY

## 2018-03-29 PROCEDURE — 25500064 ZZH RX 255 OP 636: Performed by: UROLOGY

## 2018-03-29 PROCEDURE — 88112 CYTOPATH CELL ENHANCE TECH: CPT | Mod: 26 | Performed by: UROLOGY

## 2018-03-29 PROCEDURE — 71000027 ZZH RECOVERY PHASE 2 EACH 15 MINS: Performed by: UROLOGY

## 2018-03-29 PROCEDURE — 82962 GLUCOSE BLOOD TEST: CPT

## 2018-03-29 PROCEDURE — 25000125 ZZHC RX 250: Performed by: NURSE ANESTHETIST, CERTIFIED REGISTERED

## 2018-03-29 PROCEDURE — 27210794 ZZH OR GENERAL SUPPLY STERILE: Performed by: UROLOGY

## 2018-03-29 PROCEDURE — C1769 GUIDE WIRE: HCPCS | Performed by: UROLOGY

## 2018-03-29 PROCEDURE — 36000055 ZZH SURGERY LEVEL 2 W FLUORO 1ST 30 MIN - UMMC: Performed by: UROLOGY

## 2018-03-29 RX ORDER — EPHEDRINE SULFATE 50 MG/ML
INJECTION, SOLUTION INTRAMUSCULAR; INTRAVENOUS; SUBCUTANEOUS PRN
Status: DISCONTINUED | OUTPATIENT
Start: 2018-03-29 | End: 2018-03-29

## 2018-03-29 RX ORDER — NALOXONE HYDROCHLORIDE 0.4 MG/ML
.1-.4 INJECTION, SOLUTION INTRAMUSCULAR; INTRAVENOUS; SUBCUTANEOUS
Status: DISCONTINUED | OUTPATIENT
Start: 2018-03-29 | End: 2018-03-29 | Stop reason: HOSPADM

## 2018-03-29 RX ORDER — FENTANYL CITRATE 50 UG/ML
INJECTION, SOLUTION INTRAMUSCULAR; INTRAVENOUS PRN
Status: DISCONTINUED | OUTPATIENT
Start: 2018-03-29 | End: 2018-03-29

## 2018-03-29 RX ORDER — CEFAZOLIN SODIUM 1 G/3ML
1 INJECTION, POWDER, FOR SOLUTION INTRAMUSCULAR; INTRAVENOUS SEE ADMIN INSTRUCTIONS
Status: DISCONTINUED | OUTPATIENT
Start: 2018-03-29 | End: 2018-03-29 | Stop reason: HOSPADM

## 2018-03-29 RX ORDER — FENTANYL CITRATE 50 UG/ML
25-50 INJECTION, SOLUTION INTRAMUSCULAR; INTRAVENOUS
Status: DISCONTINUED | OUTPATIENT
Start: 2018-03-29 | End: 2018-03-29 | Stop reason: HOSPADM

## 2018-03-29 RX ORDER — SODIUM CHLORIDE 9 MG/ML
INJECTION, SOLUTION INTRAVENOUS CONTINUOUS PRN
Status: DISCONTINUED | OUTPATIENT
Start: 2018-03-29 | End: 2018-03-29

## 2018-03-29 RX ORDER — ONDANSETRON 2 MG/ML
4 INJECTION INTRAMUSCULAR; INTRAVENOUS EVERY 30 MIN PRN
Status: DISCONTINUED | OUTPATIENT
Start: 2018-03-29 | End: 2018-03-29 | Stop reason: HOSPADM

## 2018-03-29 RX ORDER — ONDANSETRON 4 MG/1
4 TABLET, ORALLY DISINTEGRATING ORAL EVERY 30 MIN PRN
Status: DISCONTINUED | OUTPATIENT
Start: 2018-03-29 | End: 2018-03-29 | Stop reason: HOSPADM

## 2018-03-29 RX ORDER — LIDOCAINE 40 MG/G
CREAM TOPICAL
Status: DISCONTINUED | OUTPATIENT
Start: 2018-03-29 | End: 2018-03-29 | Stop reason: HOSPADM

## 2018-03-29 RX ORDER — SODIUM CHLORIDE, SODIUM LACTATE, POTASSIUM CHLORIDE, CALCIUM CHLORIDE 600; 310; 30; 20 MG/100ML; MG/100ML; MG/100ML; MG/100ML
INJECTION, SOLUTION INTRAVENOUS CONTINUOUS
Status: DISCONTINUED | OUTPATIENT
Start: 2018-03-29 | End: 2018-03-29 | Stop reason: HOSPADM

## 2018-03-29 RX ORDER — CEFAZOLIN SODIUM 2 G/100ML
2 INJECTION, SOLUTION INTRAVENOUS
Status: COMPLETED | OUTPATIENT
Start: 2018-03-29 | End: 2018-03-29

## 2018-03-29 RX ORDER — SODIUM CHLORIDE 9 MG/ML
INJECTION, SOLUTION INTRAVENOUS CONTINUOUS
Status: DISCONTINUED | OUTPATIENT
Start: 2018-03-29 | End: 2018-03-29 | Stop reason: HOSPADM

## 2018-03-29 RX ORDER — ACETAMINOPHEN 325 MG/1
650 TABLET ORAL ONCE
Status: DISCONTINUED | OUTPATIENT
Start: 2018-03-29 | End: 2018-03-29 | Stop reason: HOSPADM

## 2018-03-29 RX ORDER — PROPOFOL 10 MG/ML
INJECTION, EMULSION INTRAVENOUS CONTINUOUS PRN
Status: DISCONTINUED | OUTPATIENT
Start: 2018-03-29 | End: 2018-03-29

## 2018-03-29 RX ADMIN — Medication 10 MG: at 08:09

## 2018-03-29 RX ADMIN — MIDAZOLAM 2 MG: 1 INJECTION INTRAMUSCULAR; INTRAVENOUS at 07:15

## 2018-03-29 RX ADMIN — FENTANYL CITRATE 25 MCG: 50 INJECTION, SOLUTION INTRAMUSCULAR; INTRAVENOUS at 07:38

## 2018-03-29 RX ADMIN — PROPOFOL 25 MCG/KG/MIN: 10 INJECTION, EMULSION INTRAVENOUS at 07:26

## 2018-03-29 RX ADMIN — SODIUM CHLORIDE: 9 INJECTION, SOLUTION INTRAVENOUS at 07:15

## 2018-03-29 RX ADMIN — FENTANYL CITRATE 50 MCG: 50 INJECTION, SOLUTION INTRAMUSCULAR; INTRAVENOUS at 07:26

## 2018-03-29 RX ADMIN — CEFAZOLIN SODIUM 2 G: 2 INJECTION, SOLUTION INTRAVENOUS at 07:25

## 2018-03-29 RX ADMIN — FENTANYL CITRATE 25 MCG: 50 INJECTION, SOLUTION INTRAMUSCULAR; INTRAVENOUS at 07:20

## 2018-03-29 RX ADMIN — IOTHALAMATE MEGLUMINE 15 ML: 600 INJECTION INTRAVASCULAR at 07:52

## 2018-03-29 ASSESSMENT — COPD QUESTIONNAIRES
CAT_SEVERITY: MODERATE
COPD: 1

## 2018-03-29 ASSESSMENT — LIFESTYLE VARIABLES: TOBACCO_USE: 1

## 2018-03-29 NOTE — BRIEF OP NOTE
[facility] [place]    Brief Operative Note    Pre-operative diagnosis: Atypical Urine Cystology, Microscopic Hematuria  Post-operative diagnosis Same  Procedure: Procedure(s):  Cystoscopy, Bilateral Retrogrades, Bilateral Ureteral Washings, Pyelogram  - Wound Class: II-Clean Contaminated  Surgeon: Surgeon(s) and Role:     * Moisés Pena MD - Primary     * Manny Kurtz MD - Resident - Assisting  Anesthesia: Monitor Anesthesia Care   Estimated blood loss: None  Drains: None  Specimens:   ID Type Source Tests Collected by Time Destination   A : bladder urine Urine Bladder CYTOLOGY NON Moisés Perez MD 3/29/2018  7:41 AM    B : left kidney urine Urine Kidney, Left CYTOLOGY Moisés Whitehead MD 3/29/2018  7:41 AM    C : right kidney urine Urine Kidney, Right CYTOLOGY Moisés Whitehead MD 3/29/2018  7:41 AM      Findings:   Unremarkable cystoscopy; Unremarkable bilateral RPGS; Urine for cytology collected from bladder; Bilateral ureteral washings for cytology  Complications: None.

## 2018-03-29 NOTE — ANESTHESIA CARE TRANSFER NOTE
Patient: Keagan Wayne    Procedure(s):  Cystoscopy, Bilateral Retrogrades, Bilateral Ureteral Washings, Pyelogram  - Wound Class: II-Clean Contaminated    Diagnosis: Atypical Urine Cystology, Microscopic Hematuria  Diagnosis Additional Information: No value filed.    Anesthesia Type:   MAC     Note:  Airway :Face Mask  Patient transferred to:PACU  Comments: Strong SV- requiring chin liftto maintain patency, VSS. Report to RN.Handoff Report: Identifed the Patient, Identified the Reponsible Provider, Reviewed the pertinent medical history, Discussed the surgical course, Reviewed Intra-OP anesthesia mangement and issues during anesthesia, Set expectations for post-procedure period and Allowed opportunity for questions and acknowledgement of understanding      Vitals: (Last set prior to Anesthesia Care Transfer)    CRNA VITALS  3/29/2018 0733 - 3/29/2018 0818      3/29/2018             Pulse: 66    SpO2: 100 %                Electronically Signed By: YARA Colby CRNA  March 29, 2018  8:18 AM

## 2018-03-29 NOTE — IP AVS SNAPSHOT
[facility]    [address] [address]    Phone:  [phone]                                       After Visit Summary   3/29/2018    Keagan Wayne    MRN: [id number]           After Visit Summary Signature Page     I have received my discharge instructions, and my questions have been answered. I have discussed any challenges I see with this plan with the nurse or doctor.    ..........................................................................................................................................  Patient/Patient Representative Signature      ..........................................................................................................................................  Patient Representative Print Name and Relationship to Patient    ..................................................               ................................................  Date                                            Time    ..........................................................................................................................................  Reviewed by Signature/Title    ...................................................              ..............................................  Date                                                            Time

## 2018-03-29 NOTE — IP AVS SNAPSHOT
MRN:[id number]                      After Visit Summary   3/29/2018    Keagan Wayne    MRN: [id number]           Thank you!     Thank you for choosing [facility] for your care. Our goal is always to provide you with excellent care. Hearing back from our patients is one way we can continue to improve our services. Please take a few minutes to complete the written survey that you may receive in the mail after you visit with us. Thank you!        Patient Information     Date Of Birth          1945        About your hospital stay     You were admitted on:  March 29, 2018 You last received care in the:  [facility] PACU    You were discharged on:  March 29, 2018       Who to Call     For medical emergencies, please call 911.  For non-urgent questions about your medical care, please call your primary care provider or clinic, [phone]  For questions related to your surgery, please call your surgery clinic        Attending Provider     Provider Moisés Chen MD Urology       Primary Care Provider Office Phone # Fax #    Yolanda Hernandez [phone]      After Care Instructions     Diet Instructions       Resume pre procedure diet            Discharge Instructions       Activity  - Resume normal activity    Urination  - Some light redness (up to a watermelon-like-pink in color) is expected in the upcoming 7-10days.   - If having hematuria (blood in the urine), make sure to increase your water intake and monitor for improvement  - If you are unable to urinate you should return urgently to the ED or call clinic to try to arrange for an urgent visit same day.       Follow-Up:  - Call your primary care provider to touch base regarding your recent admission.    - Call or return sooner than your regularly scheduled visit if you develop any of the following: fever (greater than 101.5), uncontrolled pain, uncontrolled nausea or vomiting, as well as increased redness, swelling, or  "drainage from your wound.   - Dr Pena will contact you with the results of today's procedure    Phone numbers:   - Monday through Friday 8am to 4:30pm: Call [phone] with questions or to schedule or confirm appointment.    - Nights or weekends: call the after hours emergency pager - [phone] and tell the  \"I would like to page the Urology Resident on call.\"  - For emergencies, call [phone]            No driving or operating machinery        until the day after procedure                  Further instructions from your care team       Same-Day Surgery   Adult Discharge Orders & Instructions     For 24 hours after surgery:  1. Get plenty of rest.  A responsible adult must stay with you for at least 24 hours after you leave the hospital.   2. Pain medication can slow your reflexes. Do not drive or use heavy equipment.  If you have weakness or tingling, don't drive or use heavy equipment until this feeling goes away.  3. Mixing alcohol and pain medication can cause dizziness and slow your breathing. It can even be fatal. Do not drink alcohol while taking pain medication.  4. Avoid strenuous or risky activities.  Ask for help when climbing stairs.   5. You may feel lightheaded.  If so, sit for a few minutes before standing.  Have someone help you get up.   6. If you have nausea (feel sick to your stomach), drink only clear liquids such as apple juice, ginger ale, broth or 7-Up.  Rest may also help.  Be sure to drink enough fluids.  Move to a regular diet as you feel able. Take pain medications with a small amount of solid food, such as toast or crackers, to avoid nausea.   7. A slight fever is normal. Call the doctor if your fever is over 100 F (37.7 C) (taken under the tongue) or lasts longer than 24 hours.  8. You may have a dry mouth, muscle aches, trouble sleeping or a sore throat.  These symptoms should go away after 24 hours.  9. Do not make important or legal decisions.   Pain Management:      1. " "Take pain medication (if prescribed) for pain as directed by your physician.        2. WARNING: If the pain medication you have been prescribed contains Tylenol  (acetaminophen), DO NOT take additional doses of Tylenol (acetaminophen).     Call your doctor for any of the followin.  Signs of infection (fever, growing tenderness at the surgery site, severe pain, a large amount of drainage or bleeding, foul-smelling drainage, redness, swelling).    2.  It has been over 8 to 10 hours since surgery and you are still not able to urinate (pee).    3.  Headache for over 24 hours.    4.  Numbness, tingling or weakness the day after surgery (if you had spinal anesthesia).  To contact a doctor, call _____________________________________ or:      [phone] and ask for the Resident On Call for:          __________________________________________ (answered 24 hours a day)      Emergency Department:  [place] [facility] Department: [phone]  [place] [facility] Department: [phone]               Rev. 10/2014       Pending Results     No orders found from 3/27/2018 to 3/30/2018.            Admission Information     Date & Time Provider Department Dept. Phone    3/29/2018 Moisés Pena MD [facility] PACU [phone]      Your Vitals Were     Blood Pressure Temperature Respirations Height Weight Pulse Oximetry    113/68 (Cuff Size: Adult Regular) 97.2  F (36.2  C) (Axillary) 14 1.88 m (6' 2\") 99.1 kg (218 lb 7.6 oz) 94%    BMI (Body Mass Index)                   28.05 kg/m2           [employer] Information     [employer] lets you send messages to your doctor, view your test results, renew your prescriptions, schedule appointments and more. To sign up, go to [email or web address] . Click on \"Log in\" on the left side of the screen, which will take you to the Welcome page. Then click on \"Sign up Now\" on the right side of the page.     You will be asked to enter the access code listed below, as well as some personal " information. Please follow the directions to create your username and password.     Your access code is: [email or web address]  Expires: 2018  8:55 AM     Your access code will  in 90 days. If you need help or a new code, please call your [facility] clinic or [phone].        Care EveryWhere ID     This is your Care EveryWhere ID. This could be used by other organizations to access your [facility] medical records  [id number]        Equal Access to Services     [facility] : Hadii hien mendez hadasho Soomaali, waaxda luqadaha, qaybta kaalmada adeegyada, gio cooper . So [facility] [phone].    ATENCIÓN: Si habla español, tiene a benitez disposición servicios gratuitos de asistencia lingüística. Llame al [phone].    We comply with applicable federal civil rights laws and [place] laws. We do not discriminate on the basis of race, color, national origin, age, disability, sex, sexual orientation, or gender identity.               Review of your medicines      CONTINUE these medicines which have NOT CHANGED        Dose / Directions    aspirin 81 MG chewable tablet   Used for:  Type 2 diabetes mellitus with diabetic nephropathy (H)        Dose:  81 mg   Take 1 tablet (81 mg) by mouth daily   Quantity:  36 tablet   Refills:  0       blood glucose monitoring lancets        Test 2 times per day.   Refills:  0       blood glucose monitoring meter device kit        accucheck Lesly glucometer   Refills:  0       bumetanide 2 MG tablet   Commonly known as:  BUMEX   Used for:  Acute on chronic systolic congestive heart failure (H)        Dose:  4 mg   Take 2 tablets (4 mg) by mouth daily   Quantity:  60 tablet   Refills:  2       calcium acetate 667 MG Caps capsule   Commonly known as:  PHOSLO        Dose:  1334 mg   Take 1,334 mg by mouth 4 times daily (with meals and nightly)   Refills:  0       citalopram 20 MG tablet   Commonly known as:  celeXA   Used for:  Depression        Dose:  20 mg   Take 1  "tablet (20 mg) by mouth daily   Quantity:  30 tablet   Refills:  0       cloNIDine 0.2 MG/24HR WK patch   Commonly known as:  CATAPRES-TTS2        Dose:  1 patch   Place 1 patch onto the skin once a week   Refills:  0       COREG PO        Dose:  25 mg   Take 25 mg by mouth 2 times daily (with meals)   Refills:  0       INCRUSE ELLIPTA 62.5 MCG/INH oral inhaler   Generic drug:  umeclidinium        Dose:  1 puff   Inhale 1 puff into the lungs daily   Refills:  0       isosorbide mononitrate 30 MG 24 hr tablet   Commonly known as:  IMDUR        Dose:  90 mg   Take 90 mg by mouth daily   Refills:  0       latanoprost 0.005 % ophthalmic solution   Commonly known as:  XALATAN        Dose:  1 drop   1 drop   Refills:  0       LOSARTAN POTASSIUM PO        Dose:  100 mg   Take 100 mg by mouth daily   Refills:  0       NEURONTIN PO        Dose:  200 mg   Take 200 mg by mouth daily   Refills:  0       NovoLOG FLEXPEN 100 UNIT/ML injection   Generic drug:  insulin aspart        Inject Subcutaneous 3 times daily (with meals) sliding scale up to 7 units before meals   Refills:  0       ombitasvir-paritaprevir-ritonavir;dasabuvir 12.5-75-50 &250 MG tablets   Commonly known as:  VIEKIRA MAYANK        Take twice daily according to package instructions for 12 weeks total.   Refills:  0       OXYCODONE HCL PO        Dose:  10 mg   Take 10 mg by mouth 3 times daily as needed (takes scheduled)   Refills:  0       * pen needles 5/16\" 31G X 8 MM Misc        31g x 8 mm   Refills:  0       * ULTICARE MICRO 32G X 4 MM   Generic drug:  insulin pen needle        Refills:  0       * ULTICARE PEN NEEDLES 29G X 12MM   Generic drug:  insulin pen needle        Refills:  0       RA BLOOD PRESSURE CUFF MONITOR Misc        by Misc.(Non-Drug; Combo Route) route once daily.   Refills:  0       TRESIBA FLEXTOUCH 100 UNIT/ML pen   Generic drug:  insulin degludec        Dose:  20 Units   Inject 20 Units Subcutaneous daily (with breakfast)   Refills:  0       " VITAMIN D (CHOLECALCIFEROL) PO        Dose:  1000 Units   Take 1,000 Units by mouth daily   Refills:  0       * Notice:  This list has 3 medication(s) that are the same as other medications prescribed for you. Read the directions carefully, and ask your doctor or other care provider to review them with you.             Protect others around you: Learn how to safely use, store and throw away your medicines at www.disposemymeds.org.             Medication List: This is a list of all your medications and when to take them. Check marks below indicate your daily home schedule. Keep this list as a reference.      Medications           Morning Afternoon Evening Bedtime As Needed    aspirin 81 MG chewable tablet   Take 1 tablet (81 mg) by mouth daily                                blood glucose monitoring lancets   Test 2 times per day.                                blood glucose monitoring meter device kit   accucheck Lesly glucometer                                bumetanide 2 MG tablet   Commonly known as:  BUMEX   Take 2 tablets (4 mg) by mouth daily                                calcium acetate 667 MG Caps capsule   Commonly known as:  PHOSLO   Take 1,334 mg by mouth 4 times daily (with meals and nightly)                                citalopram 20 MG tablet   Commonly known as:  celeXA   Take 1 tablet (20 mg) by mouth daily                                cloNIDine 0.2 MG/24HR WK patch   Commonly known as:  CATAPRES-TTS2   Place 1 patch onto the skin once a week                                COREG PO   Take 25 mg by mouth 2 times daily (with meals)                                INCRUSE ELLIPTA 62.5 MCG/INH oral inhaler   Inhale 1 puff into the lungs daily   Generic drug:  umeclidinium                                isosorbide mononitrate 30 MG 24 hr tablet   Commonly known as:  IMDUR   Take 90 mg by mouth daily                                latanoprost 0.005 % ophthalmic solution   Commonly known as:  XALATAN   1  "drop                                LOSARTAN POTASSIUM PO   Take 100 mg by mouth daily                                NEURONTIN PO   Take 200 mg by mouth daily                                NovoLOG FLEXPEN 100 UNIT/ML injection   Inject Subcutaneous 3 times daily (with meals) sliding scale up to 7 units before meals   Generic drug:  insulin aspart                                ombitasvir-paritaprevir-ritonavir;dasabuvir 12.5-75-50 &250 MG tablets   Commonly known as:  VIEKIRA MAYANK   Take twice daily according to package instructions for 12 weeks total.                                OXYCODONE HCL PO   Take 10 mg by mouth 3 times daily as needed (takes scheduled)                                * pen needles 5/16\" 31G X 8 MM Misc   31g x 8 mm                                * ULTICARE MICRO 32G X 4 MM   Generic drug:  insulin pen needle                                * ULTICARE PEN NEEDLES 29G X 12MM   Generic drug:  insulin pen needle                                RA BLOOD PRESSURE CUFF MONITOR Misc   by Misc.(Non-Drug; Combo Route) route once daily.                                TRESIBA FLEXTOUCH 100 UNIT/ML pen   Inject 20 Units Subcutaneous daily (with breakfast)   Generic drug:  insulin degludec                                VITAMIN D (CHOLECALCIFEROL) PO   Take 1,000 Units by mouth daily                                * Notice:  This list has 3 medication(s) that are the same as other medications prescribed for you. Read the directions carefully, and ask your doctor or other care provider to review them with you.      "

## 2018-03-29 NOTE — ANESTHESIA PREPROCEDURE EVALUATION
Anesthesia Evaluation     . Pt has had prior anesthetic. Type: General    No history of anesthetic complications          ROS/MED HX    ENT/Pulmonary:     (+)tobacco use, Current use moderate COPD, , recent URI resolved . .    Neurologic:  - neg neurologic ROS   (+)dementia,     Cardiovascular:     (+) hypertension--CAD, -past MI,-. : . CHF . . :. .       METS/Exercise Tolerance:     Hematologic:     (+) Anemia, -      Musculoskeletal:         GI/Hepatic:     (+) hepatitis type C,       Renal/Genitourinary:     (+) chronic renal disease, type: ESRD, Pt requires dialysis, type: Hemodialysis, Pt has no history of transplant,       Endo:     (+) type I DM, .      Psychiatric:     (+) psychiatric history depression      Infectious Disease:         Malignancy:         Other:                     Physical Exam  Normal systems: cardiovascular    Airway   Mallampati: II  TM distance: >3 FB  Neck ROM: full    Dental   (+) chipped, missing and other    Cardiovascular       Pulmonary (+) rhonchi                       Anesthesia Plan      History & Physical Review      ASA Status:  3 .        Plan for General and LMA with Intravenous and Propofol induction. Maintenance will be Balanced.    PONV prophylaxis:  Ondansetron (or other 5HT-3) and Dexamethasone or Solumedrol       Postoperative Care  Postoperative pain management:  IV analgesics and Oral pain medications.      Consents  Anesthetic plan, risks, benefits and alternatives discussed with:  Patient.  Use of blood products discussed: No .   .                          .

## 2018-03-29 NOTE — ANESTHESIA POSTPROCEDURE EVALUATION
Patient: Keagan Wayne    Procedure(s):  Cystoscopy, Bladder Biopsy, Bilateral Retrogrades, Pyelogram  - Wound Class: II-Clean Contaminated   - Wound Class: II-Clean Contaminated    Diagnosis:Atypical Urine Cystology, Microscopic Hematuria  Diagnosis Additional Information: No value filed.    Anesthesia Type:  MAC    Note:  Anesthesia Post Evaluation    Patient location during evaluation: PACU  Patient participation: Able to fully participate in evaluation  Level of consciousness: awake and alert  Pain management: adequate  Airway patency: patent  Cardiovascular status: acceptable and hemodynamically stable  Respiratory status: acceptable  Hydration status: acceptable  PONV: none     Anesthetic complications: None          Last vitals:  Vitals:    03/29/18 0544   BP: 96/57   Resp: 16   Temp: 36.5  C (97.7  F)   SpO2: 100%         Electronically Signed By: Praveen Gao MD, MD  March 29, 2018  7:42 AM

## 2018-03-29 NOTE — OP NOTE
OPERATIVE REPORT    PREOPERATIVE DIAGNOSIS:  Atypical Urine Cytology  POSTOPERATIVE DIAGNOSIS:  Same    PROCEDURES PERFORMED:   1. Cystourethroscopy  2. Right retrograde pyelogram with interpretation of intraoperative fluoroscopic imaging  3. Left retrograde pyelogram with interpretation of intraoperative fluoroscopic imaging    STAFF SURGEON: Moisés Pena, present and participatory for the entire case.   RESIDENT(S): Manny Ugalde MD  ANESTHESIA: MAC    ESTIMATED BLOOD LOSS: <5 cc  DRAINS/TUBES: None    IV FLUIDS: Please see dictated anesthesia record  COMPLICATIONS: None.   SPECIMEN: bladder cytology, right and left kidney cytology  SIGNIFICANT FINDINGS: Normal upper tracts, no evidence of bladder pathology    BRIEF OPERATIVE INDICATIONS:  Keagan Wayne is a(n) 72 year old male who presented with dialysis and microscopic hematuria.  An outpatient workup was notable for atypical urine cytology and poorly opacified upper tracts on CTU. After a discussion of all risks, benefits, and alternatives, the decision has been made to proceed with bilateral retrograde pyelograms and cytologies to ensure no tumor prior to transplant evaluation.    After induction of monitored sedation the patient was repositioned in dorsal lithotomy and prepped and draped in the standard sterile fashion.  A time out was performed confirming the appropriate patient identity and planned procedure.  The patient received culture directed antibiotics and had bilateral sequential compression devices for DVT propylaxis.    A 22-Khmer rigid cystoscope was inserted into a well-lubricated urethra. The urethra was unremarkable without stricture.  The prostate was moderately enlarged with a high bladder neck.  The bladder was meticulously inspected with a 30 and 70 degree lens and no tumors, erythema or abnormalities were seen.  The right ureteral orifice was cannulated with a sensor wire and a 5F open ended catheter was placed over the wire and  into the renal pelvis.  Urine was sent for cytology with barbotage as there was little initial return.  Retrograde pyelogram was unremarkable for hydronephrosis or filling defect in the kidney or ureter.  The catheter was removed and the same procedure performed on the left side again with no abnormal findings, no hydronephrosis or filling defects in the kidney or ureter.    The bladder was then drained, there was no significant bleeding.    The patient tolerated the procedure well and there were no apparent complications. The patient  was transported to the postanesthesia care unit in stable condition.     POSTOP PLAN:  -Pending results of urine cytology    Moisés Pena    \

## 2018-03-29 NOTE — DISCHARGE INSTRUCTIONS
Same-Day Surgery   Adult Discharge Orders & Instructions     For 24 hours after surgery:  1. Get plenty of rest.  A responsible adult must stay with you for at least 24 hours after you leave the hospital.   2. Pain medication can slow your reflexes. Do not drive or use heavy equipment.  If you have weakness or tingling, don't drive or use heavy equipment until this feeling goes away.  3. Mixing alcohol and pain medication can cause dizziness and slow your breathing. It can even be fatal. Do not drink alcohol while taking pain medication.  4. Avoid strenuous or risky activities.  Ask for help when climbing stairs.   5. You may feel lightheaded.  If so, sit for a few minutes before standing.  Have someone help you get up.   6. If you have nausea (feel sick to your stomach), drink only clear liquids such as apple juice, ginger ale, broth or 7-Up.  Rest may also help.  Be sure to drink enough fluids.  Move to a regular diet as you feel able. Take pain medications with a small amount of solid food, such as toast or crackers, to avoid nausea.   7. A slight fever is normal. Call the doctor if your fever is over 100 F (37.7 C) (taken under the tongue) or lasts longer than 24 hours.  8. You may have a dry mouth, muscle aches, trouble sleeping or a sore throat.  These symptoms should go away after 24 hours.  9. Do not make important or legal decisions.   Pain Management:      1. Take pain medication (if prescribed) for pain as directed by your physician.        2. WARNING: If the pain medication you have been prescribed contains Tylenol  (acetaminophen), DO NOT take additional doses of Tylenol (acetaminophen).     Call your doctor for any of the followin.  Signs of infection (fever, growing tenderness at the surgery site, severe pain, a large amount of drainage or bleeding, foul-smelling drainage, redness, swelling).    2.  It has been over 8 to 10 hours since surgery and you are still not able to urinate (pee).    3.   Headache for over 24 hours.    4.  Numbness, tingling or weakness the day after surgery (if you had spinal anesthesia).  To contact a doctor, call _____________________________________ or:      [phone] and ask for the Resident On Call for:          __________________________________________ (answered 24 hours a day)      Emergency Department:  [facility]: [phone]  [facility]: [phone]               Rev. 10/2014

## 2018-03-30 LAB — COPATH REPORT: NORMAL

## 2018-04-02 NOTE — PROGRESS NOTES
No evidence for upper or lower tract malignancy including cytologies, retrograde pyelograms.  Can proceed with transplant from urologic perspective.

## 2018-04-03 ENCOUNTER — CARE COORDINATION (OUTPATIENT)
Dept: UROLOGY | Facility: CLINIC | Age: 73
End: 2018-04-03

## 2018-04-03 NOTE — PROGRESS NOTES
I called and spoke with Mr. Wayne and his PCA, María, to let them know the cytologies were normal and he is cleared from urology for transplant purposes. Patient verbalized understanding. Edita Esquivel RN

## 2018-04-09 ENCOUNTER — DOCUMENTATION ONLY (OUTPATIENT)
Dept: UROLOGY | Facility: CLINIC | Age: 73
End: 2018-04-09

## 2018-04-09 NOTE — PROGRESS NOTES
Cytology from bilateral kidneys and bladder all negative for high grade cells.  No evidence of urologic malignancy on cystoscopy or retrograde pyelograms.  Patient is cleared from urologic perspective to proceed with renal transplant.    Moisés Pena    A. Urinary tract, Urine from Bladder:   Negative for High-Grade Urothelial    Carcinoma.   Specimen Adequacy: Satisfactory for evaluation.     B. Urinary tract, Urine from Left Kidney:   Negative for High-Grade   Urothelial Carcinoma.   Specimen Adequacy: Satisfactory for evaluation.     C. Urinary tract, Urine from Right Kidney:   Negative for High-Grade   Urothelial Carcinoma.   Specimen Adequacy: Satisfactory for evaluation.

## 2018-04-17 ENCOUNTER — TELEPHONE (OUTPATIENT)
Dept: TRANSPLANT | Facility: CLINIC | Age: 73
End: 2018-04-17

## 2018-04-17 NOTE — TELEPHONE ENCOUNTER
Called María to tie up a few loose ends. Will need a PA from insurance. Keagan needs a bridge replaced and he is scheduled for his dental appointment in May. I asked María to call me when this is complete so I can request the dental clearance and submit his PA. He is now taking Chantix to completely stop smoking and doing well with it. María will keep me updated.

## 2018-05-15 ENCOUNTER — DOCUMENTATION ONLY (OUTPATIENT)
Dept: DENTISTRY | Facility: CLINIC | Age: 73
End: 2018-05-15

## 2018-06-13 ENCOUNTER — COMMITTEE REVIEW (OUTPATIENT)
Dept: TRANSPLANT | Facility: CLINIC | Age: 73
End: 2018-06-13

## 2018-06-13 NOTE — COMMITTEE REVIEW
Abdominal Committee Review Note     Evaluation Date: 2017  Committee Review Date: 2018    Organ being evaluated for: Kidney    Transplant Phase: Evaluation  Transplant Status: Active    Transplant Coordinator: Margaret Reardon  Transplant Surgeon:       Referring Physician: MICHELLE Calle    Primary Diagnosis: Diabetes Mellitus - Type II  Secondary Diagnosis:     Committee Review Members:  Nephrology Luis Pena MD, Charanjit Kate MD, Viral Raudel Bautista MD   Nutrition Orquidea Dunbar,    Pharmacy Charito Lao, [facility]    - Clinical Alyssa Courtney, [place], Tracie Aguilar, [place]   Transplant Renae Jon, RN, Pam Benitez, RN, Lissett Tovar, RN, Ericka Chance LPN, Sana Rich, ILIANA, Chelsi Rodas, SOLANGE, Filomena Mathew, RN, María Styles, RN, Janusz Boyd MD, Oracio Matthews MD       Transplant Eligibility: Irreversible chronic kidney disease treated w/dialysis or expected need for dialysis    Committee Review Decision: Approved    Relative Contraindications: None    Absolute Contraindications: None    Committee Chair Oracio Matthews MD verbally attested to the committee's decision.    Committee Discussion Details: Reviewed medical records and completed evaluation. Team approves ACTIVE status. PA is needed before he can attain active status and documentation will be submitted. Once approved, patient will be added to the  donor wait list as active and will be notified. Listing letter and PRA orders/mailers will be sent.

## 2018-06-14 ENCOUNTER — DOCUMENTATION ONLY (OUTPATIENT)
Dept: TRANSPLANT | Facility: CLINIC | Age: 73
End: 2018-06-14

## 2018-06-21 ENCOUNTER — TELEPHONE (OUTPATIENT)
Dept: TRANSPLANT | Facility: CLINIC | Age: 73
End: 2018-06-21

## 2018-06-21 ENCOUNTER — DOCUMENTATION ONLY (OUTPATIENT)
Dept: TRANSPLANT | Facility: CLINIC | Age: 73
End: 2018-06-21

## 2018-06-21 DIAGNOSIS — Z76.82 AWAITING ORGAN TRANSPLANT: Primary | ICD-10-CM

## 2018-06-21 NOTE — TELEPHONE ENCOUNTER
Spoke to both Keagan and María on a speaker phone. Letting them know that Keagan has successfully completed his evaluation and we have gotten his PA insurance approval. He has been placed on the ACTIVE  donor kidney transplant wait list today. I told them this means he is now eligible to receive organ offers and it also means he is essentially on call . Instructed him to keep his phone on and charged at all times. He should answer all incoming calls whether or not he recognizes the incoming number. He should return any missed calls asap. Reminded him the on call coordinator will not leave a message and he has one hour to return the call before the organ is offered to the next recipient. He will be presented with all the known information on the donor and he may accept or decline. Emphasized that if he should decline an organ offer there is absolutely no penalty or consequence. The organ simply is offered to the next recipient. He will be told when to arrive, when to stop eating and drinking and what medications to take. If he is called to arrive after  he will need to enter through the ED as the front doors are locked. He understands the transplant will be done in the hospital and not the [facility]. We reviewed the overall admission process, periop, OR, pacu, intermediate care unit and finally 7A general routines. Reminded once he is discharged from the hospital he will go to the [facility] every day for at least a week to monitor his drugs levels. We talked about the gradual lessening of the frequency of the required blood draws, but the bottom line is he will be taking immunosuppression twice a day and having his labs done once a month for the rest of his life.He is aware he should be expecting a  of lab tubes and an order form for PRA. Instructed to take these to his dialysis unit as soon as he gets them. The last PRA is from 2017. María will make sure this is done. He is aware he will now have a new  coordinator while on the wait list and Melanie and Kerline's names and phone numbers were included in the listing letter.  Active listing letter, PRA orders and mailers have been sent.

## 2018-06-21 NOTE — LETTER
2018    Keagan Wayne  [address]  [place] [address]   1948    Dear Keagan,    This letter is sent to confirm that you have completed your transplant work-up and you are a candidate in the kidney transplant program at the [facility].  You were placed on the kidney active waitlist on 2018.      When you are active on the waitlist and an organ becomes available, a coordinator will need to speak to you immediately.  You could be contacted at any time during the day or night as an organ could become available at any time.  Please make certain our office always has your current telephone numbers and address. Please keep your phone on and charged at all times. Answer all incoming calls even if you do not recognize the number. Return any missed calls as soon as possible as this may be your organ offer. Remember, the on call coordinator will not leave a message and you have one hour to return the call before the organ is offered to the next recipient. You will be presented with all available information about the donor and you may accept or decline. If you decline an organ offer there is no penalty or consequence.    Items we will need from you:      We have received approval from your insurance company for the transplant procedure.  It is critical that you notify us if there is any change in your insurance.  It is also important that you familiarize yourself with the details of your specific insurance policy.  Our patient  is available to assist you if you should have any questions regarding your coverage.      An ALA or PRA blood sample will  need to be sent here every 3 months to match you with  donors or any potential living donors.  If you need this testing, special mailing boxes (called mailers) will be sent to you directly from the Outreach Department. You should take the physician order form and the  to  your home laboratory when it is time to for this testing to be done.  Additional mailers can be obtained by calling the Transplant Office and asking to speak to a kidney . I am sending you the mailers and orders in the mail. Take them to your dialysis unit as soon as you receive them and they will draw the blood and send in.      During this waiting period, we may request additional periodic laboratory tests with your primary physician.  It will be your responsibility to remind your physician to forward your results to the Transplant Office.      We need to be kept informed of any changes in your medical condition such as:    o changes in your medications,   o significant changes in your health  o significant infections (such as pneumonia or abscesses)  o blood transfusions  o any condition which requires hospitalization  o any surgery      Remember to complete any routine cancer screening tests required before your transplant.  This includes colonoscopy; prostate screening for men, and mammogram and gynecologic testing for women, as well as dental work.  Your primary care clinic can assist you with arranging for these exams.  Remind your caregivers to forward copies of the records and final reports. You are currently up to date on all health maintenance.    We want you to know that our program has physician and surgeon coverage 24 hours a day, 365 days a year. If this coverage changes or there are substantial program changes, you will be notified in writing by letter. You will now have a new RN coordinator while on the wait list. Her name is Melanie Chacon [phone] and her LPN is Kerline Ramirez [phone]    Attached is a letter from the [employer] ([employer]). It describes the services and information offered to patients by [employer] and the [employer]urement and Transplantation Network.    We appreciate having had the opportunity to participate in your care.  If you have  questions, please feel free to call the Transplant Office at [phone] or [phone].      Sincerely,      Solid Organ Transplant  [facility]    Enclosures: ALA/GIANNI Physician Order, Telephone Contact List, Travel Resources, [employer] Letter, Waitlist Information Update and While You Are Waiting  cc: Yolanda Hernandez, Mason Calle, [facility]

## 2018-06-21 NOTE — PROGRESS NOTES
Keagan Wayne was added to the  donor kidney transplant list on ACTIVE status today 2018. He has successfully completed his evaluation. He has been on dialysis since 2016. ACTIVE listing letter, PRA orders and mailers have been sent.

## 2018-06-21 NOTE — LETTER
PHYSICIAN ORDER   ALA/PRA BLOOD    DATE & TIME ISSUED: 2018 1:38 PM  PATIENT NAME: Keagan Wayne   : 1945     [facility] MR#  [id number]     DIAGNOSIS/ICD-10 CODE: Awaiting Organ transplant [Z76.82}   EXPIRES: (1 YEAR AFTER DATE ISSUED) As soon as you receive the mailers please have your dialysis unit draw the blood and send in.  EVERY 12 weeks / 3 months   1. Please draw 20ml of blood in red top (plain) tube for Antileukocyte Antibody (ALA or PRA).   2. Label tubes with the patient s name, complete lab slip.         3. Mailers, lab slips with instructions are sent to patient separately.      4. Call the Outreach Lab at [phone] to reorder mailers.       5. Mail blood to (this address is also on the mailers):    IMMUNOLOGY LABORATORY  [facility]  Room [address]  [facility] [address]   [address] [place] [address]  [id number]            Oracio Matthews MD  Department of Surgery

## 2018-06-25 ENCOUNTER — RESULTS ONLY (OUTPATIENT)
Dept: OTHER | Facility: CLINIC | Age: 73
End: 2018-06-25

## 2018-06-25 DIAGNOSIS — Z76.82 AWAITING ORGAN TRANSPLANT: ICD-10-CM

## 2018-06-26 ENCOUNTER — DOCUMENTATION ONLY (OUTPATIENT)
Dept: TRANSPLANT | Facility: CLINIC | Age: 73
End: 2018-06-26

## 2018-06-27 LAB — PRA SINGLE ANTIGEN IGG ANTIBODY: NORMAL

## 2018-06-28 LAB
PROTOCOL CUTOFF: NORMAL
SA1 CELL: NORMAL
SA1 COMMENTS: NORMAL
SA1 HI RISK ABY: NORMAL
SA1 MOD RISK ABY: NORMAL
SA1 TEST METHOD: NORMAL
SA2 CELL: NORMAL
SA2 COMMENTS: NORMAL
SA2 HI RISK ABY UA: NORMAL
SA2 MOD RISK ABY: NORMAL
SA2 TEST METHOD: NORMAL
UNACCEPTABLE ANTIGEN: NORMAL
UNOS CPRA: 53

## 2018-07-31 RX ORDER — VARENICLINE TARTRATE 1 MG/1
TABLET, FILM COATED ORAL
Qty: 56 TABLET | Refills: 2 | OUTPATIENT
Start: 2018-07-31

## 2018-08-02 RX ORDER — VARENICLINE TARTRATE 1 MG/1
TABLET, FILM COATED ORAL
Qty: 56 TABLET | Refills: 2 | OUTPATIENT
Start: 2018-08-02

## 2018-10-10 DIAGNOSIS — N18.6 ESRD (END STAGE RENAL DISEASE) (H): ICD-10-CM

## 2018-10-10 DIAGNOSIS — I10 HYPERTENSION: ICD-10-CM

## 2018-10-10 DIAGNOSIS — Z12.5 PROSTATE CANCER SCREENING: ICD-10-CM

## 2018-10-10 DIAGNOSIS — Z76.82 ORGAN TRANSPLANT CANDIDATE: Primary | ICD-10-CM

## 2018-10-10 DIAGNOSIS — Z01.810 PRE-OPERATIVE CARDIOVASCULAR EXAMINATION: ICD-10-CM

## 2018-12-13 ENCOUNTER — TRANSFERRED RECORDS (OUTPATIENT)
Dept: HEALTH INFORMATION MANAGEMENT | Facility: CLINIC | Age: 73
End: 2018-12-13

## 2018-12-20 ENCOUNTER — PATIENT OUTREACH (OUTPATIENT)
Dept: CARE COORDINATION | Facility: CLINIC | Age: 73
End: 2018-12-20

## 2019-01-03 ENCOUNTER — OFFICE VISIT (OUTPATIENT)
Dept: NEPHROLOGY | Facility: CLINIC | Age: 74
End: 2019-01-03
Attending: INTERNAL MEDICINE
Payer: COMMERCIAL

## 2019-01-03 ENCOUNTER — HOSPITAL ENCOUNTER (OUTPATIENT)
Dept: CARDIOLOGY | Facility: CLINIC | Age: 74
Setting detail: NUCLEAR MEDICINE
End: 2019-01-03
Attending: INTERNAL MEDICINE
Payer: COMMERCIAL

## 2019-01-03 ENCOUNTER — HOSPITAL ENCOUNTER (OUTPATIENT)
Dept: NUCLEAR MEDICINE | Facility: CLINIC | Age: 74
Setting detail: NUCLEAR MEDICINE
Discharge: HOME | End: 2019-01-03
Attending: INTERNAL MEDICINE | Admitting: INTERNAL MEDICINE
Payer: COMMERCIAL

## 2019-01-03 ENCOUNTER — OFFICE VISIT (OUTPATIENT)
Dept: CARDIOLOGY | Facility: CLINIC | Age: 74
End: 2019-01-03
Attending: INTERNAL MEDICINE
Payer: COMMERCIAL

## 2019-01-03 ENCOUNTER — HOSPITAL ENCOUNTER (OUTPATIENT)
Dept: NUCLEAR MEDICINE | Facility: CLINIC | Age: 74
Setting detail: NUCLEAR MEDICINE
End: 2019-01-03
Attending: INTERNAL MEDICINE
Payer: COMMERCIAL

## 2019-01-03 ENCOUNTER — ALLIED HEALTH/NURSE VISIT (OUTPATIENT)
Dept: TRANSPLANT | Facility: CLINIC | Age: 74
End: 2019-01-03
Attending: INTERNAL MEDICINE
Payer: COMMERCIAL

## 2019-01-03 VITALS
BODY MASS INDEX: 29.33 KG/M2 | HEIGHT: 74 IN | HEART RATE: 98 BPM | DIASTOLIC BLOOD PRESSURE: 78 MMHG | OXYGEN SATURATION: 99 % | WEIGHT: 228.5 LBS | SYSTOLIC BLOOD PRESSURE: 138 MMHG

## 2019-01-03 VITALS — BODY MASS INDEX: 29.74 KG/M2 | WEIGHT: 231.6 LBS

## 2019-01-03 DIAGNOSIS — Z79.4 INSULIN DEPENDENT TYPE 2 DIABETES MELLITUS (H): ICD-10-CM

## 2019-01-03 DIAGNOSIS — R76.8 HEPATITIS C ANTIBODY POSITIVE IN BLOOD: ICD-10-CM

## 2019-01-03 DIAGNOSIS — N25.81 SECONDARY HYPERPARATHYROIDISM (H): ICD-10-CM

## 2019-01-03 DIAGNOSIS — N18.6 ESRD (END STAGE RENAL DISEASE) (H): ICD-10-CM

## 2019-01-03 DIAGNOSIS — E55.9 VITAMIN D DEFICIENCY: ICD-10-CM

## 2019-01-03 DIAGNOSIS — F12.11 CANNABIS ABUSE, IN REMISSION: ICD-10-CM

## 2019-01-03 DIAGNOSIS — Z01.818 PRE-OP EXAM: ICD-10-CM

## 2019-01-03 DIAGNOSIS — Z12.5 PROSTATE CANCER SCREENING: ICD-10-CM

## 2019-01-03 DIAGNOSIS — Z87.898 HISTORY OF SEIZURES: ICD-10-CM

## 2019-01-03 DIAGNOSIS — D49.0 IPMN (INTRADUCTAL PAPILLARY MUCINOUS NEOPLASM): ICD-10-CM

## 2019-01-03 DIAGNOSIS — Z01.810 PRE-OPERATIVE CARDIOVASCULAR EXAMINATION: ICD-10-CM

## 2019-01-03 DIAGNOSIS — N18.6 ESRD (END STAGE RENAL DISEASE) (H): Primary | ICD-10-CM

## 2019-01-03 DIAGNOSIS — Z76.82 ORGAN TRANSPLANT CANDIDATE: ICD-10-CM

## 2019-01-03 DIAGNOSIS — F12.10 MARIJUANA ABUSE: ICD-10-CM

## 2019-01-03 DIAGNOSIS — I10 HYPERTENSION: ICD-10-CM

## 2019-01-03 DIAGNOSIS — E11.9 INSULIN DEPENDENT TYPE 2 DIABETES MELLITUS (H): ICD-10-CM

## 2019-01-03 DIAGNOSIS — Z76.82 ORGAN TRANSPLANT CANDIDATE: Primary | ICD-10-CM

## 2019-01-03 DIAGNOSIS — I73.9 PAD (PERIPHERAL ARTERY DISEASE) (H): ICD-10-CM

## 2019-01-03 DIAGNOSIS — F31.9 BIPOLAR 1 DISORDER (H): ICD-10-CM

## 2019-01-03 DIAGNOSIS — I15.0 RENOVASCULAR HYPERTENSION: ICD-10-CM

## 2019-01-03 DIAGNOSIS — I73.9 PERIPHERAL ARTERIAL DISEASE (H): ICD-10-CM

## 2019-01-03 DIAGNOSIS — Z01.810 PRE-OPERATIVE CARDIOVASCULAR EXAMINATION: Primary | ICD-10-CM

## 2019-01-03 DIAGNOSIS — F11.90 CHRONIC, CONTINUOUS USE OF OPIOIDS: ICD-10-CM

## 2019-01-03 LAB — PSA SERPL-ACNC: 0.77 UG/L (ref 0–4)

## 2019-01-03 PROCEDURE — A9502 TC99M TETROFOSMIN: HCPCS | Performed by: INTERNAL MEDICINE

## 2019-01-03 PROCEDURE — 99214 OFFICE O/P EST MOD 30 MIN: CPT | Mod: 25 | Performed by: NURSE PRACTITIONER

## 2019-01-03 PROCEDURE — G0103 PSA SCREENING: HCPCS | Performed by: INTERNAL MEDICINE

## 2019-01-03 PROCEDURE — 93010 ELECTROCARDIOGRAM REPORT: CPT | Mod: ZP | Performed by: INTERNAL MEDICINE

## 2019-01-03 PROCEDURE — 40000358 ZZHCL STATISTIC DRUG SCREEN MULTIPLE (METRO): Performed by: NURSE PRACTITIONER

## 2019-01-03 PROCEDURE — G0463 HOSPITAL OUTPT CLINIC VISIT: HCPCS | Mod: 27

## 2019-01-03 PROCEDURE — G0463 HOSPITAL OUTPT CLINIC VISIT: HCPCS | Mod: ZF

## 2019-01-03 PROCEDURE — 36415 COLL VENOUS BLD VENIPUNCTURE: CPT | Performed by: INTERNAL MEDICINE

## 2019-01-03 PROCEDURE — 93005 ELECTROCARDIOGRAM TRACING: CPT | Mod: ZF

## 2019-01-03 PROCEDURE — 93017 CV STRESS TEST TRACING ONLY: CPT

## 2019-01-03 PROCEDURE — 80307 DRUG TEST PRSMV CHEM ANLYZR: CPT | Performed by: NURSE PRACTITIONER

## 2019-01-03 PROCEDURE — 78452 HT MUSCLE IMAGE SPECT MULT: CPT

## 2019-01-03 PROCEDURE — 40000141 ZZH STATISTIC PERIPHERAL IV START W/O US GUIDANCE

## 2019-01-03 PROCEDURE — 25000128 H RX IP 250 OP 636: Performed by: INTERNAL MEDICINE

## 2019-01-03 PROCEDURE — 34300033 ZZH RX 343: Performed by: INTERNAL MEDICINE

## 2019-01-03 RX ORDER — CLOPIDOGREL BISULFATE 75 MG/1
75 TABLET ORAL DAILY
Status: ON HOLD | COMMUNITY
End: 2020-06-22

## 2019-01-03 RX ORDER — REGADENOSON 0.08 MG/ML
0.4 INJECTION, SOLUTION INTRAVENOUS ONCE
Status: COMPLETED | OUTPATIENT
Start: 2019-01-03 | End: 2019-01-03

## 2019-01-03 RX ORDER — ACYCLOVIR 200 MG/1
0-1 CAPSULE ORAL
Status: DISCONTINUED | OUTPATIENT
Start: 2019-01-03 | End: 2019-01-04 | Stop reason: HOSPADM

## 2019-01-03 RX ORDER — ALBUTEROL SULFATE 90 UG/1
2 AEROSOL, METERED RESPIRATORY (INHALATION) EVERY 5 MIN PRN
Status: DISCONTINUED | OUTPATIENT
Start: 2019-01-03 | End: 2019-01-04 | Stop reason: HOSPADM

## 2019-01-03 RX ORDER — AMINOPHYLLINE 25 MG/ML
50-100 INJECTION, SOLUTION INTRAVENOUS
Status: DISCONTINUED | OUTPATIENT
Start: 2019-01-03 | End: 2019-01-04 | Stop reason: HOSPADM

## 2019-01-03 RX ADMIN — TETROFOSMIN 10.1 MCI.: 1.38 INJECTION, POWDER, LYOPHILIZED, FOR SOLUTION INTRAVENOUS at 11:02

## 2019-01-03 RX ADMIN — REGADENOSON 0.4 MG: 0.08 INJECTION, SOLUTION INTRAVENOUS at 12:05

## 2019-01-03 RX ADMIN — TETROFOSMIN 35.3 MCI.: 1.38 INJECTION, POWDER, LYOPHILIZED, FOR SOLUTION INTRAVENOUS at 12:06

## 2019-01-03 ASSESSMENT — MIFFLIN-ST. JEOR: SCORE: 1856.22

## 2019-01-03 ASSESSMENT — PAIN SCALES - GENERAL
PAINLEVEL: NO PAIN (0)
PAINLEVEL: NO PAIN (0)

## 2019-01-03 NOTE — PROGRESS NOTES
Assessment and Plan:  1. Kidney transplant wait list evaluation - Mr. Wayne is a fair candidate overall. He should be not be active on the wait list, due to needing CT imaging for transplant surgery review given recent bilateral SFA stenting and being on DAPT for 6 months.   2. ESKD from diabetes mellitus type 2 - although doing well on hemodialysis since 12/2016, would likely benefit from a kidney transplant.   3. Type 2 diabetes - controlled with most recent hemoglobin A1c of 7.9% (10/2018) while using 12-18 units per day. No unawareness.   4. Cardiac risk - coronary angiogram in 2016 showed mild irregularities and no acute focal CAD. ECHO in 2017 showed borderline EF. Will have cardiac risk assessment today with stress test.   5. PAD s/p stenting of left SFA (6/2018) and right SFA (8/2018) - patient denies any further claudication symptoms since intervention. On DAPT for 6 months. Will need CT abd/pelvis w/o contrast for transplant surgery to review vascular targets.   6. History of seizures (9/2017) - w/o recurrence. Thought to be metabolic related and was placed on Keppra, for which he states he is no longer taking. Will need neurology follow up as previously recommended at discharge.   7. History of microscopic hematuria - CT urogram noted bladder wall thickening. Cystoscopy was normal and bladder biopsies were negative. Cleared by urology.   8. Side branch IPMN  (6 mm) - noted on CT urogram. Needs follow up with Dr. Wiley.   8. HCV antibody positive - HCV RNA undetectable (10/2017). Cleared by hepatology with imaging showing minimal liver fibrosis.   10. Bipolar 1 disorder - taking Celexa and not seeing a therapist. Would appreciate social work assessment and input.    11. Functionality - highly dependent on PCA/girlfriend. She helps with all ADLs (although he denies any limitations with walking), med set up/administration as well as appointment management. Would appreciate social work assessment and  input.   12. Chronic pain - for which he uses oxycodone weekly and follows at a pain clinic.   13. Current marijuana use - no concerning findings of urine toxicology today. Educated patient about risk of respiratory fungal infection post-transplant.   14. Former tobacco use /COPD - with normal PFTs here in 2017. Follows with outside pulmonary and was noted in September to be doing reasonably well.  15. History of noncompliance - successfully completed compliance contract. Dialysis unit currently reports great compliance as well.   16. Health maintenance - colonoscopy in 2017 with adenomatous polyps. Repeat in 3 years. PSA 0.77.    Discussed the risks and benefits of a transplant, including the risk of surgery and immunosuppression medications.    KDPI: We discussed approximate remaining wait time and how that is influenced by issues such as blood type and sensitization (PRA) and access to a living donor. I contrasted potential waiting time for living vs  donor kidneys from  normal (0-85%) or higher (%) kidney donor profile index (KDPI) donors and their associated outcomes. I would recommend Mr. Wayne to consider kidneys from high KDPI donors. The reason for this decision is best summarized as: decreased dialysis related morbidity/mortality, accepting lower kidney graft survival rates.    Patient s overall re-evaluation may require further discussion in the Transplant Program s multidisciplinary selection committee for a final recommendation on the patient s suitability for transplant.     Reason for Visit:  Keagan Wayne is a 73 year old male with ESKD from diabetes mellitus type 2, who presents for Kidney transplant wait list evaluation.     Last Evaluation Clinic Visit Date:  2017         Wait List Date: 2016  Current phase/status: Waitlist: Active as of 2018  Transplant coordinator: Maty [facility] Trans[facility] Office phone number [phone]     Previous Medical Issues:  See HPI       HPI:   Mr. Wayne is a 73-year-old that presents for wait list evaluation with PMH of ESKD from presumed type 2 diabetes on hemodialysis since 12/2016, retinopathy, neuropathy, CAD, PAD s/p stenting of left and right SFA in 2018, HCV (RNA undetectable in 2017), noncompliance, chronic pain with chronic opioid use, former smoker, COPD, TRACEY, metabolic seizures (9/2017) and bipolar 1 disorder.          Interim events/issues/events  Since he was last seen in 6/2017 by Dr. Kate he was made active in 6/2018 after completing his evaluation. This included clearance from urology for microscopic hematuria with a CT urogram in 2017 noting bladder wall thickening with a normal cystoscopy and negative bladder biopsies. He was cleared by hepatology for history of HCV with undetectable RNA level s and imaging showing minimal liver fibrosis. He completed a 6-month compliance contract. He was cleared by pulmonary given history of COPD and lung nodules. In 9/2017, he was hospitalized  with new onset of seizures. Head imaging was unremarkable. Seizures suspected to be likely more metabolic. He was started on Keppra daily, that he reports today he is no longer taking. He denies any recurrent seizure activity. He was supposed to follow up at [facility], but never did so. Of note, his dialysis unit currently reports great compliance. In 2018, given worsening PAD he had stenting of his left SFA (6/2018) and right SFA (8/2018), now on DAPT for 6 months. He reports no limitations with walking now. He is doing daily home physical therapy exercises on his own with stretching and resistant bands without chest pain, shortness of breath or claudication symptoms. Of note, he denies any infections or blood transfusions since he was last seen.    Mr. Wayne has history of chronic pain for which he uses oxycodone on a weekly basis and follows at a pain clinic. He quit smoking tobacco, but is still using marijuana. He  continues to follow with outside pulmonary for COPD and was noted in September to be doing reasonably well. As far as his functionality, he is quite dependent on his girlfriend/PCA . She helps with all ADLs, although he reports he has no limitations with walking. She sets up his medications and physically hands them to him. She manages all of his appts and drives him most of the time. He has history of Bipolar 1 disorder, on Celexa and is not seeing a therapist.       ESKD   Overall, dialysis is going well without complications of his AFV. BPs typically average 130/70 mmHg on antihypertensive therapy x 4. He is making good urine output and denies dysuria, hematuria or trouble emptying his bladder or starting his stream. He denies any new potential donors.     DM II   Followed by PCP, with most recent hemoglobin A1c of 7.9% in 10/2018. He is currently using 18-12 units per day. Blood sugars range 90s-200s. No history of hypoglycemic unawareness.      Cardiac   12/2016 - coronary angiogram that showed mild irregularities and no acute focal CAD  2017 - ECHO with borderline EF   Today - will have cardiac risk assessment and stress test        Kidney Disease Hx       Kidney Disease Dx: DM II        Biopsy Proven: No        On Dialysis: Yes, Date initiated: 12/2016  and Dialysis Type: [facility] HD;       Primary Nephrologist: Dr. Calle           Uremic Symptoms: Fatigue: No; Nausea: No; Poor Appetite: No;          Potential Donor(s): No          Cardiac history:       Last cardiac risk assessment: today        Last stress test/coronary angiogram: today        Exertional symptoms: none        Recent cardiac events: no     Pertinent issues:   Blood transfusion: No  Jehovah s Witness: No  Pregnancies: No  Previous transplant: No  Urine output: Yes; good amounts every day   Bladder dysfunction: No  Claudication: Yes S/p bilateral SFA stents in 2018   Previous amputation: No  Cancer: No  Recurrent infection: No  Chronic  anticoagulation: Yes; plavix       ROS:  A comprehensive review of systems was obtained and negative, except as noted in the HPI or PMH.     PMH:  Medical records were obtained and reviewed.  Past Medical History:   Diagnosis Date     Acute pulmonary edema (H)      Anemia      Bipolar 1 disorder (H)      CAD (coronary artery disease)      Cognitive impairment      Colitis      COPD (chronic obstructive pulmonary disease) (H)      Depression      Diabetes (H)      ESRD (end stage renal disease) (H)      ESRD (end stage renal disease) on dialysis (H)      Glaucoma      Hepatitis C      Hyperlipidemia      Hyperlipidemia      Hypertension      IV drug abuse (H) 1970's     Non compliance w medication regimen      Non-ischemic cardiomyopathy (H)      Non-ST elevation MI (NSTEMI) (H)      Peripheral neuropathy      Peripheral neuropathy      Pneumonia         PSH:  Past Surgical History:   Procedure Laterality Date     CREATE FISTULA ARTERIOVENOUS UPPER EXTREMITY Right 5/8/2016    Procedure: CREATE FISTULA ARTERIOVENOUS UPPER EXTREMITY;  Surgeon: Josias Valente MD;  Location:  OR     CREATE FISTULA ARTERIOVENOUS UPPER EXTREMITY Right 12/14/2016    Procedure: CREATE FISTULA ARTERIOVENOUS UPPER EXTREMITY;  Surgeon: Contreras Bowman MD;  Location:  OR     CYSTOSCOPY, RETROGRADES, COMBINED Bilateral 3/29/2018    Procedure: COMBINED CYSTOSCOPY, RETROGRADES;  Cystoscopy, Bilateral Retrogrades, Bilateral Ureteral Washings, Pyelogram ;  Surgeon: Moisés Pena MD;  Location: [facility] OR     HERNIA REPAIR          Personal Hx:  Social History     Socioeconomic History     Marital status:      Spouse name: Not on file     Number of children: Not on file     Years of education: Not on file     Highest education level: Not on file   Social Needs     Financial resource strain: Not on file     Food insecurity - worry: Not on file     Food insecurity - inability: Not on file     Transportation needs - medical:  Not on file     Transportation needs - non-medical: Not on file   Occupational History     Not on file   Tobacco Use     Smoking status: Former Smoker     Packs/day: 0.20     Years: 50.00     Pack years: 10.00     Types: Cigarettes     Start date: 10/12/1957     Smokeless tobacco: Never Used   Substance and Sexual Activity     Alcohol use: Yes     Alcohol/week: 8.4 oz     Types: 7 Cans of beer, 7 Shots of liquor per week     Comment: cocktail 2-3/month     Drug use: No     Comment: past marijuana use     Sexual activity: Not on file   Other Topics Concern     Parent/sibling w/ CABG, MI or angioplasty before 65F 55M? Not Asked   Social History Narrative     Not on file        Allergies:  Allergies   Allergen Reactions     Morphine Rash     Dry skin        Medications:  Prior to Admission medications    Medication Sig Start Date End Date Taking? Authorizing Provider   aspirin 81 MG chewable tablet Take 1 tablet (81 mg) by mouth daily 2/8/16   Nila Espana DO   blood glucose monitoring (ACCU-CHEK MINISTERIO PLUS) meter device kit accucheck Lesly glucometer 12/8/15   Reported, Patient   blood glucose monitoring (ACCU-CHEK FASTCLIX) lancets Test 2 times per day. 1/30/15   Reported, Patient   Blood Pressure Monitoring (RA BLOOD PRESSURE CUFF MONITOR) MISC by Misc.(Non-Drug; Combo Route) route once daily. 9/26/17   Reported, Patient   bumetanide (BUMEX) 2 MG tablet Take 2 tablets (4 mg) by mouth daily 12/19/16   Tamir Jett MD   calcium acetate (PHOSLO) 667 MG CAPS capsule Take 1,334 mg by mouth 4 times daily (with meals and nightly)    Unknown, Entered By History   Carvedilol (COREG PO) Take 25 mg by mouth 2 times daily (with meals)    Unknown, Entered By History   citalopram (CELEXA) 20 MG tablet Take 1 tablet (20 mg) by mouth daily 2/8/16   Nila Espana DO   cloNIDine (CATAPRES-TTS2) 0.2 MG/24HR patch Place 1 patch onto the skin once a week    Reported, Patient   Gabapentin (NEURONTIN PO) Take  "200 mg by mouth daily    Unknown, Entered By History   insulin aspart (NOVOLOG FLEXPEN) 100 UNIT/ML injection Inject Subcutaneous 3 times daily (with meals) sliding scale up to 7 units before meals    Reported, Patient   insulin degludec (TRESIBA FLEXTOUCH) 100 UNIT/ML pen Inject 20 Units Subcutaneous daily (with breakfast)    Reported, Patient   Insulin Pen Needle (PEN NEEDLES 5/16\") 31G X 8 MM MISC 31g x 8 mm 1/30/15   Reported, Patient   isosorbide mononitrate (IMDUR) 30 MG 24 hr tablet Take 90 mg by mouth daily    Unknown, Entered By History   latanoprost (XALATAN) 0.005 % ophthalmic solution 1 drop 10/8/15   Reported, Patient   LOSARTAN POTASSIUM PO Take 100 mg by mouth daily    Unknown, Entered By History   ombitasvir-paritaprevir-ritonavir;dasabuvir (VIEKIRA MAYANK) 12.5-75-50 &250 MG tablets Take twice daily according to package instructions for 12 weeks total. 9/30/15   Reported, Patient   OXYCODONE HCL PO Take 10 mg by mouth 3 times daily as needed (takes scheduled)    Unknown, Entered By History   ULTICARE MICRO 32G X 4 MM insulin pen needle  10/24/16   Reported, Patient   ULTICARE PEN NEEDLES 29G X 12MM  12/19/16   Reported, Patient   umeclidinium (INCRUSE ELLIPTA) 62.5 MCG/INH oral inhaler Inhale 1 puff into the lungs daily    Unknown, Entered By History   VITAMIN D, CHOLECALCIFEROL, PO Take 1,000 Units by mouth daily    Unknown, Entered By History        Vitals:  Wt 105.1 kg (231 lb 9.6 oz)   BMI 29.74 kg/m       Exam:  GENERAL APPEARANCE: alert and no distress  HENT: mouth without ulcers or lesions. Fair dentition. No signs of oral or dental infection.   LYMPHATICS: no cervical or supraclavicular nodes  RESP: lungs clear to auscultation - no rales, rhonchi or wheezes  CV: regular rhythm, normal rate, no rub, no murmur  FEMORAL PULSES: 1+ equal bilaterally.   EDEMA: no LE edema bilaterally  ABDOMEN: soft, nondistended, nontender, bowel sounds normal  MS: extremities normal - no gross deformities noted, " no evidence of inflammation in joints, no muscle tenderness  SKIN: no rash     Results:  Office Visit on 01/03/2019   Component Date Value Ref Range Status     Interpretation ECG 01/03/2019 Click View Image link to view waveform and result   Final

## 2019-01-03 NOTE — PATIENT INSTRUCTIONS
Patient Instructions:    It was a pleasure to see you in the cardiology clinic today.    If you have any questions you can reach our nurse triage line at [phone].  Press Option #1 for the [facility], then press Option #3 for nursing or Option #1 for scheduling. We also encourage the use of [employer] to communicate with your HealthCare Provider    Note new medications: no changes  Stop the following medications: none    Please follow up with Cardiology  in one year if you are still listed.    Control your risk of coronary artery disease with these four lifestyle changes:  - Eating a heart healthy diet by following the American Heart Association Recommendations: Reduce saturated fat and trans fat to 5-6 percent of daily calories and minimizing the amount of trans fat you eat by limiting your intake of red meat and dairy products made with whole milk. It also means choosing skim milk, low-fat or fat-free dairy products, limiting fried food, and cooking with healthy oils such as vegetable oil. A healthy diet should include emphasis on fruits, vegetables, whole grains, poultry, fish and nuts, and limiting sugary foods and beverages. We recommend following the DASH (Dietary Approaches to Stop Hypertension) or Mediterranean Diet.  - Regular Exercise: Just 40 minutes of aerobic exercise of moderate to vigorous intensity done 3-4 times per week is enough to lower both cholesterol and high blood pressure. Brisk walking, swimming, bicycling or a dance class are examples.  - Avoiding Tobacco Smoking: Smoking compounds the risk from other risk factors for heart disease including high cholesterol, high blood pressure, and diabetes. Smokers can lower cholesterol, blood pressure, and protect their arteries by quitting. Ask to learn more about quitting smoking.  - Losing Weight: Being overweight or obese raises your risk of high cholesterol, high blood pressure, and diabetes which are all risk  factors for heart disease. Losing excess weight can improve cholesterol levels, blood pressure, and reduce incidence of diabetes and potentially reverse these disease processes.     Get help if you experience any of these heart attack warning signs: Although some heart attacks are sudden and intense, most start slowly, with mild pain or discomfort. Pay attention to your body -- and call 911 if you feel:  - Chest discomfort: Most heart attacks involve discomfort in the center of the chest that lasts more than a few minutes, or that goes away and comes back. It can feel like uncomfortable pressure, squeezing, fullness or pain.  - Discomfort in other areas of the upper body: Symptoms can include pain or discomfort in one or both arms, the back, neck, jaw or stomach.   - Shortness of breath with or without chest discomfort   - Other signs may include breaking out in a cold sweat, nausea or lightheadedness      Sincerely,    Vikas Yu, CNP

## 2019-01-03 NOTE — NURSING NOTE
Chief Complaint   Patient presents with     RECHECK     ESRD (end stage renal disease) on dialysis       Provider reviewed vitals from previous visit and confirmed they do not need to be re-taken.  Terri Bernstein MA

## 2019-01-03 NOTE — LETTER
1/3/2019      RE: Keagan Wayne  [address]  [facility] [address]       Dear Colleague,    Thank you for the opportunity to participate in the care of your patient, Keagan Wayne, at the [facility] at [facility]. Please see a copy of my visit note below.        Chief Complaint:   Chief Complaint   Patient presents with     Follow Up     f/u pre op       HPI: Returns to clinic today for annual follow-up to be listed for kidney transplant.  In the interim since he saw us in October 2017 he tells me he has had not had any hospitalizations.  He does report occasional issues with dyspnea secondary to volume retention requiring additional dialysis however he has not been hospitalized during these episodes.  He denies any chest pain or discomfort, exertional angina, exertional dyspnea, syncope, lightheadedness, dizziness, orthopnea, PND, or peripheral edema.  He does report being quite active and is able to walk up a flight of steps without any exertional symptoms.    Past Medical History:  Past Medical History:   Diagnosis Date     Acute pulmonary edema (H)      Anemia      Bipolar 1 disorder (H)      CAD (coronary artery disease)      Cognitive impairment      Colitis      COPD (chronic obstructive pulmonary disease) (H)      Depression      Diabetes (H)      ESRD (end stage renal disease) (H)      ESRD (end stage renal disease) on dialysis (H)      Glaucoma      Hepatitis C      Hyperlipidemia      Hyperlipidemia      Hypertension      IV drug abuse (H) 1970's     Non compliance w medication regimen      Non-ischemic cardiomyopathy (H)      Non-ST elevation MI (NSTEMI) (H)      Peripheral neuropathy      Peripheral neuropathy      Pneumonia        Past Surgical History:  Past Surgical History:   Procedure Laterality Date     CREATE FISTULA ARTERIOVENOUS UPPER EXTREMITY Right 5/8/2016    Procedure: CREATE FISTULA ARTERIOVENOUS UPPER EXTREMITY;  Surgeon:  Josias Valente MD;  Location: [facility] OR     CREATE FISTULA ARTERIOVENOUS UPPER EXTREMITY Right 12/14/2016    Procedure: CREATE FISTULA ARTERIOVENOUS UPPER EXTREMITY;  Surgeon: Contreras Bowman MD;  Location: [facility] OR     CYSTOSCOPY, RETROGRADES, COMBINED Bilateral 3/29/2018    Procedure: COMBINED CYSTOSCOPY, RETROGRADES;  Cystoscopy, Bilateral Retrogrades, Bilateral Ureteral Washings, Pyelogram ;  Surgeon: Moisés Pena MD;  Location: [facility] OR     HERNIA REPAIR         Social History:  Social History     Tobacco Use     Smoking status: Former Smoker     Packs/day: 0.20     Years: 50.00     Pack years: 10.00     Types: Cigarettes     Start date: 10/12/1957     Smokeless tobacco: Never Used   Substance Use Topics     Alcohol use: Yes     Alcohol/week: 8.4 oz     Types: 7 Cans of beer, 7 Shots of liquor per week     Comment: cocktail 2-3/month       Family History:  Family History   Problem Relation Age of Onset     Coronary Artery Disease Brother      Diabetes Mother        Medications:  Current Outpatient Medications   Medication Sig     aspirin 81 MG chewable tablet Take 1 tablet (81 mg) by mouth daily     blood glucose monitoring (ACCU-CHEK MINISTERIO PLUS) meter device kit accucheck Lesly glucometer     blood glucose monitoring (ACCU-CHEK FASTCLIX) lancets Test 2 times per day.     Blood Pressure Monitoring (RA BLOOD PRESSURE CUFF MONITOR) MISC by Misc.(Non-Drug; Combo Route) route once daily.     bumetanide (BUMEX) 2 MG tablet Take 2 tablets (4 mg) by mouth daily     calcium acetate (PHOSLO) 667 MG CAPS capsule Take 1,334 mg by mouth 4 times daily (with meals and nightly)     Carvedilol (COREG PO) Take 25 mg by mouth 2 times daily (with meals)     citalopram (CELEXA) 20 MG tablet Take 1 tablet (20 mg) by mouth daily     cloNIDine (CATAPRES-TTS2) 0.2 MG/24HR patch Place 1 patch onto the skin once a week     clopidogrel (PLAVIX) 75 MG tablet Take 75 mg by mouth daily     Gabapentin (NEURONTIN PO) Take 200 mg  "by mouth daily     insulin aspart (NOVOLOG FLEXPEN) 100 UNIT/ML injection Inject Subcutaneous 3 times daily (with meals) sliding scale up to 7 units before meals     insulin degludec (TRESIBA FLEXTOUCH) 100 UNIT/ML pen Inject 20 Units Subcutaneous daily (with breakfast)     Insulin Pen Needle (PEN NEEDLES 5/16\") 31G X 8 MM MISC 31g x 8 mm     isosorbide mononitrate (IMDUR) 30 MG 24 hr tablet Take 90 mg by mouth daily     latanoprost (XALATAN) 0.005 % ophthalmic solution 1 drop     LOSARTAN POTASSIUM PO Take 100 mg by mouth daily     ombitasvir-paritaprevir-ritonavir;dasabuvir (VIEKIRA MAYANK) 12.5-75-50 &250 MG tablets Take twice daily according to package instructions for 12 weeks total.     OXYCODONE HCL PO Take 10 mg by mouth 3 times daily as needed (takes scheduled)     ULTICARE MICRO 32G X 4 MM insulin pen needle      ULTICARE PEN NEEDLES 29G X 12MM      umeclidinium (INCRUSE ELLIPTA) 62.5 MCG/INH oral inhaler Inhale 1 puff into the lungs daily     VITAMIN D, CHOLECALCIFEROL, PO Take 1,000 Units by mouth daily     No current facility-administered medications for this visit.        Review of Systems:  As per HPI otherwise all other systems are negative    Physical Exam:   /78 (BP Location: Left arm, Patient Position: Chair, Cuff Size: Adult Large)   Pulse 98   Ht 1.88 m (6' 2\")   Wt 103.6 kg (228 lb 8 oz)   SpO2 99%   BMI 29.34 kg/m     GEN:patient is in no apparent distress.    HEENT: NC/AT.  Sclerae white, no incertus  Neck: No adenopathy.Carotids brisk bilaterally without bruits.  No jugular venous distension.   Heart:RRR. Normal S1, S2. No murmur, rub, click, or gallop.   Lungs: Lungs clear to ausculation bilaterally.  No ronchi, wheezes, rales.  Abdomen: Soft, nontender, nondistended.  Extremities: No clubbing, cyanosis, or edema.  The pulses are 2+ at the bilateral radial, DP, and PT. right forearm AV fistula  Neurologic: Awake and alert, normal speech, gait and affect  Skin: No petechiae, purpura " or rash noted    Labs:  LIPID RESULTS:  Lab Results   Component Value Date    CHOL 119 06/22/2017    HDL 74 06/22/2017    LDL 21 06/22/2017    TRIG 118 06/22/2017    CHOLHDLRATIO 4.9 04/14/2012    NHDL 44 06/22/2017       LIVER ENZYME RESULTS:  Lab Results   Component Value Date    AST 9 10/10/2017    ALT 14 10/10/2017       CBC RESULTS:  Lab Results   Component Value Date    WBC 5.3 10/10/2017    RBC 3.70 (L) 10/10/2017    HGB 11.1 (L) 10/10/2017    HCT 35.9 (L) 10/10/2017    MCV 97 10/10/2017    MCH 30.0 10/10/2017    MCHC 30.9 (L) 10/10/2017    RDW 13.3 10/10/2017     (L) 10/10/2017       BMP RESULTS:  Lab Results   Component Value Date     10/10/2017    POTASSIUM 5.0 03/29/2018    CHLORIDE 96 10/10/2017    CO2 31 10/10/2017    ANIONGAP 6 10/10/2017     (H) 03/29/2018    BUN 43 (H) 10/10/2017    CR 6.87 (H) 10/10/2017    GFRESTIMATED 8 (L) 10/10/2017    GFRESTBLACK 10 (L) 10/10/2017    ALLYSON 9.5 10/10/2017        A1C RESULTS:  Lab Results   Component Value Date    A1C 11.9 (H) 09/18/2017       INR RESULTS:  Lab Results   Component Value Date    INR 1.08 10/10/2017    INR 1.05 09/18/2017       Diagnostics:  EKG today demonstrates normal sinus rhythm with left axis deviation and inferior Q waves.  Nonspecific lateral ST abnormalities are noted.    Echocardiogram June 22 2017:  Interpretation Summary  Borderline (EF 50-55%) reduced left ventricular function is present. Basal to  mid lateral wall hypokinesis.  Right ventricular function, chamber size, wall motion, and thickness are  normal.  Pulmonary artery systolic pressure cannot be assessed.  There is mild dilatation of the proximal ascending aorta: 3.9 cm (indexed for  body surface area: 1.9 cm/m^2; z-score +2.25).  Small inferior vena cava size consistent with hypovolemia.     Compared to the study dated 12/6/2016, global left ventricular systolic  function has improved to low-normal range, the IVC is now small, and the  proximal aorta is  stable in dimensions.    Coronary angiography December 12, 2016:  Catheterization results  1-likely nonischemic cardiomyopathy. One-vessel coronary disease with  60-70% proximal right coronary lesion, however FFR is not  significantly abnormal.     2-coronary angiography      - left main coronary artery. Very large in caliber with moderate  ostial calcifications superiorly. Widely patent. Trifurcates into LAD,  ramus intermedius, circumflex.     -Left anterior descending coronary artery. Moderate proximal  calcification. Very large caliber type III LAD. Straddling the first  diagonal branch is a smooth 40% stenosis. Otherwise trivial  irregularity. Diagonal has minimal irregularity and septals are widely  patent     -Ramus intermedius. Large caliber vessel reaching out to the  lateral apex. Mid 40-50% smooth irregularity.     -Left circumflex coronary. Nondominant dominant. Moderate first and  second and small third marginals. Large caliber system with minimal  irregularity.     -Right coronary artery. Morphologically dominant but relatively  small territory with a moderate sized PDA and no significant GAVI.  Normal caliber vessel with moderate calcification. There is a focal  proximal smooth 60-70% stenosis in mild mid to distal irregularity.     3-physiologic lesion assessment of the RCA. FFR across the proximal  and mid RCA was 0.86 which is not significant.     4-left heart catheterization. Left ventricular pressure 121/28. No  gradient across aortic valve     Comment  This gentleman's global hypokinesis, although reported to be more  severe inferiorly, cannot be explained on the basis of epicardial  coronary disease. Continued risk factor intervention is warranted as  well as management of hypertension and nonischemic cardiomyopathy.  Suspect troponin elevation due to is pulmonary edema    Assessment and Plan:   1.  Preoperative Cardiovascular Evaluation: He is at elevated cardiovascular risk in the  perioperative period.  His LVEF had improved in 2017 following his hospitalization in December 2016 with pulmonary edema.  He has a known 60-70% proximal RCA lesion on coronary angiography from December 2016.  He does have inferior Q waves on his EKG.  He is planned to undergo a Lexiscan stress test today.  We will follow-up on these results and if they are normal he can proceed with continuing to be listed.    Revised Cardiovascular Risk Index: 5  (0=0.4% risk of MACE, 1=0.9% risk of MACE, 2=6.6% of MACE,  ?3=11% of MACE)    CV Risk Factor Profile Includes:  Age > 60: Yes   Diabetes Mellitus: Yes  Hypertension: Yes  Dyslipidemia: Yes  Peripheral Vascular Disease: No  History of CAD: Yes  LVH: No  Family History of Heart Disease: No  Dialysis > 1 Year: Yes  Prolonged Duration of CKD: No  History of Smoking: Yes  History of Radiation Therapy (either whole body or chest irradiation: No    Per the [place] [employer] Guidelines (2005), patients with < 3 of the above clinical risk factors are considered to be at low risk for cardiac disease, and in general, do not require screening with noninvasive testing unless significant DM or PVD history. Patients with > 3 of the above clinical risk factors are considered intermediate risk and a non-invasive study such as a dobutamine stress echo or nuclear SPECT is warranted. Patients with angina symptoms, cardiomyopathy with reduced EF, long-standing type I diabetes, or a positive stress test are considered high risk and may warrant further evaluation with coronary angiography.       2.  Hypertension: Blood pressure is well controlled at today's encounter continue current medications.    3.  Coronary artery disease with known RCA lesion: Continue medical therapy with aspirin, beta-blocker.  Would also recommend he be started on a statin.    YARA Hudson CNP  01/03/19  9:27 AM    Addendum on 01/04/19 @ 7:46 AM    January 3, 2019: Lexiscan stress  test  Findings:  1. Overall quality of the study: Diagnostic.   2. Left ventricular cavity is nondilated on the rest and stress  studies.  3. SPECT images demonstrate  normal perfusion of the myocardium on the  stress images without ischemia or infarct. Normal left ventricular  myocardial contractility.  4. Left ventricular ejection fraction is 73%. Left ventricular  end-diastolic volume is 146 mL. End-systolic volume is 40 mL.  5. Baseline EKG  findings reported separately in EPIC.  6. Normal heart size without pericardial effusion. Moderate coronary  artery calcifications, most severe at the RCA territory. Patent  airways. Bilateral minimal pleural effusion. No consolidation. No  suspicious lung nodule. Old rib fractures in the right lower ribs.                                                                      Impression: Normal myocardial SPECT study without ischemia or infarct.  Normal ejection fraction and left ventricular myocardial  Contractility.    May continue to be listed for kidney transplant.  Would recommend he be started on a statin medication.    YARA Hudson CNP  01/04/19  7:46 AM        CC  Patient Care Team:  Yolanda Hernandez as PCP - General (Internal Medicine)  Mimi Tovar APRN CNP as PCP - Assigned PCP  Suzanne Chao MD as MD (Pulmonary)  Moisés Pena MD as MD (Urology)  BUCKY Khan MD as MD (Cardiology)  MICHELLE Calle MD as MD (Nephrology)  Kerline Ramirez LPN as Alley Henao MD as MD (INTERNAL MEDICINE - ENDOCRINOLOGY, DIABETES & METABOLISM)  KRISTY CAO

## 2019-01-03 NOTE — PROGRESS NOTES
Chief Complaint:   Chief Complaint   Patient presents with     Follow Up     f/u pre op       HPI: Returns to clinic today for annual follow-up to be listed for kidney transplant.  In the interim since he saw us in October 2017 he tells me he has had not had any hospitalizations.  He does report occasional issues with dyspnea secondary to volume retention requiring additional dialysis however he has not been hospitalized during these episodes.  He denies any chest pain or discomfort, exertional angina, exertional dyspnea, syncope, lightheadedness, dizziness, orthopnea, PND, or peripheral edema.  He does report being quite active and is able to walk up a flight of steps without any exertional symptoms.    Past Medical History:  Past Medical History:   Diagnosis Date     Acute pulmonary edema (H)      Anemia      Bipolar 1 disorder (H)      CAD (coronary artery disease)      Cognitive impairment      Colitis      COPD (chronic obstructive pulmonary disease) (H)      Depression      Diabetes (H)      ESRD (end stage renal disease) (H)      ESRD (end stage renal disease) on dialysis (H)      Glaucoma      Hepatitis C      Hyperlipidemia      Hyperlipidemia      Hypertension      IV drug abuse (H) 1970's     Non compliance w medication regimen      Non-ischemic cardiomyopathy (H)      Non-ST elevation MI (NSTEMI) (H)      Peripheral neuropathy      Peripheral neuropathy      Pneumonia        Past Surgical History:  Past Surgical History:   Procedure Laterality Date     CREATE FISTULA ARTERIOVENOUS UPPER EXTREMITY Right 5/8/2016    Procedure: CREATE FISTULA ARTERIOVENOUS UPPER EXTREMITY;  Surgeon: Josias Valente MD;  Location:  OR     CREATE FISTULA ARTERIOVENOUS UPPER EXTREMITY Right 12/14/2016    Procedure: CREATE FISTULA ARTERIOVENOUS UPPER EXTREMITY;  Surgeon: Contreras Bowman MD;  Location:  OR     CYSTOSCOPY, RETROGRADES, COMBINED Bilateral 3/29/2018    Procedure: COMBINED CYSTOSCOPY,  "RETROGRADES;  Cystoscopy, Bilateral Retrogrades, Bilateral Ureteral Washings, Pyelogram ;  Surgeon: Moisés Pena MD;  Location: [facility]     HERNIA REPAIR         Social History:  Social History     Tobacco Use     Smoking status: Former Smoker     Packs/day: 0.20     Years: 50.00     Pack years: 10.00     Types: Cigarettes     Start date: 10/12/1957     Smokeless tobacco: Never Used   Substance Use Topics     Alcohol use: Yes     Alcohol/week: 8.4 oz     Types: 7 Cans of beer, 7 Shots of liquor per week     Comment: cocktail 2-3/month       Family History:  Family History   Problem Relation Age of Onset     Coronary Artery Disease Brother      Diabetes Mother        Medications:  Current Outpatient Medications   Medication Sig     aspirin 81 MG chewable tablet Take 1 tablet (81 mg) by mouth daily     blood glucose monitoring (ACCU-CHEK MINISTERIO PLUS) meter device kit accucheck Lesly glucometer     blood glucose monitoring (ACCU-CHEK FASTCLIX) lancets Test 2 times per day.     Blood Pressure Monitoring (RA BLOOD PRESSURE CUFF MONITOR) MISC by Misc.(Non-Drug; Combo Route) route once daily.     bumetanide (BUMEX) 2 MG tablet Take 2 tablets (4 mg) by mouth daily     calcium acetate (PHOSLO) 667 MG CAPS capsule Take 1,334 mg by mouth 4 times daily (with meals and nightly)     Carvedilol (COREG PO) Take 25 mg by mouth 2 times daily (with meals)     citalopram (CELEXA) 20 MG tablet Take 1 tablet (20 mg) by mouth daily     cloNIDine (CATAPRES-TTS2) 0.2 MG/24HR patch Place 1 patch onto the skin once a week     clopidogrel (PLAVIX) 75 MG tablet Take 75 mg by mouth daily     Gabapentin (NEURONTIN PO) Take 200 mg by mouth daily     insulin aspart (NOVOLOG FLEXPEN) 100 UNIT/ML injection Inject Subcutaneous 3 times daily (with meals) sliding scale up to 7 units before meals     insulin degludec (TRESIBA FLEXTOUCH) 100 UNIT/ML pen Inject 20 Units Subcutaneous daily (with breakfast)     Insulin Pen Needle (PEN NEEDLES 5/16\") 31G " "X 8 MM MISC 31g x 8 mm     isosorbide mononitrate (IMDUR) 30 MG 24 hr tablet Take 90 mg by mouth daily     latanoprost (XALATAN) 0.005 % ophthalmic solution 1 drop     LOSARTAN POTASSIUM PO Take 100 mg by mouth daily     ombitasvir-paritaprevir-ritonavir;dasabuvir (VIEKIRA MAYANK) 12.5-75-50 &250 MG tablets Take twice daily according to package instructions for 12 weeks total.     OXYCODONE HCL PO Take 10 mg by mouth 3 times daily as needed (takes scheduled)     ULTICARE MICRO 32G X 4 MM insulin pen needle      ULTICARE PEN NEEDLES 29G X 12MM      umeclidinium (INCRUSE ELLIPTA) 62.5 MCG/INH oral inhaler Inhale 1 puff into the lungs daily     VITAMIN D, CHOLECALCIFEROL, PO Take 1,000 Units by mouth daily     No current facility-administered medications for this visit.        Review of Systems:  As per HPI otherwise all other systems are negative    Physical Exam:   /78 (BP Location: Left arm, Patient Position: Chair, Cuff Size: Adult Large)   Pulse 98   Ht 1.88 m (6' 2\")   Wt 103.6 kg (228 lb 8 oz)   SpO2 99%   BMI 29.34 kg/m    GEN:patient is in no apparent distress.    HEENT: NC/AT.  Sclerae white, no incertus  Neck: No adenopathy.Carotids brisk bilaterally without bruits.  No jugular venous distension.   Heart:RRR. Normal S1, S2. No murmur, rub, click, or gallop.   Lungs: Lungs clear to ausculation bilaterally.  No ronchi, wheezes, rales.  Abdomen: Soft, nontender, nondistended.  Extremities: No clubbing, cyanosis, or edema.  The pulses are 2+ at the bilateral radial, DP, and PT. right forearm AV fistula  Neurologic: Awake and alert, normal speech, gait and affect  Skin: No petechiae, purpura or rash noted    Labs:  LIPID RESULTS:  Lab Results   Component Value Date    CHOL 119 06/22/2017    HDL 74 06/22/2017    LDL 21 06/22/2017    TRIG 118 06/22/2017    CHOLHDLRATIO 4.9 04/14/2012    NHDL 44 06/22/2017       LIVER ENZYME RESULTS:  Lab Results   Component Value Date    AST 9 10/10/2017    ALT 14 " 10/10/2017       CBC RESULTS:  Lab Results   Component Value Date    WBC 5.3 10/10/2017    RBC 3.70 (L) 10/10/2017    HGB 11.1 (L) 10/10/2017    HCT 35.9 (L) 10/10/2017    MCV 97 10/10/2017    MCH 30.0 10/10/2017    MCHC 30.9 (L) 10/10/2017    RDW 13.3 10/10/2017     (L) 10/10/2017       BMP RESULTS:  Lab Results   Component Value Date     10/10/2017    POTASSIUM 5.0 03/29/2018    CHLORIDE 96 10/10/2017    CO2 31 10/10/2017    ANIONGAP 6 10/10/2017     (H) 03/29/2018    BUN 43 (H) 10/10/2017    CR 6.87 (H) 10/10/2017    GFRESTIMATED 8 (L) 10/10/2017    GFRESTBLACK 10 (L) 10/10/2017    ALLYSON 9.5 10/10/2017        A1C RESULTS:  Lab Results   Component Value Date    A1C 11.9 (H) 09/18/2017       INR RESULTS:  Lab Results   Component Value Date    INR 1.08 10/10/2017    INR 1.05 09/18/2017       Diagnostics:  EKG today demonstrates normal sinus rhythm with left axis deviation and inferior Q waves.  Nonspecific lateral ST abnormalities are noted.    Echocardiogram June 22 2017:  Interpretation Summary  Borderline (EF 50-55%) reduced left ventricular function is present. Basal to  mid lateral wall hypokinesis.  Right ventricular function, chamber size, wall motion, and thickness are  normal.  Pulmonary artery systolic pressure cannot be assessed.  There is mild dilatation of the proximal ascending aorta: 3.9 cm (indexed for  body surface area: 1.9 cm/m^2; z-score +2.25).  Small inferior vena cava size consistent with hypovolemia.     Compared to the study dated 12/6/2016, global left ventricular systolic  function has improved to low-normal range, the IVC is now small, and the  proximal aorta is stable in dimensions.    Coronary angiography December 12, 2016:  Catheterization results  1-likely nonischemic cardiomyopathy. One-vessel coronary disease with  60-70% proximal right coronary lesion, however FFR is not  significantly abnormal.     2-coronary angiography      - left main coronary artery. Very  large in caliber with moderate  ostial calcifications superiorly. Widely patent. Trifurcates into LAD,  ramus intermedius, circumflex.     -Left anterior descending coronary artery. Moderate proximal  calcification. Very large caliber type III LAD. Straddling the first  diagonal branch is a smooth 40% stenosis. Otherwise trivial  irregularity. Diagonal has minimal irregularity and septals are widely  patent     -Ramus intermedius. Large caliber vessel reaching out to the  lateral apex. Mid 40-50% smooth irregularity.     -Left circumflex coronary. Nondominant dominant. Moderate first and  second and small third marginals. Large caliber system with minimal  irregularity.     -Right coronary artery. Morphologically dominant but relatively  small territory with a moderate sized PDA and no significant GAVI.  Normal caliber vessel with moderate calcification. There is a focal  proximal smooth 60-70% stenosis in mild mid to distal irregularity.     3-physiologic lesion assessment of the RCA. FFR across the proximal  and mid RCA was 0.86 which is not significant.     4-left heart catheterization. Left ventricular pressure 121/28. No  gradient across aortic valve     Comment  This gentleman's global hypokinesis, although reported to be more  severe inferiorly, cannot be explained on the basis of epicardial  coronary disease. Continued risk factor intervention is warranted as  well as management of hypertension and nonischemic cardiomyopathy.  Suspect troponin elevation due to is pulmonary edema    Assessment and Plan:   1.  Preoperative Cardiovascular Evaluation: He is at elevated cardiovascular risk in the perioperative period.  His LVEF had improved in 2017 following his hospitalization in December 2016 with pulmonary edema.  He has a known 60-70% proximal RCA lesion on coronary angiography from December 2016.  He does have inferior Q waves on his EKG.  He is planned to undergo a Lexiscan stress test today.  We will  follow-up on these results and if they are normal he can proceed with continuing to be listed.    Revised Cardiovascular Risk Index: 5  (0=0.4% risk of MACE, 1=0.9% risk of MACE, 2=6.6% of MACE,  ?3=11% of MACE)    CV Risk Factor Profile Includes:  Age > 60: Yes   Diabetes Mellitus: Yes  Hypertension: Yes  Dyslipidemia: Yes  Peripheral Vascular Disease: No  History of CAD: Yes  LVH: No  Family History of Heart Disease: No  Dialysis > 1 Year: Yes  Prolonged Duration of CKD: No  History of Smoking: Yes  History of Radiation Therapy (either whole body or chest irradiation: No    Per the [place] Society for Transplantation Guidelines (2005), patients with < 3 of the above clinical risk factors are considered to be at low risk for cardiac disease, and in general, do not require screening with noninvasive testing unless significant DM or PVD history. Patients with > 3 of the above clinical risk factors are considered intermediate risk and a non-invasive study such as a dobutamine stress echo or nuclear SPECT is warranted. Patients with angina symptoms, cardiomyopathy with reduced EF, long-standing type I diabetes, or a positive stress test are considered high risk and may warrant further evaluation with coronary angiography.       2.  Hypertension: Blood pressure is well controlled at today's encounter continue current medications.    3.  Coronary artery disease with known RCA lesion: Continue medical therapy with aspirin, beta-blocker.  Would also recommend he be started on a statin.    YARA Hudson CNP  01/03/19  9:27 AM    Addendum on 01/04/19 @ 7:46 AM    January 3, 2019: Lexiscan stress test  Findings:  1. Overall quality of the study: Diagnostic.   2. Left ventricular cavity is nondilated on the rest and stress  studies.  3. SPECT images demonstrate  normal perfusion of the myocardium on the  stress images without ischemia or infarct. Normal left ventricular  myocardial contractility.  4. Left  ventricular ejection fraction is 73%. Left ventricular  end-diastolic volume is 146 mL. End-systolic volume is 40 mL.  5. Baseline EKG  findings reported separately in EPIC.  6. Normal heart size without pericardial effusion. Moderate coronary  artery calcifications, most severe at the RCA territory. Patent  airways. Bilateral minimal pleural effusion. No consolidation. No  suspicious lung nodule. Old rib fractures in the right lower ribs.                                                                      Impression: Normal myocardial SPECT study without ischemia or infarct.  Normal ejection fraction and left ventricular myocardial  Contractility.    May continue to be listed for kidney transplant.  Would recommend he be started on a statin medication.    YARA Hudson CNP  01/04/19  7:46 AM        CC  Patient Care Team:  Yolanda Hernandez as PCP - General (Internal Medicine)  Mimi Tovar APRN CNP as PCP - Assigned PCP  Suzanne Chao MD as MD (Pulmonary)  Moisés Pena MD as MD (Urology)  BUCKY Khan MD as MD (Cardiology)  MICHELLE Calle MD as MD (Nephrology)  Kerline Ramirez LPN as Alley Henao MD as MD (INTERNAL MEDICINE - ENDOCRINOLOGY, DIABETES & METABOLISM)  KRISTY CAO

## 2019-01-03 NOTE — NURSING NOTE
Chief Complaint   Patient presents with     Follow Up     f/u pre op     Vitals were taken and medications were reconciled. EKG was performed.    Ayana Neville CMA    9:37 AM

## 2019-01-04 LAB
ACETAMINOPHEN QUAL: NEGATIVE
AMANTADINE: NEGATIVE
AMITRIPTYLINE QUAL: NEGATIVE
AMOXAPINE: NEGATIVE
AMPHETAMINES QUAL: NEGATIVE
ATROPINE: NEGATIVE
BENZODIAZ UR QL: NEGATIVE
CAFFEINE QUAL: POSITIVE
CANNABINOIDS UR QL SCN: NEGATIVE
CARBAMAZEPINE QUAL: NEGATIVE
CHLORPHENIRAMINE: NEGATIVE
CHLORPROMAZINE: NEGATIVE
CITALOPRAM QUAL: POSITIVE
CLOMIPRAMINE QUAL: NEGATIVE
COCAINE QUAL: NEGATIVE
COCAINE UR QL: NEGATIVE
CODEINE QUAL: NEGATIVE
DESIPRAMINE QUAL: NEGATIVE
DEXTROMETHORPHAN: NEGATIVE
DIPHENHYDRAMINE: NEGATIVE
DOXEPIN/METABOLITE: NEGATIVE
DOXYLAMINE: NEGATIVE
EPHEDRINE OR PSEUDO: NEGATIVE
FENTANYL QUAL: NEGATIVE
FLUOXETINE AND METAB: NEGATIVE
HYDROCODONE QUAL: NEGATIVE
HYDROMORPHONE QUAL: NEGATIVE
IBUPROFEN QUAL: NEGATIVE
IMIPRAMINE QUAL: NEGATIVE
INTERPRETATION ECG - MUSE: NORMAL
LAMOTRIGINE QUAL: NEGATIVE
LOXAPINE: NEGATIVE
MAPROTYLINE: NEGATIVE
MDMA QUAL: NEGATIVE
MEPERIDINE QUAL: NEGATIVE
METHAMPHETAMINE: NEGATIVE
METHODONE QUAL: NEGATIVE
MORPHINE QUAL: NEGATIVE
NICOTINE: NEGATIVE
NORTRIPTYLINE QUAL: NEGATIVE
OLANZAPINE QUAL: NEGATIVE
OPIATES UR QL SCN: NEGATIVE
OXYCODONE QUAL: NEGATIVE
PENTAZOCINE: NEGATIVE
PHENCYCLIDINE QUAL: NEGATIVE
PHENMETRAZINE: NEGATIVE
PHENTERMINE: NEGATIVE
PHENYLBUTAZONE: NEGATIVE
PHENYLPROPANOLAMINE: NEGATIVE
PROPOXPHENE QUAL: NEGATIVE
PROPRANOLOL QUAL: NEGATIVE
PYRILAMINE: NEGATIVE
SALICYLATE QUAL: NEGATIVE
THEOBROMINE: POSITIVE
TOPIRAMATE QUAL: NEGATIVE
TRIMIPRAMINE QUAL: NEGATIVE
VENLAFAXINE QUAL: NEGATIVE

## 2019-01-10 NOTE — PROGRESS NOTES
"Patient Name: Keagan Wayne  : 1945  Age: 73 year old  MRN: [id number]  Date of Initial Social Work Evaluation: 17    Patient on kidney transplant wait list active.  Met with Keagan today to update psychosocial assessment.      Presenting Information   Living Situation: in [place], [place] with his girlfriend María  If not local, plans for short term stay:  N/A  Previous Functional Status: Keagan indicated he has three hours of PCA services a day.  María sets up and reminds him to take his medications.  Cultural/Language/Spiritual Considerations: N/A    Support System  Primary Support Person María  Other support:  Friends  Plan for support in immediate post-transplant period: María    Health Care Directive  Decision Maker: Self  Alternate Decision Maker: Adult children  Health Care Directive: Provided Copy and Provided education    Mental Health/Coping:   History of Mental Health: Keagan has been diagnosed with Bipolar Disorder, is on medication and has not had any psych hospitalization since the .  History of Chemical Health: Keagan indicated he is going to stop using marijuana in the near future.  He no longer drinks 2-3 shots of vodka a year now he has a shot of bourbon \"couple of times a month\".  He also quit smoking within the last year.  Current status: Appropriate  Coping: Keagan indicated when under stress he will lay down.  Services Needed/Recommended: None at this time.    Financial   Income: Social Security correction.  Impact of transplant on income: None  Insurance and medication coverage: Medicare and [employer] through [place]  Financial concerns: None at this time.  Resources needed: None at this time.    Assessment and recommendations and plan: Keagan continues to be an appropriate transplant candidate from a psychosocial point of view.  María was not with him at this assessment but writer received Philsarah's permission to contact María.  María indicated she continues to be a part of Keagan's support " system.  Keagan did appear to be slightly manic during the assessment but María indicated no concerns regarding his mental health.   Reviewed transplant education (Medicare, rehabilitation, donor issues, community/financial resources, and psych/family adjustment) as well as psychosocial risks of transplant.  Patient seemed to process information well. Appeared well informed, motivated, and able to follow post transplant requirements. Behavior was appropriate during interview. Has adequate income and insurance coverage. Adequate social support. No major contraindications noted for transplant. At this time, patient appears to understand the risks and benefits of transplant.

## 2019-01-11 PROBLEM — J18.9 PNEUMONIA: Status: RESOLVED | Noted: 2017-01-15 | Resolved: 2019-01-11

## 2019-01-15 ENCOUNTER — TELEPHONE (OUTPATIENT)
Dept: TRANSPLANT | Facility: CLINIC | Age: 74
End: 2019-01-15

## 2019-01-15 PROBLEM — R56.9 SEIZURES (H): Status: RESOLVED | Noted: 2017-09-18 | Resolved: 2019-01-15

## 2019-01-15 NOTE — TELEPHONE ENCOUNTER
Spoke to María to review the following:  The need to make Keagan inactive due to the stenting on his SFA June 2017 and the DAPT (now discontinued per María).  Because of the calcification in the lower femoral artery, we need Keagan to have a Abdominal/Pelvic CT w/out contrast for our transplant surgeons to review for a target.   He needs to see our Dr. Worthy for the cystic lesion noted on the CT Urogram summer 2017 (also 2014) for evaluation of possible IPMN tumor and treatment.   Finally, in regard to his seizure during dialysis September 2017, he was to follow up with [facility].   María States he did 3 months post initial seizure and this past fall. The neurologist is Dr. Pemberton.   We will request those records for review.     María understands the items we need to schedule. She will wait for the call from our  to schedule.

## 2019-01-15 NOTE — LETTER
January 24, 2019        Keagan Wayne  [address]   [place] [address]          Dear Keagan Wayne,    [facility] Transplant scheduling office has been trying to reach you to schedule your appointment(s).    Our transplant team has not been able to contact you at: [phone] (home)     Please call [phone] so we can arrange this important visit.  We look forward to hearing from you.      Thank you,    [facility]  Dona Bernard  [address] [place] [id number]

## 2019-01-15 NOTE — LETTER
January 15, 2019    Keagan Wayne  [address]  [place] [address]      Dear Mr. Wayne,    This letter is sent to notify you that your listing status was changed from Active to Inactive on the kidney transplant waitlist at the [facility].      Your status was changed because you had the femoral stent placed in June of 2018 requiring dual antiplatelet therapy x 6 months (increasing your bleeding risk), the need for follow up with neurology at [facility] that was recommended after your seizure in September 2017, and the 6 mm cystic lesion in uncinate process of pancreas, noted on the 2017 CT Urogram that needs MD follow up and possible images.     During this waiting period, we may still request periodic laboratory tests with your primary physician.  It will be your responsibility to remind your physician to forward your results to the Transplant Office.    We still need to be kept informed of any changes such as:    o changes in your insurance coverage  o change in your phone number, address or emergency contact  o significant changes in your health  o significant infections (such as pneumonia or abscesses)  o blood transfusions  o any condition which requires hospitalization or surgery    Sincerely,        Kidney Transplant Program    Enclosures: UNOS Letter, Waitlist Information Update and While You Are Waiting    CC: Mason Calle MD, Alley Clark MD, and [facility]

## 2019-01-16 ENCOUNTER — TEAM CONFERENCE (OUTPATIENT)
Dept: TRANSPLANT | Facility: CLINIC | Age: 74
End: 2019-01-16

## 2019-01-16 NOTE — TELEPHONE ENCOUNTER
Team Conference:      Attendees: Lavinia Boyd Kandaswamy, Schumacher, Schenk, Schuller Coordinator, , Dietician, Pharmacy    Discussion and Outcome: Patient status changed to Inactive approved. Patient temporarily too ill.

## 2019-01-18 NOTE — TELEPHONE ENCOUNTER
I attempted to contact pt to schedule GI, CT and neurology visits and there was no answer the voicemail was full. I will try again tomorrow.

## 2019-01-21 NOTE — TELEPHONE ENCOUNTER
I attempted to contact pt again to schedule appointments and this time I reached his voicemail. I LVM asking him to return my call

## 2019-01-24 NOTE — TELEPHONE ENCOUNTER
"I have tried 3 times to contact this patient to schedule GI, CT and neurology appointments and have reached his voicemail each time and left messages with no response. I have sent a \"Trying To Reach You\" letter.  "

## 2019-02-06 NOTE — TELEPHONE ENCOUNTER
I spoke to stewart Daniel.'s PCA and have him scheduled for GI and CT on 3/11. She is aware that pt will have to change his dialysis around for this appointment and I have sent a reminder

## 2019-02-07 ENCOUNTER — MEDICAL CORRESPONDENCE (OUTPATIENT)
Dept: TRANSPLANT | Facility: CLINIC | Age: 74
End: 2019-02-07

## 2019-02-15 ENCOUNTER — MEDICAL CORRESPONDENCE (OUTPATIENT)
Dept: TRANSPLANT | Facility: CLINIC | Age: 74
End: 2019-02-15

## 2019-03-07 ENCOUNTER — CARE COORDINATION (OUTPATIENT)
Dept: SURGERY | Facility: CLINIC | Age: 74
End: 2019-03-07

## 2019-03-07 DIAGNOSIS — D49.0 IPMN (INTRADUCTAL PAPILLARY MUCINOUS NEOPLASM): Primary | ICD-10-CM

## 2019-03-07 NOTE — PROGRESS NOTES
Care Coordination New Patient Referral  Surgical Oncology and GI    Referred to MD: Dr. Wiley    Referring MD: unknown    Diagnosis: pancreas lesion    Imaging:   CT yes multiple non contrast due to renal function  MRI  no    Procedures:  colonoscopy    Referral has been reviewed today by Dr. Wiley.  Message has been sent to transplant coordinator at the request of Dr. Wiley to see if patient can have MRI prior to clinic visit.    Referral was scheduled for clinic visit without review prior to today.    Chyna Jones  BSN, HNBC  RN Care Coordinator  Surgical Oncology  Ph: [phone]  Email: [email or web address]

## 2019-03-17 ENCOUNTER — ANCILLARY PROCEDURE (OUTPATIENT)
Dept: MRI IMAGING | Facility: CLINIC | Age: 74
End: 2019-03-17
Attending: NURSE PRACTITIONER
Payer: COMMERCIAL

## 2019-03-17 DIAGNOSIS — D49.0 IPMN (INTRADUCTAL PAPILLARY MUCINOUS NEOPLASM): ICD-10-CM

## 2019-03-17 LAB — RADIOLOGIST FLAGS: NORMAL

## 2019-03-20 ENCOUNTER — TELEPHONE (OUTPATIENT)
Dept: NEPHROLOGY | Facility: CLINIC | Age: 74
End: 2019-03-20

## 2019-03-20 NOTE — TELEPHONE ENCOUNTER
Received critical call from Radiology:  MRI ABdomen from 3/17. Reported to [facility] transplant team.    Nikky Poon LPN  Nephrology  [phone]    Component   Radiologist flags   Pancreatic cystic focus. Left retroperitoneal

## 2019-04-01 ENCOUNTER — TELEPHONE (OUTPATIENT)
Dept: TRANSPLANT | Facility: CLINIC | Age: 74
End: 2019-04-01
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
## 2019-04-04 ENCOUNTER — TELEPHONE (OUTPATIENT)
Dept: TRANSPLANT | Facility: CLINIC | Age: 74
End: 2019-04-04

## 2019-04-04 ENCOUNTER — DOCUMENTATION ONLY (OUTPATIENT)
Dept: TRANSPLANT | Facility: CLINIC | Age: 74
End: 2019-04-04

## 2019-04-04 ENCOUNTER — TEAM CONFERENCE (OUTPATIENT)
Dept: TRANSPLANT | Facility: CLINIC | Age: 74
End: 2019-04-04

## 2019-04-04 NOTE — TELEPHONE ENCOUNTER
TEAM CONFERENCE: 04/03/2019    ATTENDEES: Oliver Betancur, Keys, Veda, Itzel, Coordinators, Social Work, Pharmacy, Finance, and Dietary    DISCUSSION: Presented to the committee that I was notified from his  that Keagan was recently in the hospital x 3 at [facility]. Asked me to review in Care Everywhere.  Reviewed that he tested positive for Amphetamines and Opiates. (neither are prescribed).  Reviewed missed dialysis and subsequent weakness, fatigue, confusion, electrolyte imbalance, ect.   He was diagnosed encephalopathic either by poly substance abuse and/or missing dialysis.   OUTCOME: Discuss with Mr. Wayne's nephrologist.   If he is agreeable, remove from our list.     Addendum: Spoke to Dr. Calle's RN Jade on 4/4/2019. She stated she reviewed with Dr. Calle who stated he is fine with the removal from our the transplant center.

## 2019-04-04 NOTE — TELEPHONE ENCOUNTER
"Called and spoke to Keagan.  Stated that our committee reviewed his recent medical history including 3 hospitalizations from February 2019-March 2019. He was hospitalized for encephalopathy, (weakness, confusion, ect). He was found to have a positive tox screen of amphetamines and opiates, neither are prescribed.   Committee decided that you are not a candidate for a kidney transplant at this time.  Stated that I reviewed with his nephrologist, Dr. Calle and he understands that you can be referred another time and you will not lose any waiting time per your dialysis start date.   Keagan stated, \"Well, I did this to myself. I will get on the straight and narrow\".      "

## 2019-04-04 NOTE — TELEPHONE ENCOUNTER
"Left message for Dr. Calle's RN to relay that due to the updated medical/clinical picture of our mutual patient, Keagan Wayne, our committee for transplant has made the decision to remove him from the waitlist.  However, our team would appreciate any feedback and/or conversation with Dr. Calle re: Mr. Wayne.   Also, Mr. Wayne would not lose any \"wait\" time should Dr. Calle feel he would be a transplant candidate at another time.   My name and contact number left on voice mail.  "

## 2019-04-22 ENCOUNTER — OFFICE VISIT (OUTPATIENT)
Dept: GASTROENTEROLOGY | Facility: CLINIC | Age: 74
End: 2019-04-22
Attending: NURSE PRACTITIONER
Payer: COMMERCIAL

## 2019-04-22 VITALS
WEIGHT: 219.7 LBS | DIASTOLIC BLOOD PRESSURE: 84 MMHG | TEMPERATURE: 98.3 F | OXYGEN SATURATION: 98 % | HEART RATE: 110 BPM | BODY MASS INDEX: 28.21 KG/M2 | SYSTOLIC BLOOD PRESSURE: 133 MMHG

## 2019-04-22 DIAGNOSIS — K86.2 PANCREAS CYST: Primary | ICD-10-CM

## 2019-04-22 PROCEDURE — G0463 HOSPITAL OUTPT CLINIC VISIT: HCPCS | Mod: ZF

## 2019-04-22 RX ORDER — CARBOXYMETHYLCELLULOSE SODIUM 5 MG/ML
1 SOLUTION/ DROPS OPHTHALMIC
Status: ON HOLD | COMMUNITY
End: 2020-06-22

## 2019-04-22 RX ORDER — CALCIUM CARBONATE 160(400)MG
1 TABLET,CHEWABLE ORAL
COMMUNITY
Start: 2019-02-14

## 2019-04-22 RX ORDER — BROMFENAC SODIUM 0.7 MG/ML
1 SOLUTION/ DROPS OPHTHALMIC
Status: ON HOLD | COMMUNITY
End: 2020-06-22

## 2019-04-22 RX ORDER — BUPROPION HYDROCHLORIDE 150 MG/1
150 TABLET, EXTENDED RELEASE ORAL
Status: ON HOLD | COMMUNITY
Start: 2019-03-18 | End: 2020-06-22

## 2019-04-22 RX ORDER — ATORVASTATIN CALCIUM 10 MG/1
10 TABLET, FILM COATED ORAL DAILY
Status: ON HOLD | COMMUNITY
End: 2020-06-22

## 2019-04-22 ASSESSMENT — PAIN SCALES - GENERAL: PAINLEVEL: SEVERE PAIN (7)

## 2019-04-22 NOTE — LETTER
4/22/2019       RE: Keagan Wayne  [address]  [address]  [facility] [address]     Dear Colleague,    Thank you for referring your patient, Keagan Wayne, to the [facility] at [facility]. Please see a copy of my visit note below.    PRESENTING COMPLAINT:  The patient is a 73-year-old -American male who I was asked to see in consultation at the request of Beni Song for evaluation of a pancreatic cyst.      HISTORY OF PRESENT ILLNESS:  The patient has a history of end-stage renal disease and is undergoing hemodialysis 3 times a week.  He was referred to our transplant Nephrology Clinic to consider renal transplantation.  In the initial evaluation, it was noted that the patient had a previous CT urogram on 10/19/2017 which identified a 6x3 mm cyst in the uncinate process of the pancreas.  This was noted to be stable compared to 2014.  It was at that time recommended that he receive formal GI consultation to determine if this was a clinical significant cyst.      The patient has significant recent health issues and has been admitted on multiple occasions at least 4 times at [facility] for confusion, encephalopathy and falls.  At that time, he was noted to be reportedly encephalopathic and this was thought at one point to be related to having missed hemodialysis.  Subsequently, the patient had toxicology testing which was positive for opiates and amphetamines, which were not prescribed.  This information has been conveyed to our renal Transplant Clinic and he has been D listed and will not be considered per report for renal transplant.  In the interim, however, the patient had an MRI per my request on 03/17/2019 and I did recommend that he follow up with me to discuss clinical significance of the pancreatic cyst.      The patient denies abdominal pain.  He states that he has never been told he had acute pancreatitis.  He was  interviewed today in the presence of a brother.      PAST MEDICAL HISTORY:  Significant for end-stage renal disease, diabetes mellitus type 2, hepatitis C, hypertension, hyperlipidemia, prior seizures.  Additional past medical history is documented in Epic and reviewed.  He has a history of nonischemic cardiomyopathy and COPD.      PAST SURGICAL HISTORY:  Significant for prior hernia repair prior cystoscopies and arterial stents I believe of the iliac arteries.      ALLERGIES:  Reviewed.       MEDICATIONS:  Reviewed.      PHYSICAL EXAMINATION:  The patient is cooperative.  He seems somewhat slow with his responses and does not seem to know significant details of his past medical history.  His brother was able to fill in some details, but does not often accompany him apparently to medical visits and does not know details as well.  Vital signs are documented in Epic.  His sclerae are anicteric.  The patient was otherwise not examined today.      RADIOLOGIC DATA:  MRI from 03/17/2019 was personally reviewed.  This reports slight atrophy of the pancreas, but preserved T1 signal intensity.  There is no dilation of the main pancreatic duct.  There is a 4 mm simple appearing cyst in the uncinate process of the pancreas without worrisome features on this nonenhanced study.  The patient was also noted to have multiple borderline enlarged lymph nodes including in the saul hepatis.  There was a 2 cm lesion identified to the left of the aorta anterior to the psoas muscle.  Inferior to the left renal artery.  In the text of the report, it states at one point that this is stable and then later says that is new from 10/19/2017.  In my review of the previous CT from 10/2017 this appears to be similar in size and location.      ASSESSMENT AND PLAN:     1.  Pancreatic cyst.  We discussed the possibility that this could represent intraductal papillary mucinous neoplasm and discussed that this is somewhat analogous to having  adenomatous colon polyps.  I discussed that there are currently no worrisome features to suggest that there is a need for intervention.  I would not recommend endoscopic ultrasound at this time.  I also specifically discussed that I do not believe that this cyst should in any way influence decisions regarding his candidacy for transplant.  We discussed the published guidelines for management and the current recommendation would be to consider a followup MRI in 3 years.  Given his overall health status, I think it would be reasonable to have this assessed through his primary care providers at [facility].  I will question pursuing further surveillance in a patient who has had recurrent admissions for encephalopathy and falls.   2.  With regard to his prominent lymph nodes on MRI, I believe that the upper abdominal lymph nodes may be related to his history of hepatitis and again, by my review I believe that these lesions all appear to be stable compared to 10/2017.  I have paged Radiology to review this, but have not yet heard back.  Unless I hear otherwise, I do not recommend that these receive further surveillance imaging or additional evaluation.      At this point, I believe he can follow up through his providers at [facility].  I will be happy to see him at any time if questions arise on additional imaging or related to transplant candidacy.  I do not believe that he needs scheduled followup unless he were to develop jaundice or be diagnosed with acute pancreatitis.     Total visit time today was 20 minutes, of which >50% was counseling and coordinating care.    ARIN Wiley MD  Associate Professor of Medicine  Division of Gastroenterology, Hepatology and Nutrition  [facility]

## 2019-04-22 NOTE — PROGRESS NOTES
PRESENTING COMPLAINT:  The patient is a 73-year-old -American male who I was asked to see in consultation at the request of Beni Song for evaluation of a pancreatic cyst.      HISTORY OF PRESENT ILLNESS:  The patient has a history of end-stage renal disease and is undergoing hemodialysis 3 times a week.  He was referred to our transplant Nephrology Clinic to consider renal transplantation.  In the initial evaluation, it was noted that the patient had a previous CT urogram on 10/19/2017 which identified a 6x3 mm cyst in the uncinate process of the pancreas.  This was noted to be stable compared to 2014.  It was at that time recommended that he receive formal GI consultation to determine if this was a clinical significant cyst.      The patient has significant recent health issues and has been admitted on multiple occasions at least 4 times at [facility] for confusion, encephalopathy and falls.  At that time, he was noted to be reportedly encephalopathic and this was thought at one point to be related to having missed hemodialysis.  Subsequently, the patient had toxicology testing which was positive for opiates and amphetamines, which were not prescribed.  This information has been conveyed to our renal Transplant Clinic and he has been D listed and will not be considered per report for renal transplant.  In the interim, however, the patient had an MRI per my request on 03/17/2019 and I did recommend that he follow up with me to discuss clinical significance of the pancreatic cyst.      The patient denies abdominal pain.  He states that he has never been told he had acute pancreatitis.  He was interviewed today in the presence of a brother.      PAST MEDICAL HISTORY:  Significant for end-stage renal disease, diabetes mellitus type 2, hepatitis C, hypertension, hyperlipidemia, prior seizures.  Additional past medical history is documented in Epic and reviewed.  He has a history of  nonischemic cardiomyopathy and COPD.      PAST SURGICAL HISTORY:  Significant for prior hernia repair prior cystoscopies and arterial stents I believe of the iliac arteries.      ALLERGIES:  Reviewed.       MEDICATIONS:  Reviewed.      PHYSICAL EXAMINATION:  The patient is cooperative.  He seems somewhat slow with his responses and does not seem to know significant details of his past medical history.  His brother was able to fill in some details, but does not often accompany him apparently to medical visits and does not know details as well.  Vital signs are documented in Epic.  His sclerae are anicteric.  The patient was otherwise not examined today.      RADIOLOGIC DATA:  MRI from 03/17/2019 was personally reviewed.  This reports slight atrophy of the pancreas, but preserved T1 signal intensity.  There is no dilation of the main pancreatic duct.  There is a 4 mm simple appearing cyst in the uncinate process of the pancreas without worrisome features on this nonenhanced study.  The patient was also noted to have multiple borderline enlarged lymph nodes including in the saul hepatis.  There was a 2 cm lesion identified to the left of the aorta anterior to the psoas muscle.  Inferior to the left renal artery.  In the text of the report, it states at one point that this is stable and then later says that is new from 10/19/2017.  In my review of the previous CT from 10/2017 this appears to be similar in size and location.      ASSESSMENT AND PLAN:     1.  Pancreatic cyst.  We discussed the possibility that this could represent intraductal papillary mucinous neoplasm and discussed that this is somewhat analogous to having adenomatous colon polyps.  I discussed that there are currently no worrisome features to suggest that there is a need for intervention.  I would not recommend endoscopic ultrasound at this time.  I also specifically discussed that I do not believe that this cyst should in any way influence decisions  regarding his candidacy for transplant.  We discussed the published guidelines for management and the current recommendation would be to consider a followup MRI in 3 years.  Given his overall health status, I think it would be reasonable to have this assessed through his primary care providers at [facility].  I will question pursuing further surveillance in a patient who has had recurrent admissions for encephalopathy and falls.   2.  With regard to his prominent lymph nodes on MRI, I believe that the upper abdominal lymph nodes may be related to his history of hepatitis and again, by my review I believe that these lesions all appear to be stable compared to 10/2017.  I have paged Radiology to review this, but have not yet heard back.  Unless I hear otherwise, I do not recommend that these receive further surveillance imaging or additional evaluation.      At this point, I believe he can follow up through his providers at [facility].  I will be happy to see him at any time if questions arise on additional imaging or related to transplant candidacy.  I do not believe that he needs scheduled followup unless he were to develop jaundice or be diagnosed with acute pancreatitis.     Total visit time today was 20 minutes, of which >50% was counseling and coordinating care.    ARIN Wiley MD  Associate Professor of Medicine  Division of Gastroenterology, Hepatology and Nutrition  [facility]

## 2019-04-22 NOTE — NURSING NOTE
"Oncology Rooming Note    April 22, 2019 1:19 PM   Keagan Wayne is a 73 year old male who presents for:    Chief Complaint   Patient presents with     Oncology Clinic Visit     Return; IPMN     Initial Vitals: /84   Pulse 110   Temp 98.3  F (36.8  C) (Oral)   Wt 99.7 kg (219 lb 11.2 oz)   SpO2 98%   BMI 28.21 kg/m   Estimated body mass index is 28.21 kg/m  as calculated from the following:    Height as of 1/3/19: 1.88 m (6' 2\").    Weight as of this encounter: 99.7 kg (219 lb 11.2 oz). Body surface area is 2.28 meters squared.  Severe Pain (7) Comment: Data Unavailable   No LMP for male patient.  Allergies reviewed: Yes  Medications reviewed: Yes    Medications: Medication refills not needed today.  Pharmacy name entered into EPIC:    [facility], [place] - [address] [place] [address] PHARMACY [facility], [place] - [address] [facility]  [place] [facility], [place] - [address], [address]  [id number], [place] - [address]    Clinical concerns: No concerns Inez was NOT notified.      Alba Hammond CMA              "

## 2020-02-19 ENCOUNTER — COMMITTEE REVIEW (OUTPATIENT)
Dept: TRANSPLANT | Facility: CLINIC | Age: 75
End: 2020-02-19

## 2020-02-19 ENCOUNTER — TELEPHONE (OUTPATIENT)
Dept: TRANSPLANT | Facility: CLINIC | Age: 75
End: 2020-02-19

## 2020-02-19 NOTE — LETTER
February 19, 2020    Keagan Wayne  [address]  [address]  [place] [address]      Dear Mr. Wayne,   The purpose of this letter is to let you know that on 2/19/2020 the [facility] Multi-Disciplinary Selection Team reviewed your most recent medical records  Based on your ongoing non compliance, illegal polysubstance abuse, multiple Emergency room visits and the selection criteria used by our program , the decision was made to not to offer you an evaluation or re-consider you for transplant. Your referral for transplant is closed.  Important things you should know:    If you would like to discuss the decision, or if your medical status changes you may schedule a return visits with your doctor by calling [phone] and asking to speak to your transplant coordinator.    We recommend that you continue to follow up with your primary care doctor in order to manage your health concerns.  Enclosed is a letter from [facility] which describes the services offered to patients by [facility] and the Organ Procurement and Transplantation Network.  Thank you for allowing us to participate in your care.  We wish you well.  Sincerely,        Solid Organ Transplant  [facility]    Enclosures: OS Letter  cc: MICHELLE Gomez [facility]            The Organ Procurement and Transplantation Network  Toll-free patient services line:     Your resource for organ transplant information    If you have a question regarding your own medical care, you always should call your transplant hospital first. However, for general organ transplant-related information, you can call the Organ Procurement and Transplantation Network (OPTN) toll-free patient services line at [phone]- [phone]. Anyone, including potential transplant candidates, candidates, recipients, family members, friends, living donors, and donor family members,  can call this number to:          Talk about organ donation, living donation, the transplant process, the donation process, and transplant policies.    Get a free patient information kit with helpful booklets, waiting list and transplant information, and a list of all transplant hospitals.    Ask questions about the OPTN website (https://optn.transplant.hrsa.gov/), the United Network for Organ Sharing s (UNOS) website (https://unos.org/), or the UNOS website for living donors and transplant recipients. (https://www.transplantliving.org/).    Learn how the OPTN can help you.    Talk about any concerns that you may have with a transplant hospital.    The [facility] transplant system, the OPTN, is managed under federal contract by the United Network for Organ Sharing (UNOS), which is a non-profit charitable organization. The OPTN helps create and define organ sharing policies that make the best use of donated organs. This process continuously evaluating new advances and discoveries so policies can be adapted to best serve patients waiting for transplants. To do so, the OPTN works closely with transplant professionals, transplant patients, transplant candidates, donor families, living donors, and the public. All transplant programs and organ procurement organizations throughout the country are OPTN members and are obligated to follow the policies the OPTN creates for allocating organs.    The OPTN also is responsible for:      Providing educational material for patients, the public, and professionals.    Raising awareness of the need for donated organs and tissue.    Coordinating organ procurement, matching, and placement.    Collecting information about every organ transplant and donation that occurs in the [place].    Remember, you should contact your transplant hospital directly if you have questions or concerns about your own medical care including medical records, work-up progress, and test results.    We are  not your transplant hospital, and our staff will not be able to answer questions about your case, so please keep your transplant hospital s phone number handy.    However, while you research your transplant needs and learn as much as you can about transplantation and donation, we welcome your call to our toll-free patient services line at [phone].          Updated 4/1/2019

## 2020-02-19 NOTE — COMMITTEE REVIEW
"Abdominal Committee Review Note     Evaluation Date:   Committee Review Date: 2/19/2020    Organ being evaluated for: Kidney    Transplant Phase: Referral  Transplant Status: Declined    Transplant Coordinator: Margaret Reardon  Transplant Surgeon:       Referring Physician:     Primary Diagnosis: Diabetes Mellitus - Type II  Secondary Diagnosis:     Committee Review Members:  Nephrology Luis Pena MD, Beni Song, APRN CNP   Nutrition Hope Bedoya, JAI   Pharmacist Vikas Giraldo, [facility]    - Clinical Alyssa Courtney, [facility], Tracie Aguilar, [facility]   Transplant Maty Chacon, RN, Pam Benitez, RN, Lissett Tovar, RN, Chelsi Rodas, RN, Filomena Mathew, SOLANGE, Janusz Boyd MD, Margaret Reardon RN, Helena Vasquez MD, MD       Transplant Eligibility: Irreversible chronic kidney disease treated w/dialysis or expected need for dialysis    Committee Review Decision: Declined    Relative Contraindications: Other, polysubstance abuse    Absolute Contraindications: Active/ongoing non-compliance and/or failure to provide medical information     Committee Chair Helena Vasquez MD verbally attested to the committee's decision.    Committee Discussion Details: Reviewed past and ongoing issues of non-compliance and illegal polysubstance abuse. He was de-listed 4/2019 for the same issues. Patient has called and wants to \"get back on the list\".There is continued  documentation of non compliance in the notes in CE on 1/10/2020 by his PCP. Committee declines to offer patient another evaluation and referral will be closed. Patient will be called and denial letter will be sent.    "

## 2020-02-19 NOTE — TELEPHONE ENCOUNTER
"Called Keagan to discuss the closing of his referral and got his voice mail. I introduced myself and acknowledged his desire to \"get back on the list\". I told him we reviewed the past medical records since he was delisted in 4/2019 and he has ongoing compliance and substance abuse issues and therefore is not a transplant candidate. He  will not be offered an evaluation and his referral is now closed. I told him a letter will be sent to him in the mail.  "

## 2020-02-25 ENCOUNTER — TELEPHONE (OUTPATIENT)
Dept: TRANSPLANT | Facility: CLINIC | Age: 75
End: 2020-02-25

## 2020-02-25 NOTE — TELEPHONE ENCOUNTER
"Keagan called me about why he was not put back on the list. His speech was rather slow and garbled. I explained he had been de-listed 4/2019 for positive tox screen for amphetamines and opiates. He has self referred to \"get back on the list\". I told Keagan the Committee did discuss his continuing non compliance with medical care as documented in CE and multiple hospitalizations for hyperglycemia and admitting he does not follow a diabetic diet or take his insulin as prescribed. I told him getting a transplant is a very big deal and there is a lot of care and responsibility that go along with being a shwetha of someone's gift of an organ and he does not seem to be able to do it. He assured me he would take good care of a kidney and he is no longer doing any illegal drugs and is following the straight and narrow. I told him we can see his outside medical records and that is simply not true. He then said \"oh those records follow me everywhere\". He asked me if he should just stop dialysis and I told him he should not if he wants to continue to live.  "

## 2020-06-22 ENCOUNTER — APPOINTMENT (OUTPATIENT)
Dept: GENERAL RADIOLOGY | Facility: CLINIC | Age: 75
End: 2020-06-22
Attending: EMERGENCY MEDICINE
Payer: COMMERCIAL

## 2020-06-22 ENCOUNTER — HOSPITAL ENCOUNTER (OUTPATIENT)
Dept: NEUROLOGY | Facility: CLINIC | Age: 75
End: 2020-06-22
Attending: PSYCHIATRY & NEUROLOGY
Payer: COMMERCIAL

## 2020-06-22 ENCOUNTER — HOSPITAL ENCOUNTER (OUTPATIENT)
Facility: CLINIC | Age: 75
Setting detail: OBSERVATION
Discharge: HOME | End: 2020-06-24
Attending: EMERGENCY MEDICINE | Admitting: HOSPITALIST
Payer: COMMERCIAL

## 2020-06-22 ENCOUNTER — TRANSFERRED RECORDS (OUTPATIENT)
Dept: HEALTH INFORMATION MANAGEMENT | Facility: CLINIC | Age: 75
End: 2020-06-22

## 2020-06-22 ENCOUNTER — APPOINTMENT (OUTPATIENT)
Dept: CT IMAGING | Facility: CLINIC | Age: 75
End: 2020-06-22
Attending: HOSPITALIST
Payer: COMMERCIAL

## 2020-06-22 DIAGNOSIS — N18.6 END STAGE RENAL DISEASE (H): ICD-10-CM

## 2020-06-22 DIAGNOSIS — E87.5 HYPERKALEMIA: ICD-10-CM

## 2020-06-22 DIAGNOSIS — R55 SYNCOPE, UNSPECIFIED SYNCOPE TYPE: ICD-10-CM

## 2020-06-22 LAB
ANION GAP SERPL CALCULATED.3IONS-SCNC: 8 MMOL/L (ref 3–14)
BASOPHILS # BLD AUTO: 0 10E9/L (ref 0–0.2)
BASOPHILS NFR BLD AUTO: 0.2 %
BUN SERPL-MCNC: 48 MG/DL (ref 7–30)
CALCIUM SERPL-MCNC: 10.3 MG/DL (ref 8.5–10.1)
CHLORIDE SERPL-SCNC: 94 MMOL/L (ref 94–109)
CO2 SERPL-SCNC: 27 MMOL/L (ref 20–32)
CREAT SERPL-MCNC: 12.1 MG/DL (ref 0.66–1.25)
CRP SERPL-MCNC: 10.8 MG/L (ref 0–8)
D DIMER PPP FEU-MCNC: 2.1 UG/ML FEU (ref 0–0.5)
DIFFERENTIAL METHOD BLD: ABNORMAL
EOSINOPHIL # BLD AUTO: 0.4 10E9/L (ref 0–0.7)
EOSINOPHIL NFR BLD AUTO: 7.4 %
ERYTHROCYTE [DISTWIDTH] IN BLOOD BY AUTOMATED COUNT: 14.4 % (ref 10–15)
GFR SERPL CREATININE-BSD FRML MDRD: 4 ML/MIN/{1.73_M2}
GLUCOSE BLDC GLUCOMTR-MCNC: 197 MG/DL (ref 70–99)
GLUCOSE BLDC GLUCOMTR-MCNC: 209 MG/DL (ref 70–99)
GLUCOSE BLDC GLUCOMTR-MCNC: 260 MG/DL (ref 70–99)
GLUCOSE SERPL-MCNC: 231 MG/DL (ref 70–99)
HBA1C MFR BLD: 8.9 % (ref 0–5.6)
HCT VFR BLD AUTO: 33.7 % (ref 40–53)
HGB BLD-MCNC: 10.8 G/DL (ref 13.3–17.7)
IMM GRANULOCYTES # BLD: 0 10E9/L (ref 0–0.4)
IMM GRANULOCYTES NFR BLD: 0.2 %
INTERPRETATION ECG - MUSE: NORMAL
LYMPHOCYTES # BLD AUTO: 1.6 10E9/L (ref 0.8–5.3)
LYMPHOCYTES NFR BLD AUTO: 26.9 %
MCH RBC QN AUTO: 31.1 PG (ref 26.5–33)
MCHC RBC AUTO-ENTMCNC: 32 G/DL (ref 31.5–36.5)
MCV RBC AUTO: 97 FL (ref 78–100)
MONOCYTES # BLD AUTO: 0.5 10E9/L (ref 0–1.3)
MONOCYTES NFR BLD AUTO: 9.1 %
NEUTROPHILS # BLD AUTO: 3.3 10E9/L (ref 1.6–8.3)
NEUTROPHILS NFR BLD AUTO: 56.2 %
NRBC # BLD AUTO: 0 10*3/UL
NRBC BLD AUTO-RTO: 0 /100
PLATELET # BLD AUTO: 149 10E9/L (ref 150–450)
POTASSIUM SERPL-SCNC: 5.8 MMOL/L (ref 3.4–5.3)
PROCALCITONIN SERPL-MCNC: 1.01 NG/ML
RBC # BLD AUTO: 3.47 10E12/L (ref 4.4–5.9)
SARS-COV-2 RNA SPEC QL NAA+PROBE: NOT DETECTED
SODIUM SERPL-SCNC: 129 MMOL/L (ref 133–144)
SPECIMEN SOURCE: NORMAL
TROPONIN I SERPL-MCNC: <0.015 UG/L (ref 0–0.04)
TROPONIN I SERPL-MCNC: <0.015 UG/L (ref 0–0.04)
WBC # BLD AUTO: 5.8 10E9/L (ref 4–11)

## 2020-06-22 PROCEDURE — 99207 ZZC CDG-HISTORY COMP: MEETS EXP. PROBLEM FOCUSED-DOWN CODED LACK OF ROS: CPT | Performed by: HOSPITALIST

## 2020-06-22 PROCEDURE — 83036 HEMOGLOBIN GLYCOSYLATED A1C: CPT | Performed by: HOSPITALIST

## 2020-06-22 PROCEDURE — 86706 HEP B SURFACE ANTIBODY: CPT | Performed by: INTERNAL MEDICINE

## 2020-06-22 PROCEDURE — 25000125 ZZHC RX 250: Performed by: HOSPITALIST

## 2020-06-22 PROCEDURE — 95816 EEG AWAKE AND DROWSY: CPT | Mod: XU

## 2020-06-22 PROCEDURE — 85379 FIBRIN DEGRADATION QUANT: CPT | Performed by: HOSPITALIST

## 2020-06-22 PROCEDURE — 25800030 ZZH RX IP 258 OP 636: Performed by: INTERNAL MEDICINE

## 2020-06-22 PROCEDURE — 87340 HEPATITIS B SURFACE AG IA: CPT | Performed by: INTERNAL MEDICINE

## 2020-06-22 PROCEDURE — 80048 BASIC METABOLIC PNL TOTAL CA: CPT | Performed by: EMERGENCY MEDICINE

## 2020-06-22 PROCEDURE — 93005 ELECTROCARDIOGRAM TRACING: CPT

## 2020-06-22 PROCEDURE — 99285 EMERGENCY DEPT VISIT HI MDM: CPT | Mod: 25

## 2020-06-22 PROCEDURE — 25000132 ZZH RX MED GY IP 250 OP 250 PS 637: Performed by: HOSPITALIST

## 2020-06-22 PROCEDURE — 84145 PROCALCITONIN (PCT): CPT | Performed by: INTERNAL MEDICINE

## 2020-06-22 PROCEDURE — 71045 X-RAY EXAM CHEST 1 VIEW: CPT

## 2020-06-22 PROCEDURE — 00000146 ZZHCL STATISTIC GLUCOSE BY METER IP

## 2020-06-22 PROCEDURE — 25000128 H RX IP 250 OP 636: Performed by: HOSPITALIST

## 2020-06-22 PROCEDURE — C9803 HOPD COVID-19 SPEC COLLECT: HCPCS

## 2020-06-22 PROCEDURE — G0378 HOSPITAL OBSERVATION PER HR: HCPCS

## 2020-06-22 PROCEDURE — 85025 COMPLETE CBC W/AUTO DIFF WBC: CPT | Performed by: EMERGENCY MEDICINE

## 2020-06-22 PROCEDURE — 96374 THER/PROPH/DIAG INJ IV PUSH: CPT | Mod: 59

## 2020-06-22 PROCEDURE — 87040 BLOOD CULTURE FOR BACTERIA: CPT | Mod: XS | Performed by: HOSPITALIST

## 2020-06-22 PROCEDURE — 96375 TX/PRO/DX INJ NEW DRUG ADDON: CPT

## 2020-06-22 PROCEDURE — U0003 INFECTIOUS AGENT DETECTION BY NUCLEIC ACID (DNA OR RNA); SEVERE ACUTE RESPIRATORY SYNDROME CORONAVIRUS 2 (SARS-COV-2) (CORONAVIRUS DISEASE [COVID-19]), AMPLIFIED PROBE TECHNIQUE, MAKING USE OF HIGH THROUGHPUT TECHNOLOGIES AS DESCRIBED BY CMS-2020-01-R: HCPCS | Performed by: EMERGENCY MEDICINE

## 2020-06-22 PROCEDURE — 36415 COLL VENOUS BLD VENIPUNCTURE: CPT

## 2020-06-22 PROCEDURE — 25000131 ZZH RX MED GY IP 250 OP 636 PS 637: Performed by: HOSPITALIST

## 2020-06-22 PROCEDURE — 86140 C-REACTIVE PROTEIN: CPT | Performed by: EMERGENCY MEDICINE

## 2020-06-22 PROCEDURE — 36415 COLL VENOUS BLD VENIPUNCTURE: CPT | Performed by: INTERNAL MEDICINE

## 2020-06-22 PROCEDURE — 84484 ASSAY OF TROPONIN QUANT: CPT | Performed by: HOSPITALIST

## 2020-06-22 PROCEDURE — 99207 ZZC APP CREDIT; MD BILLING SHARED VISIT: CPT | Performed by: NURSE PRACTITIONER

## 2020-06-22 PROCEDURE — 99226 ZZC SUBSEQUENT OBSERVATION CARE,LEVEL III: CPT | Performed by: HOSPITALIST

## 2020-06-22 PROCEDURE — 40000556 ZZH STATISTIC PERIPHERAL IV START W US GUIDANCE

## 2020-06-22 PROCEDURE — 84484 ASSAY OF TROPONIN QUANT: CPT | Performed by: EMERGENCY MEDICINE

## 2020-06-22 PROCEDURE — 71250 CT THORAX DX C-: CPT

## 2020-06-22 PROCEDURE — 36415 COLL VENOUS BLD VENIPUNCTURE: CPT | Performed by: HOSPITALIST

## 2020-06-22 PROCEDURE — 90937 HEMODIALYSIS REPEATED EVAL: CPT

## 2020-06-22 PROCEDURE — 96372 THER/PROPH/DIAG INJ SC/IM: CPT

## 2020-06-22 RX ORDER — ACETAMINOPHEN 325 MG/1
650 TABLET ORAL EVERY 4 HOURS PRN
Status: DISCONTINUED | OUTPATIENT
Start: 2020-06-22 | End: 2020-06-24 | Stop reason: HOSPADM

## 2020-06-22 RX ORDER — OLANZAPINE 15 MG/1
15 TABLET, ORALLY DISINTEGRATING ORAL AT BEDTIME
COMMUNITY

## 2020-06-22 RX ORDER — METOCLOPRAMIDE 5 MG/1
5 TABLET ORAL EVERY 6 HOURS PRN
Status: DISCONTINUED | OUTPATIENT
Start: 2020-06-22 | End: 2020-06-24 | Stop reason: HOSPADM

## 2020-06-22 RX ORDER — METOPROLOL TARTRATE 25 MG/1
12.5 TABLET, FILM COATED ORAL 2 TIMES DAILY
COMMUNITY

## 2020-06-22 RX ORDER — LIDOCAINE 40 MG/G
CREAM TOPICAL
Status: DISCONTINUED | OUTPATIENT
Start: 2020-06-22 | End: 2020-06-24 | Stop reason: HOSPADM

## 2020-06-22 RX ORDER — NICOTINE POLACRILEX 4 MG
15-30 LOZENGE BUCCAL
Status: DISCONTINUED | OUTPATIENT
Start: 2020-06-22 | End: 2020-06-24 | Stop reason: HOSPADM

## 2020-06-22 RX ORDER — ALBUMIN, HUMAN INJ 5% 5 %
250 SOLUTION INTRAVENOUS
Status: DISCONTINUED | OUTPATIENT
Start: 2020-06-22 | End: 2020-06-22

## 2020-06-22 RX ORDER — NALOXONE HYDROCHLORIDE 0.4 MG/ML
.1-.4 INJECTION, SOLUTION INTRAMUSCULAR; INTRAVENOUS; SUBCUTANEOUS
Status: DISCONTINUED | OUTPATIENT
Start: 2020-06-22 | End: 2020-06-24 | Stop reason: HOSPADM

## 2020-06-22 RX ORDER — ONDANSETRON 2 MG/ML
4 INJECTION INTRAMUSCULAR; INTRAVENOUS EVERY 6 HOURS PRN
Status: DISCONTINUED | OUTPATIENT
Start: 2020-06-22 | End: 2020-06-24 | Stop reason: HOSPADM

## 2020-06-22 RX ORDER — ONDANSETRON 4 MG/1
4 TABLET, ORALLY DISINTEGRATING ORAL EVERY 6 HOURS PRN
Status: DISCONTINUED | OUTPATIENT
Start: 2020-06-22 | End: 2020-06-24 | Stop reason: HOSPADM

## 2020-06-22 RX ORDER — SILVER SULFADIAZINE 10 MG/G
CREAM TOPICAL DAILY
COMMUNITY

## 2020-06-22 RX ORDER — PROCHLORPERAZINE 25 MG
12.5 SUPPOSITORY, RECTAL RECTAL EVERY 12 HOURS PRN
Status: DISCONTINUED | OUTPATIENT
Start: 2020-06-22 | End: 2020-06-24 | Stop reason: HOSPADM

## 2020-06-22 RX ORDER — DOXYCYCLINE 100 MG/10ML
100 INJECTION, POWDER, LYOPHILIZED, FOR SOLUTION INTRAVENOUS EVERY 12 HOURS
Status: DISCONTINUED | OUTPATIENT
Start: 2020-06-22 | End: 2020-06-23

## 2020-06-22 RX ORDER — ASPIRIN 81 MG/1
81 TABLET, CHEWABLE ORAL DAILY
Status: DISCONTINUED | OUTPATIENT
Start: 2020-06-22 | End: 2020-06-24 | Stop reason: HOSPADM

## 2020-06-22 RX ORDER — ATORVASTATIN CALCIUM 80 MG/1
80 TABLET, FILM COATED ORAL AT BEDTIME
COMMUNITY

## 2020-06-22 RX ORDER — METOCLOPRAMIDE HYDROCHLORIDE 5 MG/ML
5 INJECTION INTRAMUSCULAR; INTRAVENOUS EVERY 6 HOURS PRN
Status: DISCONTINUED | OUTPATIENT
Start: 2020-06-22 | End: 2020-06-24 | Stop reason: HOSPADM

## 2020-06-22 RX ORDER — DEXTROSE MONOHYDRATE 25 G/50ML
25-50 INJECTION, SOLUTION INTRAVENOUS
Status: DISCONTINUED | OUTPATIENT
Start: 2020-06-22 | End: 2020-06-24 | Stop reason: HOSPADM

## 2020-06-22 RX ORDER — CEFTRIAXONE 2 G/1
2 INJECTION, POWDER, FOR SOLUTION INTRAMUSCULAR; INTRAVENOUS EVERY 24 HOURS
Status: DISCONTINUED | OUTPATIENT
Start: 2020-06-22 | End: 2020-06-23

## 2020-06-22 RX ORDER — LOSARTAN POTASSIUM 25 MG/1
25 TABLET ORAL DAILY
Status: ON HOLD | COMMUNITY
End: 2020-06-22

## 2020-06-22 RX ORDER — ALBUMIN (HUMAN) 12.5 G/50ML
50 SOLUTION INTRAVENOUS
Status: DISCONTINUED | OUTPATIENT
Start: 2020-06-22 | End: 2020-06-22

## 2020-06-22 RX ORDER — PROCHLORPERAZINE MALEATE 5 MG
5 TABLET ORAL EVERY 6 HOURS PRN
Status: DISCONTINUED | OUTPATIENT
Start: 2020-06-22 | End: 2020-06-24 | Stop reason: HOSPADM

## 2020-06-22 RX ORDER — ACETAMINOPHEN 650 MG/1
650 SUPPOSITORY RECTAL EVERY 4 HOURS PRN
Status: DISCONTINUED | OUTPATIENT
Start: 2020-06-22 | End: 2020-06-24 | Stop reason: HOSPADM

## 2020-06-22 RX ADMIN — SODIUM CHLORIDE 250 ML: 9 INJECTION, SOLUTION INTRAVENOUS at 17:59

## 2020-06-22 RX ADMIN — ACETAMINOPHEN 325 MG: 325 TABLET, FILM COATED ORAL at 18:08

## 2020-06-22 RX ADMIN — INSULIN ASPART 2 UNITS: 100 INJECTION, SOLUTION INTRAVENOUS; SUBCUTANEOUS at 11:14

## 2020-06-22 RX ADMIN — DOXYCYCLINE 100 MG: 100 INJECTION, POWDER, LYOPHILIZED, FOR SOLUTION INTRAVENOUS at 20:28

## 2020-06-22 RX ADMIN — INSULIN DEGLUDEC INJECTION 14 UNITS: 100 INJECTION, SOLUTION SUBCUTANEOUS at 13:13

## 2020-06-22 RX ADMIN — CEFTRIAXONE 2 G: 2 INJECTION, POWDER, FOR SOLUTION INTRAMUSCULAR; INTRAVENOUS at 21:34

## 2020-06-22 RX ADMIN — UMECLIDINIUM 1 PUFF: 62.5 AEROSOL, POWDER ORAL at 13:13

## 2020-06-22 RX ADMIN — SODIUM CHLORIDE 300 ML: 9 INJECTION, SOLUTION INTRAVENOUS at 17:59

## 2020-06-22 RX ADMIN — INSULIN ASPART 2 UNITS: 100 INJECTION, SOLUTION INTRAVENOUS; SUBCUTANEOUS at 18:08

## 2020-06-22 ASSESSMENT — ENCOUNTER SYMPTOMS: FATIGUE: 1

## 2020-06-22 NOTE — ED TRIAGE NOTES
"Pt here from dialysis for syncope. Pt was 5 minutes into a dialysis run when he went unresponsive. Per medics when they arrived on scene pt was \" confused. Didn't know where he was. Thought he was at [facility]\". He \"came around\" and became alert and back to baseline. His BS was 217 for medics. Per ems pt's initial blood pressure prior to diaylsis was 147/systolic and during his syncope episode was 87/systolic. Upon arrival pt is alert, follows commands. He states he has R anterior chest pain. He is quiet and is not forth coming with information. EKG obtained. ST on monitor @ rate of 101. VSS. Denies covid symptoms. However he is coughing.  "

## 2020-06-22 NOTE — ED NOTES
Bed: ED05  Expected date: 6/22/20  Expected time:   Means of arrival:   Comments:  [id number] 74m syncope during dialysis 0635 jani

## 2020-06-22 NOTE — PLAN OF CARE
Alert to self only; orientation fluctuates. Impulsive at times; refusing to removed clothing for full assement/cares. VSS, ex tachy. Tele: ST. Denies pain. Mod carb/dialysis/7665-4560 calorie diet; tolerating well. BS checks QID. Pt on hemodialysis M/W/F; awaiting dialysis today. Code 21 this shift; pt attempting to leave the unit numerous times despite redirection attempts. Bedside attendant orders placed. Prior R flank wound, scabbing present; dressing applied, CDI. PIV is SL; intermittent abx. Up with SBA + cane. Chest x-ray & EEG performed today; see results. Discharge pending; neph & neuro following.

## 2020-06-22 NOTE — PROGRESS NOTES
Care Coordination:    Pt admitted as observation.  Pt currently only alert to self and requesting to leave.  Bedside nurse updated.  CC called Pt's son Michael and updated on observation and status.  Voiced understanding. Son voices that when he spoke to patient yesterday he seemed more confused.  Pt had a similar episode several weeks ago.  Pt has a caretaker, María who per Michael is with him 24/7 and can pick him up at discharge.         Ciera Barrientos RN BSN  Inpatient Care Coordination  [facility]  [phone]

## 2020-06-22 NOTE — PROGRESS NOTES
RECEIVING UNIT ED HANDOFF REVIEW    ED Nurse Handoff Report was reviewed by: Darren Colon RN on June 22, 2020 at 9:34 AM

## 2020-06-22 NOTE — ED PROVIDER NOTES
History     Chief Complaint:  Loss of Consciousness    The history is provided by the patient. History limited by: poor historian.      Keagan Wayne is a 74 year old male with complicated past medical history including end stage renal disease on dialysis (MWF), seizures, hypertension, NSTEMI, non-ischemic cardiomyopathy, CAD, COPD, diabetes amongst others as noted below, who presents alone via EMS for evaluation after suffering a syncopal episode 5 minutes into dialysis. Per EMS report, patient went unresponsive while at dialysis and on their arrival, patient was confused and unaware of where he was, stating he thought he was at [facility].     En route, patient became more alert and returned to baseline, with BS of 217. Patient was noted to be hypotensive in the 87 systolic during his syncopal episode, but prior to dialysis, BP was 147 systolic.     Here, patient is unable to provide additional history as he notes that he is unsure what happened, but does remember going to dialysis. He states he is currently tired, but notes he is always fatigued on Mondays, Wednesdays and Fridays as when he goes to dialysis, he gets up at 0415 and gets very little sleep. He is followed by Dr. Oates of nephrology.     Allergies:  Morphine     Medications:    Neurontin  Metoprolol  Lantus  Incruse ellipta  Novolog  Zyprexa  Aspirin 81 mg  Lipitor  Phoslo    Past Medical History:    Acute pulmonary edema  Anemia  Bipolar 1 disorder  CAD  Chronic pain  Cognitive impairment  Colitis  COPD  Depression  Diabetes  End stage renal disease on dialysis   Glaucoma  Hepatitis C  Seizures  Hyperlipidemia  Hypertension  Intraductal papillary mucinous neoplasm  IV drug use  Non-compliance with medication regimen  Nonischemic cardiomyopathy  NSTEMI  PAD  Peripheral neuropathy  Secondary hyperparathyroidism      Past Surgical History:    Create fistula arteriovenous upper extremity - right x2  Cystoscopy, retrogrades,  combined  Hernia repair    Family History:    Brother - CAD  Mother - diabetes    Social History:  The patient was accompanied to the ED by EMS.  Smoking Status: Former  Smokeless Tobacco: Never  Alcohol Use: Yes  Drug Use: No   Marital Status:   [4]     Review of Systems   Constitutional: Positive for fatigue.   Neurological: Positive for syncope.   All other systems reviewed and are negative.    Physical Exam     Patient Vitals for the past 24 hrs:   BP Temp Pulse Heart Rate Resp SpO2   06/22/20 0800 (!) 146/87 -- 98 98 10 --   06/22/20 0705 -- 97.8  F (36.6  C) -- -- 16 --   06/22/20 0700 139/77 -- 90 99 -- --   06/22/20 0638 (!) 142/78 -- 101 -- -- 96 %       Physical Exam  Physical Exam   Nursing note and vitals reviewed.  General: Oriented to person, place, and time. Appears well-developed and well-nourished. Somnolent.  Head: No signs of trauma.   Mouth/Throat: Oropharynx is clear and moist.   Eyes: Conjunctivae are normal. Pupils are equal, round, and reactive to light.   Neck: Normal range of motion. No nuchal rigidity.   Cardiovascular: Normal rate and regular rhythm.    Respiratory: Effort normal and breath sounds normal. No respiratory distress. Intermittent cough.   Abdominal: Soft. There is no tenderness. There is no guarding.   Musculoskeletal: Normal range of motion. no edema.   Neurological: The patient is alert and oriented to person, place, and time.  PERRLA, EOMI, visual fields intact, strength in upper/lower extremities normal and symmetrical.   Sensation normal. Speech normal  GCS eye subscore is 4. GCS verbal subscore is 5. GCS motor subscore is 6.   Skin: Skin is warm and dry. No rash noted.   Psychiatric: normal mood and affect. behavior is normal.      Emergency Department Course     ECG:  Indication: Syncope  ECG taken at 0639, ECG read at 0640 by Dr. Hans MD  Sinus tachycardia with 1st degree AV block  Left axis deviation  Low voltage QRS  Inferior infarct, age  undetermined  Abnormal ECG  No significant changes compared to EKG dated 1/3/19  Rate 101 bpm. MD interval 218. QRS duration 70. QT/QTc 338/438. P-R-T axes 62 -43 66.      Imaging:  Radiology findings were communicated with the patient who voiced understanding of the findings.    XR Chest Port:  IMPRESSION: New focal masslike opacity involving the infrahilar region   of the right middle lobe. This could represent underlying pulmonary   mass versus focal dense consolidation and volume loss in the right   middle lobe. Recommend chest CT with IV contrast material for further   evaluation. Minimal amount of right basilar pleural fluid or   thickening. Minimal left basilar pleural fluid or thickening. Upper   lungs clear. Normal heart size. Normal pulmonary vascularity. Osseous   structures unremarkable.   Reading per radiology.     Laboratory:  Laboratory findings were communicated with the patient who voiced understanding of the findings.    CBC: HGB 10.8 (L),  (L) o/w WNL (WBC 5.8)  BMP:  (L), Potassium 5.8 (H), Glucose 00533 (H), Bun 48 (H), Creatinine 12.10 (H), GFR Estimate if Black 4 (L), Calcium 10.3 (H) o/w WNL  Troponin (Collected 0658): <0.015    CRP Inflammation: 10.8 (H)    Asymptomatic COVID-19 (Coronavirus) PCR by Nasopharyngeal Swab: Pending     Emergency Department Course:  Past medical records, nursing notes, and vitals reviewed.    EKG obtained in the ED, see results above.      (2004)   I performed an exam of the patient as documented above. History obtained from EMS.    IV was inserted and blood was drawn for laboratory testing, results above.    The patient was sent for a XR Chest Port while in the emergency department, results above.      (32)   I spoke with Dr. Arrington of the Nephrology service regarding patient's presentation, findings, and plan of care, who recommended admission for dialysis and further syncope work up.       (6173)   I rechecked the patient and discussed the  results of his workup thus far. Discussed plan of care and patient will be admitted.     A nasal swab was obtained for laboratory testing, findings above.     (3750)   I spoke with Dr. Arango of the Hospitalist service regarding patient's presentation, findings, and plan of care, who agrees to accept patient for further care, evaluation and monitoring.      Findings and plan explained to the Patient who consents to admission. Discussed the patient with Dr. Arango, who will admit the patient to an observation bed for further monitoring, evaluation, and treatment.    I personally reviewed the laboratory and imaging results with the Patient and answered all related questions prior to admission.     Impression & Plan     Medical Decision Making:  Keagan Wayne is a 74 year old male with complicated past medical history including end stage renal disease on dialysis, seizures, NSTEMI amongst others as noted above, who presents with a history and clinical exam consistent with syncope while at dialysis prior to arrival.  I considered a broad differential for their syncope today including cardiac arrythmia, ACS, aortic stenosis or other acute valvular dysfunction, HOCM, PE, orthostatic hypotension, drugs, medication side effect, situational, carotid hypersensitivity, seizure, TIA, stroke, cerebral insufficiency, vasovagal, etc.  Patient has no signs at this point of an acute stroke, PE, dissection, acute coronary syndrome.  Given lack of a clear etiology, patient's age and risk factors and discussion with Dr. Arrington of Nephrology, would admit patient for further evaluation on telemetry to the medicine team as well as to complete his course of dialysis.    Covid-19  Keagan Wayne was evaluated during a global COVID-19 pandemic, which necessitated consideration that the patient might be at risk for infection with the SARS-CoV-2 virus that causes COVID-19.   Applicable protocols for evaluation were followed during the  patient's care.   COVID-19 was considered as part of the patient's evaluation. The plan for testing is:  a test was obtained during this visit.    Diagnosis:    ICD-10-CM    1. Syncope, unspecified syncope type  R55 Asymptomatic COVID-19 Virus (Coronavirus) by PCR     CRP inflammation     CRP inflammation     CANCELED: CRP inflammation   2. Hyperkalemia  E87.5    3. End stage renal disease (H)  N18.6        Disposition:  Admitted to Observation.    Scribe Disclosure:  Cris ANAND, am serving as a scribe at 6:57 AM on 6/22/2020 to document services personally performed by Declan Maxwell MD based on my observations and the provider's statements to me.   6/22/2020    EMERGENCY DEPARTMENT       Declan Mxawell MD  06/22/20 6295

## 2020-06-22 NOTE — CONSULTS
"[facility]    RENAL CONSULTATION NOTE    REFERRING MD:  Dr. Arango    REASON FOR CONSULTATION:  ESKD c hyperkalemia, syncope    DATE OF CONSULTATION: 06/22/20    SHORTHAND KEY FOR MY NOTES:  c = with, s = without, p = after, a = before, x = except, asx = asymptomatic, tx = transplant or treatment, sx = symptoms or symptomatic, cx = canceled or culture, rxn = reaction, yday = yesterday, nl = normal, abx = antibiotics, fxn = function, dx = diagnosis, dz = disease, m/h = melena/hematochezia, c/d/l/ha = cramping/dizziness/lightheadedness/headache, d/c = discharge or diarrhea/constipation, f/c/n/v = fevers/chills/nausea/vomiting, cp/sob = chest pain/shortness of breath, tbv = total body volume, rxn = reaction, tdc = tunneled dialysis catheter, pta = prior to admission, hd = hemodialysis, pd = peritoneal dialysis, hhd = home hemodialysis    HPI: Keagan Wayne is a 74 year old male c ESKD 2 DM2/HTN/HCV who dialyses MWF via a TAWNY at the [facility] under the care of Dr. Calle / Solange Reddy CNP and was admitted on 6/22/2020 c confusion, malaise.  Hx obtained from dialysis RN, chart, pt.    Pt presented to the HD unit this AM and told the RN, \"I'm sick.  I don't feel well.\"  He was not very interactive or responsive to her questions, and he didn't look good, his vitals were abn (89/43 - 130 reg) so she activated EMS and didn't start him on dialysis.  When EMS arrived, his sugars were ok and en route he became more interactive.  In the ER, his vitals were better.  On the floor, he is not happy bc he thought he had a full HD run earlier today and that \"I don't like when ppl lie to me.\"  When I was c him, we called the HD RN who explained to him that he didn't even start today's run.  As such, he is willing to do HD now.    Currently, he denies any f/c/n/v.  No cp/sob.  He does not have any LE edema and states that \"I don't get swelling in my legs, it goes to my belly.\"  He is " talking very slowly and seems to drift off mid-conversation.    ROS:  A complete review of systems was performed and is negative x as noted above.    PMH:    Past Medical History:   Diagnosis Date     Acute pulmonary edema (H)      Anemia      Bipolar 1 disorder (H)      CAD (coronary artery disease)      Chronic pain      Chronic, continuous use of opioids      Cognitive impairment      Colitis      COPD (chronic obstructive pulmonary disease) (H)      Depression      Diabetes (H)      ESRD (end stage renal disease) (H)      ESRD (end stage renal disease) on dialysis (H)      Glaucoma      Hepatitis C      Hepatitis C antibody positive in blood      History of seizures      Hyperlipidemia      Hyperlipidemia      Hypertension      IPMN (intraductal papillary mucinous neoplasm)      IV drug abuse (H) 1970's     Non compliance w medication regimen      Non-ischemic cardiomyopathy (H)      Non-ST elevation MI (NSTEMI) (H)      PAD (peripheral artery disease) (H)      Peripheral neuropathy      Peripheral neuropathy      Pneumonia      Secondary hyperparathyroidism (H)      Vitamin D deficiency      PSH:    Past Surgical History:   Procedure Laterality Date     CREATE FISTULA ARTERIOVENOUS UPPER EXTREMITY Right 5/8/2016    Procedure: CREATE FISTULA ARTERIOVENOUS UPPER EXTREMITY;  Surgeon: Josias Valente MD;  Location:  OR     CREATE FISTULA ARTERIOVENOUS UPPER EXTREMITY Right 12/14/2016    Procedure: CREATE FISTULA ARTERIOVENOUS UPPER EXTREMITY;  Surgeon: Contreras Bowman MD;  Location:  OR     CYSTOSCOPY, RETROGRADES, COMBINED Bilateral 3/29/2018    Procedure: COMBINED CYSTOSCOPY, RETROGRADES;  Cystoscopy, Bilateral Retrogrades, Bilateral Ureteral Washings, Pyelogram ;  Surgeon: Moisés Pena MD;  Location: [facility] OR     HERNIA REPAIR       MEDICATIONS:      - MEDICATION INSTRUCTIONS for Dialysis Patients -   Does not apply See Admin Instructions     aspirin  81 mg Oral Daily     insulin aspart   1-7 Units Subcutaneous TID AC     insulin aspart  1-5 Units Subcutaneous At Bedtime     insulin degludec  14 Units Subcutaneous QAM     sodium chloride (PF)  3 mL Intracatheter Q8H     umeclidinium  1 puff Inhalation Daily     ALLERGIES:    Allergies as of 06/22/2020 - Reviewed 06/22/2020   Allergen Reaction Noted     Morphine Rash 09/29/2016     FH:    Family History   Problem Relation Age of Onset     Coronary Artery Disease Brother      Diabetes Mother      SH:    Social History     Socioeconomic History     Marital status:      Spouse name: Not on file     Number of children: Not on file     Years of education: Not on file     Highest education level: Not on file   Occupational History     Not on file   Social Needs     Financial resource strain: Not on file     Food insecurity     Worry: Not on file     Inability: Not on file     Transportation needs     Medical: Not on file     Non-medical: Not on file   Tobacco Use     Smoking status: Former Smoker     Packs/day: 0.20     Years: 50.00     Pack years: 10.00     Types: Cigarettes     Start date: 10/12/1957     Smokeless tobacco: Never Used   Substance and Sexual Activity     Alcohol use: Yes     Alcohol/week: 14.0 standard drinks     Types: 7 Cans of beer, 7 Shots of liquor per week     Comment: cocktail 2-3/month     Drug use: No     Types: Marijuana     Comment: past marijuana use     Sexual activity: Not on file   Lifestyle     Physical activity     Days per week: Not on file     Minutes per session: Not on file     Stress: Not on file   Relationships     Social connections     Talks on phone: Not on file     Gets together: Not on file     Attends Latter day service: Not on file     Active member of club or organization: Not on file     Attends meetings of clubs or organizations: Not on file     Relationship status: Not on file     Intimate partner violence     Fear of current or ex partner: Not on file     Emotionally abused: Not on file      Physically abused: Not on file     Forced sexual activity: Not on file   Other Topics Concern     Parent/sibling w/ CABG, MI or angioplasty before 65F 55M? Not Asked   Social History Narrative     Not on file     PHYSICAL EXAM:    /68 (BP Location: Left arm)   Pulse 101   Temp 97.2  F (36.2  C) (Oral)   Resp 14   SpO2 97%     GENERAL: awake, mostly alert, NAD, sitting up in bed  HEENT:  normocephalic, no gross abnormalities; MMM; pupils equal, EOMI, no scleral icterus or conj edema  CV: Reg, tachy, nl S1S2; no ble edema  RESP: fairly CTA B c good efforts  GI: abd o/s/nt/nd; no masses  MUSCULOSKELETAL: extremities nl - no gross deformities noted  SKIN: no suspicious lesions or rashes, dry to touch  NEURO:  strength symmetric; moves ext  PSYCH: mood ok, affect not nl; seems confused  LYMPH: no palpable ant/post cervical and supraclavicular adenopathy  OTHER:  Access - TAWNY c good thrill/bruit    LABS:      CBC RESULTS:     Recent Labs   Lab 06/22/20  0658   WBC 5.8   RBC 3.47*   HGB 10.8*   HCT 33.7*   *     BMP RESULTS:  Recent Labs   Lab 06/22/20  0658   *   POTASSIUM 5.8*   CHLORIDE 94   CO2 27   BUN 48*   CR 12.10*   *   ALLYSON 10.3*     INRNo lab results found in last 7 days.     DIAGNOSTICS:  Personally reviewed CXR - fluid in fissure, R hilar infiltrate?    A/P:  Keagan Wayne is a 74 year old male c ESKD who has confusion, hyperkalemia and possible pneumonia.    1.  ESKD c hyperkalemia.  Pt is due for HD today and he hasn't run, yet.  He usually takes ~3L off.  The k is elevated bc it's his stretch day.  A.  HD today c k2 bath.  B.  Net 3L UF goal.    2.  Confusion.  Per dialysis RN, he is not at his baseline.  Infectious etiology is a possibility despite nl wbc and lack of fever bc his CXR shows a R hilar infiltrate.  Neuro eval pending.  This is not bc of his labs as they are stable.  A.  Follow clinically.  B.  Consider treating c abx empirically.    3.  Hypotension.  Pt's BP was  low at dialysis (in the 80s) and has improved to the 140s here.  At present, he is fine.  A.  Hold BP meds for now.  B.  Follow BP as fluid is pulled on HD.    4.  DM2.  Pt's sugars are now well controlled, but better than in January when his A1C was 14%.    A.  Follow sugars, clinically.    5.  FEN.  Na is a bit low, but corrects to 131 for the low sugars.  He is on a dialysis diabetic diet.  A.  Follow electrolytes.  B.  Same diet as ordered.    Thank you for this consultation. We will follow c you.  Please call if any questions.    Attestation:   I have reviewed today's relevant vital signs, notes, medications, labs and imaging.    Bryn Warren MD  [facility] - Nephrology  [phone]

## 2020-06-22 NOTE — PHARMACY-ADMISSION MEDICATION HISTORY
Pharmacy Medication History  Admission medication history interview status for the 6/22/2020  admission is complete. See EPIC admission navigator for prior to admission medications     Medication history sources: Kaleb, Care Everywhere and María [facility] [phone]  Medication history source reliability: Good  Adherence assessment: Good    Significant changes made to the medication list:  Removed prolensa, bumex, wellbutrin, carboxymethylcellulose, coreg, catapress, clopidogrel, imdur, losartan, viekira, oxycodone.  Added metoprolol, olanzapine, silvadene, nephrocap. Changed lipitor dose, novolog.  Changed tresiba to lantus      Additional medication history information:   none    Medication reconciliation completed by provider prior to medication history? Yes    Time spent in this activity: 45 minutes      Prior to Admission medications    Medication Sig Last Dose Taking? Auth Provider   aspirin 81 MG chewable tablet Take 1 tablet (81 mg) by mouth daily 6/21/2020 at afternoon Yes Nila Espana,    atorvastatin (LIPITOR) 80 MG tablet Take 80 mg by mouth At Bedtime  6/21/2020 at hs Yes Unknown, Entered By History   calcium acetate (PHOSLO) 667 MG CAPS capsule Take 2,001 mg by mouth 3 times daily (with meals)  6/21/2020 at pm Yes Unknown, Entered By History   Gabapentin (NEURONTIN PO) Take 100 mg by mouth every evening  6/21/2020 at pm Yes Unknown, Entered By History   insulin aspart (NOVOLOG FLEXPEN) 100 UNIT/ML injection Inject 8 Units Subcutaneous 3 times daily (with meals)  Past Week at Unknown time Yes Reported, Patient   insulin glargine (LANTUS PEN) 100 UNIT/ML pen Inject 22 Units Subcutaneous every morning 6/21/2020 at am Yes Unknown, Entered By History   metoprolol tartrate (LOPRESSOR) 25 MG tablet Take 12.5 mg by mouth 2 times daily On dialysis days, only take in the evening. Skip morning dose on dialysis days. 1/2 x 25mg = 12.5mg 6/21/2020 at pm Yes Unknown, Entered By History   multivitamin  "RENAL (NEPHROCAPS/TRIPHROCAPS) 1 MG capsule Take 1 capsule by mouth every evening 6/21/2020 at pm Yes Unknown, Entered By History   OLANZapine zydis (ZYPREXA) 15 MG ODT Take 15 mg by mouth At Bedtime 6/21/2020 at pm Yes Unknown, Entered By History   silver sulfADIAZINE (SILVADENE) 1 % external cream Apply topically daily Apply on blisters Past Week at Unknown time Yes Unknown, Entered By History   umeclidinium (INCRUSE ELLIPTA) 62.5 MCG/INH oral inhaler Inhale 1 puff into the lungs daily Past Week at Unknown time Yes Unknown, Entered By History   VITAMIN D, CHOLECALCIFEROL, PO Take 1,000 Units by mouth daily afternoon 6/21/2020 at afternoon Yes Unknown, Entered By History   blood glucose monitoring (ACCU-CHEK MINISTERIO PLUS) meter device kit accucheck Lesly glucometer   Reported, Patient   blood glucose monitoring (ACCU-CHEK FASTCLIX) lancets Test 2 times per day.   Reported, Patient   Blood Pressure Monitoring (RA BLOOD PRESSURE CUFF MONITOR) MISC by Misc.(Non-Drug; Combo Route) route once daily.   Reported, Patient   Insulin Pen Needle (PEN NEEDLES 5/16\") 31G X 8 MM MISC 31g x 8 mm   Reported, Patient   latanoprost (XALATAN) 0.005 % ophthalmic solution Place 1 drop into both eyes At Bedtime  on hold  Reported, Patient   Misc. Devices (ROLLATOR ULTRA-LIGHT) MISC 1 each by Other route   Reported, Patient   ULTICARE MICRO 32G X 4 MM insulin pen needle    Reported, Patient   ULTICARE PEN NEEDLES 29G X 12MM    Reported, Patient       "

## 2020-06-22 NOTE — CONSULTS
[facility]    Neurology Consultation     Date of Admission:  6/22/2020    Assessment & Plan   Keagan Wayne is a 74 year old male who was admitted on 6/22/2020. I was asked to see the patient for syncope vs seiure during dialysis.    Staffs noticed that he was zoning out in the middle of conversation+    Syncope, disequilibrium syndrome vs seizure ( risk factor- FHx of seizure, concussion, prior alcohol /drug Hx)  -EEG - Global slow activity- likely encephlopathy  -MRI Brain wo ( ESRD)  1. No acute intracranial abnormality. Specifically, no acute infarct,  hemorrhage, mass lesion or mass effect.  2. Global brain parenchymal volume loss and presumed chronic small vessel ischemic changes  -B12 (high), Folate (wnl), TSH ( high)    [facility]  Office Phone [phone]      Code Status    Full Code    Reason for Consult   Reason for consult: I was asked by primary team to evaluate this patient for LOC.    Primary Care Physician   Yolanda Hernandez    Chief Complaint       History is obtained from the patient's parent(s)    History of Present Illness   Keagan Wayne is a 74 year old male with HTN, DM, CAD, COPD, ESRD on hemodialysis who presents with syncope during dialysis.   drug/alcohol history+ last drink 1 year ago  TBI +  Fhx of seizure ( brother)    With respect to risk factors for epilepsy the patient denies any developmental delay, febrile seizure, or meningitis/encephalitis, prior stroke, prior brain surgery    no staring episode, tongue biting, urinary incontinenceor any other seizure episode.    Staffs noticed that he was zoning out in the middle of conversation+  Head trauma+ in the past  Fhx seizure ( brother)  alcohol/Drug History- drinking vodka 2 bottles frequently until 2019 and MJ until 6-12 mo ago    no seizure, no shaking, staring episode, tongue biting or incontinence,   recent illness, sleep deprivation, stress, alcohol/Drug recently.  Denied any missing AED and  tolerating well without side effect.    CTH -N/A  Alcohol <0.0.1  Na 129  Glu 231  Mg N/A  Ca 10.3  UA N/A        Past Medical History    I have reviewed this patient's medical history and updated it with pertinent information if needed.   Past Medical History:   Diagnosis Date     Acute pulmonary edema (H)      Anemia      Bipolar 1 disorder (H)      CAD (coronary artery disease)      Chronic pain      Chronic, continuous use of opioids      Cognitive impairment      Colitis      COPD (chronic obstructive pulmonary disease) (H)      Depression      Diabetes (H)      ESRD (end stage renal disease) (H)      ESRD (end stage renal disease) on dialysis (H)      Glaucoma      Hepatitis C      Hepatitis C antibody positive in blood      History of seizures      Hyperlipidemia      Hyperlipidemia      Hypertension      IPMN (intraductal papillary mucinous neoplasm)      IV drug abuse (H) 1970's     Non compliance w medication regimen      Non-ischemic cardiomyopathy (H)      Non-ST elevation MI (NSTEMI) (H)      PAD (peripheral artery disease) (H)      Peripheral neuropathy      Peripheral neuropathy      Pneumonia      Secondary hyperparathyroidism (H)      Vitamin D deficiency        Past Surgical History   I have reviewed this patient's surgical history and updated it with pertinent information if needed.  Past Surgical History:   Procedure Laterality Date     CREATE FISTULA ARTERIOVENOUS UPPER EXTREMITY Right 5/8/2016    Procedure: CREATE FISTULA ARTERIOVENOUS UPPER EXTREMITY;  Surgeon: Josias Valente MD;  Location:  OR     CREATE FISTULA ARTERIOVENOUS UPPER EXTREMITY Right 12/14/2016    Procedure: CREATE FISTULA ARTERIOVENOUS UPPER EXTREMITY;  Surgeon: Contreras Bowman MD;  Location:  OR     CYSTOSCOPY, RETROGRADES, COMBINED Bilateral 3/29/2018    Procedure: COMBINED CYSTOSCOPY, RETROGRADES;  Cystoscopy, Bilateral Retrogrades, Bilateral Ureteral Washings, Pyelogram ;  Surgeon: Moisés Pena  "MD;  Location: [facility] OR     HERNIA REPAIR         Prior to Admission Medications   Prior to Admission Medications   Prescriptions Last Dose Informant Patient Reported? Taking?   Blood Pressure Monitoring (RA BLOOD PRESSURE CUFF MONITOR) MISC   Yes No   Sig: by Misc.(Non-Drug; Combo Route) route once daily.   Carvedilol (COREG PO)  Guardian Yes No   Sig: Take 25 mg by mouth 2 times daily (with meals) 1/2 tab morning   Gabapentin (NEURONTIN PO)  Guardian Yes No   Sig: Take 100 mg by mouth daily    Insulin Pen Needle (PEN NEEDLES ") 31G X 8 MM MISC   Yes No   Sig x 8 mm   LOSARTAN POTASSIUM PO  Guardian Yes No   Sig: Take 100 mg by mouth daily 1/4 tab   Misc. Devices (ROLLATOR ULTRA-LIGHT) MISC   Yes No   Si each by Other route   OXYCODONE HCL PO  Guardian Yes No   Sig: Take 10 mg by mouth 3 times daily as needed (takes scheduled)   ULTICARE MICRO 32G X 4 MM insulin pen needle   Yes No   ULTICARE PEN NEEDLES 29G X 12MM   Yes No   VITAMIN D, CHOLECALCIFEROL, PO  Guardian Yes No   Sig: Take 1,000 Units by mouth daily   aspirin 81 MG chewable tablet  Guardian No No   Sig: Take 1 tablet (81 mg) by mouth daily   atorvastatin (LIPITOR) 10 MG tablet   Yes No   Sig: Take 10 mg by mouth daily   blood glucose monitoring (ACCU-CHEK MINISTERIO PLUS) meter device kit   Yes No   Sig: accucheck Lesly glucometer   blood glucose monitoring (ACCU-CHEK FASTCLIX) lancets   Yes No   Sig: Test 2 times per day.   bromfenac (PROLENSA) 0.07 % ophthalmic solution   Yes No   Si drop   buPROPion (WELLBUTRIN SR) 150 MG 12 hr tablet   Yes No   Sig: Take 150 mg by mouth   bumetanide (BUMEX) 2 MG tablet  Guardian No No   Sig: Take 2 tablets (4 mg) by mouth daily   calcium acetate (PHOSLO) 667 MG CAPS capsule  Guardian Yes No   Sig: Take 1,334 mg by mouth 4 times daily (with meals and nightly)   carboxymethylcellulose PF (CARBOXYMETHYLCELLULOSE SODIUM) 0.5 % SOLN ophthalmic solution   Yes No   Si drop   citalopram (CELEXA) 20 MG tablet  " Guardian No No   Sig: Take 1 tablet (20 mg) by mouth daily   cloNIDine (CATAPRES-TTS2) 0.2 MG/24HR patch  Guardian Yes No   Sig: Place 1 patch onto the skin once a week   clopidogrel (PLAVIX) 75 MG tablet   Yes No   Sig: Take 75 mg by mouth daily   insulin aspart (NOVOLOG FLEXPEN) 100 UNIT/ML injection   Yes No   Sig: Inject Subcutaneous 3 times daily (with meals) sliding scale up to 7 units before meals   insulin degludec (TRESIBA FLEXTOUCH) 100 UNIT/ML pen   Yes No   Sig: Inject 14 Units Subcutaneous daily (with breakfast)    isosorbide mononitrate (IMDUR) 30 MG 24 hr tablet  Guardian Yes No   Sig: Take 90 mg by mouth daily   latanoprost (XALATAN) 0.005 % ophthalmic solution   Yes No   Si drop   ombitasvir-paritaprevir-ritonavir;dasabuvir (VIEKIRA MAYANK) 12.5-75-50 &250 MG tablets   Yes No   Sig: Take twice daily according to package instructions for 12 weeks total.   umeclidinium (INCRUSE ELLIPTA) 62.5 MCG/INH oral inhaler  Guardian Yes No   Sig: Inhale 1 puff into the lungs daily      Facility-Administered Medications: None     Allergies   Allergies   Allergen Reactions     Morphine Rash     Dry skin  Dry skin  Dry skin       Social History   I have reviewed this patient's social history and updated it with pertinent information if needed. Keagan Wayne  reports that he has quit smoking. His smoking use included cigarettes. He started smoking about 62 years ago. He has a 10.00 pack-year smoking history. He has never used smokeless tobacco. He reports current alcohol use of about 14.0 standard drinks of alcohol per week. He reports that he does not use drugs.    Family History   I have reviewed this patient's family history and updated it with pertinent information if needed.   Family History   Problem Relation Age of Onset     Coronary Artery Disease Brother      Diabetes Mother        Review of Systems The 10 point Review of Systems is negative other than noted in the HPI or here.     Patient denies any LOC,  HA, vision change (double vision/blurry vision/ vision loss), dizziness, imbalance, nausea, vomiting, dysphagia, dysarthria, weakness, numbness, tingling, incontinence, saddle anesthesia, prior seizure, stroke, trauma, brain surgery, weight change, recent illness, bleeding, bruising, fever, diarrhea, chest pain or shortness breath    Physical Exam   Temp: 97.2  F (36.2  C) Temp src: Oral BP: 126/68 Pulse: 101 Heart Rate: 109 Resp: 14 SpO2: 97 % O2 Device: None (Room air) Oxygen Delivery: 2 LPM  Vital Signs with Ranges  Temp:  [97.2  F (36.2  C)-97.8  F (36.6  C)] 97.2  F (36.2  C)  Pulse:  [] 101  Heart Rate:  [] 109  Resp:  [10-16] 14  BP: (126-146)/(68-87) 126/68  SpO2:  [96 %-98 %] 97 %  0 lbs 0 oz       Limited exam in the context of telemedicine    General appearance:No acute distress   Neuro/Psych:Awake, alert, oriented *3  .   Cranial nerves: Grossly II-XII intact   Fundi/Optic disc: Not available  Extraocular movements intact. No nystagmus, Face appears symmetric. Facial sensation - Numbness on the Rt ( per patient). Hearing intact to finger rub (cannot assess).   Motor strength: Upper/ Lower extremities at least 4/5 bilaterally  Range of motion: Partially limited   Sensory: symmetric response to LT stimuli in both upper and lower extremities   reflexes: Not available  Babinski/Clonus/Dennis: Not available  Coordination: Not available, normal tandem gait  Romberg  not available.   Gait: Not available.          Data   Results for orders placed or performed during the hospital encounter of 06/22/20 (from the past 24 hour(s))   EKG 12-lead, tracing only   Result Value Ref Range    Interpretation ECG Click View Image link to view waveform and result    Basic metabolic panel   Result Value Ref Range    Sodium 129 (L) 133 - 144 mmol/L    Potassium 5.8 (H) 3.4 - 5.3 mmol/L    Chloride 94 94 - 109 mmol/L    Carbon Dioxide 27 20 - 32 mmol/L    Anion Gap 8 3 - 14 mmol/L    Glucose 231 (H) 70 - 99 mg/dL     Urea Nitrogen 48 (H) 7 - 30 mg/dL    Creatinine 12.10 (H) 0.66 - 1.25 mg/dL    GFR Estimate 4 (L) >60 mL/min/[1.73_m2]    GFR Estimate If Black 4 (L) >60 mL/min/[1.73_m2]    Calcium 10.3 (H) 8.5 - 10.1 mg/dL   CBC with platelets + differential   Result Value Ref Range    WBC 5.8 4.0 - 11.0 10e9/L    RBC Count 3.47 (L) 4.4 - 5.9 10e12/L    Hemoglobin 10.8 (L) 13.3 - 17.7 g/dL    Hematocrit 33.7 (L) 40.0 - 53.0 %    MCV 97 78 - 100 fl    MCH 31.1 26.5 - 33.0 pg    MCHC 32.0 31.5 - 36.5 g/dL    RDW 14.4 10.0 - 15.0 %    Platelet Count 149 (L) 150 - 450 10e9/L    Diff Method Automated Method     % Neutrophils 56.2 %    % Lymphocytes 26.9 %    % Monocytes 9.1 %    % Eosinophils 7.4 %    % Basophils 0.2 %    % Immature Granulocytes 0.2 %    Nucleated RBCs 0 0 /100    Absolute Neutrophil 3.3 1.6 - 8.3 10e9/L    Absolute Lymphocytes 1.6 0.8 - 5.3 10e9/L    Absolute Monocytes 0.5 0.0 - 1.3 10e9/L    Absolute Eosinophils 0.4 0.0 - 0.7 10e9/L    Absolute Basophils 0.0 0.0 - 0.2 10e9/L    Abs Immature Granulocytes 0.0 0 - 0.4 10e9/L    Absolute Nucleated RBC 0.0    Troponin I   Result Value Ref Range    Troponin I ES <0.015 0.000 - 0.045 ug/L   CRP inflammation   Result Value Ref Range    CRP Inflammation 10.8 (H) 0.0 - 8.0 mg/L   XR Chest Port 1 View    Narrative    CHEST PORTABLE ONE VIEW 6/22/2020 8:41 AM    HISTORY: Syncope.    COMPARISON: 10/12/2017      Impression    IMPRESSION: New focal masslike opacity involving the infrahilar region  of the right middle lobe. This could represent underlying pulmonary  mass versus focal dense consolidation and volume loss in the right  middle lobe. Recommend chest CT with IV contrast material for further  evaluation. Minimal amount of right basilar pleural fluid or  thickening. Minimal left basilar pleural fluid or thickening. Upper  lungs clear. Normal heart size. Normal pulmonary vascularity. Osseous  structures unremarkable.    CURT L BEHRNS, MD   Nephrology IP Consult: Patient to  "be seen: Routine - within 24 hours; ESRD; Consultant may enter orders: Yes; Requesting provider? Attending physician    Bryn Mendoza MD     6/22/2020 12:32 PM  [facility]    RENAL CONSULTATION NOTE    REFERRING MD:  Dr. Arango    REASON FOR CONSULTATION:  ESKD c hyperkalemia, syncope    DATE OF CONSULTATION: 06/22/20    SHORTHAND KEY FOR MY NOTES:  c = with, s = without, p = after, a   = before, x = except, asx = asymptomatic, tx = transplant or   treatment, sx = symptoms or symptomatic, cx = canceled or   culture, rxn = reaction, yday = yesterday, nl = normal, abx =   antibiotics, fxn = function, dx = diagnosis, dz = disease, m/h =   melena/hematochezia, c/d/l/ha =   cramping/dizziness/lightheadedness/headache, d/c = discharge or   diarrhea/constipation, f/c/n/v = fevers/chills/nausea/vomiting,   cp/sob = chest pain/shortness of breath, tbv = total body volume,   rxn = reaction, tdc = tunneled dialysis catheter, pta = prior to   admission, hd = hemodialysis, pd = peritoneal dialysis, hhd =   home hemodialysis    HPI: Keagan Wayne is a 74 year old male c ESKD 2 DM2/HTN/HCV   who dialyses MWF via a TAWNY at the [facility] under the care of Dr. Calle / Solange Reddy CNP and   was admitted on 6/22/2020 c confusion, malaise.  Hx obtained from   dialysis RN, chart, pt.    Pt presented to the HD unit this AM and told the RN, \"I'm sick.    I don't feel well.\"  He was not very interactive or responsive to   her questions, and he didn't look good, his vitals were abn   (89/43 - 130 reg) so she activated EMS and didn't start him on   dialysis.  When EMS arrived, his sugars were ok and en route he   became more interactive.  In the ER, his vitals were better.  On   the floor, he is not happy bc he thought he had a full HD run   earlier today and that \"I don't like when ppl lie to me.\"  When I   was c him, we called the HD RN who explained to him that he   didn't even " "start today's run.  As such, he is willing to do HD   now.    Currently, he denies any f/c/n/v.  No cp/sob.  He does not have   any LE edema and states that \"I don't get swelling in my legs, it   goes to my belly.\"  He is talking very slowly and seems to drift   off mid-conversation.    ROS:  A complete review of systems was performed and is negative   x as noted above.    PMH:    Past Medical History:   Diagnosis Date     Acute pulmonary edema (H)      Anemia      Bipolar 1 disorder (H)      CAD (coronary artery disease)      Chronic pain      Chronic, continuous use of opioids      Cognitive impairment      Colitis      COPD (chronic obstructive pulmonary disease) (H)      Depression      Diabetes (H)      ESRD (end stage renal disease) (H)      ESRD (end stage renal disease) on dialysis (H)      Glaucoma      Hepatitis C      Hepatitis C antibody positive in blood      History of seizures      Hyperlipidemia      Hyperlipidemia      Hypertension      IPMN (intraductal papillary mucinous neoplasm)      IV drug abuse (H) 1970's     Non compliance w medication regimen      Non-ischemic cardiomyopathy (H)      Non-ST elevation MI (NSTEMI) (H)      PAD (peripheral artery disease) (H)      Peripheral neuropathy      Peripheral neuropathy      Pneumonia      Secondary hyperparathyroidism (H)      Vitamin D deficiency      PSH:    Past Surgical History:   Procedure Laterality Date     CREATE FISTULA ARTERIOVENOUS UPPER EXTREMITY Right 5/8/2016    Procedure: CREATE FISTULA ARTERIOVENOUS UPPER EXTREMITY;    Surgeon: Josias Valente MD;  Location:  OR     CREATE FISTULA ARTERIOVENOUS UPPER EXTREMITY Right 12/14/2016    Procedure: CREATE FISTULA ARTERIOVENOUS UPPER EXTREMITY;    Surgeon: Contreras Bowman MD;  Location:  OR     CYSTOSCOPY, RETROGRADES, COMBINED Bilateral 3/29/2018    Procedure: COMBINED CYSTOSCOPY, RETROGRADES;  Cystoscopy,   Bilateral Retrogrades, Bilateral Ureteral Washings, Pyelogram ;  "   Surgeon: Moisés Pena MD;  Location: [facility] OR     HERNIA REPAIR       MEDICATIONS:      - MEDICATION INSTRUCTIONS for Dialysis Patients -   Does not   apply See Admin Instructions     aspirin  81 mg Oral Daily     insulin aspart  1-7 Units Subcutaneous TID AC     insulin aspart  1-5 Units Subcutaneous At Bedtime     insulin degludec  14 Units Subcutaneous QAM     sodium chloride (PF)  3 mL Intracatheter Q8H     umeclidinium  1 puff Inhalation Daily     ALLERGIES:    Allergies as of 06/22/2020 - Reviewed 06/22/2020   Allergen Reaction Noted     Morphine Rash 09/29/2016     FH:    Family History   Problem Relation Age of Onset     Coronary Artery Disease Brother      Diabetes Mother      SH:    Social History     Socioeconomic History     Marital status:      Spouse name: Not on file     Number of children: Not on file     Years of education: Not on file     Highest education level: Not on file   Occupational History     Not on file   Social Needs     Financial resource strain: Not on file     Food insecurity     Worry: Not on file     Inability: Not on file     Transportation needs     Medical: Not on file     Non-medical: Not on file   Tobacco Use     Smoking status: Former Smoker     Packs/day: 0.20     Years: 50.00     Pack years: 10.00     Types: Cigarettes     Start date: 10/12/1957     Smokeless tobacco: Never Used   Substance and Sexual Activity     Alcohol use: Yes     Alcohol/week: 14.0 standard drinks     Types: 7 Cans of beer, 7 Shots of liquor per week     Comment: cocktail 2-3/month     Drug use: No     Types: Marijuana     Comment: past marijuana use     Sexual activity: Not on file   Lifestyle     Physical activity     Days per week: Not on file     Minutes per session: Not on file     Stress: Not on file   Relationships     Social connections     Talks on phone: Not on file     Gets together: Not on file     Attends Yazdanism service: Not on file     Active member of club or  organization: Not on file     Attends meetings of clubs or organizations: Not on file     Relationship status: Not on file     Intimate partner violence     Fear of current or ex partner: Not on file     Emotionally abused: Not on file     Physically abused: Not on file     Forced sexual activity: Not on file   Other Topics Concern     Parent/sibling w/ CABG, MI or angioplasty before 65F 55M? Not   Asked   Social History Narrative     Not on file     PHYSICAL EXAM:    /68 (BP Location: Left arm)   Pulse 101   Temp 97.2  F   (36.2  C) (Oral)   Resp 14   SpO2 97%     GENERAL: awake, mostly alert, NAD, sitting up in bed  HEENT:  normocephalic, no gross abnormalities; MMM; pupils equal,   EOMI, no scleral icterus or conj edema  CV: Reg, tachy, nl S1S2; no ble edema  RESP: fairly CTA B c good efforts  GI: abd o/s/nt/nd; no masses  MUSCULOSKELETAL: extremities nl - no gross deformities noted  SKIN: no suspicious lesions or rashes, dry to touch  NEURO:  strength symmetric; moves ext  PSYCH: mood ok, affect not nl; seems confused  LYMPH: no palpable ant/post cervical and supraclavicular   adenopathy  OTHER:  Access - TAWNY c good thrill/bruit    LABS:      CBC RESULTS:     Recent Labs   Lab 06/22/20  0658   WBC 5.8   RBC 3.47*   HGB 10.8*   HCT 33.7*   *     BMP RESULTS:  Recent Labs   Lab 06/22/20  0658   *   POTASSIUM 5.8*   CHLORIDE 94   CO2 27   BUN 48*   CR 12.10*   *   ALLYSON 10.3*     INRNo lab results found in last 7 days.     DIAGNOSTICS:  Personally reviewed CXR - fluid in fissure, R hilar   infiltrate?    A/P:  Keagan Wayne is a 74 year old male c ESKD who has   confusion, hyperkalemia and possible pneumonia.    1.  ESKD c hyperkalemia.  Pt is due for HD today and he hasn't   run, yet.  He usually takes ~3L off.  The k is elevated bc it's   his stretch day.  A.  HD today c k2 bath.  B.  Net 3L UF goal.    2.  Confusion.  Per dialysis RN, he is not at his baseline.    Infectious etiology  is a possibility despite nl wbc and lack of   fever bc his CXR shows a R hilar infiltrate.  Neuro eval pending.    This is not bc of his labs as they are stable.  A.  Follow clinically.  B.  Consider treating c abx empirically.    3.  Hypotension.  Pt's BP was low at dialysis (in the 80s) and   has improved to the 140s here.  At present, he is fine.  A.  Hold BP meds for now.  B.  Follow BP as fluid is pulled on HD.    4.  DM2.  Pt's sugars are now well controlled, but better than in   January when his A1C was 14%.    A.  Follow sugars, clinically.    5.  FEN.  Na is a bit low, but corrects to 131 for the low   sugars.  He is on a dialysis diabetic diet.  A.  Follow electrolytes.  B.  Same diet as ordered.    Thank you for this consultation. We will follow c you.  Please   call if any questions.    Attestation:   I have reviewed today's relevant vital signs, notes, medications,   labs and imaging.    Bryn Warren MD  [facility] - Nephrology  [phone]   Hemoglobin A1c   Result Value Ref Range    Hemoglobin A1C 8.9 (H) 0 - 5.6 %   Troponin I   Result Value Ref Range    Troponin I ES <0.015 0.000 - 0.045 ug/L   D dimer quantitative   Result Value Ref Range    D Dimer 2.1 (H) 0.0 - 0.50 ug/ml FEU   Glucose by meter   Result Value Ref Range    Glucose 209 (H) 70 - 99 mg/dL   Blood culture    Specimen: Blood    Left Arm   Result Value Ref Range    Specimen Description Blood Left Arm     Culture Micro No growth after 1 hour    Procalcitonin   Result Value Ref Range    Procalcitonin 1.01 ng/ml         This is a telemedicine visit that was performed with the originating site at [facility] and the distant site at provider s home in [place], [place]. Verbal consent was given to participate in video visit using [employer] real-time telemedicine video conference.  This visit occurred during the Coronavirus (COVID-19) Public Health Emergency and was requested by patient due to contact concerns.     I discussed with  the patient the nature of our telemedicine visits, that:      I would evaluate the patient and recommend diagnostics and treatments based on my assessment and the exam is limited due to the nature of telemedicine visit.    Our sessions are not being recorded and that personal health information is protected    Our team would provide followup care in person if/when the patient needs it.     Patient identification was verified before the start of the encounter.     total time : 70 minutes  More than 50% of the time was spent on counseling on the Pathophysiology and differential diagnosis of the syncope/seizure or disequilibrium syndrome,the importance of MRI/EEG and other Risk factor management as well as discussing/coordinating care with hospital staffs.

## 2020-06-22 NOTE — ED NOTES
Pt resting.  Alert and wakes easily.  Denies any needs.  Waiting admit.  Plan per hospitalist is to dialyze pt and if all well discharge this afternoon

## 2020-06-22 NOTE — CODE/RAPID RESPONSE
[facility]    RRT Note:  CODE 21  6/22/2020   Time Called: 11: 37 AM    RRT called for: Code 21     Assessment & Plan   Bipolar disorder  Concern for cognitive impairment  Code 21 called as Mr. Wayne was attempting to leave. With verbal de-escalation he was able to be redirected to his room without restraint. Per EHR review he was evaluated by Neuropsychology at [facility] in October 2019 for episodes of acute encephalopathy after missing dialysis treatments. He has history of bipolar disorder with multiple psychiatric hospitalizations int he 1980s and 1990s. MoCA screen on 6/25/2019 20/30. Neuropsychology deemed his cognitive functioning as baseline intellectual functioning in the borderline range. At that time it was felt acute encephalopathy was secondary to missed dialysis sessions creating an exacerbation of his bipolar disorder.     Mr. Wayne had as similar episode of encephalopathy earlier in June 2020. He was hospitalized at [facility] with mental status clearing after dialysis.     His son, who would be his surrogate decision maker, is in agreement he needs to stay in the hospital. Given the concern for cognitive impairment and his son agreeing to continued hospitalization will not put him on a hold for now, but will not let him leave. If he demonstrates his behavior is due to an acute psychiatric illness which would require Psychiatry consult will need to consider a hold.     INTERVENTIONS:  - okay to leave IV out for now if it causes agitation   - recommend 1:1 sitter with good rapport   - continue to monitor   - avoid sedation especially in the setting of dialysis     Discussed with and defer further cares to Dr. Arango, Hospitalist.     Code Status: Full Code    YARA Jerome, CNP  Hospitalist Service, House Officer  [facility]     Text Page  Pager: [phone]    Allergies   Allergies   Allergen Reactions     Morphine Rash     Dry  skin  Dry skin  Dry skin       Physical Exam   Vital Signs with Ranges:  Temp:  [97.2  F (36.2  C)-97.8  F (36.6  C)] 97.2  F (36.2  C)  Pulse:  [] 101  Heart Rate:  [] 109  Resp:  [10-16] 14  BP: (126-146)/(68-87) 126/68  SpO2:  [96 %-98 %] 97 %  No intake/output data recorded.    Constitutional: 74- year old male walking in the hallway attempting to leave.  Pulmonary: He is not hypoxic. No obvious increased work of breathing.   Cardiovascular: He appears well perfused. Warm.   Skin/Integumen: No obvious concerning lesions or rashes on exposed skin.   Neuro: Awake, oriented to self only. No obvious focal deficit.   Psych:  Pacing in hallway. Requiring verbal de-escalation. No behavior disturbance toward staff.   Extremities: Moves all extremities. Slightly unsteady on feet.     IMAGING: (X-ray/CT/MRI)   Recent Results (from the past 24 hour(s))   XR Chest Port 1 View    Narrative    CHEST PORTABLE ONE VIEW 6/22/2020 8:41 AM    HISTORY: Syncope.    COMPARISON: 10/12/2017      Impression    IMPRESSION: New focal masslike opacity involving the infrahilar region  of the right middle lobe. This could represent underlying pulmonary  mass versus focal dense consolidation and volume loss in the right  middle lobe. Recommend chest CT with IV contrast material for further  evaluation. Minimal amount of right basilar pleural fluid or  thickening. Minimal left basilar pleural fluid or thickening. Upper  lungs clear. Normal heart size. Normal pulmonary vascularity. Osseous  structures unremarkable.    CURT L BEHRNS, MD       CBC with Diff:  Recent Labs   Lab Test 06/22/20  0658 10/10/17  1146   WBC 5.8 5.3   HGB 10.8* 11.1*   MCV 97 97   * 114*   INR  --  1.08      Comprehensive Metabolic Panel:  Recent Labs   Lab 06/22/20  0658   *   POTASSIUM 5.8*   CHLORIDE 94   CO2 27   ANIONGAP 8   *   BUN 48*   CR 12.10*   GFRESTIMATED 4*   GFRESTBLACK 4*   ALLYSON 10.3*     Time Spent on this Encounter   I  spent 30 minutes on the unit/floor managing the care of Keagan Rosana. Over 50% of my time was spent counseling the patient and/or coordinating care regarding services listed in this note.

## 2020-06-22 NOTE — H&P
[facility]    History and Physical - Hospitalist Service       Date of Admission:  6/22/2020    Assessment & Plan   Keagan Wayne is a 74 year old male with ESRD on hemodialysis.  He had sudden loss of consciousness at the initiation of his hemodialysis session this morning.  Was associated with some hypotension.  He was taken to the emergency department where he was awake alert and grossly nonfocal.  His vital signs were stable.  Chest x-ray showed a right middle lobe opacity versus infiltrate.  He does have a history of seizures for which she was admitted here in 2017.    Syncope   History of seizures  The patient was admitted here in 2017 for seizures.  He was placed on Keppra.  Evaluation including MRI was unremarkable.  I doubt that he had an acute cardiac event or pulmonary embolism.    Cardiac monitoring    Repeat troponin along with a d-dimer    Neurology consultation    Right middle lobe opacity versus mass  The patient's procalcitonin is 1.  He is not hypoxic or febrile.    Start antibiotics    CT chest    ESRD on hemodialysis     Nephrology consulted for his missed hemodialysis session    Type 2 diabetes mellitus   Hemoglobin A1C   Date Value Ref Range Status   06/22/2020 8.9 (H) 0 - 5.6 % Final     Comment:     Normal <5.7% Prediabetes 5.7-6.4%  Diabetes 6.5% or higher - adopted from ADA   consensus guidelines.       Continue degludec    Aspart correction scale ordered    Coronary artery disease   Hypertension   Diastolic dysfunction on transthoracic echocardiogram     Continue aspirin, statin, carvedilol, clonidine, clopidogrel, isosorbide, losartan and bumetanide    Chronic obstructive lung disease     Continue Incruse Ellipta    Bipolar disorder   Peripheral neuropathy     Continue gabapentin and citalopram but hold bupropion because it can cause seizures    History of treatment for hepatitis C     Diet: diabetic, hemodialysis   DVT Prophylaxis: Low Risk/Ambulatory with no VTE  prophylaxis indicated  Warner Catheter: not present  Code Status: Full        Disposition Plan   Expected discharge: Tomorrow, recommended to prior living arrangement once cardiac status is stable .  Entered: Vikas Arango MD 06/22/2020, 1:45 PM     The patient's care was discussed with the Patient.    Vikas Arango MD  [facility]    ______________________________________________________________________    Chief Complaint   Syncope     History is obtained from the patient, electronic health record and emergency department physician    History of Present Illness   Keagan Wayne is a 74 year old male who has multiple medical problems including end-stage kidney disease for which he undergoes hemodialysis on Monday Wednesdays and Fridays.  The patient says he has been feeling totally well recently and went to hemodialysis as usual.  He really does not remember what happened this morning other than that he suddenly woke up and he was in the [facility] emergency department.  Apparently soon after beginning hemodialysis this morning he lost consciousness for about 5 minutes and had associated hypotension.  911 was called and when EMS arrived the patient was confused.  On route to the hospital he became more alert.  He was not hypoglycemic.  His initial blood pressure in dialysis was 147 systolic and was 87 during the syncopal episode.  In the emergency department his vital signs were stable.  He was awake and speaking normally grossly he was nonfocal.  He was little confused initially as to where he was.  Chest x-ray showed a masslike opacity in the right middle lobe.  EKG showed sinus tachycardia with a first-degree AV block.  There was no significant change from the patient's last EKG.  Labs included white blood cell count 5.8 thousand hemoglobin 10.8 g and platelet count 149,000.  Sodium 129 potassium 5.8 glucose over 200 BUN 48 creatinine 12 calcium 10.3 potassium 5.8.  CRP 11 troponin  undetectable.    Review of Systems    The 10 point Review of Systems is negative other than noted in the HPI or here.     Past Medical History    I have reviewed this patient's medical history and updated it with pertinent information if needed.   Past Medical History:   Diagnosis Date     Acute pulmonary edema (H)      Anemia      Bipolar 1 disorder (H)      CAD (coronary artery disease)      Chronic pain      Chronic, continuous use of opioids      Cognitive impairment      Colitis      COPD (chronic obstructive pulmonary disease) (H)      Depression      Diabetes (H)      ESRD (end stage renal disease) (H)      ESRD (end stage renal disease) on dialysis (H)      Glaucoma      Hepatitis C      Hepatitis C antibody positive in blood      History of seizures      Hyperlipidemia      Hyperlipidemia      Hypertension      IPMN (intraductal papillary mucinous neoplasm)      IV drug abuse (H) 1970's     Non compliance w medication regimen      Non-ischemic cardiomyopathy (H)      Non-ST elevation MI (NSTEMI) (H)      PAD (peripheral artery disease) (H)      Peripheral neuropathy      Peripheral neuropathy      Pneumonia      Secondary hyperparathyroidism (H)      Seizure (H) 2017     Vitamin D deficiency        Past Surgical History   I have reviewed this patient's surgical history and updated it with pertinent information if needed.  Past Surgical History:   Procedure Laterality Date     CREATE FISTULA ARTERIOVENOUS UPPER EXTREMITY Right 5/8/2016    Procedure: CREATE FISTULA ARTERIOVENOUS UPPER EXTREMITY;  Surgeon: Josias Valente MD;  Location:  OR     CREATE FISTULA ARTERIOVENOUS UPPER EXTREMITY Right 12/14/2016    Procedure: CREATE FISTULA ARTERIOVENOUS UPPER EXTREMITY;  Surgeon: Contreras Bowman MD;  Location:  OR     CYSTOSCOPY, RETROGRADES, COMBINED Bilateral 3/29/2018    Procedure: COMBINED CYSTOSCOPY, RETROGRADES;  Cystoscopy, Bilateral Retrogrades, Bilateral Ureteral Washings, Pyelogram ;   "Surgeon: Moisés Pena MD;  Location: [facility] OR     HERNIA REPAIR         Social History   I have reviewed this patient's social history and updated it with pertinent information if needed.  Social History     Tobacco Use     Smoking status: Former Smoker     Packs/day: 0.20     Years: 50.00     Pack years: 10.00     Types: Cigarettes     Start date: 10/12/1957     Smokeless tobacco: Never Used   Substance Use Topics     Alcohol use: Yes     Alcohol/week: 14.0 standard drinks     Types: 7 Cans of beer, 7 Shots of liquor per week     Comment: cocktail 2-3/month     Drug use: No     Types: Marijuana     Comment: past marijuana use       Family History   I have reviewed this patient's family history and updated it with pertinent information if needed.   Family History   Problem Relation Age of Onset     Coronary Artery Disease Brother      Diabetes Mother        Prior to Admission Medications   Prior to Admission Medications   Prescriptions Last Dose Informant Patient Reported? Taking?   Blood Pressure Monitoring (RA BLOOD PRESSURE CUFF MONITOR) MISC   Yes No   Sig: by Misc.(Non-Drug; Combo Route) route once daily.   Carvedilol (COREG PO)  Guardian Yes No   Sig: Take 25 mg by mouth 2 times daily (with meals) 1/2 tab morning   Gabapentin (NEURONTIN PO)  Guardian Yes No   Sig: Take 100 mg by mouth daily    Insulin Pen Needle (PEN NEEDLES \") 31G X 8 MM MISC   Yes No   Sig x 8 mm   LOSARTAN POTASSIUM PO  Guardian Yes No   Sig: Take 100 mg by mouth daily 1/4 tab   Misc. Devices (ROLLATOR ULTRA-LIGHT) MISC   Yes No   Si each by Other route   OXYCODONE HCL PO  Guardian Yes No   Sig: Take 10 mg by mouth 3 times daily as needed (takes scheduled)   ULTICARE MICRO 32G X 4 MM insulin pen needle   Yes No   ULTICARE PEN NEEDLES 29G X 12MM   Yes No   VITAMIN D, CHOLECALCIFEROL, PO  Guardian Yes No   Sig: Take 1,000 Units by mouth daily   aspirin 81 MG chewable tablet  Guardian No No   Sig: Take 1 tablet (81 mg) by " mouth daily   atorvastatin (LIPITOR) 10 MG tablet   Yes No   Sig: Take 10 mg by mouth daily   blood glucose monitoring (ACCU-CHEK MINISTERIO PLUS) meter device kit   Yes No   Sig: accucheck Lesly glucometer   blood glucose monitoring (ACCU-CHEK FASTCLIX) lancets   Yes No   Sig: Test 2 times per day.   bromfenac (PROLENSA) 0.07 % ophthalmic solution   Yes No   Si drop   buPROPion (WELLBUTRIN SR) 150 MG 12 hr tablet   Yes No   Sig: Take 150 mg by mouth   bumetanide (BUMEX) 2 MG tablet  Guardian No No   Sig: Take 2 tablets (4 mg) by mouth daily   calcium acetate (PHOSLO) 667 MG CAPS capsule  Guardian Yes No   Sig: Take 1,334 mg by mouth 4 times daily (with meals and nightly)   carboxymethylcellulose PF (CARBOXYMETHYLCELLULOSE SODIUM) 0.5 % SOLN ophthalmic solution   Yes No   Si drop   citalopram (CELEXA) 20 MG tablet  Guardian No No   Sig: Take 1 tablet (20 mg) by mouth daily   cloNIDine (CATAPRES-TTS2) 0.2 MG/24HR patch  Guardian Yes No   Sig: Place 1 patch onto the skin once a week   clopidogrel (PLAVIX) 75 MG tablet   Yes No   Sig: Take 75 mg by mouth daily   insulin aspart (NOVOLOG FLEXPEN) 100 UNIT/ML injection   Yes No   Sig: Inject Subcutaneous 3 times daily (with meals) sliding scale up to 7 units before meals   insulin degludec (TRESIBA FLEXTOUCH) 100 UNIT/ML pen   Yes No   Sig: Inject 14 Units Subcutaneous daily (with breakfast)    isosorbide mononitrate (IMDUR) 30 MG 24 hr tablet  Guardian Yes No   Sig: Take 90 mg by mouth daily   latanoprost (XALATAN) 0.005 % ophthalmic solution   Yes No   Si drop   ombitasvir-paritaprevir-ritonavir;dasabuvir (VIEKIRA MAYANK) 12.5-75-50 &250 MG tablets   Yes No   Sig: Take twice daily according to package instructions for 12 weeks total.   umeclidinium (INCRUSE ELLIPTA) 62.5 MCG/INH oral inhaler  Guardian Yes No   Sig: Inhale 1 puff into the lungs daily      Facility-Administered Medications: None     Allergies   Allergies   Allergen Reactions     Morphine Rash     Dry  skin  Dry skin  Dry skin       Physical Exam   Vital Signs: Temp: 97.2  F (36.2  C) Temp src: Oral BP: 126/68 Pulse: 101 Heart Rate: 109 Resp: 14 SpO2: 97 % O2 Device: None (Room air) Oxygen Delivery: 2 LPM  Weight: 0 lbs 0 oz    Constitutional: awake, alert, cooperative, no apparent distress  Eyes: Lids and lashes normal, pupils equal, round, sclera clear, conjunctiva normal  ENT: Normocephalic, without obvious abnormality, atraumatic, oral pharynx with moist mucous membranes  Respiratory: No increased work of breathing, good air exchange, clear to auscultation bilaterally, no crackles or wheezing  Cardiovascular: Tachycardic, normal S1 and S2, no S3 or S4, and no murmur noted  GI: normal bowel sounds, soft, non-distended, non-tender  Skin: no rashes  Musculoskeletal: There is no redness, warmth, or swelling of the joints  Neurologic: The patient is awake and alert.  He knows that he is in [facility] and knows that he was at hemodialysis this morning.  He knew the year and month and the day.  He is fluent speech.  Cranial nerves are normal as tested.  He is moving all extremities equally.  And there is no tremor.  Neuropsychiatric: General: normal, calm and normal eye contact    Data   Data reviewed today: I reviewed all medications, new labs and imaging results over the last 24 hours. I personally reviewed     Recent Labs   Lab 06/22/20  1050 06/22/20  0658   WBC  --  5.8   HGB  --  10.8*   MCV  --  97   PLT  --  149*   NA  --  129*   POTASSIUM  --  5.8*   CHLORIDE  --  94   CO2  --  27   BUN  --  48*   CR  --  12.10*   ANIONGAP  --  8   ALLYSON  --  10.3*   GLC  --  231*   TROPI <0.015 <0.015     Recent Results (from the past 24 hour(s))   XR Chest Port 1 View    Narrative    CHEST PORTABLE ONE VIEW 6/22/2020 8:41 AM    HISTORY: Syncope.    COMPARISON: 10/12/2017      Impression    IMPRESSION: New focal masslike opacity involving the infrahilar region  of the right middle lobe. This could represent underlying  pulmonary  mass versus focal dense consolidation and volume loss in the right  middle lobe. Recommend chest CT with IV contrast material for further  evaluation. Minimal amount of right basilar pleural fluid or  thickening. Minimal left basilar pleural fluid or thickening. Upper  lungs clear. Normal heart size. Normal pulmonary vascularity. Osseous  structures unremarkable.    CURT L BEHRNS, MD

## 2020-06-23 ENCOUNTER — APPOINTMENT (OUTPATIENT)
Dept: CARDIOLOGY | Facility: CLINIC | Age: 75
End: 2020-06-23
Attending: HOSPITALIST
Payer: COMMERCIAL

## 2020-06-23 ENCOUNTER — APPOINTMENT (OUTPATIENT)
Dept: MRI IMAGING | Facility: CLINIC | Age: 75
End: 2020-06-23
Attending: PHYSICIAN ASSISTANT
Payer: COMMERCIAL

## 2020-06-23 LAB
ANION GAP SERPL CALCULATED.3IONS-SCNC: 6 MMOL/L (ref 3–14)
BASOPHILS # BLD AUTO: 0 10E9/L (ref 0–0.2)
BASOPHILS NFR BLD AUTO: 0.6 %
BUN SERPL-MCNC: 31 MG/DL (ref 7–30)
CALCIUM SERPL-MCNC: 9.4 MG/DL (ref 8.5–10.1)
CHLORIDE SERPL-SCNC: 100 MMOL/L (ref 94–109)
CO2 SERPL-SCNC: 28 MMOL/L (ref 20–32)
CREAT SERPL-MCNC: 9.18 MG/DL (ref 0.66–1.25)
DIFFERENTIAL METHOD BLD: ABNORMAL
EOSINOPHIL # BLD AUTO: 0.5 10E9/L (ref 0–0.7)
EOSINOPHIL NFR BLD AUTO: 9.5 %
ERYTHROCYTE [DISTWIDTH] IN BLOOD BY AUTOMATED COUNT: 14.4 % (ref 10–15)
GFR SERPL CREATININE-BSD FRML MDRD: 5 ML/MIN/{1.73_M2}
GLUCOSE BLDC GLUCOMTR-MCNC: 180 MG/DL (ref 70–99)
GLUCOSE BLDC GLUCOMTR-MCNC: 232 MG/DL (ref 70–99)
GLUCOSE BLDC GLUCOMTR-MCNC: 260 MG/DL (ref 70–99)
GLUCOSE BLDC GLUCOMTR-MCNC: 283 MG/DL (ref 70–99)
GLUCOSE SERPL-MCNC: 241 MG/DL (ref 70–99)
HBV SURFACE AB SERPL IA-ACNC: 26.52 M[IU]/ML
HBV SURFACE AG SERPL QL IA: NONREACTIVE
HCT VFR BLD AUTO: 34.8 % (ref 40–53)
HGB BLD-MCNC: 10.7 G/DL (ref 13.3–17.7)
IMM GRANULOCYTES # BLD: 0 10E9/L (ref 0–0.4)
IMM GRANULOCYTES NFR BLD: 0 %
LYMPHOCYTES # BLD AUTO: 1.7 10E9/L (ref 0.8–5.3)
LYMPHOCYTES NFR BLD AUTO: 35.6 %
MCH RBC QN AUTO: 30.1 PG (ref 26.5–33)
MCHC RBC AUTO-ENTMCNC: 30.7 G/DL (ref 31.5–36.5)
MCV RBC AUTO: 98 FL (ref 78–100)
MONOCYTES # BLD AUTO: 0.4 10E9/L (ref 0–1.3)
MONOCYTES NFR BLD AUTO: 8.4 %
NEUTROPHILS # BLD AUTO: 2.2 10E9/L (ref 1.6–8.3)
NEUTROPHILS NFR BLD AUTO: 45.9 %
NRBC # BLD AUTO: 0 10*3/UL
NRBC BLD AUTO-RTO: 0 /100
PLATELET # BLD AUTO: 143 10E9/L (ref 150–450)
POTASSIUM SERPL-SCNC: 4.8 MMOL/L (ref 3.4–5.3)
RBC # BLD AUTO: 3.55 10E12/L (ref 4.4–5.9)
SODIUM SERPL-SCNC: 134 MMOL/L (ref 133–144)
TSH SERPL DL<=0.005 MIU/L-ACNC: 2.56 MU/L (ref 0.4–4)
VIT B12 SERPL-MCNC: 1898 PG/ML (ref 193–986)
WBC # BLD AUTO: 4.8 10E9/L (ref 4–11)

## 2020-06-23 PROCEDURE — 25000132 ZZH RX MED GY IP 250 OP 250 PS 637: Performed by: PHYSICIAN ASSISTANT

## 2020-06-23 PROCEDURE — G0378 HOSPITAL OBSERVATION PER HR: HCPCS

## 2020-06-23 PROCEDURE — 00000146 ZZHCL STATISTIC GLUCOSE BY METER IP

## 2020-06-23 PROCEDURE — 82607 VITAMIN B-12: CPT | Performed by: INTERNAL MEDICINE

## 2020-06-23 PROCEDURE — 96376 TX/PRO/DX INJ SAME DRUG ADON: CPT | Mod: 59

## 2020-06-23 PROCEDURE — 85025 COMPLETE CBC W/AUTO DIFF WBC: CPT | Performed by: INTERNAL MEDICINE

## 2020-06-23 PROCEDURE — 25000131 ZZH RX MED GY IP 250 OP 636 PS 637: Performed by: PHYSICIAN ASSISTANT

## 2020-06-23 PROCEDURE — 70551 MRI BRAIN STEM W/O DYE: CPT

## 2020-06-23 PROCEDURE — 99226 ZZC SUBSEQUENT OBSERVATION CARE,LEVEL III: CPT | Performed by: PHYSICIAN ASSISTANT

## 2020-06-23 PROCEDURE — 25000132 ZZH RX MED GY IP 250 OP 250 PS 637: Performed by: HOSPITALIST

## 2020-06-23 PROCEDURE — 25500064 ZZH RX 255 OP 636: Performed by: HOSPITALIST

## 2020-06-23 PROCEDURE — 96372 THER/PROPH/DIAG INJ SC/IM: CPT

## 2020-06-23 PROCEDURE — 80048 BASIC METABOLIC PNL TOTAL CA: CPT | Performed by: INTERNAL MEDICINE

## 2020-06-23 PROCEDURE — 84443 ASSAY THYROID STIM HORMONE: CPT | Performed by: INTERNAL MEDICINE

## 2020-06-23 PROCEDURE — 93306 TTE W/DOPPLER COMPLETE: CPT | Mod: 26 | Performed by: INTERNAL MEDICINE

## 2020-06-23 PROCEDURE — 93306 TTE W/DOPPLER COMPLETE: CPT

## 2020-06-23 PROCEDURE — 36415 COLL VENOUS BLD VENIPUNCTURE: CPT | Performed by: INTERNAL MEDICINE

## 2020-06-23 PROCEDURE — 25000125 ZZHC RX 250: Performed by: HOSPITALIST

## 2020-06-23 PROCEDURE — 25000125 ZZHC RX 250: Performed by: PHYSICIAN ASSISTANT

## 2020-06-23 RX ORDER — GABAPENTIN 100 MG/1
100 CAPSULE ORAL EVERY EVENING
Status: DISCONTINUED | OUTPATIENT
Start: 2020-06-23 | End: 2020-06-24 | Stop reason: HOSPADM

## 2020-06-23 RX ORDER — OLANZAPINE 5 MG/1
15 TABLET, ORALLY DISINTEGRATING ORAL AT BEDTIME
Status: DISCONTINUED | OUTPATIENT
Start: 2020-06-23 | End: 2020-06-24 | Stop reason: HOSPADM

## 2020-06-23 RX ORDER — SILVER SULFADIAZINE 10 MG/G
CREAM TOPICAL DAILY
Status: DISCONTINUED | OUTPATIENT
Start: 2020-06-23 | End: 2020-06-24 | Stop reason: HOSPADM

## 2020-06-23 RX ORDER — ATORVASTATIN CALCIUM 40 MG/1
80 TABLET, FILM COATED ORAL AT BEDTIME
Status: DISCONTINUED | OUTPATIENT
Start: 2020-06-23 | End: 2020-06-24 | Stop reason: HOSPADM

## 2020-06-23 RX ADMIN — INSULIN ASPART 3 UNITS: 100 INJECTION, SOLUTION INTRAVENOUS; SUBCUTANEOUS at 17:49

## 2020-06-23 RX ADMIN — METOPROLOL TARTRATE 12.5 MG: 25 TABLET, FILM COATED ORAL at 21:17

## 2020-06-23 RX ADMIN — UMECLIDINIUM 1 PUFF: 62.5 AEROSOL, POWDER ORAL at 09:07

## 2020-06-23 RX ADMIN — METOPROLOL TARTRATE 12.5 MG: 25 TABLET, FILM COATED ORAL at 10:00

## 2020-06-23 RX ADMIN — INSULIN ASPART 3 UNITS: 100 INJECTION, SOLUTION INTRAVENOUS; SUBCUTANEOUS at 09:04

## 2020-06-23 RX ADMIN — INSULIN ASPART 2 UNITS: 100 INJECTION, SOLUTION INTRAVENOUS; SUBCUTANEOUS at 12:23

## 2020-06-23 RX ADMIN — SILVER SULFADIAZINE: 10 CREAM TOPICAL at 17:48

## 2020-06-23 RX ADMIN — GABAPENTIN 100 MG: 100 CAPSULE ORAL at 21:17

## 2020-06-23 RX ADMIN — DOXYCYCLINE 100 MG: 100 INJECTION, POWDER, LYOPHILIZED, FOR SOLUTION INTRAVENOUS at 09:00

## 2020-06-23 RX ADMIN — ASPIRIN 81 MG 81 MG: 81 TABLET ORAL at 09:07

## 2020-06-23 RX ADMIN — INSULIN DEGLUDEC INJECTION 14 UNITS: 100 INJECTION, SOLUTION SUBCUTANEOUS at 09:03

## 2020-06-23 RX ADMIN — HUMAN ALBUMIN MICROSPHERES AND PERFLUTREN 9 ML: 10; .22 INJECTION, SOLUTION INTRAVENOUS at 09:45

## 2020-06-23 RX ADMIN — INSULIN ASPART 4 UNITS: 100 INJECTION, SOLUTION INTRAVENOUS; SUBCUTANEOUS at 17:49

## 2020-06-23 RX ADMIN — Medication 1 CAPSULE: at 21:17

## 2020-06-23 RX ADMIN — OLANZAPINE 15 MG: 5 TABLET, ORALLY DISINTEGRATING ORAL at 21:17

## 2020-06-23 RX ADMIN — ATORVASTATIN CALCIUM 80 MG: 40 TABLET, FILM COATED ORAL at 21:17

## 2020-06-23 NOTE — UTILIZATION REVIEW
Concurrent stay review; Secondary Review Determination    Under the authority of the Utilization Management Committee, the utilization review process indicated a secondary review on the above patient. The review outcome is based on review of the medical records, discussions with staff, and applying clinical experience noted on the date of the review.    (x) Observation Status Appropriate - Concurrent stay review        RATIONALE FOR DETERMINATION:74-year-old male with end-stage renal disease and episode of loss of consciousness at the initiation of his hemodialysis on the day of admission with associated hypotension.  Patient had some mental status changes noted on the day of admission and proceeded to have dialysis completed that day.  Patient's vital signs and mental status have improved.  Observation care appropriate to rule out recurrent seizures, or infectious/metabolic encephalopathic causes.  As patient's mental status appears towards baseline, if patient is found to have a worrisome abnormality on testing or has recurrent mental status changes then at that time patient would be appropriate to advance to inpatient care.    Patient is clinically improving and there is no clear indication to change patient's status to inpatient. The severity of illness, intensity of service provided, expected LOS and risk for adverse outcome make the care appropriate for observation.    This document was produced using voice recognition software    The information on this document is developed by the utilization review team in order for the business office to ensure compliance. This only denotes the appropriateness of proper admission status and does not reflect the quality of care rendered.    The definitions of Inpatient Status and Observation Status used in making the determination above are those provided in the CMS Coverage Manual, Chapter 1 and Chapter 6, section 70.4.    Sincerely,    Joe Bernabe MD  Utilization  Review  Physician Advisor  [facility].

## 2020-06-23 NOTE — PROGRESS NOTES
[facility]    Medicine Progress Note - Hospitalist Service       Date of Admission:  6/22/2020    Assessment & Plan   Keagan Wayne is a 74 year old male with ESRD on hemodialysis.  He had sudden loss of consciousness at the initiation of his hemodialysis session this morning.  Was associated with some hypotension.  He was taken to the emergency department where he was awake alert and grossly nonfocal.  His vital signs were stable.  Chest x-ray showed a right middle lobe opacity versus infiltrate.  He does have a history of seizures for which she was admitted here in 2017.     Syncope, question seizure  Intermittent encephalopathy, improved   Hx of recurrent, metabolic encephalopathy  History of seizures (9/2017): Soon after beginning dialysis on 6/22/2020, he lost consciousness for about 5 minutes with associated hypotension (initial  during dialysis, down to 87 during syncopal episode). EMS was called and noted hypotension with associated confusion. Pt become more alert en route to the hospital. No hypoglycemia noted per EMS. EKG with sinus tachycardia with first degree AV block, no change relative to last EKG. Troponin negative X2. D-dimer 2.1 (mild tachycardia in the low 100s in the context of PTA BB being on hold, no hypoxia noted). Pt treated with total of 550 ccs IVF bolus in the ED. Note admission in 9/2017 for new onset seizures. At that time, pt was discharged with Keppra 500 mg po BID. No longer taking. Denies any chest pain, SOB, dizziness, lightheadedness. No recent URI sx including cough, cold, nasal congestion. No recent syncope or seizure-like activity reported. TSH 2.56. Note recent hospitalization 6/5/2020-6/8/2020 at [facility] for metabolic encephalopathy in the setting of missed dialysis run. Pt denies any recent alcohol or illicit drug use (no UDS or ethanol obtained on admission), PCA confirms this. UDS positive for opiates on 6/5/2020. No recent reported missed  dialysis runs.   - No focal neuro deficits. Pt is alert and oriented X4 this AM, conversant  - Echocardiogram with EF >70%, moderate LVH, no RWMA, no evidence for significant valvular pathology, mild aortic root dilatation, 4.1 cm.   - Folate and B12 are pending   - Neurology consulted, appreciate assistance   - MRI brain ordered   - EEG pending   - PT evaluation at 1500  - CBC, CMP, ammonia in the AM   - Consider Psychiatry consult for possible decompensated bipolar disorder if neurologic work-up unremarkable      Chronic bilateral pleural thickening and rounded atelectasis in lower lobes: WBC WNL. Procalcitonin 1.01 (unclear how to interpret in ESRD pt). CXR on admission noting new focal masslike opacity involving the infrahilar region of the right middle lobe. CT chest without contrast with chronic, bilateral pleural thickening and rounded atelectasis in both lower lobes, similar in appearance to 2017. No mass or acute infiltrate. Pt started on Ceftriaxone and doxycycline on admission due to concern for CAP. Afebrile overnight. Pt denies any recent URI sx including cold, cough, nasal congestion, fevers.   - Discontinue antibiotics given unremarkable CT   - Monitor fever curve  - BC NGTD, follow      ESRD on hemodialysis  Hyponatremia, resolved  Hyperkalemia, resolved  Hypercalcemia, resolved: Dialyzes MWF. On admission, sodium 129, potassium 5.8, creatinine 12.10, calcium 10.3.   [Phoslo 2001 mg TID, Nephrocaps daily]  - Nephrology consulted, pt underwent dialysis 6/22/2020--net removal of 3 L, next dialysis run 6/24/2020.      Anemia of chronic disease: Baseline Hgb ~10-11. No active signs of bleeding.   - Monitor     Recent Labs   Lab 06/23/20  0721 06/22/20  0658   HGB 10.7* 10.8*     T2DM, insulin dependent, uncontrolled with associated peripheral neuropathy: A1C 8.9 on 6/22/2020.   [Lantus 22 U qam, Novolog 8 U TID with meals, Gabapentin 100 mg qevening]  - Tresiba 14 U am given 6/23, resume PTA Lantus 22 U  6/24  - Novolog 4 U TID AC, uptitrate as able   - Medium sliding scale insulin ordered   - BS per protocol   - Monitor for hypoglycemia     Recent Labs   Lab 06/23/20  1130 06/23/20  0721 06/23/20  0157 06/22/20  2128 06/22/20  1805 06/22/20  1051 06/22/20  0658   GLC  --  241*  --   --   --   --  231*   *  --  260* 260* 197* 209*  --      Coronary artery disease, nonischemic   Hx of nonischemic cardiomyopathy in the setting of hypertensive heart disease, with improved EF  PAD s/p left SFA IVUS, PTA, scoring balloon PTA, DCB PTA and stenting (6/8/2018) and right SFA IVUS/PTA/DCB PTA with stenting (8/31/2018)  Hypertension: Echocardiogram during hospitalization with EF >70%, moderate LVH, no RWMA, no evidence for significant valvular pathology, mild aortic root dilatation, 4.1 cm. Lexiscan in 2016 with EF 23%, severe global hypokinesis, medium sized inferior myocardial infarction with moderate brent-infarction ischemia present. Angiogram in 2016 with mild irregularities with no significant focal CAD.   [ASA 81 mg po every day, Lipitor 80 mg at bedtime, Lopressor 12.5 mg BID]  - /66,  this AM   - Resume above regimen     Bipolar disorder   Concern for cognitive impairment: Per EHR review he was evaluated by Neuropsychology at [facility] in October 2019 for episodes of acute encephalopathy after missing dialysis treatments. He has history of bipolar disorder with multiple psychiatric hospitalizations int he 1980s and 1990s. MoCA screen on 6/25/2019 20/30. Neuropsychology deemed his cognitive functioning as baseline intellectual functioning in the borderline range. At that time it was felt acute encephalopathy was secondary to missed dialysis sessions creating an exacerbation of his bipolar disorder.   [Zyprexa 15 mg at bedtime]  - Resume Zyprexa 15 mg at bedtime on 6/23/2020   - Recommend outpatient, repeat neurocognitive evaluation upon discharge and recovery from acute illness     Chronic  obstructive lung disease, not in acute exacerbation: Continue Incruse Ellipta one puff daily     Pancreatic cyst: Stable dating back to 2014.      History of treatment for hepatitis C: Noted.     Covid status: Not detected per PCR 6/22/2020.     Family update: Discussed POC with pt's sister, Bailey, and pt's PCA, María, with patient's permission.      Diet: Combination Diet Regular Diet Adult; Dialysis Diet; 9637-5448 Calories: Moderate Consistent CHO (4-6 CHO units/meal)  Room Service    DVT Prophylaxis: Ambulate every shift  Warner Catheter: not present  Code Status: Full Code         Disposition Plan   Expected discharge: tomorrow 6/24/2020, recommended to prior living arrangement vs pending PT evaluation once neurologic evaluation completed.  Entered: Orquidea Colunga PA-C 06/23/2020, 5:00 PM       The patient's care was discussed with the Attending Physician, Dr. Butler, Bedside Nurse and Patient.    Orquidea Colunga PA-C  Hospitalist Service  [facility]  ______________________________________________________________________    Interval History   Waxing and waning cognition noted overnight. No seizure like activity or neurologic deficits reported. Neurology following, work-up continues.     Data reviewed today: I reviewed all medications, new labs and imaging results over the last 24 hours. I personally reviewed all labs and imaging to date.     Physical Exam   Vital Signs: Temp: 96.6  F (35.9  C) Temp src: Axillary BP: 119/60   Heart Rate: 80 Resp: 16 SpO2: 96 % O2 Device: None (Room air)    Weight: 242 lbs 1.6 oz    CONSTITUTIONAL: Pt laying in bed, dressed in hospital garb. Appears comfortable. Cooperative with interview.   HEENT: Normocephalic, atraumatic.   CARDIOVASCULAR: RRR, no murmurs, rubs, or extra heart sounds appreciated. Pulses +2/4 and regular in upper and lower extremities, bilaterally.   RESPIRATORY: No increased work of breathing.  CTA, bilat; no  wheezes, rales, or rhonchi appreciated.  GASTROINTESTINAL:  Abdomen soft, non-distended. BS auscultated in all four quadrants. Negative for tenderness to palpation.  No masses or organomegaly noted.  MUSCULOSKELETAL: Strength +5/5 in upper and lower extremities, bilaterally. No gross deformities noted. Normal muscle tone.   HEMATOLOGIC/LYMPHATIC/IMMUNOLOGIC: Negative for lower extremity edema, bilaterally.  NEUROLOGIC: Alert and oriented to person, place, and time.  strength intact. CN's II-XII grossly intact. Baseline peripheral neuropathy. No focal neuro deficits appreciated.   SKIN: Warm, dry, intact. No jaundice noted. Negative for suspicious lesions, rashes, bruising, open sores or abrasions.     Data   Recent Labs   Lab 06/23/20  0721 06/22/20  1050 06/22/20  0658   WBC 4.8  --  5.8   HGB 10.7*  --  10.8*   MCV 98  --  97   *  --  149*     --  129*   POTASSIUM 4.8  --  5.8*   CHLORIDE 100  --  94   CO2 28  --  27   BUN 31*  --  48*   CR 9.18*  --  12.10*   ANIONGAP 6  --  8   ALLYSON 9.4  --  10.3*   *  --  231*   TROPI  --  <0.015 <0.015     Recent Results (from the past 24 hour(s))   CT Chest w/o Contrast    Narrative    CT CHEST W/O CONTRAST 6/22/2020 8:07 PM     HISTORY: abnormal cxr    TECHNIQUE: CT scan of the chest was performed without IV contrast.  Multiplanar reformats were obtained. Dose reduction techniques were  used.  CONTRAST: None.  COMPARISON: Chest x-ray 6/22/2020, abdominal CT 10/19/2017    FINDINGS:     LUNGS AND PLEURA: Chronic bilateral pleural thickening. Rounded  atelectasis in both lower lobes, similar in appearance to 2017. Mild  apical emphysema. The lungs are otherwise clear.    MEDIASTINUM/AXILLAE: No lymphadenopathy. Marked coronary artery  calcification.    UPPER ABDOMEN: Negative.      Impression    IMPRESSION:  1.  Chronic bilateral pleural thickening and rounded atelectasis in  both lower lobes, similar in appearance to 2017. No pulmonary mass or  acute  infiltrate.    SHARON ROJO MD   Echocardiogram Complete    Narrative    [id number]  [id number]  [id number]  [id number]^COLLIN^BRYSON^D           [facility]  Echocardiography Laboratory  [facility] [place] [address]        Name: KRISTAL MORALES  MRN: [id number]  : 1945  Study Date: 2020 09:13 AM  Age: 74 yrs  Gender: Male  Patient Location: [facility]  Reason For Study: Syncope  Ordering Physician: BRYSON POLANCO  Referring Physician: Yolanda Hernandez  Performed By: Odette Arroyo     BSA: 2.4 m2  Height: 74 in  Weight: 242 lb  HR: 102  BP: 132/75 mmHg  _____________________________________________________________________________  __        Procedure  Complete Portable Echo Adult. Optison (NDC #[id number]) given intravenously.  _____________________________________________________________________________  __        Interpretation Summary     1. Hyperdynamic left ventricular function The visual ejection fraction is  estimated at >70%.  2. There is moderate concentric left ventricular hypertrophy.  3. No regional wall motion abnormalities noted.  4. The right ventricle is normal in structure, function and size.  5. No evidence for significant valvular pathology.  6. Mild aortic root dilatation (sinus of valsalva), 4.1 cm.  _____________________________________________________________________________  __        Left Ventricle  The left ventricle is normal in size. There is moderate concentric left  ventricular hypertrophy. A sigmoid septum is present. Hyperdynamic left  ventricular function. The visual ejection fraction is estimated at >70%. Left  ventricular diastolic function is normal. No regional wall motion  abnormalities noted.     Right Ventricle  The right ventricle is normal in structure, function and size.     Atria  Normal left atrial size. Right atrial size is normal. There is no color  Doppler evidence of an atrial shunt.     Mitral Valve  The mitral valve  leaflets appear normal. There is no evidence of stenosis,  fluttering, or prolapse.        Tricuspid Valve  The tricuspid valve is normal in structure and function.     Aortic Valve  The aortic valve is trileaflet with aortic valve sclerosis.     Pulmonic Valve  The pulmonic valve is normal in structure and function.     Vessels  Mild aortic root dilatation. Normal size ascending aorta. IVC diameter <2.1 cm  collapsing >50% with sniff suggests a normal RA pressure of 3 mmHg.     Pericardium  There is no pericardial effusion.        Rhythm  Sinus rhythm was noted.  _____________________________________________________________________________  __  MMode/2D Measurements & Calculations  IVSd: 1.8 cm     LVIDd: 3.3 cm  LVIDs: 1.9 cm  LVPWd: 1.5 cm  FS: 40.8 %  LV mass(C)d: 201.5 grams  LV mass(C)dI: 85.5 grams/m2  Ao root diam: 4.1 cm  LA dimension: 2.7 cm  asc Aorta Diam: 3.3 cm  LA/Ao: 0.66  RWT: 0.90        Doppler Measurements & Calculations  MV E max radha: 79.8 cm/sec  MV A max radha: 52.6 cm/sec  MV E/A: 1.5  MV dec time: 0.13 sec  PA acc time: 0.05 sec  E/E' av.0  Lateral E/e': 11.0  Medial E/e': 7.0              _____________________________________________________________________________  __        Report approved by: Cristin Shen 2020 11:42 AM        Medications       - MEDICATION INSTRUCTIONS for Dialysis Patients -   Does not apply See Admin Instructions     aspirin  81 mg Oral Daily     atorvastatin  80 mg Oral At Bedtime     gabapentin  100 mg Oral QPM     insulin aspart  4 Units Subcutaneous TID w/meals     insulin aspart  1-7 Units Subcutaneous TID AC     insulin aspart  1-5 Units Subcutaneous At Bedtime     [START ON 2020] insulin glargine  22 Units Subcutaneous QAM     metoprolol tartrate  12.5 mg Oral BID     multivitamin RENAL  1 capsule Oral QPM     OLANZapine zydis  15 mg Oral At Bedtime     silver sulfADIAZINE   Topical Daily     sodium chloride (PF)  3 mL Intracatheter Q8H      umeclidinium  1 puff Inhalation Daily

## 2020-06-23 NOTE — PLAN OF CARE
Observation goals  PRIOR TO DISCHARGE      Comments:   -diagnostic tests and consults completed and resulted  - Not met, neuro and nephrology following  -vital signs normal or at patient baseline - Not met    Nurse to notify provider when observation goals have been met and patient is ready for discharge.

## 2020-06-23 NOTE — PLAN OF CARE
Observation goals  PRIOR TO DISCHARGE      Comments:   -diagnostic tests and consults completed and resulted  - Not met, brain MRI ordered  -vital signs normal or at patient baseline - Met    Nurse to notify provider when observation goals have been met and patient is ready for discharge.

## 2020-06-23 NOTE — PLAN OF CARE
[id number]  Primary Diagnosis: Syncope (just before OP dialysis); R rib pain d/t injury 3 weeks ago with blistering/scabbing of skin   Orientation: Alert, oriented x4 with periods of confusion/forgetfulness  Aggression Stop Light: Green  Mobility: SBA with cane, refused gait belt  Pain Management: Denies  Diet: Mod carb, dialysis, 2738-4484 calories  Bowel/Bladder: Hemodialysis  Abnormal Lab/Assessments: , Creat 9.18  Drain/Device/Wound: PIV SL, R rib and flank blistering/scabbing, new dressing   Consults: Neph & Neurology   D/C Day/Goals/Place: Pending     Shift Note: Tele: 1st degree AV Block. Plan for dialysis tomorrow.

## 2020-06-23 NOTE — PLAN OF CARE
Disoriented to time and situation. Orientation fluctuates greatly, can be agitated and impulsive at times. VSS on RA ex tachy, slight htn. Tele: NSR w/ occasional PAC's . BG checks. PIV SL, int abx. SBA w/ cane. Dialysis M/W/F, removed 3 liters yesterday. CXR and CT non-significant. Discharge pending, neurology, nephro following. Did not sleep overnight. Discharge pending, neurology and nephrology following.

## 2020-06-23 NOTE — PROGRESS NOTES
"Potassium   Date Value Ref Range Status   06/22/2020 5.8 (H) 3.4 - 5.3 mmol/L Final     Hemoglobin   Date Value Ref Range Status   06/22/2020 10.8 (L) 13.3 - 17.7 g/dL Final     Creatinine   Date Value Ref Range Status   06/22/2020 12.10 (H) 0.66 - 1.25 mg/dL Final     Urea Nitrogen   Date Value Ref Range Status   06/22/2020 48 (H) 7 - 30 mg/dL Final     Sodium   Date Value Ref Range Status   06/22/2020 129 (L) 133 - 144 mmol/L Final     INR   Date Value Ref Range Status   10/10/2017 1.08 0.86 - 1.14 Final       DIALYSIS PROCEDURE NOTE  Hepatitis status of previous patient on machine log was checked and verified ok to use with this patients hepatitis status.  Patient dialyzed for 3.5 hrs. on a 2 K bath with a net fluid removal of  3L.  A BFR of 400 ml/min was obtained via a LLE AVF using 15 gauge needles.    The treatment plan was discussed with Dr. Warren during the treatment.  Total heparin received during the treatment: 0 units.   Needle cannulation sites held x 10 min, pressure dressings applied after hemostasis.  Meds  given: none Complications: patient intermittently confused. Became increasingly angry and uncooperative at end of treatment. After treatment was complete patient became very upset with writer about not being able to go home. Writer tried explaining that she has no say in when discharge will happen but patient did not believe this. Accused writer of \"trying to touch my hand to your titties\" while attempting to remove clamps from patients AVF and making racial comments to ER transport mitch.     Procedure education provided orally to patient, patient verbalized understanding.  ICEBOAT? Timeout performed pre-treatment  I: Patient was identified using 2 identifiers  C: Consent obtained/verified current before treatment  E: Equipment preventative maintenance is current and dialysis delivery system OK to use  B: Hepatitis B Surface Antigen: UNKNOWN; Draw Date: 6/22/20      Hepatitis B Surface " Antibody: UNKNOWN; Draw Date: 6/22/20  O: Dialysis orders present and complete prior to treatment  A: Vascular access verified and assessed prior to treatment  T: Treatment was performed at a clinically appropriate time  ?: Patient was allowed to ask questions and address concerns prior to treatment  See flowsheet in EPIC for further details and post assessment.  Machine water alarm in place and functioning. Transducer pods intact and checked every 15min.  Pt returned via bed  Report received from: DAHLIA Colon RN  Report given to: DAHLIA Colon RN  Chlorine/Chloramine water system checked every 4 hours.  Outpatient Dialysis at [facility]    Albertina Bourne RN

## 2020-06-23 NOTE — PLAN OF CARE
Primary Diagnosis: Syncope (just before OP dialysis); R rib pain d/t injury 3 weeks ago with blistering/scabbing of skin   Orientation: Alert, oriented x4 with periods of confusion/forgetfulness  Aggression Stop Light: Green  Mobility: SBA with cane, refused gait belt  Pain Management: Denies  Diet: Mod carb, dialysis, 9447-6289 calories  Bowel/Bladder: Hemodialysis, continent BM this afternoon  Abnormal Lab/Assessments: , 232; Creat 9.18  Drain/Device/Wound: PIV SL, R rib and flank blistering/scabbing  Consults: Neph & Neurology   D/C Day/Goals/Place: Pending    Shift Note:     Oriented x4 with short periods of forgetfulness/confusion, able to be reoriented. VSS on RA ex slightly tachy this AM 100s. Tele ST. PIV SL, Abx dc'd. SBA w/ cane. Dialysis M/W/F, removed 3 liters yesterday. CXR, CT and Echo unremarkable. MRI ordered, checklist faxed. Discharge pending, neurology, nephrology following.

## 2020-06-23 NOTE — PLAN OF CARE
A&Ox3, disoriented to situation. Can be agitated & impulsive at times, attendant at bedside. C/o leg pain during dialysis, tylenol given x1 w/ relief. VSS on RA ex tachy. Tele: ST. Mod CHO/dialysis diet, tolerating well. BG checks + sliding scale insulin. PIV SL, int abx. SBA + cane. Dialysis M/W/F, removed 3 liters today. Chest x-ray & CT today (see results). Discharge pending, neurology following.

## 2020-06-24 ENCOUNTER — APPOINTMENT (OUTPATIENT)
Dept: CARDIOLOGY | Facility: CLINIC | Age: 75
End: 2020-06-24
Attending: PHYSICIAN ASSISTANT
Payer: COMMERCIAL

## 2020-06-24 VITALS
RESPIRATION RATE: 16 BRPM | HEART RATE: 97 BPM | HEIGHT: 74 IN | DIASTOLIC BLOOD PRESSURE: 59 MMHG | SYSTOLIC BLOOD PRESSURE: 93 MMHG | TEMPERATURE: 96.4 F | BODY MASS INDEX: 31.07 KG/M2 | OXYGEN SATURATION: 98 % | WEIGHT: 242.1 LBS

## 2020-06-24 LAB
ALBUMIN SERPL-MCNC: 3.1 G/DL (ref 3.4–5)
ALP SERPL-CCNC: 55 U/L (ref 40–150)
ALT SERPL W P-5'-P-CCNC: 16 U/L (ref 0–70)
AMMONIA PLAS-SCNC: 13 UMOL/L (ref 10–50)
ANION GAP SERPL CALCULATED.3IONS-SCNC: 7 MMOL/L (ref 3–14)
AST SERPL W P-5'-P-CCNC: 10 U/L (ref 0–45)
BASOPHILS # BLD AUTO: 0 10E9/L (ref 0–0.2)
BASOPHILS NFR BLD AUTO: 0.8 %
BILIRUB SERPL-MCNC: 0.6 MG/DL (ref 0.2–1.3)
BUN SERPL-MCNC: 41 MG/DL (ref 7–30)
CALCIUM SERPL-MCNC: 9.7 MG/DL (ref 8.5–10.1)
CHLORIDE SERPL-SCNC: 99 MMOL/L (ref 94–109)
CO2 SERPL-SCNC: 28 MMOL/L (ref 20–32)
CREAT SERPL-MCNC: 11.5 MG/DL (ref 0.66–1.25)
DIFFERENTIAL METHOD BLD: ABNORMAL
EOSINOPHIL # BLD AUTO: 0.6 10E9/L (ref 0–0.7)
EOSINOPHIL NFR BLD AUTO: 11.3 %
ERYTHROCYTE [DISTWIDTH] IN BLOOD BY AUTOMATED COUNT: 14.2 % (ref 10–15)
FOLATE SERPL-MCNC: 53.2 NG/ML
GFR SERPL CREATININE-BSD FRML MDRD: 4 ML/MIN/{1.73_M2}
GLUCOSE BLDC GLUCOMTR-MCNC: 141 MG/DL (ref 70–99)
GLUCOSE BLDC GLUCOMTR-MCNC: 171 MG/DL (ref 70–99)
GLUCOSE BLDC GLUCOMTR-MCNC: 176 MG/DL (ref 70–99)
GLUCOSE SERPL-MCNC: 164 MG/DL (ref 70–99)
HCT VFR BLD AUTO: 35.8 % (ref 40–53)
HGB BLD-MCNC: 11.4 G/DL (ref 13.3–17.7)
IMM GRANULOCYTES # BLD: 0 10E9/L (ref 0–0.4)
IMM GRANULOCYTES NFR BLD: 0.2 %
LYMPHOCYTES # BLD AUTO: 2 10E9/L (ref 0.8–5.3)
LYMPHOCYTES NFR BLD AUTO: 37.5 %
MCH RBC QN AUTO: 31.1 PG (ref 26.5–33)
MCHC RBC AUTO-ENTMCNC: 31.8 G/DL (ref 31.5–36.5)
MCV RBC AUTO: 98 FL (ref 78–100)
MONOCYTES # BLD AUTO: 0.5 10E9/L (ref 0–1.3)
MONOCYTES NFR BLD AUTO: 8.5 %
NEUTROPHILS # BLD AUTO: 2.2 10E9/L (ref 1.6–8.3)
NEUTROPHILS NFR BLD AUTO: 41.7 %
NRBC # BLD AUTO: 0 10*3/UL
NRBC BLD AUTO-RTO: 0 /100
PLATELET # BLD AUTO: 155 10E9/L (ref 150–450)
POTASSIUM SERPL-SCNC: 4.9 MMOL/L (ref 3.4–5.3)
PROT SERPL-MCNC: 8.6 G/DL (ref 6.8–8.8)
RBC # BLD AUTO: 3.66 10E12/L (ref 4.4–5.9)
SODIUM SERPL-SCNC: 134 MMOL/L (ref 133–144)
WBC # BLD AUTO: 5.3 10E9/L (ref 4–11)

## 2020-06-24 PROCEDURE — G0378 HOSPITAL OBSERVATION PER HR: HCPCS

## 2020-06-24 PROCEDURE — 93010 ELECTROCARDIOGRAM REPORT: CPT | Performed by: INTERNAL MEDICINE

## 2020-06-24 PROCEDURE — 25000132 ZZH RX MED GY IP 250 OP 250 PS 637: Performed by: PHYSICIAN ASSISTANT

## 2020-06-24 PROCEDURE — 80053 COMPREHEN METABOLIC PANEL: CPT | Performed by: PHYSICIAN ASSISTANT

## 2020-06-24 PROCEDURE — 90937 HEMODIALYSIS REPEATED EVAL: CPT

## 2020-06-24 PROCEDURE — 25000132 ZZH RX MED GY IP 250 OP 250 PS 637: Performed by: HOSPITALIST

## 2020-06-24 PROCEDURE — 25000131 ZZH RX MED GY IP 250 OP 636 PS 637: Performed by: PHYSICIAN ASSISTANT

## 2020-06-24 PROCEDURE — 93270 REMOTE 30 DAY ECG REV/REPORT: CPT

## 2020-06-24 PROCEDURE — 82746 ASSAY OF FOLIC ACID SERUM: CPT | Performed by: PHYSICIAN ASSISTANT

## 2020-06-24 PROCEDURE — 25800030 ZZH RX IP 258 OP 636: Performed by: INTERNAL MEDICINE

## 2020-06-24 PROCEDURE — 00000146 ZZHCL STATISTIC GLUCOSE BY METER IP

## 2020-06-24 PROCEDURE — 93272 ECG/REVIEW INTERPRET ONLY: CPT | Performed by: INTERNAL MEDICINE

## 2020-06-24 PROCEDURE — 85025 COMPLETE CBC W/AUTO DIFF WBC: CPT | Performed by: PHYSICIAN ASSISTANT

## 2020-06-24 PROCEDURE — 82140 ASSAY OF AMMONIA: CPT | Performed by: PHYSICIAN ASSISTANT

## 2020-06-24 PROCEDURE — 36415 COLL VENOUS BLD VENIPUNCTURE: CPT | Performed by: PHYSICIAN ASSISTANT

## 2020-06-24 PROCEDURE — 93005 ELECTROCARDIOGRAM TRACING: CPT | Mod: XU

## 2020-06-24 PROCEDURE — 99217 ZZC OBSERVATION CARE DISCHARGE: CPT | Performed by: PHYSICIAN ASSISTANT

## 2020-06-24 PROCEDURE — 96372 THER/PROPH/DIAG INJ SC/IM: CPT | Mod: 59

## 2020-06-24 RX ORDER — ALBUMIN, HUMAN INJ 5% 5 %
250 SOLUTION INTRAVENOUS
Status: DISCONTINUED | OUTPATIENT
Start: 2020-06-24 | End: 2020-06-24

## 2020-06-24 RX ORDER — ALBUMIN (HUMAN) 12.5 G/50ML
50 SOLUTION INTRAVENOUS
Status: DISCONTINUED | OUTPATIENT
Start: 2020-06-24 | End: 2020-06-24

## 2020-06-24 RX ADMIN — INSULIN ASPART 1 UNITS: 100 INJECTION, SOLUTION INTRAVENOUS; SUBCUTANEOUS at 08:49

## 2020-06-24 RX ADMIN — INSULIN ASPART 4 UNITS: 100 INJECTION, SOLUTION INTRAVENOUS; SUBCUTANEOUS at 08:49

## 2020-06-24 RX ADMIN — SILVER SULFADIAZINE: 10 CREAM TOPICAL at 13:36

## 2020-06-24 RX ADMIN — INSULIN GLARGINE 22 UNITS: 100 INJECTION, SOLUTION SUBCUTANEOUS at 08:48

## 2020-06-24 RX ADMIN — INSULIN ASPART: 100 INJECTION, SOLUTION INTRAVENOUS; SUBCUTANEOUS at 13:34

## 2020-06-24 RX ADMIN — SODIUM CHLORIDE 200 ML: 9 INJECTION, SOLUTION INTRAVENOUS at 09:02

## 2020-06-24 RX ADMIN — INSULIN ASPART 4 UNITS: 100 INJECTION, SOLUTION INTRAVENOUS; SUBCUTANEOUS at 13:34

## 2020-06-24 RX ADMIN — ASPIRIN 81 MG 81 MG: 81 TABLET ORAL at 13:29

## 2020-06-24 RX ADMIN — SODIUM CHLORIDE 250 ML: 9 INJECTION, SOLUTION INTRAVENOUS at 09:03

## 2020-06-24 RX ADMIN — METOPROLOL TARTRATE 12.5 MG: 25 TABLET, FILM COATED ORAL at 13:29

## 2020-06-24 RX ADMIN — UMECLIDINIUM 1 PUFF: 62.5 AEROSOL, POWDER ORAL at 09:08

## 2020-06-24 NOTE — PROGRESS NOTES
[facility]    Neurology Progress Note     Assessment & Plan   Keagan Wayne is a 74 year old male who was admitted on 6/22/2020. Syncope vs seizure  during dialysis      Keagan Wayne is a 74 year old male who was admitted on 6/22/2020. I was asked to see the patient for syncope vs seiure during dialysis. He is improving in his mental status     Staffs noticed that he was zoning out in the middle of conversation+    Syncope, disequilibrium syndrome vs seizure ( risk factor- FHx of seizure, concussion, prior alcohol /drug Hx)vs metabolic encephalopathy ( hypothyroidism TSH 6.4)     -EEG - Global slow activity- likely encephlopathy  -MRI Brain wo ( ESRD)  1. No acute intracranial abnormality. Specifically, no acute infarct,  hemorrhage, mass lesion or mass effect.  2. Global brain parenchymal volume loss and presumed chronic small vessel ischemic changes  -B12 (high), Folate (wnl), TSH ( high)  -Neurology will sign off    Shaina Churchill    [facility]  Office Phone [phone]    Physical Exam   Temp: 97.8  F (36.6  C) Temp src: Oral BP: 108/52 Pulse: 85 Heart Rate: 112 Resp: 16 SpO2: 98 % O2 Device: None (Room air)    Vitals:    06/22/20 1400 06/23/20 0457   Weight: 114.5 kg (252 lb 6.8 oz) 109.8 kg (242 lb 1.6 oz)     Vital Signs with Ranges  Temp:  [96.6  F (35.9  C)-97.8  F (36.6  C)] 97.8  F (36.6  C)  Pulse:  [85] 85  Heart Rate:  [] 112  Resp:  [15-16] 16  BP: ()/(45-79) 108/52  SpO2:  [97 %-100 %] 98 %  I/O last 3 completed shifts:  In: 920 [P.O.:920]  Out: -         Medications       - MEDICATION INSTRUCTIONS for Dialysis Patients -   Does not apply See Admin Instructions     aspirin  81 mg Oral Daily     atorvastatin  80 mg Oral At Bedtime     gabapentin  100 mg Oral QPM     insulin aspart  4 Units Subcutaneous TID w/meals     insulin aspart  1-7 Units Subcutaneous TID AC     insulin aspart  1-5 Units Subcutaneous At Bedtime     insulin glargine  22 Units Subcutaneous QAM      metoprolol tartrate  12.5 mg Oral BID     multivitamin RENAL  1 capsule Oral QPM     OLANZapine zydis  15 mg Oral At Bedtime     silver sulfADIAZINE   Topical Daily     sodium chloride (PF)  3 mL Intracatheter Q8H     umeclidinium  1 puff Inhalation Daily     total time : 15 minutes  More than 50% of the time was spent on  Risk factor management as well as discussing/coordinating care with hospital staffs.

## 2020-06-24 NOTE — PROGRESS NOTES
Potassium   Date Value Ref Range Status   06/24/2020 4.9 3.4 - 5.3 mmol/L Final     Hemoglobin   Date Value Ref Range Status   06/24/2020 11.4 (L) 13.3 - 17.7 g/dL Final     Creatinine   Date Value Ref Range Status   06/24/2020 11.50 (H) 0.66 - 1.25 mg/dL Final     Urea Nitrogen   Date Value Ref Range Status   06/24/2020 41 (H) 7 - 30 mg/dL Final     Sodium   Date Value Ref Range Status   06/24/2020 134 133 - 144 mmol/L Final     INR   Date Value Ref Range Status   10/10/2017 1.08 0.86 - 1.14 Final       DIALYSIS PROCEDURE NOTE  Hepatitis status of previous patient on machine log was checked and verified ok to use with this patients hepatitis status.  Patient dialyzed for 4 hrs. on a 2 K bath then changed to K3 after HR in 120's with a net fluid removal of  2L.  A BFR of 400 ml/min was obtained via a TAWNY using 15gauge needles.    The treatment plan was discussed with Dr. Warren during the treatment.  Total heparin received during the treatment: 0 units.   Needle cannulation sites held x 20 min, pressure dressings applied after hemostasis.  Meds  given: NONE Complications:ST during the run. K2 bath changed to K3 and goal at 2 kg.   Access care education provided orally to patient, patient verbalized understanding  ICEBOAT? Timeout performed pre-treatment  I: Patient was identified using 2 identifiers  C: Consent obtained/verified current before treatment  E: Equipment preventative maintenance is current and dialysis delivery system OK to use  B: Hepatitis B Surface Antigen: neg; Draw Date: 6/22/2020      Hepatitis B Surface Antibody: Immune; Draw Date: 6/22/2020  O: Dialysis orders present and complete prior to treatment  A: Vascular access verified and assessed prior to treatment  T: Treatment was performed at a clinically appropriate time  ?: Patient was allowed to ask questions and address concerns prior to treatment  See flowsheet in EPIC for further details and post assessment.  Machine water alarm in place and  functioning. Transducer pods intact and checked every 15min.  Pt returned via wheelchair  Report received from: JOANN Benton R.N.  Report given to: SHANI Zayas  Chlorine/Chloramine water system checked every 4 hours.  Outpatient Dialysis at [facility]

## 2020-06-24 NOTE — PLAN OF CARE
A&Ox4, can be forgetful at times.  VSS, RA.  Denies pain.  SBA with cane; did not get OOB this shift.  Dialysis fistula to R lower arm, dressing CDI.  PIV SL.  Plan for patient to have dialysis today.  Dressing to R rib/flank area, CDI.  Neuro/nephrology consulted; possible psych consult.  Discharge pending progress.

## 2020-06-24 NOTE — PROVIDER NOTIFICATION
MD Notification    Notified Person: MD    Notified Person Name:    Notification Date/Time: 6/24/2020 1:02 PM      Notification Interaction:    Purpose of Notification:pt with phos 1.9 before dialysis. hes no his way back. should I replace with protocol? Also, tachy in 120's, please note. will give daily metop when he gets back    Orders Received:    Comments:

## 2020-06-24 NOTE — PLAN OF CARE
A&Ox4, can be forgetful at times. Sinus tach, 120's with dialysis. AM Metop given now, since back from HD. Denies pain.  Up with 1 at EOB/chair.  Dialysis fistula to R lower arm, dressing CDI.  PIV SL. dialysis done today.  Creat 11.5 before HD. EKG done, sinus tach. Tele, sinus tach. Dressing to R rib/flank burn area, changed, CDI.  Neuro/nephrology consulted, MRI and EEG normal.  Discharge likely near future with PCA María and Holter monitor.

## 2020-06-24 NOTE — DISCHARGE SUMMARY
[facility]  Hospitalist Discharge Summary      Date of Admission:  6/22/2020  Date of Discharge:  6/24/2020  Discharging Provider: Orquidea Colunga PA-C    Discharge Diagnoses   Syncope   Intermittent encephalopathy, suspect metabolic, resolved  Elevated d-dimer  Hyponatremia, resolved  Hyperkalemia, resolved  Hypercalcemia, resolved    Follow-ups Needed After Discharge   Follow-up Appointments     Follow-up and recommended labs and tests       Follow up with primary care provider, Yolanda Hernandez, within 7 days for   hospital follow- up.  The following labs/tests are recommended: cardiac   event monitor.    You are scheduled to see Dr. Hernandez on Tuesday July 7th at 11:00 a.m.   for hospital follow up and further recommendations/referrals.  Located at:  [address] [address] [address]    [place] [address] [phone]           Unresulted Labs Ordered in the Past 30 Days of this Admission     Date and Time Order Name Status Description    6/22/2020 1159 Blood culture Preliminary     6/22/2020 1159 Blood culture Preliminary       These results will be followed up by PCP    Discharge Disposition   Discharged to home  Condition at discharge: Stable    Hospital Course   Keagan Wayne is a 74 year old male with ESRD on hemodialysis.  He had sudden loss of consciousness at the initiation of his hemodialysis session this morning.  Was associated with some hypotension.  He was taken to the emergency department where he was awake alert and grossly nonfocal.  His vital signs were stable.  Chest x-ray showed a right middle lobe opacity versus infiltrate.  He does have a history of seizures for which she was admitted here in 2017.     Syncope  History of seizures (9/2017): Soon after beginning dialysis on 6/22/2020, he lost consciousness for about 5 minutes with associated hypotension (initial  during dialysis, down to 87 during  syncopal episode). EMS was called and noted hypotension with associated confusion. Pt become more alert en route to the hospital. No hypoglycemia noted per EMS. EKG with sinus tachycardia with first degree AV block, no change relative to last EKG. Troponin negative X2. D-dimer 2.1 (mild tachycardia in the low 100s in the context of PTA BB being on hold, no hypoxia noted). Pt treated with total of 550 ccs IVF bolus in the ED. Note admission in 9/2017 for new onset seizures. At that time, pt was discharged with Keppra 500 mg po BID. No longer taking. Denies any chest pain, SOB, dizziness, lightheadedness. No recent URI sx including cough, cold, nasal congestion. No recent syncope or seizure-like activity reported. TSH 2.56. Note recent hospitalization 6/5/2020-6/8/2020 at [facility] for metabolic encephalopathy in the setting of missed dialysis run. Pt denies any recent alcohol or illicit drug use (no UDS or ethanol obtained on admission), PCA confirms this. UDS positive for opiates on 6/5/2020. No recent reported missed dialysis runs.   - Echocardiogram with EF >70%, moderate LVH, no RWMA, no evidence for significant valvular pathology, mild aortic root dilatation, 4.1 cm.   - B12 1989, Folate 53.2, CMP unremarkable, Ammonia 13   - MRI brain negative for acute pathology  - EEG negative for seizure activity    - Neurology followed throughout hospitalization; discussed case with Dr. Churchill, higher suspicion for syncope, metabolic encephalopathy. No indication for re-initiation of AED upon discharge.  - Given above work-up suspect etiology of syncopal event likely dehydration in the setting of pt being hyperconscious of fluid restriction in the setting of ESRD and associated metabolic derangements, cannot rule out underlying arrhythmia (note telemetry during hospitalization with NSR, occasional sinus tachycardia in the setting of delayed BB dosage). Pt without dizziness, lightheadedness, or chest pain throughout stay.  No focal neuro deficits.   - Cardiac event monitor X14 days upon discharge, follow-up results with PCP   - Care Coordinator helped facilitate PCP follow-up at discharge     Intermittent encephalopathy, suspect metabolic, resolved  Hx of recurrent, metabolic encephalopathy: Noted confusion on admission in the setting of hypotension, improved with IVF resuscitation. Also noted electrolyte derangements. Confusion seemed to improve with dialysis as well. No focal neuro deficits. Work-up as above. On day of discharge, pt was alert and oriented X3. Sister, Bailey, and PCA, María, felt he seemed back to baseline mental status via telephone.     Elevated d-dimer: D-dimer 2.1 on admission. Mild, transient tachycardia in the low 100s in the context of PTA BB being on hold--majority of hospitalization, tele with NSR. No hypoxia noted. Pt without palpitations, chest pain, SOB. No hx of VTE. No recent travel, surgery. No ELIZABETH, calf tenderness, erythema, or swelling. Low suspicion for VTE.      Chronic bilateral pleural thickening and rounded atelectasis in lower lobes: WBC WNL. Procalcitonin 1.01 (unclear how to interpret in ESRD pt). CXR on admission noting new focal masslike opacity involving the infrahilar region of the right middle lobe. CT chest without contrast with chronic, bilateral pleural thickening and rounded atelectasis in both lower lobes, similar in appearance to 2017. No mass or acute infiltrate. Pt started on Ceftriaxone and doxycycline on admission due to concern for CAP. Afebrile overnight. Pt denies any recent URI sx including cold, cough, nasal congestion, fevers.   - Antibiotics discontinued 6/23/2020 given unremarkable CT, afebrile throughout stay, WBC WNL.   - BC NGTD after 2 days      ESRD on hemodialysis  Hyponatremia, resolved  Hyperkalemia, resolved  Hypercalcemia, resolved: Dialyzes MWF. On admission, sodium 129, potassium 5.8, creatinine 12.10, calcium 10.3.   [Phoslo 2001 mg TID, Nephrocaps daily]  -  Nephrology consulted, pt underwent dialysis 6/22/2020--net removal of 3 L and dialysis run 6/24/2020--Note during dialysis on 6/24/2020, pts SBP dropped transiently to 80s with compensatory HR up to the 120s, tele with reported sinus tachycardia. EKG confirming. Pt asymptomatic at that time. He did receive a total of 550 ml bolus in dialysis. Net removal decreased to 2 L. Suspect pt will need adjustment to fluid taken off in the future given presentation above and concern for dehydration. Pt euvolemic throughout stay and at time of discharge.      Anemia of chronic disease: Baseline Hgb ~10-11. No active signs of bleeding.   - Monitor      Recent Labs   Lab 06/24/20  0713 06/23/20  0721 06/22/20  0658   HGB 11.4* 10.7* 10.8*     T2DM, insulin dependent, uncontrolled with associated peripheral neuropathy: A1C 8.9 on 6/22/2020.   [Lantus 22 U qam, Novolog 8 U TID with meals, Gabapentin 100 mg qevening]  - Tresiba 14 U am given 6/23, resume PTA Lantus 22 U 6/24--continue at discharge  - Resume Novolog 8 U TID AC at discharge, treated with 4 U TID AC + sliding scale insulin during hospitalization      Recent Labs   Lab 06/24/20  1330 06/24/20  0845 06/24/20  0713 06/23/20  2105 06/23/20  1706 06/23/20  1130 06/23/20  0721 06/23/20  0157  06/22/20  0658   GLC  --   --  164*  --   --   --  241*  --   --  231*   * 141*  --  180* 283* 232*  --  260*   < >  --     < > = values in this interval not displayed.     Coronary artery disease, nonischemic   Hx of nonischemic cardiomyopathy in the setting of hypertensive heart disease, with improved EF  PAD s/p left SFA IVUS, PTA, scoring balloon PTA, DCB PTA and stenting (6/8/2018) and right SFA IVUS/PTA/DCB PTA with stenting (8/31/2018)  Hypertension: Echocardiogram during hospitalization with EF >70%, moderate LVH, no RWMA, no evidence for significant valvular pathology, mild aortic root dilatation, 4.1 cm. Lexiscan in 2016 with EF 23%, severe global hypokinesis, medium  sized inferior myocardial infarction with moderate brent-infarction ischemia present. Angiogram in 2016 with mild irregularities with no significant focal CAD.   [ASA 81 mg po every day, Lipitor 80 mg at bedtime, Lopressor 12.5 mg BID]  - BP 93/59 following dialysis, HR 97 BPM. Pt asymptomatic from cardiac standpoing   - Continue above regimen      Bipolar disorder   Concern for cognitive impairment: Per EHR review he was evaluated by Neuropsychology at [facility] in October 2019 for episodes of acute encephalopathy after missing dialysis treatments. He has history of bipolar disorder with multiple psychiatric hospitalizations int he 1980s and 1990s. MoCA screen on 6/25/2019 20/30. Neuropsychology deemed his cognitive functioning as baseline intellectual functioning in the borderline range. At that time it was felt acute encephalopathy was secondary to missed dialysis sessions creating an exacerbation of his bipolar disorder.   [Zyprexa 15 mg at bedtime]  - Continue Zyprexa at discharge   - Recommend outpatient, repeat neurocognitive evaluation upon discharge and recovery from acute illness     Chronic obstructive lung disease, not in acute exacerbation: Continue Incruse Ellipta one puff daily      Pancreatic cyst: Stable dating back to 2014.      History of treatment for hepatitis C: Noted.      Covid status: Not detected per PCR 6/22/2020.      Family update: Discussed POC with pt's sister, Bailey, and pt's PCA, María, with patient's permission.     Consultations This Hospital Stay   NEPHROLOGY IP CONSULT  NEUROLOGY IP CONSULT  PHYSICAL THERAPY ADULT IP CONSULT  CARE TRANSITION RN/SW IP CONSULT    Code Status   Full Code    Time Spent on this Encounter   I, Orquidea Colunga PA-C, personally saw the patient today and spent greater than 30 minutes discharging this patient.       [facility]  Hospital  ______________________________________________________________________    Physical Exam   Vital Signs: Temp: 96.4  F (35.8  C) Temp src: Oral BP: 93/59 Pulse: 97 Heart Rate: 112 Resp: 16 SpO2: 98 % O2 Device: None (Room air)    Weight: 242 lbs 1.6 oz    CONSTITUTIONAL: Pt laying in bed, dressed in hospital garb. Appears comfortable, pleasant. Cooperative with interview.   HEENT: Normocephalic, atraumatic. Pupils equal, round, and reactive to light. EOMI, bilat. Negative for conjunctival redness or scleral icterus.  Oral mucosa pink and moist; negative for ulcerations, erythema, or exudates.    CARDIOVASCULAR: RRR, no murmurs, rubs, or extra heart sounds appreciated. Pulses +2/4 and regular in upper and lower extremities, bilaterally.   RESPIRATORY: No increased work of breathing. CTA, bilat; no wheezes, rales, or rhonchi appreciated.  GASTROINTESTINAL:  Abdomen soft, non-distended. BS auscultated in all four quadrants. Negative for tenderness to palpation.  No masses or organomegaly noted.  MUSCULOSKELETAL: Strength +5/5 in upper and lower extremities, bilaterally. No gross deformities noted. Normal muscle tone.   HEMATOLOGIC/LYMPHATIC/IMMUNOLOGIC: Negative for lower extremity edema, bilaterally.  NEUROLOGIC: Alert and oriented to person, place, and time.  strength intact. CN's II-XII grossly intact. Cerebellar function intact per finger to nose testing intact. Sensation equal and intact in upper and lower extremities, bilat.   SKIN: Known sores noted on right side of abdomen. No appreciable drainage, tenderness, or surrounding erythema.        Primary Care Physician   Yolanda Hernandez    Discharge Orders      Reason for your hospital stay    You were hospitalized for further evaluation after a fainting spell while at dialysis, found to have low blood pressures and confusion. Suspect related to mild dehydration and electrolyte derangements prior to dialysis. You underwent a cardiac and neurologic  evaluation while hospitalized. You will be discharged with a cardiac event monitor to ensure no arrhythmia and complete your work-up.     Follow-up and recommended labs and tests     Follow up with primary care provider, Yolanda Hernandez, within 7 days for hospital follow- up.  The following labs/tests are recommended: cardiac event monitor.    You are scheduled to see Dr. Hernandez on Tuesday July 7th at 11:00 a.m. for hospital follow up and further recommendations/referrals.  Located at:  [address] [address] [address]    [place] [address] [phone]     Activity    Your activity upon discharge: activity as tolerated     Discharge Instructions    1) No medication changes at discharge   2) Cardiac event monitor at discharge for 14 days. Follow-up results with primary care provider   3) Monitor fluid intake, you may need adjustment in the amount of fluid they take off during dialysis to prevent dehydration   4) No driving until seen for follow-up with your primary care provider and until your cardiac event monitor is completed   5) Return to the ED with worsening or recurrent symptoms     Full Code     Diet    Follow this diet upon discharge: Orders Placed This Encounter      Room Service      Combination Diet Regular Diet Adult; Dialysis Diet; 1611-2242 Calories: Moderate Consistent CHO (4-6 CHO units/meal)       Significant Results and Procedures   Most Recent 3 CBC's:  Recent Labs   Lab Test 06/24/20  0713 06/23/20  0721 06/22/20  0658   WBC 5.3 4.8 5.8   HGB 11.4* 10.7* 10.8*   MCV 98 98 97    143* 149*     Most Recent 3 BMP's:  Recent Labs   Lab Test 06/24/20  0713 06/23/20  0721 06/22/20  0658    134 129*   POTASSIUM 4.9 4.8 5.8*   CHLORIDE 99 100 94   CO2 28 28 27   BUN 41* 31* 48*   CR 11.50* 9.18* 12.10*   ANIONGAP 7 6 8   ALLYSON 9.7 9.4 10.3*   * 241* 231*     Most Recent 2 LFT's:  Recent Labs   Lab Test 06/24/20  0713 10/10/17  1146   AST  10 9   ALT 16 14   ALKPHOS 55 73   BILITOTAL 0.6 0.7     Most Recent D-dimer:  Recent Labs   Lab Test 06/22/20  1050   DD 2.1*     Most Recent TSH and T4:  Recent Labs   Lab Test 06/23/20  0721 01/27/16  0737   TSH 2.56 6.44*   T4  --  1.23   ,   Results for orders placed or performed during the hospital encounter of 06/22/20   XR Chest Port 1 View    Narrative    CHEST PORTABLE ONE VIEW 6/22/2020 8:41 AM    HISTORY: Syncope.    COMPARISON: 10/12/2017      Impression    IMPRESSION: New focal masslike opacity involving the infrahilar region  of the right middle lobe. This could represent underlying pulmonary  mass versus focal dense consolidation and volume loss in the right  middle lobe. Recommend chest CT with IV contrast material for further  evaluation. Minimal amount of right basilar pleural fluid or  thickening. Minimal left basilar pleural fluid or thickening. Upper  lungs clear. Normal heart size. Normal pulmonary vascularity. Osseous  structures unremarkable.    CURT L BEHRNS, MD   CT Chest w/o Contrast    Narrative    CT CHEST W/O CONTRAST 6/22/2020 8:07 PM     HISTORY: abnormal cxr    TECHNIQUE: CT scan of the chest was performed without IV contrast.  Multiplanar reformats were obtained. Dose reduction techniques were  used.  CONTRAST: None.  COMPARISON: Chest x-ray 6/22/2020, abdominal CT 10/19/2017    FINDINGS:     LUNGS AND PLEURA: Chronic bilateral pleural thickening. Rounded  atelectasis in both lower lobes, similar in appearance to 2017. Mild  apical emphysema. The lungs are otherwise clear.    MEDIASTINUM/AXILLAE: No lymphadenopathy. Marked coronary artery  calcification.    UPPER ABDOMEN: Negative.      Impression    IMPRESSION:  1.  Chronic bilateral pleural thickening and rounded atelectasis in  both lower lobes, similar in appearance to 2017. No pulmonary mass or  acute infiltrate.    SHARON ROJO MD   MR Brain w/o Contrast    Narrative    MRI BRAIN WITHOUT CONTRAST  6/23/2020 4:01  PM    HISTORY:  Concern for seizures. Acute encephalopathy    TECHNIQUE:  Multiplanar, multisequence MRI of the brain without  contrast. This was performed according to seizure protocol.    COMPARISON:  MRI brain dated 9/18/2017.    FINDINGS: No acute infarct. Moderate diffuse parenchymal volume loss.  Scattered T2/T2 FLAIR hyperintense signal changes within the  periventricular and deep more than subcortical cerebral white matter,  and also patchy signal changes in the georgie, nonspecific but presumably  related to chronic small vessel ischemic disease. No intracranial  hemorrhage, extra axial fluid collection, mass lesion or mass effect.  Ventricles are normal in size and configuration.    Special attention to the bilateral mesial temporal regions  demonstrates no specific imaging features of mesial temporal  sclerosis. The hippocampal formations appear relatively normal and  symmetric bilaterally. The temporal horns of lateral ventricles do not  appear disproportionately dilated. The signal intensity of the  hippocampal formations appears relatively normal and symmetric. The  fornices and mamillary bodies appear symmetric.    Bilateral lens replacements. Visualized orbits otherwise appear  grossly normal. Small T2 hyperintense lesion deep to the skin of the  left face within the subcutaneous soft tissues (series 8 image 2)  measuring approximately 8 mm in size is nonspecific, but presumably  represents a skin-based lesion such as a slightly complex epidermal  inclusion/sebaceous cyst. Trace fluid in the bilateral mastoid tips.  The major vascular flow voids of the skull base appear to be  maintained. The calvarium, skull base and midface otherwise appear  unremarkable.      Impression    IMPRESSION:  1. No acute intracranial abnormality. Specifically, no acute infarct,  hemorrhage, mass lesion or mass effect.  2. Global brain parenchymal volume loss and presumed chronic small  vessel ischemic changes.  3. No  specific imaging features of mesial temporal sclerosis or other  definite structural seizure focus.  4. Nonspecific 8 mm lesion within the subcutaneous soft tissues of the  left face, possibly representing a mildly complex sebaceous  cyst/epidermal inclusion cyst. Recommend direct clinical inspection.    CHRISTELLE CLEVELAND MD   Echocardiogram Complete    Narrative    [id number]  [id number]  [id number]  [id number]^COLLIN^BRYSON^D           [facility]  Echocardiography Laboratory  [address] [place] [address]        Name: KRISTAL MORALES  MRN: [id number]  : 1945  Study Date: 2020 09:13 AM  Age: 74 yrs  Gender: Male  Patient [facility]: [facility]  Reason For Study: Syncope  Ordering Physician: BRYSON POLANCO  Referring Physician: Yolanda Hernandez  Performed By: Odette Arroyo     BSA: 2.4 m2  Height: 74 in  Weight: 242 lb  HR: 102  BP: 132/75 mmHg  _____________________________________________________________________________  __        Procedure  Complete Portable Echo Adult. Optison (NDC #0240-1730) given intravenously.  _____________________________________________________________________________  __        Interpretation Summary     1. Hyperdynamic left ventricular function The visual ejection fraction is  estimated at >70%.  2. There is moderate concentric left ventricular hypertrophy.  3. No regional wall motion abnormalities noted.  4. The right ventricle is normal in structure, function and size.  5. No evidence for significant valvular pathology.  6. Mild aortic root dilatation (sinus of valsalva), 4.1 cm.  _____________________________________________________________________________  __        Left Ventricle  The left ventricle is normal in size. There is moderate concentric left  ventricular hypertrophy. A sigmoid septum is present. Hyperdynamic left  ventricular function. The visual ejection fraction is estimated at >70%. Left  ventricular diastolic function is normal. No  regional wall motion  abnormalities noted.     Right Ventricle  The right ventricle is normal in structure, function and size.     Atria  Normal left atrial size. Right atrial size is normal. There is no color  Doppler evidence of an atrial shunt.     Mitral Valve  The mitral valve leaflets appear normal. There is no evidence of stenosis,  fluttering, or prolapse.        Tricuspid Valve  The tricuspid valve is normal in structure and function.     Aortic Valve  The aortic valve is trileaflet with aortic valve sclerosis.     Pulmonic Valve  The pulmonic valve is normal in structure and function.     Vessels  Mild aortic root dilatation. Normal size ascending aorta. IVC diameter <2.1 cm  collapsing >50% with sniff suggests a normal RA pressure of 3 mmHg.     Pericardium  There is no pericardial effusion.        Rhythm  Sinus rhythm was noted.  _____________________________________________________________________________  __  MMode/2D Measurements & Calculations  IVSd: 1.8 cm     LVIDd: 3.3 cm  LVIDs: 1.9 cm  LVPWd: 1.5 cm  FS: 40.8 %  LV mass(C)d: 201.5 grams  LV mass(C)dI: 85.5 grams/m2  Ao root diam: 4.1 cm  LA dimension: 2.7 cm  asc Aorta Diam: 3.3 cm  LA/Ao: 0.66  RWT: 0.90        Doppler Measurements & Calculations  MV E max radha: 79.8 cm/sec  MV A max radha: 52.6 cm/sec  MV E/A: 1.5  MV dec time: 0.13 sec  PA acc time: 0.05 sec  E/E' av.0  Lateral E/e': 11.0  Medial E/e': 7.0              _____________________________________________________________________________  __        Report approved by: Cristin Shen 2020 11:42 AM            Discharge Medications   Current Discharge Medication List      CONTINUE these medications which have NOT CHANGED    Details   aspirin 81 MG chewable tablet Take 1 tablet (81 mg) by mouth daily  Qty: 36 tablet    Associated Diagnoses: Type 2 diabetes mellitus with diabetic nephropathy (H)      atorvastatin (LIPITOR) 80 MG tablet Take 80 mg by mouth At Bedtime      "  calcium acetate (PHOSLO) 667 MG CAPS capsule Take 2,001 mg by mouth 3 times daily (with meals)       Gabapentin (NEURONTIN PO) Take 100 mg by mouth every evening       insulin aspart (NOVOLOG FLEXPEN) 100 UNIT/ML injection Inject 8 Units Subcutaneous 3 times daily (with meals)       insulin glargine (LANTUS PEN) 100 UNIT/ML pen Inject 22 Units Subcutaneous every morning    Comments: If Lantus is not covered by insurance, may substitute Basaglar at same dose and frequency.        metoprolol tartrate (LOPRESSOR) 25 MG tablet Take 12.5 mg by mouth 2 times daily On dialysis days, only take in the evening. Skip morning dose on dialysis days. 1/2 x 25mg = 12.5mg      multivitamin RENAL (NEPHROCAPS/TRIPHROCAPS) 1 MG capsule Take 1 capsule by mouth every evening      OLANZapine zydis (ZYPREXA) 15 MG ODT Take 15 mg by mouth At Bedtime      silver sulfADIAZINE (SILVADENE) 1 % external cream Apply topically daily Apply on blisters      umeclidinium (INCRUSE ELLIPTA) 62.5 MCG/INH oral inhaler Inhale 1 puff into the lungs daily      VITAMIN D, CHOLECALCIFEROL, PO Take 1,000 Units by mouth daily afternoon      blood glucose monitoring (ACCU-CHEK MINISTERIO PLUS) meter device kit accucheck Lesly glucometer      blood glucose monitoring (ACCU-CHEK FASTCLIX) lancets Test 2 times per day.      Blood Pressure Monitoring (RA BLOOD PRESSURE CUFF MONITOR) MISC by Misc.(Non-Drug; Combo Route) route once daily.      !! Insulin Pen Needle (PEN NEEDLES 5/16\") 31G X 8 MM MISC 31g x 8 mm      latanoprost (XALATAN) 0.005 % ophthalmic solution Place 1 drop into both eyes At Bedtime       Misc. Devices (ROLLATOR ULTRA-LIGHT) MISC 1 each by Other route      !! ULTICARE MICRO 32G X 4 MM insulin pen needle       !! ULTICARE PEN NEEDLES 29G X 12MM        !! - Potential duplicate medications found. Please discuss with provider.        Allergies   Allergies   Allergen Reactions     Morphine Rash     Dry skin  Dry skin  Dry skin     "

## 2020-06-24 NOTE — PROGRESS NOTES
[facility]     Renal Progress Note       SHORTHAND KEY FOR MY NOTES:  c = with, s = without, p = after, a = before, x = except, asx = asymptomatic, tx = transplant or treatment, sx = symptoms or symptomatic, cx = canceled or culture, rxn = reaction, yday = yesterday, nl = normal, abx = antibiotics, fxn = function, dx = diagnosis, dz = disease, m/h = melena/hematochezia, c/d/l/ha = cramping/dizziness/lightheadedness/headache, d/c = discharge or diarrhea/constipation, f/c/n/v = fevers/chills/nausea/vomiting, cp/sob = chest pain/shortness of breath, tbv = total body volume, rxn = reaction, tdc = tunneled dialysis catheter, pta = prior to admission, hd = hemodialysis, pd = peritoneal dialysis, hhd = home hemodialysis         Assessment/Plan:     1.  ESKD.  Pt dialysing per MWF schedule. The UF goal was reduced from 3L to 2L bc of some mild hypotension and persistent tachycardia.   A.  Next planned HD on Friday.    2.  AMS.  This has resolved.  Neuro w/u neg - unremarkable echo, brain MRI.  Chest CT didn't show any new infiltrates.  A.  Follow clinically.    3.  HTN.  Controlled c metop.  A.  Continue same meds/doses.    4.  Sinus tachy.  The monitor showed some irregularity, but it didn't sound like afib when auscultated.  ECG on admission showed sinus tach and when he had the echo, he was in sinus.  Goal UF was reduced, but HR remained in the 110-120s.  A.  Check ECG again if it persists.    5.  FEN.  Electrolytes are ok.        Interval History:     Pt is feeling much better.  He is not confused today and recognizes he was the other day.  No f/c/n/v.  Ate some breakfast this AM.          Medications and Allergies:       - MEDICATION INSTRUCTIONS for Dialysis Patients -   Does not apply See Admin Instructions     aspirin  81 mg Oral Daily     atorvastatin  80 mg Oral At Bedtime     gabapentin  100 mg Oral QPM     insulin aspart  4 Units Subcutaneous TID w/meals     insulin aspart  1-7 Units  "Subcutaneous TID AC     insulin aspart  1-5 Units Subcutaneous At Bedtime     insulin glargine  22 Units Subcutaneous QAM     metoprolol tartrate  12.5 mg Oral BID     multivitamin RENAL  1 capsule Oral QPM     OLANZapine zydis  15 mg Oral At Bedtime     silver sulfADIAZINE   Topical Daily     sodium chloride (PF)  3 mL Intracatheter Q8H     umeclidinium  1 puff Inhalation Daily     Allergies   Allergen Reactions     Morphine Rash     Dry skin  Dry skin  Dry skin          Physical Exam:     Vitals were reviewed    Heart Rate: 112, Blood pressure 108/52, pulse 85, temperature 97.8  F (36.6  C), resp. rate 16, height 1.88 m (6' 2\"), weight 109.8 kg (242 lb 1.6 oz), SpO2 98 %.  Wt Readings from Last 3 Encounters:   06/23/20 109.8 kg (242 lb 1.6 oz)   04/22/19 99.7 kg (219 lb 11.2 oz)   01/03/19 105.1 kg (231 lb 9.6 oz)     Intake/Output Summary (Last 24 hours) at 6/24/2020 1241  Last data filed at 6/24/2020 1215  Gross per 24 hour   Intake 600 ml   Output 2000 ml   Net -1400 ml     GENERAL APPEARANCE: pleasant, NAD, much more alert  HEENT:  eyes/ears/nose/neck grossly normal  RESP: lungs cta b c good efforts, no crackles  CV: reg, tachy, nl S1/S2 - monitor looks like sinus c some irregularity  ABDOMEN: o/s/nt/nd  EXTREMITIES/SKIN: no significant ble edema    Pt seen on HD. Stable run c -2L UF goal.  HR was high most of the run. Good BFR via TAWNY.         Data:     CBC RESULTS:     Recent Labs   Lab 06/24/20  0713 06/23/20  0721 06/22/20  0658   WBC 5.3 4.8 5.8   RBC 3.66* 3.55* 3.47*   HGB 11.4* 10.7* 10.8*   HCT 35.8* 34.8* 33.7*    143* 149*     Basic Metabolic Panel:  Recent Labs   Lab 06/24/20  0713 06/23/20  0721 06/22/20  0658    134 129*   POTASSIUM 4.9 4.8 5.8*   CHLORIDE 99 100 94   CO2 28 28 27   BUN 41* 31* 48*   CR 11.50* 9.18* 12.10*   * 241* 231*   ALLYSON 9.7 9.4 10.3*     INRNo lab results found in last 7 days.   Attestation:   I have reviewed today's relevant vital signs, notes, " medications, labs and imaging.    Bryn Warren MD  [facility] - Nephrology  [phone]

## 2020-06-24 NOTE — PROGRESS NOTES
Writer did not get a chance to meet with the patient.  Acknowledged consult for PCP follow up.  Writer called patients PCP and scheduled the following appointment.      Tuesday July 7th 11:00 a.m. with Dr. Hernandez   [facility]    [id number] [place] [address] [address]    [place] [id number] [phone]      Writer to fax hand off to [facility] when discharge orders are placed.

## 2020-06-24 NOTE — PROGRESS NOTES
Paged by nursing regarding intermittent bout of confusion following ambulation. Note BP prior to walk 93/59. Suspect softer BPs in the setting of dialysis run today and receiving PTA Lopressor early likely contributing. Repeat SBP subsequently 126. Pt reportedly less confused. Recommend encouraging PO fluid intake, hold Lopressor dose this evening and resume normal dosing in the AM. If pt's PCA does not feel comfortable taking him home, pt can remain in the hospital overnight for overnight monitoring, but pt is still OK to discharge from my standpoint. OK to monitor for a few hours as well and re-evaluate. RN to update PCA to determine discharge decision.

## 2020-06-24 NOTE — PLAN OF CARE
[id number] shift. Vitals stable. Tele- NSR. A&O, forgetful at times. SBA w/ cane. Wound to R rib/flank, dressing CDI. Neuro and nephrology following, possible psych consult. Plan for dialysis tomorrow.

## 2020-06-25 NOTE — PLAN OF CARE
Patient discharged to home with María FLORES. PIV removed. Paperwork discussed via phone with María. Holter monitor in place. Follow up appointment made. All belongings sent with patient upon exiting facility.

## 2020-06-28 LAB
BACTERIA SPEC CULT: NO GROWTH
BACTERIA SPEC CULT: NO GROWTH
INTERPRETATION ECG - MUSE: NORMAL
SPECIMEN SOURCE: NORMAL
SPECIMEN SOURCE: NORMAL

## 2020-07-27 ENCOUNTER — TELEPHONE (OUTPATIENT)
Dept: TRANSPLANT | Facility: CLINIC | Age: 75
End: 2020-07-27

## 2020-07-27 NOTE — TELEPHONE ENCOUNTER
Pt calling  Wants to restart the referral again  Had dialysis early Am- M-W Fr  Call pt back anytime  Has re-thought the process and realizes he needs the transplant

## 2020-08-12 ENCOUNTER — TELEPHONE (OUTPATIENT)
Dept: TRANSPLANT | Facility: CLINIC | Age: 75
End: 2020-08-12

## 2020-08-12 NOTE — TELEPHONE ENCOUNTER
Returned call to Keagan and he asked me if he has a chance to get back on the transplant list here. I told him our Transplant team had decided 2/2020 not to offer Keagan another evaluation due to his ongoing noncompliance and drug use. Reviewing his records shows his behavior has not changed. He asked me why is he going to dialysis 3 days a week then? I told him that is something he should talk to his nephrologist about. I did tell him he could seek transplant at another center and he wanted me to send him a letter with the names and addresses of other transplant centers. I told him he should talk with his nephrologist at dialysis about a referral but keep in mind all other transplant centers can see his medical records and his history of non compliance.

## 2020-09-16 ENCOUNTER — TELEPHONE (OUTPATIENT)
Dept: TRANSPLANT | Facility: CLINIC | Age: 75
End: 2020-09-16

## 2020-09-17 NOTE — TELEPHONE ENCOUNTER
Returned call to Keagan and got his voice mail. I left the message that our center has declined to work with him again toward the goal of a transplant because of his long standing ongoing non compliance. This is the second call I have received to resume evaluation. This time the message was he has a donor. I encouraged him to talk with his nephrologist about a referral to another transplant center.

## 2021-06-15 NOTE — PROGRESS NOTES
Rule 25 Assessment  Background Information   1. Date of Assessment Request  2. Date of Assessment  1/30/2018   3. Date Service Authorized     4.   Savi Yepez MA [facility]   5.  Phone Number [phone]    6. Referent  Dr Cho 7. Assessment Site  [facility]     8. Client Name Keagan Lugo 9. Date of Birth  1945 Age  72 y.o. 10. Gender  male  11. PMI/ Insurance No.  [id number]   12. Client's Primary Language:  English 13. Do you require special accommodations, such as an  or assistance with written material? No   14. Current Address: [address] [place] [address] [address]  [facility] [address]   15. Client Phone Numbers: [phone] (home)    16.  Alternate (cell) Phone Number:  u     17. Tell me what has happened to bring you here today.     I just here so I can get a Kidney    18. Have you had other rule 25 assessments? no    DIMENSION I - Acute Intoxication /Withdrawal Potential   1. Chemical use most recent 12 months outside a facility and other significant use history (client self-report)             X = Primary Drug Used   Age of First Use Most Recent Pattern of Use and Duration   Need enough information to show pattern (both frequency and amounts) and to show tolerance for each chemical that has a diagnosis   Date of last use and time, if needed   Withdrawal Potential? Requiring special care Method of use  (oral, smoked, snort, IV, etc)   [] Alcohol 21 None current      [x] Marijuana/Hashish 21 2 to 3 times per week  Medical Card in [place], but hasn't established himself in [place]  Reports using for pain Few days ago none smokes   [] Cocaine/Crack  Prior use  30 years ago     [] Meth/Amphetamines        [] Heroin  Prior use 30 years ago     [] Other Opiates/Synthetics        [] Inhalants        [] Benzodiazepines        [] Hallucinogens        [] Barbiturates/Sedatives/Hypnotics        [] Over-the-Counter Drugs        [] Other        [] Nicotine          2. Do you use greater  amounts of alcohol/other drugs to feel intoxicated or achieve the desired effect? no.  Or use the same amount and get less of an effect? No (DSM)  Example:     3A. Have you ever been to detox? no    3B. When was the first time? na    3C. How many times since then? na    3D. Date of most recent detox: na      4.  Withdrawal symptoms: Have you had any of the following withdrawal symptoms?  no  Past 12 months Recent (past 30 days)   None None     Notes:      's Visual Observations and Symptoms: cooperative, oriented X3  Based on the above information, is withdrawal likely to require attention as part of treatment participation?  no    Dimension I Ratings   Acute intoxication/Withdrawal potential - The placing authority must use the criteria in Dimension I to determine a client s acute intoxication and withdrawal potential.    RISK DESCRIPTIONS - Severity ratin  Client displays full functioning with good ability to tolerate and cope with withdrawal discomfort.  No signs or symptoms of intoxication or withdrawal or resolving signs or symptoms    REASONS SEVERITY WAS ASSIGNED (What about the amount of the person s use and date of most recent use and history of withdrawal problems suggests the potential of withdrawal symptoms requiring professional assistance? )    Ct cites no signs or symptoms of withdrawal and is able to manage any discomfort.   Ct admits to use of cannabis to jahaira pain.  He states that he has a medical card through [place], but has not applied for one in [place]     DIMENSION II - Biomedical Complications and Conditions   1. Do you have any current health/medical conditions?(Include any infectious diseases, allergies, or chronic or acute pain, history of chronic conditions)    Kidney failure  Dialysis 3 time per week  Diabetic  HBP     2. Do you have a health care provider? When was your most recent appointment? What concerns were identified?    Yolanda Hernandez MD      3. If indicated by answers  to items 1 or 2: How do you deal with these concerns? Is that working for you? If you are not receiving care for this problem, why not?    I follow recommendations      4A. List current medication(s) including over-the-counter or herbal supplements--including pain management:    Insulin  metformin  HBP medication  I can't remember the names of all of them    4B. Do you follow current medical recommendations/take medications as prescribed?   yes    4C. When did you last take your medication?  today    5. Has a health care provider/healer ever recommended that you reduce or quit alcohol/drug use?  No (DSM)    6. Are you pregnant?  NA      6B.  Receiving prenatal care?  no      6C.  When is your baby due?      7. Have you had any injuries, assaults/violence towards you, accidents, health related issues, overdose(s) or hospitalizations related to your use of alcohol or other drugs:  no    8. Do you have any specific physical needs/accommodations? yes, Explain:  dialysis    Dimension II Ratings   Biomedical Conditions and Complications - The placing authority must use the criteria in Dimension II to determine a client s biomedical conditions and complications.   RISK DESCRIPTIONS - Severity ratin  Client has difficulty tolerating and coping with physical problems or has other biomedical problems that interfere with recovery and treatment.  Client neglects or does not seek care for serious biomedical problems.    REASONS SEVERITY WAS ASSIGNED (What physical/medical problems does this person have that would inhibit his or her ability to participate in treatment? What issues does he or she have that require assistance to address?)    Ct has diabetes and renal failure - hoping to get a kidney transplant.  Ct is being followed by PCP and specialists.  Ct able to access services needed.  Ct reports having a PCA and a CADI Worker                           DIMENSION III - Emotional, Behavioral, Cognitive Conditions and  Complications   1. (Optional) Tell me what it was like growing up in your family. (substance use, mental health, discipline, abuse, support)     I've been through some rough stuff back in the day.  Nothing that I'm going to complain about at this point  Denies history of abuse other than a whoopin when he deserved it.    2.  When was the last time that you had significant problems  Past month 2-12 months ago 1+ years ago Never   A. With feeling very trapped, lonely, sad, blue, depressed or hopelessabout the future? []  []  [x] []     B. With sleep trouble, such as bad dreams, sleeping restlessly, or fallingasleep during the day? [] [] [] []   C. With feeling very anxious, nervous, tense, scared, panicked, or likesomething bad was going to happen? [] [] [x] []   D. With becoming very distressed and upset when something remindedyou of the past? [] [] [] [x]   E. With thinking about ending your life or committing suicide?  [] [] [] [x]     3.  When was the last time that you did the following things two or more times? Past month 2-12 months ago 1+ years ago Never   A. Lied or conned to get things you wanted or to avoid having to do something? [] [] [] [x]   B. Had a hard time paying attention at school, work, or home? [] [] [] [x]   C. Had a hard time listening to instructions at school, work, or home?  [] [] [] [x]   D. Were a bully or threatened other people? [] [] [] [x]   E. Started physical fights with other people? [] [] [] [x]     Note: These questions are from the Global Appraisal of Individual Needs--Short Screener. Any item marked  past month  or  2 to 12 months ago  will be scored with a severity rating of at least 2.  For each item that has occurred in the past month or past year ask follow up questions to determine how often the person has felt this way or has the behavior occurred? How recently? How has it affected their daily living? And, whether they were using or in withdrawal at the time?    Ct states  that he has Bipolar Disorder and his symptoms caused psychosis putting him in the psych smith 2 time in the past - many years ago    4A. If the person has answered item 2E with  in the past year  or  the past month , ask about frequency and history of suicide in the family or someone close and whether they were under the influence.    Any history of suicide in your family? Or someone close to you?  no      4B. If the person answered item 2E  in the past month  ask about  intent, plan, means and access and any other follow-up information  to determine imminent risk. Document any actions taken to intervene  on any identified imminent risk.     PANSI administered - low risk for suicide.  No thoughts, means or plan noted at the time of evaluation       5A. Have you ever been diagnosed with a mental health problem?  yes  5B. Are you receiving care for any mental health issues? no  If yes, what is the focus of that care or treatment?  Are you satisfied with the service?  Most recent appointment?  How has it been helpful?    I know how to cope with Bipolar at this point in my life    6A.  Have you been prescribed medications for emotional/psychological problems?  yes  6B.  Current mental health medication(s) If these medications are listed for Dimension II, reference item II-5.  6C.  Are you taking your medications as instructed?  yes    7A. Does your MH provider know about your use?  no  7B.  What does he or she have to say about it? (DSM)  No provider    8A. Have you ever been verbally, emotionally, physically or sexually abused? no   Follow up questions to learn current risk, continuing emotional impact.  denies  8B. Have you received counseling for abuse?  NA    9A. Have you ever experienced or been part of a group that experienced community violence, historical trauma, rape or assault? no  9B.  How has that affected you?    9C.  Have you received counseling for that?  no    10A. [place]: no  10B.  Exposure to Combat?   no    11. Do you have problems with any of the following things in your daily life?  None      Note: If the person has any of the above problems, how do they deal with them, have they developed coping mechanisms?  Have they received treatment?  Follow up with items 12, 13, and 14. If none of the issues in item 11 are a problem for the person, skip to item 15.    na    12. Have you been diagnosed with traumatic brain injury or Alzheimer s?  no    13.  If the answer to #12 is no, ask the following questions:    Have you ever hit your head or been hit on the head? I'm sure I have    Were you ever seen in the Emergency Room, hospital, or by a doctor because of an injury to your head? no    Have you had any significant illness that affected your brain (brain tumor, meningitis, West Nile Virus, stroke or seizure, heart attack, near drowning or near suffocation)?  no    14.  If the answer to # 12 is yes, ask if any of the problems identified in #11 occurred since the head injury or loss of oxygen no    15A. Highest grade of school completed:  college  15B. Do you have a learning disability? no  15C. Did you ever have tutoring in Math or English? no.  15D. Have you ever been diagnosed with Fetal Alcohol Effects or Fetal Alcohol Syndrome? no    Explain:      16. If yes to item 15 B, C, or D: How has this affected your use or been affected by your use?     na    Dimension III Ratings   Emotional/Behavioral/Cognitive - The placing authority must use the criteria in Dimension III to determine a client s emotional, behavioral, and cognitive conditions and complications.   RISK DESCRIPTIONS - Severity ratin  Client has impulse control and coping skills.  Client presents a mild to moderate risk of harm to self or others or displays symptoms of emotional, behavioral or cognitive problems.  Client has a mental health diagnosis and is stable.  Client functions adequately in significant life areas.    REASONS SEVERITY WAS ASSIGNED  - What current issues might with thinking, feelings or behavior pose barriers to participation in a treatment program? What coping skills or other assets does the person have to offset those issues? Are these problems that can be initially accommodated by a treatment provider? If not, what specialized skills or attributes must a provider have?    Ct reports a Biopolar diagnosis, but no ongoing negative emotions that are not congruent with his current situation.  Ct appears to be able to cope adequately with life stressors.           DIMENSION IV - Readiness for Change   1. You ve told me what brought you here today. (first section) What do you think the problem really is? I need to get a Kidney to live    2. Tell me how things are going. Ask enough questions to determine whether the person has use related problems or assets that can be built upon in the following areas: Family/friends/relationships; Legal; Financial; Emotional; Educational; Recreational/ leisure; Vocational/employment; Living arrangements (DSM)     They would be better if I could get a Kidney  I'm retired - I've lived a good and bold life.  I have homes all over the place, made and lost fortunes.  I have 3 grown children and lots of grandbabies  I like to zeenat women - I have about 22 girl friends  I don't have criminal justice down my throat  I live on my own on my own terms      3. What activities have you engaged in when using .alcohol/other drugs that could be hazardous to you or others (i.e. driving a car/motorcycle/boat, operating machinery, unsafe sex, sharing needles for drugs or tattoos, etc       Fast living in the past - nothing but a little weed now    4. How much time do you spend getting, using or getting over using alcohol or drugs? (DSM)     Not at all    5. Reasons for drinking/drug use (Use the space below to record answers. It may not be necessary to ask each item.)  Like the feeling    Trying to forget problems    To cope with  stress    To relieve physical pain y   To cope with anxiety    To cope with depression    To relax or unwind    Makes it easier to talk with people    Partner encourages use    Most friends drink or use    To cope with family problems    Afraid of withdrawal symptoms/to feel better    Other (specify)      A. What concerns other people about your alcohol or drug use/Has anyone told you that you use too much? What did they say? (DSM)    denies    B. What did you think about that/ do you think you have a problem with alcohol or drug use?     no    6. What changes are you willing to make? What substance are you willing to stop using? How are you going to do that? Have you tried that before? What interfered with your success with that goal?     I will get on the [place] [facility]    7. What would be helpful to you in making this change?     My  Needs to give me the OK    Dimension IV Ratings   Readiness for Change - The placing authority must use the criteria in Dimension IV to determine a client s readiness for change.   RISK DESCRIPTIONS - Severity ratin Cllient is cooperative, motivated, ready to change, admits problems, committed to change, and engaged in treatment as a responsible participant.    REASONS SEVERITY WAS ASSIGNED - (What information did the person provide that supports your assessment of his or her readiness to change? How aware is the person of problems caused by continued use? How willing is she or he to make changes? What does the person feel would be helpful? What has the person been able to do without help?)    Ct is self motivated and ready to engage in medical Cannabis program if given a Dr's Order.           DIMENSION V - Relapse, Continued Use, and Continued Problem Potential   1. In what ways have you tried to control, cut-down or quit your use? If you have had periods of sobriety, how did you accomplish that? What was helpful? What happened to prevent you from continuing your  sobriety? (DSM)     Pain - weed helps with the pain      2. Have you experienced cravings? If yes, ask follow up questions to determine if the person recognizes triggers and if the person has had any success in dealing with them.     no    3A. Have you been treated for alcohol/other drug abuse/dependence? yes  3B.  Number of times (Lifetime) (over what period):  1972 in [place]  3C.  Number of times completed treatment (Lifetime):  1  3D.  During the past 3 years have you participated in outpatient and/or residential?  no  3E.  When and where?  3F.  What was helpful?  What was not?    4. Support group participation: Have you/do you attend support group meetings to reduce/stop your alcohol/drug use? How recently? What was your experience? Are you willing to restart? If the person has not participated, is he or she willing?     denies    5. What would assist you in staying sober/straight? I am as far as I'm concerned - My  didn't think too much about it either.    Dimension V Ratings   Relapse/Continued Use/Continued problem potential - The placing authority must use the criteria in Dimension V to determine a client s relapse, continued use, and continued problem potential.   RISK DESCRIPTIONS - Severity ratin  Client recognizes risk well and is able to manage potential problems.    REASONS SEVERITY WAS ASSIGNED - (What information did the person provide that indicates his or her understanding of relapse issues? What about the person s experience indicates how prone he or she is to relapse? What coping skills does the person have that decrease relapse potential?)      Ct does not engage in substance use other than Cannabis.  Ct's pattern of use indicates medical use.  Ct appears to be appropriate for Medical Cannabis Program.         DIMENSION VI - Recovery Environment   1. Are you employed/attending school? Tell me about that.     retired    2A. Describe a typical day; evening for you. Work, school, social,  leisure, volunteer, spiritual practices. Include time spent obtaining, using, recovering from drugs or alcohol. (DSM)     I've got a lot going on  Dialysis, Dr. Rolle, Women, grandkids    2B. How often do you spend more time than you planned using or use more than you planned? (DSM)     Not at all    3. How important is using to your social connections? Do many of your family or friends use?     denies    4A. Are you currently in a significant relationship?  no  4B.  If yes, how long?    4C. Sexual Orientation:  hetro    5A. Who do you live with? alone.  5B. Tell me about their alcohol/drug use and mental health issues. NA.  5C. Are you concerned for your safety there? no  5D. Are you concerned about the safety of anyone else who lives with you? no    6A. Do you have children who live with you? no  If the person lives with children, ask follow-up questions to determine the person's relationship and responsibility, both legal and care giving; and what arrangements are made for supervision for the children when the person is not available.          6B. Do you have children who do not live with you?  yes, Explain:  adult  If yes, ask follow up questions to learn where the children are, who has custody and what the person't relaltionship and responsibility is with these children and what hopes the person has for his or her future with these children.    7A. Who supports you in making changes in your alcohol or drug use? What are they willing to do to support you? Who is upset or angry about you making changes in your alcohol or drug use? How big a problem is this for you?      I can talk to my  About getting a referral for the Cannabis Program    7B. This table is provided to record information about the person s relationships and available support It is not necessary to ask each item; only to get a comprehensive picture of their support system.  How often can you count on the following people when you need  someone?   Partner / Spouse    Parent(s)/Aunt(s)/Uncle(s)/Grandparents    Sibling(s)/Cousin(s) Usually supportive   Child(raymundo) Always supportive   Other relative(s) Always supportive   Friend(s)/neighbor(s) Always supportive   Child(raymundo) s father(s)/mother(s)    Support group member(s)    Community of [employer] members    /counselor/therapist/healer Always supportive   Other (specify)      8A. What is your current living situation?     rent    8B. What is your long term plan for where you will be living?    same    8C. Tell me about your living environment/neighborhood? Ask enough follow up questions to determine safety, criminal activity, availability of alcohol and drugs, supportive or antagonistic to the person making changes.      good    9. Criminal justice history: Gather current/recent history and any significant history related to substance use--Arrests? Convictions? Circumstances? Alcohol or drug involvement? Sentences? Still on probation or parole? Expectations of the court? Current court order? Any sex offenses - lifetime? What level? (DSM)    None current    10. What obstacles exist to participating in treatment? (Time off work, childcare, funding, transportation, pending care home time, living situation)    None needed    Dimension VI Ratings   Recovery environment - The placing authority must use the criteria in Dimension VI to determine a client s recovery environment.   RISK DESCRIPTIONS - Severity ratin  Client is engaged in structured, meaningful activity and has supportive significant other, family, and living environment.    REASONS SEVERITY WAS ASSIGNED - (What support does the person have for making changes? What structure/stability does the person have in his or her daily life that willincrease the likelihood that changes can be sustained? What problems exist in the person s environment that will jeopardize getting/staying clean and sober?) Ct cites no Criminal Justice involvement.  He  lives alone in stable environment, has a PCA and CADI worker.  He notes numerous female companions, adult children and grandchildren as supports.  Ct considers his life full and enjoyable.         Client Choice/Exceptions     Would you like services specific to language, age, gender, culture, Episcopalian preference, race, ethnicity, sexual orientation or disability?  no    If yes, specify:      What particular treatment choices and options would you like to have?  na    Do you have a preference for a particular treatment program?  na      .    DSM-V Criteria for Substance Abuse  Instructions  Determine whether the client currently meets the criteria for a Substance Use Disorder using the diagnostic criteria in the DSM-V, pp. 481-589. Current means during the most recent 12 months outside a facility that controls access to substances.    Category of substance Severity ICD-10 Code/DSM V Code   []  Alcohol Use Disorder [] Mild  [] Moderate  [] Severe [] (F10.10) (305.00)  [] (F10.20) (303.90)  [] (F10.20) (303.90)   [x]  Cannabis Use  [x] Mild  [] Moderate  [] Severe [] (F12.10) (305.20)  [] (F12.20) (304.30)  [] (F12.20) (304.30)   [] Hallucinogen Use Disorder [] Mild  [] Moderate  [] Severe [] (F16.10) (305.30)  [] (F16.20) (304.50)  [] (F16.20) (304.50)   []  Inhalant Use Disorder [] Mild  [] Moderate  [] Severe [] (F18.10) (305.90)  [] (F18.20) (304.60)  [] (F18.20) (304.60)   []  Opioid Use Disorder [] Mild  [] Moderate  [] Severe [] (F11.10) (305.50)  [] (F11.20) (304.00)  [] (F11.20) (304.00)   []  Sedative, Hypnotic, or Anxiolytic Use Disorder [] Mild  [] Moderate  [] Severe [] (F13.10) (305.40)   [] (F13.20) (304.10)  [] (F13.20) (304.10)   []  Stimulant Related Disorders [] Mild  [] Moderate  [] Severe [] (F15.10) (305.70) Amphetamine type substance  [] (F14.10) (305.60) Cocaine  [] (F15.10) (305.70) Other or unspecified stimulant  [] (F15.20) (304.40) Amphetamine type substance  [] (F14.20) (304.20)  Cocaine  [] (F15.20) (304.40) Other or unspecified stimulant  [] (F15.20) (304.40) Amphetamine type substance  [] (F14.20) (304.20) Cocaine  [] (F15.20) (304.40) Other or unspecified stimulant   []  Tobacco use Disorder [] Mild  [] Moderate  [] Severe [] (Z72.0) (305.1)  [] (F17.200) (305.1)  [] (F17.200) (305.1)   []  Other (or unknown) Substance Use Disorder [] Mild  [] Moderate  [] Severe [] (F19.10) (305.90)  [] (F19.20) (304.90)  [] (F19.20) (304.90)       Suggested Level of Care Necessary for Recovery  []  Inpatient  []  Extended Care []  Residential []  Outpatient  [x]  None            Collateral Contact Summary   Number of contacts made:  2  Contact with referring person:  no     If court related records were reviewed, summarize here:  na     []   Information from collateral contacts supported/largely agreed with information from the client and associated risk ratings.   []   Information from collateral contacts was significantly different from information from the client and lead to different risk ratings.      Summarize new information here:      Rule 25 Assessment Summary and Plan   's Recommendation    Ct meets criteria for Cannabis Use - Mild (f12.10) Medical use  Recommend - referral for [place] Medical Cannabis Program   - suggest [employer] [phone]      No treatment needed or recommended        Collateral Contacts     Please duplicate this page for each contact.  If this includes information which is sensitive and not public, separate this page from the rest of the assessment before sharing.  Retain the page in the assessment file.   Name    Care Everywhere Relationship    Medical records Phone Number     Releases           Information Provided:      Confirmed medical status    Collateral Contacts     Name       Relationship     Phone Number     Releases           Information Provided:        Staff Name and Title:  Savi Yepez MA, [facility]      Date:  1/30/2018  Time:  11:11  AM

## 2022-11-04 NOTE — PROGRESS NOTES
Dental Service Clearance Letter  Keagan Wayne  is cleared for transplant from a dental standpoint after having received care at the [facility].Pt had all necessary extractions, restorations, and prophy. Pt will begin process of denture/partial fabrication once insurance approves. This will not delay transplant. He has been given oral hygiene instructions and oral health management products to help with any potential adverse effects of his future treatments.  We will provide him with follow-up care to assist with any concerns that may arise. His next visit is for a regular prophy.  Thank you for allowing us to participate in the comprehensive care of Keagan Wayne.  For any questions or concerns, please contact me or the dental care coordinator ([phone]).    Robinson Rodriguez   PGY 1     No

## 2023-02-16 ENCOUNTER — LAB REQUISITION (OUTPATIENT)
Dept: LAB | Facility: CLINIC | Age: 78
End: 2023-02-16
Payer: COMMERCIAL

## 2023-02-16 DIAGNOSIS — I95.89 OTHER HYPOTENSION: ICD-10-CM

## 2023-02-21 LAB
ANION GAP SERPL CALCULATED.3IONS-SCNC: 18 MMOL/L (ref 7–15)
BASOPHILS # BLD AUTO: 0 10E3/UL (ref 0–0.2)
BASOPHILS NFR BLD AUTO: 1 %
BUN SERPL-MCNC: 49 MG/DL (ref 8–23)
CALCIUM SERPL-MCNC: 9.4 MG/DL (ref 8.8–10.2)
CHLORIDE SERPL-SCNC: 96 MMOL/L (ref 98–107)
CREAT SERPL-MCNC: 12.2 MG/DL (ref 0.67–1.17)
DEPRECATED HCO3 PLAS-SCNC: 22 MMOL/L (ref 22–29)
EOSINOPHIL # BLD AUTO: 0.1 10E3/UL (ref 0–0.7)
EOSINOPHIL NFR BLD AUTO: 2 %
ERYTHROCYTE [DISTWIDTH] IN BLOOD BY AUTOMATED COUNT: 15.4 % (ref 10–15)
GFR SERPL CREATININE-BSD FRML MDRD: 4 ML/MIN/1.73M2
GLUCOSE SERPL-MCNC: 159 MG/DL (ref 70–99)
HCT VFR BLD AUTO: 29.5 % (ref 40–53)
HGB BLD-MCNC: 8.4 G/DL (ref 13.3–17.7)
IMM GRANULOCYTES # BLD: 0 10E3/UL
IMM GRANULOCYTES NFR BLD: 0 %
LYMPHOCYTES # BLD AUTO: 1.2 10E3/UL (ref 0.8–5.3)
LYMPHOCYTES NFR BLD AUTO: 18 %
MCH RBC QN AUTO: 28.2 PG (ref 26.5–33)
MCHC RBC AUTO-ENTMCNC: 28.5 G/DL (ref 31.5–36.5)
MCV RBC AUTO: 99 FL (ref 78–100)
MONOCYTES # BLD AUTO: 0.8 10E3/UL (ref 0–1.3)
MONOCYTES NFR BLD AUTO: 11 %
NEUTROPHILS # BLD AUTO: 4.7 10E3/UL (ref 1.6–8.3)
NEUTROPHILS NFR BLD AUTO: 68 %
NRBC # BLD AUTO: 0 10E3/UL
NRBC BLD AUTO-RTO: 0 /100
PLATELET # BLD AUTO: 221 10E3/UL (ref 150–450)
POTASSIUM SERPL-SCNC: 4.7 MMOL/L (ref 3.4–5.3)
RBC # BLD AUTO: 2.98 10E6/UL (ref 4.4–5.9)
SODIUM SERPL-SCNC: 136 MMOL/L (ref 136–145)
WBC # BLD AUTO: 6.8 10E3/UL (ref 4–11)

## 2023-02-21 PROCEDURE — P9603 ONE-WAY ALLOW PRORATED MILES: HCPCS | Mod: ORL

## 2023-02-21 PROCEDURE — 85025 COMPLETE CBC W/AUTO DIFF WBC: CPT | Mod: ORL

## 2023-02-21 PROCEDURE — 36415 COLL VENOUS BLD VENIPUNCTURE: CPT | Mod: ORL

## 2023-02-21 PROCEDURE — 80048 BASIC METABOLIC PNL TOTAL CA: CPT | Mod: ORL

## 2023-08-30 NOTE — IP AVS SNAPSHOT
MRN:[id number]                      After Visit Summary   9/18/2017    Keagan Wayne    MRN: [id number]           Thank you!     Thank you for choosing [facility] for your care. Our goal is always to provide you with excellent care. Hearing back from our patients is one way we can continue to improve our services. Please take a few minutes to complete the written survey that you may receive in the mail after you visit with us. Thank you!        Patient Information     Date Of Birth          1945        Designated Caregiver       Most Recent Value    Caregiver    Will someone help with your care after discharge? yes    Name of designated caregiver María Miller    Phone number of caregiver [phone]    Caregiver address [address] [place]      About your hospital stay     You were admitted on:  September 18, 2017 You last received care in the:  [place] [address] [facility]    You were discharged on:  September 20, 2017        Reason for your hospital stay       Seizure                  Who to Call     For medical emergencies, please call 911.  For non-urgent questions about your medical care, please call your primary care provider or clinic, [phone]          Attending Provider     Provider Specialty    Burgos, Enzo Gillespie MD Internal Medicine    [facility], Hamilton Rainey MD Internal Medicine       Primary Care Provider Office Phone # Fax #    Yolanda Hernandez [phone]      After Care Instructions     Activity       Your activity upon discharge: activity as tolerated            Diet       Follow this diet upon discharge: Orders Placed This Encounter      Room Service      Combination Diet Dialysis Diet; [id number] Calories: Moderate Consistent CHO (4-6 CHO units/meal)            Discharge Instructions       No driving for 3 months                  Follow-up Appointments     Follow Up ([facility])       Follow up with Dr. Bal/Gilma Oreilly NP , at  [facility], within 4-6 weeks  for hospital follow- up. No follow up labs or test are needed.    Appointments on [place] and/or [facility] (with [facility] or [facility] provider or service). Call [phone] if you haven't heard regarding these appointments within 7 days of discharge.            Follow-up and recommended labs and tests        Follow up with primary care provider, Yolanda Hernandez, within 7 days for hospital follow- up.  The following labs/tests are recommended: BMP.                  Your next 10 appointments already scheduled     Oct 10, 2017  8:00 AM CDT   (Arrive by 7:45 AM)   New General Liver with Francisco Reid MD   [facility] Hepatology ([facility])    [address]  3rd Floor  [facility] [id number] [phone]            Oct 10, 2017  9:00 AM CDT   (Arrive by 8:45 AM)   New Patient Visit with Moisés Pena MD   [facility] Urology and [facility] ([facility])    [address]  4th Floor  [facility] [id number] [phone]            Oct 10, 2017 10:00 AM CDT   US AORTIC IMAGING with UCUSV2   [facility] Imaging [place] US ([facility])    [address]  1st Floor  [facility] [id number] [phone]           Please bring a list of your medicines (including vitamins, minerals and over-the-counter drugs). Also, tell your doctor about any allergies you may have. Wear comfortable clothes and leave your valuables at home.  Adults: No eating or drinking for 8 hours before the exam. You may take medicine with a small sip of water.  Children: - Children 6+ years: No food or drink for 6 hours before exam. - Children 1-5 years: No food or drink for 4 hours before exam. - Infants, breast-fed: may have breast milk up to 2 hours before exam. - Infants, formula: may have bottle until 4 hours before exam.  Please call the Imaging Department at your exam  site with any questions.            Oct 10, 2017 10:30 AM CDT   US AORTA/IVC/ILIAC DUPLEX COMPLETE with UCUSV2   [facility] US ([place] [facility])    [address] [id number] [phone]           Please bring a list of your medicines (including vitamins, minerals and over-the-counter drugs). Also, tell your doctor about any allergies you may have. Wear comfortable clothes and leave your valuables at home.  Adults: No eating or drinking for 8 hours before the exam. You may take medicine with a small sip of water.  Children: - Children 6+ years: No food or drink for 6 hours before exam. - Children 1-5 years: No food or drink for 4 hours before exam. - Infants, breast-fed: may have breast milk up to 2 hours before exam. - Infants, formula: may have bottle until 4 hours before exam.  Please call the Imaging Department at your exam site with any questions.            Oct 10, 2017  2:00 PM CDT   (Arrive by 1:45 PM)   NEW PANCREAS/KIDNEY TRANSPLANT WORK-UP with BUCKY Khan MD   [facility] Care ([facility])    [address] [place] [id number] [phone]            Oct 12, 2017 12:45 PM CDT   (Arrive by 12:30 PM)   XR CHEST 2 VIEWS with UCXR1   [facility] Xray ([facility])    [address] [place] [id number] [phone]           Please bring a list of your current medicines to your exam. (Include vitamins, minerals and over-thecounter medicines.) Leave your valuables at home.  Tell your doctor if there is a chance you may be pregnant.  You do not need to do anything special for this exam.            Oct 12, 2017  1:00 PM CDT   FULL PULMONARY FUNCTION with  PFL C   [facility] Pulmonary Function Testing ([facility])    [address] [place] [id number] [phone]             "Oct 12, 2017  2:00 PM CDT   (Arrive by 1:45 PM)   New Patient Visit with Suzanne Chao MD   [facility] ([facility])    [address] [place] [id number] [phone]              Pending Results     No orders found from 2017 to 2017.            Statement of Approval     Ordered          17 1350  I have reviewed and agree with all the recommendations and orders detailed in this document.  EFFECTIVE NOW     Approved and electronically signed by:  Eduardo Mahmood MD             Admission Information     Date & Time Provider Department Dept. Phone    2017 Hamilton Allison MD [address] Stroke Center [phone]      Your Vitals Were     Blood Pressure Temperature Respirations Weight Pulse Oximetry BMI (Body Mass Index)    180/78 97.5  F (36.4  C) (Oral) 15 93.1 kg (205 lb 4 oz) 95% 25.65 kg/m2      MyChart Information     [facility] lets you send messages to your doctor, view your test results, renew your prescriptions, schedule appointments and more. To sign up, go to [email or web address] . Click on \"Log in\" on the left side of the screen, which will take you to the Welcome page. Then click on \"Sign up Now\" on the right side of the page.     You will be asked to enter the access code listed below, as well as some personal information. Please follow the directions to create your username and password.     Your access code is: [email or web address]  Expires: 10/25/2017 10:35 AM     Your access code will  in 90 days. If you need help or a new code, please call your [facility] clinic or [phone].        Care EveryWhere ID     This is your Care EveryWhere ID. This could be used by other organizations to access your [facility] medical records  [id number]        Equal Access to Services     JIAN HOROWITZ AH: Calvin Holm, catie longoria, gio patricia " kirill arhmanmelissa solis'aan ah. So [facility] [phone].    ATENCIÓN: Si yaseminla fidel, tiene a benitez disposición servicios gratuitos de asistencia lingüística. Alina al [phone].    We comply with applicable federal civil rights laws and [place] laws. We do not discriminate on the basis of race, color, national origin, age, disability sex, sexual orientation or gender identity.               Review of your medicines      START taking        Dose / Directions    levETIRAcetam 500 MG tablet   Commonly known as:  KEPPRA        Dose:  500 mg   Take 1 tablet (500 mg) by mouth every 24 hours   Quantity:  60 tablet   Refills:  0         CONTINUE these medicines which may have CHANGED, or have new prescriptions. If we are uncertain of the size of tablets/capsules you have at home, strength may be listed as something that might have changed.        Dose / Directions    calcium acetate 667 MG Caps capsule   Commonly known as:  PHOSLO   This may have changed:  Another medication with the same name was removed. Continue taking this medication, and follow the directions you see here.        Dose:  1334 mg   Take 1,334 mg by mouth 4 times daily (with meals and nightly)   Refills:  0         CONTINUE these medicines which have NOT CHANGED        Dose / Directions    aspirin 81 MG chewable tablet   Used for:  Type 2 diabetes mellitus with diabetic nephropathy (H)        Dose:  81 mg   Take 1 tablet (81 mg) by mouth daily   Quantity:  36 tablet   Refills:  0       atorvastatin 80 MG tablet   Commonly known as:  LIPITOR   Used for:  Acute diastolic CHF (congestive heart failure) (H)        Dose:  80 mg   Take 1 tablet (80 mg) by mouth daily   Quantity:  30 tablet   Refills:  0       bumetanide 2 MG tablet   Commonly known as:  BUMEX   Used for:  Acute on chronic systolic congestive heart failure (H)        Dose:  4 mg   Take 2 tablets (4 mg) by mouth daily   Quantity:  60 tablet   Refills:  2       citalopram 20 MG tablet    Commonly known as:  celeXA   Used for:  Depression        Dose:  20 mg   Take 1 tablet (20 mg) by mouth daily   Quantity:  30 tablet   Refills:  0       cloNIDine 0.2 MG/24HR WK patch   Commonly known as:  CATAPRES-TTS2        Dose:  1 patch   Place 1 patch onto the skin once a week   Refills:  0       COREG PO        Dose:  25 mg   Take 25 mg by mouth 2 times daily (with meals)   Refills:  0       INCRUSE ELLIPTA 62.5 MCG/INH oral inhaler   Generic drug:  umeclidinium        Dose:  1 puff   Inhale 1 puff into the lungs daily   Refills:  0       insulin glargine 100 UNIT/ML injection   Commonly known as:  LANTUS        Dose:  16 Units   Inject 16 Units Subcutaneous every morning   Refills:  0       isosorbide mononitrate 30 MG 24 hr tablet   Commonly known as:  IMDUR        Dose:  90 mg   Take 90 mg by mouth daily   Refills:  0       LOSARTAN POTASSIUM PO        Dose:  100 mg   Take 100 mg by mouth daily   Refills:  0       NEURONTIN PO        Dose:  200 mg   Take 200 mg by mouth daily   Refills:  0       OXYCODONE HCL PO        Dose:  10 mg   Take 10 mg by mouth 3 times daily as needed (takes scheduled)   Refills:  0       VITAMIN D (CHOLECALCIFEROL) PO        Dose:  1000 Units   Take 1,000 Units by mouth daily   Refills:  0            Where to get your medicines      These medications were sent to [facility] - [address]  [address], [place] [address]     Phone:  [phone]     levETIRAcetam 500 MG tablet                Protect others around you: Learn how to safely use, store and throw away your medicines at www.disposemymeds.org.             Medication List: This is a list of all your medications and when to take them. Check marks below indicate your daily home schedule. Keep this list as a reference.      Medications           Morning Afternoon Evening Bedtime As Needed    aspirin 81 MG chewable tablet   Take 1 tablet (81 mg) by mouth daily   Last time this  was given:  81 mg on 9/19/2017  9:12 AM                                atorvastatin 80 MG tablet   Commonly known as:  LIPITOR   Take 1 tablet (80 mg) by mouth daily   Last time this was given:  80 mg on 9/20/2017  9:19 AM                                bumetanide 2 MG tablet   Commonly known as:  BUMEX   Take 2 tablets (4 mg) by mouth daily   Last time this was given:  4 mg on 9/19/2017  9:20 AM                                calcium acetate 667 MG Caps capsule   Commonly known as:  PHOSLO   Take 1,334 mg by mouth 4 times daily (with meals and nightly)   Last time this was given:  1,334 mg on 9/19/2017 12:07 PM                                citalopram 20 MG tablet   Commonly known as:  celeXA   Take 1 tablet (20 mg) by mouth daily   Last time this was given:  20 mg on 9/20/2017  9:19 AM                                cloNIDine 0.2 MG/24HR WK patch   Commonly known as:  CATAPRES-TTS2   Place 1 patch onto the skin once a week   Last time this was given:  1 patch on 9/18/2017  8:06 AM                                COREG PO   Take 25 mg by mouth 2 times daily (with meals)   Last time this was given:  25 mg on 9/19/2017  6:50 PM                                INCRUSE ELLIPTA 62.5 MCG/INH oral inhaler   Inhale 1 puff into the lungs daily   Last time this was given:  1 puff on 9/20/2017  9:20 AM   Generic drug:  umeclidinium                                insulin glargine 100 UNIT/ML injection   Commonly known as:  LANTUS   Inject 16 Units Subcutaneous every morning   Last time this was given:  16 Units on 9/20/2017  9:22 AM                                isosorbide mononitrate 30 MG 24 hr tablet   Commonly known as:  IMDUR   Take 90 mg by mouth daily   Last time this was given:  90 mg on 9/19/2017  9:12 AM                                levETIRAcetam 500 MG tablet   Commonly known as:  KEPPRA   Take 1 tablet (500 mg) by mouth every 24 hours                                LOSARTAN POTASSIUM PO   Take 100 mg by mouth  daily   Last time this was given:  100 mg on 9/19/2017  9:14 AM                                NEURONTIN PO   Take 200 mg by mouth daily   Last time this was given:  200 mg on 9/19/2017  9:14 AM                                OXYCODONE HCL PO   Take 10 mg by mouth 3 times daily as needed (takes scheduled)   Last time this was given:  15 mg on 9/19/2017  3:05 PM                                VITAMIN D (CHOLECALCIFEROL) PO   Take 1,000 Units by mouth daily   Last time this was given:  1,000 Units on 9/20/2017  9:19 AM                                   Distance Of Undermining In Cm (Required): 4

## 2023-12-13 NOTE — PROVIDER NOTIFICATION
Paged Dr. Nguyen regarding phosolo & low phosphorus the last couple days. Last three doses held per hospitalist.     ADDENDUM: Per Dr. Nguyen do not change his dose for the phosolo. However pt should only take pills with meals. Including HS dose.     ADDENDUM: Updated Dr. Mahmood regarding Dr. Joe recommendations.   syncope

## 2024-06-18 NOTE — LETTER
1/3/2019       RE: Keagan Wayne  [address]  [facility] [address]     Dear Colleague,    Thank you for referring your patient, Keagan Wayne, to the [facility] at [facility]. Please see a copy of my visit note below.    Assessment and Plan:  1. Kidney transplant wait list evaluation - Mr. Wayne is a fair candidate overall. He should be not be active on the wait list, due to needing CT imaging for transplant surgery review given recent bilateral SFA stenting and being on DAPT for 6 months.   2. ESKD from diabetes mellitus type 2 - although doing well on hemodialysis since 12/2016, would likely benefit from a kidney transplant.   3. Type 2 diabetes - controlled with most recent hemoglobin A1c of 7.9% (10/2018) while using 12-18 units per day. No unawareness.   4. Cardiac risk - coronary angiogram in 2016 showed mild irregularities and no acute focal CAD. ECHO in 2017 showed borderline EF. Will have cardiac risk assessment today with stress test.   5. PAD s/p stenting of left SFA (6/2018) and right SFA (8/2018) - patient denies any further claudication symptoms since intervention. On DAPT for 6 months. Will need CT abd/pelvis w/o contrast for transplant surgery to review vascular targets.   6. History of seizures (9/2017) - w/o recurrence. Thought to be metabolic related and was placed on Keppra, for which he states he is no longer taking. Will need neurology follow up as previously recommended at discharge.   7. History of microscopic hematuria - CT urogram noted bladder wall thickening. Cystoscopy was normal and bladder biopsies were negative. Cleared by urology.   8. Side branch IPMN  (6 mm) - noted on CT urogram. Needs follow up with Dr. Wiley.   8. HCV antibody positive - HCV RNA undetectable (10/2017). Cleared by hepatology with imaging showing minimal liver fibrosis.   10. Bipolar 1 disorder - taking Celexa and not seeing a therapist. Would  June 18, 2024     NADIYA Roldan  [address] [facility] [address] [place] [id number]    Patient: Tom Story   YOB: 1959   Date of Visit: 6/18/2024       Dear Dr. Mills:    Thank you for referring Tom Story to me for evaluation. Below are my notes for this consultation.    If you have questions, please do not hesitate to call me. I look forward to following your patient along with you.         Sincerely,        Khanh Chase MD        CC: No Recipients    Khanh Chase MD  6/18/2024  4:05 PM  Sign when Signing Visit  RENAL FOLLOW UP NOTE:.td    ASSESSMENT AND PLAN:  64 y.o. year old male with a history of Crohn's disease/asthma/GERD/hypertension/nephrolithiasis who we are asked to see regarding CKD     1. CKD stage 3A  Etiology:  Most likely prerenal given Crohn's disease associated with weight loss.  Also obstructive uropathy please see below  Baseline creatinine:  1.3-1.6 most recently 1.30 part of which may been related to obstructive uropathy.   Recommend:  Workup:  Current creatinine: 1.41 at baseline  UA from 6/8/2024: No proteinuria/no hematuria/1-2 RBCs and no WBCs  Urine protein creatinine ratio:  0.10 g at goal  Multiple myeloma evaluation was negative from March/April 2022-including immunofixation  Renal ultrasound with PVR:  Patient had nephrolithiasis with large stone burden on the right mid to lower pole, small bilateral cysts with thinly septated cyst in left upper pole no significant PVR  Seen by Urology:  Patient is status post cystoscopy ureteroscopy and lithotripsy with laser and insertion of right ureteral stent on 06/27/2022  KUB recently on 07/22 demonstrated right renal lower pole calculi measuring 1.3 cm, right-sided ureteral stent which appears to be in appropriate position no evidence of ureteral calculi.  Stent was subsequently removed     Treatment:  Treat hypertension, please see below for recommendations  Treat dyslipidemia  Avoid nephrotoxic agents  appreciate social work assessment and input.    11. Functionality - highly dependent on PCA/girlfriend. She helps with all ADLs (although he denies any limitations with walking), med set up/administration as well as appointment management. Would appreciate social work assessment and input.   12. Chronic pain - for which he uses oxycodone weekly and follows at a pain clinic.   13. Current marijuana use - no concerning findings of urine toxicology today. Educated patient about risk of respiratory fungal infection post-transplant.   14. Former tobacco use /COPD - with normal PFTs here in 2017. Follows with outside pulmonary and was noted in September to be doing reasonably well.  15. History of noncompliance - successfully completed compliance contract. Dialysis unit currently reports great compliance as well.   16. Health maintenance - colonoscopy in 2017 with adenomatous polyps. Repeat in 3 years. PSA 0.77.    Discussed the risks and benefits of a transplant, including the risk of surgery and immunosuppression medications.    KDPI: We discussed approximate remaining wait time and how that is influenced by issues such as blood type and sensitization (PRA) and access to a living donor. I contrasted potential waiting time for living vs  donor kidneys from  normal (0-85%) or higher (%) kidney donor profile index (KDPI) donors and their associated outcomes. I would recommend Mr. Wayne to consider kidneys from high KDPI donors. The reason for this decision is best summarized as: decreased dialysis related morbidity/mortality, accepting lower kidney graft survival rates.    Patient s overall re-evaluation may require further discussion in the Transplant Program s multidisciplinary selection committee for a final recommendation on the patient s suitability for transplant.     Reason for Visit:  Keagan Wayne is a 73 year old male with ESKD from diabetes mellitus type 2, who presents for Kidney transplant  such as NSAIDs, and proton pump inhibitors as able; patient counseled as such  Good overall health recommendations including weight loss as appropriate, isotonic exercise as able, and avoidance of salt; patient counseled as such:  Patient does require proton pump inhibitor so continue at this time.  Kidney smart referral placed        2.  Volume:  Euvolemic     3.  Hypertension:       Current blood pressure averages:     None today AOBP 157/66     Goal blood pressure:  Less than 130/80 given CKD     Recommendations:  Push nonmedical regimen including weight loss, isotonic exercise and a low sodium diet.  Patient has been counseled the such.  MedicationChanges today:    Will push low-salt diet  Will add amlodipine 2.5 mg daily in the morning recheck blood pressures at home  Potentially taper Toprol-XL in the future as it was only for cardiac purposes     4.  Electrolytes:  All acceptable: Including potassium of 4.3     5.  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  Dyslipidemia:  Goal LDL:  Less than 100  Current lipid profile:  LDL 48/HDL 38/triglycerides 183 from 5/11/2024  Recommendations:   Low-cholesterol/low-fat diet / weight loss as appropriate and isotonic exercise   Medication changes today:  No changes at this time as patient is at goal     7.  Anemia:  Current hemoglobin: 13.2 which is normal     8.  Other problems:  Crohn's disease: Severe ileocolonic disease associated with eosinophilic esophagitis status post several small-bowel resections in the past on Remicade and Entyvio but failed therapy most recently on Stelara and methotrexate   Alkaline phosphatase intermittently elevated I would defer to GI   Asthma  GERD/eosinophilic esophagitis  Dyslipidemia  KIERSTEN  Chronic intermittent mild  leukocytosis  Nephrolithiasis see above regarding intervention for right renal calculus which was obstructing: June 2022     KUB from 11/7/2022 following stone extraction demonstrated right nephrolithiasis multiple clustered stones in the lower pole of the right kidney       24 hour urine for stone risk evaluation: From 5/11/2024     Volume: 1420 mL well below goal  Calcium: 182 mg at goal  Sodium: 184 mmol above goal  Citrate: 126 mg below goal although slightly increased  Oxalate: 58 mg above goal  Uric acid: 419 mg at goal  Cystine: Negative  PH: 5.39 slightly acidic        Based on the above 24 hour urine study I recommend following:     General stone diet:  In particular:  He again is only drinking one half of the amount of water he needs to he needs to double it most important dietary factor goal is 86 to 102 ounces again double of what he is drinking  Also reduce salt in his diet  Increase fruits and vegetables in his diet  And please give him a low oxalate diet as well!     Medications:  He should be on Urocit-K 30 mill equivalents twice a day he is only been taking 20 mEq twice a        Follow-up studies:  He would need a basic metabolic profile 1 to 2 weeks after adjusting Urocit-K  If he has not had a kidney stone in a couple of years he does not have to go for follow-up study if he has, then he needs a follow-up 24-hour urine in about 6 months                  GI HEALTH MAINTENANCE: LAST COLONOSCOPY: 5/16/2024: Recommended follow-up in 2 years which would be 5/16/2027 per Dr. Garcia       PATIENT INSTRUCTIONS:    Patient Instructions   Visit summary:  - Overall kidney function looks good there are a few minor abnormalities that we are going to address.  - In regards to your kidney stone prevention I would make the following recommendations    1.  Please increase water intake to at least 96 ounces a day almost double what you are doing at this time very important  2.  Avoid salt completely is much as  wait list evaluation.     Last Evaluation Clinic Visit Date:  6/2017         Wait List Date: 12/6/2016  Current phase/status: Waitlist: Active as of 6/21/2018  Transplant coordinator: Maty Chacon Transplant Office phone number [phone]     Previous Medical Issues:  See HPI      HPI:   Mr. Wayne is a 73-year-old that presents for wait list evaluation with PMH of ESKD from presumed type 2 diabetes on hemodialysis since 12/2016, retinopathy, neuropathy, CAD, PAD s/p stenting of left and right SFA in 2018, HCV (RNA undetectable in 2017), noncompliance, chronic pain with chronic opioid use, former smoker, COPD, TRACEY, metabolic seizures (9/2017) and bipolar 1 disorder.          Interim events/issues/events  Since he was last seen in 6/2017 by Dr. Kate he was made active in 6/2018 after completing his evaluation. This included clearance from urology for microscopic hematuria with a CT urogram in 2017 noting bladder wall thickening with a normal cystoscopy and negative bladder biopsies. He was cleared by hepatology for history of HCV with undetectable RNA level s and imaging showing minimal liver fibrosis. He completed a 6-month compliance contract. He was cleared by pulmonary given history of COPD and lung nodules. In 9/2017, he was hospitalized  with new onset of seizures. Head imaging was unremarkable. Seizures suspected to be likely more metabolic. He was started on Keppra daily, that he reports today he is no longer taking. He denies any recurrent seizure activity. He was supposed to follow up at [facility], but never did so. Of note, his dialysis unit currently reports great compliance. In 2018, given worsening PAD he had stenting of his left SFA (6/2018) and right SFA (8/2018), now on DAPT for 6 months. He reports no limitations with walking now. He is doing daily home physical therapy exercises on his own with stretching and resistant bands without chest pain, shortness of breath or  claudication symptoms. Of note, he denies any infections or blood transfusions since he was last seen.    Mr. Wayne has history of chronic pain for which he uses oxycodone on a weekly basis and follows at a pain clinic. He quit smoking tobacco, but is still using marijuana. He continues to follow with outside pulmonary for COPD and was noted in September to be doing reasonably well. As far as his functionality, he is quite dependent on his girlfriend/PCA . She helps with all ADLs, although he reports he has no limitations with walking. She sets up his medications and physically hands them to him. She manages all of his appts and drives him most of the time. He has history of Bipolar 1 disorder, on Celexa and is not seeing a therapist.       ESKD   Overall, dialysis is going well without complications of his AFV. BPs typically average 130/70 mmHg on antihypertensive therapy x 4. He is making good urine output and denies dysuria, hematuria or trouble emptying his bladder or starting his stream. He denies any new potential donors.     DM II   Followed by PCP, with most recent hemoglobin A1c of 7.9% in 10/2018. He is currently using 18-12 units per day. Blood sugars range 90s-200s. No history of hypoglycemic unawareness.      Cardiac   12/2016 - coronary angiogram that showed mild irregularities and no acute focal CAD  2017 - ECHO with borderline EF   Today - will have cardiac risk assessment and stress test        Kidney Disease Hx       Kidney Disease Dx: DM II        Biopsy Proven: No        On Dialysis: Yes, Date initiated: 12/2016  and Dialysis Type: [facility];       Primary Nephrologist: Dr. Calle           Uremic Symptoms: Fatigue: No; Nausea: No; Poor Appetite: No;          Potential Donor(s): No          Cardiac history:       Last cardiac risk assessment: today        Last stress test/coronary angiogram: today        Exertional symptoms: none        Recent cardiac events: no     Pertinent issues:   Blood  possible  3.  I will increase your potassiums citrate 3 pills twice a day or 30 mill equivalents twice a day  4.  Low oxalate diet please see below        1. Medication changes today:  Please increase potassium citrate/Urocit-K 3 pills or 30 mill equivalents twice a day  Continue magnesium once a day  Please begin amlodipine 2.5 mg daily in the morning for your blood pressure please watch for leg swelling if you develop any please call me  Let me know how much vitamin D you are taking because we are going to increase that just a little bit    2.  General instructions:  General stone diet in particular above recommendations please see below for formal list  Continue to exercise 3 days a week would be the beginning 5 days a week would be perfect at least 30 minutes  Continue to try to lose weight as you are doing    3.  Please go for non fasting  lab work 2 weeks after making the above medication change.    4.  Please take 1 week a blood pressure readings 4 weeks after the above medication changes    AS FOLLOWS  MORNING AND EVENING, SITTING AND STANDING as follows:  TAKE THE MORNING READINGS BEFORE ANY MEDICATIONS AND WHEN YOU ARE RELAXED FOR SEVERAL MINUTES  TAKE THE EVENING READINGS:  BETWEEN 7-10 P.M.; PRIOR TO ANY MEDICATIONS; AT LEAST IN OUR  FROM DINNER; AND CERTAINLY AFTER RELAXING FOR A FEW MINUTES  PLEASE INCLUDE HEART RATE WITH YOUR BLOOD PRESSURE READINGS  When taking standing readings, keep your arm supported at heart level and not dangling  Make sure you are sitting with your back supported and feet on the ground and do not cross your legs or feet  Make sure you have not taken any coffee or caffeine products or exercised or smoke cigarettes at least 30 minutes before taking your blood pressure  Then please mail these readings into the office      5.  In 3 months:  Please go for nonfasting lab work but in the morning  Please take 1 week a blood pressure readings as outlined above and mail those  into the office      6.  Follow-up in 6 months  Please bring in 1 week a blood pressure readings morning evening, sitting and standing is outlined above  PLEASE BRING AN YOUR BLOOD PRESSURE MACHINE TO CORRELATE WITH THE OFFICE MACHINE AT THIS NEXT SCHEDULED VISIT  Please go for fasting lab work 1-2 weeks prior to your appointment      7.  General non medical recommendations:  AVOID SALT BUT NOT ADDING AN READING LABELS TO MAKE SURE THERE IS LOW-SALT IN THE FOOD THAT YOU ARE EATING  Goal is less than 2 g of sodium intake or less than 5 g of sodium chloride intake per day    Avoid nonsteroidal anti-inflammatory drugs such as Naprosyn, ibuprofen, Aleve, Advil, Celebrex, Meloxicam (Mobic) etc.  You can use Tylenol as needed if you do not have any liver condition to be concerned about    Avoid medications such as Sudafed or decongestants and antihistamines that contained the D component which is the decongestant.  You can take antihistamines without the decongestant or D component.    Try to avoid medications such as pantoprazole or  Protonix/Nexium or Esomeprazole)/Prilosec or omeprazole/Prevacid or lansoprazole/AcipHex or Rabeprazole.  If you are able to, use Pepcid as this is safer for your kidneys.    Try to exercise at least 30 minutes 3 days a week to begin with with an ultimate goal of 5 days a week for at least 30 minutes    Please do not drink more than 2 glasses of alcohol/wine on a daily basis as this may contribute to your high blood pressure.          8.Measures to reduce stone development:    Please Drink  oz of water or 2.5L-3 L a day, throughout the day  Avoid salt/low-salt diet   Try to decrease animal protein intake, dairy protein and vegetable base protein are better  Increase fruits and vegetables as much as possible  Avoid calcium products such as Tums or other types of calcium containing antacids, you can use Pepcid for indigestion (but you do not have to restrict your dietary calcium)  Avoid  transfusion: No  Jehovah s Witness: No  Pregnancies: No  Previous transplant: No  Urine output: Yes; good amounts every day   Bladder dysfunction: No  Claudication: Yes S/p bilateral SFA stents in 2018   Previous amputation: No  Cancer: No  Recurrent infection: No  Chronic anticoagulation: Yes; plavix       ROS:  A comprehensive review of systems was obtained and negative, except as noted in the HPI or PMH.     PMH:  Medical records were obtained and reviewed.  Past Medical History:   Diagnosis Date     Acute pulmonary edema (H)      Anemia      Bipolar 1 disorder (H)      CAD (coronary artery disease)      Cognitive impairment      Colitis      COPD (chronic obstructive pulmonary disease) (H)      Depression      Diabetes (H)      ESRD (end stage renal disease) (H)      ESRD (end stage renal disease) on dialysis (H)      Glaucoma      Hepatitis C      Hyperlipidemia      Hyperlipidemia      Hypertension      IV drug abuse (H) 1970's     Non compliance w medication regimen      Non-ischemic cardiomyopathy (H)      Non-ST elevation MI (NSTEMI) (H)      Peripheral neuropathy      Peripheral neuropathy      Pneumonia         PSH:  Past Surgical History:   Procedure Laterality Date     CREATE FISTULA ARTERIOVENOUS UPPER EXTREMITY Right 5/8/2016    Procedure: CREATE FISTULA ARTERIOVENOUS UPPER EXTREMITY;  Surgeon: Josias Valente MD;  Location:  OR     CREATE FISTULA ARTERIOVENOUS UPPER EXTREMITY Right 12/14/2016    Procedure: CREATE FISTULA ARTERIOVENOUS UPPER EXTREMITY;  Surgeon: Contreras Bowman MD;  Location:  OR     CYSTOSCOPY, RETROGRADES, COMBINED Bilateral 3/29/2018    Procedure: COMBINED CYSTOSCOPY, RETROGRADES;  Cystoscopy, Bilateral Retrogrades, Bilateral Ureteral Washings, Pyelogram ;  Surgeon: Moisés Pena MD;  Location: [facility] OR     HERNIA REPAIR          Personal Hx:  Social History     Socioeconomic History     Marital status:      Spouse name: Not on file     Number of  children: Not on file     Years of education: Not on file     Highest education level: Not on file   Social Needs     Financial resource strain: Not on file     Food insecurity - worry: Not on file     Food insecurity - inability: Not on file     Transportation needs - medical: Not on file     Transportation needs - non-medical: Not on file   Occupational History     Not on file   Tobacco Use     Smoking status: Former Smoker     Packs/day: 0.20     Years: 50.00     Pack years: 10.00     Types: Cigarettes     Start date: 10/12/1957     Smokeless tobacco: Never Used   Substance and Sexual Activity     Alcohol use: Yes     Alcohol/week: 8.4 oz     Types: 7 Cans of beer, 7 Shots of liquor per week     Comment: cocktail 2-3/month     Drug use: No     Comment: past marijuana use     Sexual activity: Not on file   Other Topics Concern     Parent/sibling w/ CABG, MI or angioplasty before 65F 55M? Not Asked   Social History Narrative     Not on file        Allergies:  Allergies   Allergen Reactions     Morphine Rash     Dry skin        Medications:  Prior to Admission medications    Medication Sig Start Date End Date Taking? Authorizing Provider   aspirin 81 MG chewable tablet Take 1 tablet (81 mg) by mouth daily 2/8/16   Nila Espana,    blood glucose monitoring (ACCU-CHEK MINISTERIO PLUS) meter device kit accucheck Lesly glucometer 12/8/15   Reported, Patient   blood glucose monitoring (ACCU-CHEK FASTCLIX) lancets Test 2 times per day. 1/30/15   Reported, Patient   Blood Pressure Monitoring (RA BLOOD PRESSURE CUFF MONITOR) MISC by Misc.(Non-Drug; Combo Route) route once daily. 9/26/17   Reported, Patient   bumetanide (BUMEX) 2 MG tablet Take 2 tablets (4 mg) by mouth daily 12/19/16   Tamir Jett MD   calcium acetate (PHOSLO) 667 MG CAPS capsule Take 1,334 mg by mouth 4 times daily (with meals and nightly)    Unknown, Entered By History   Carvedilol (COREG PO) Take 25 mg by mouth 2 times daily (with meals)     excessive vitamin D   Avoid excessive vitamin C  Try to avoid oxalate products (please refer to the diet sheet)  Limit fructose and sucrose type drinks such as coke          Low Oxalate Diet   WHAT YOU NEED TO KNOW:   Oxalate is a chemical found in plant foods. You may need to eat foods that are low in oxalate to help clear kidney stones or prevent them from forming. People who have had kidney stones are at a higher risk of forming kidney stones again. The most common type of kidney stone is made up of crystals that contain calcium and oxalate. Your healthcare provider or dietitian may recommend that you limit oxalate if you get this type of kidney stone often.       DISCHARGE INSTRUCTIONS:   Foods to include:  Include the following foods that have a low to medium amount of oxalate.  Grains:      Egg noodles    Morris crackers    Pancakes and waffles    Cooked and dry cereals without nuts or bran    White or wild rice    White bread, cornbread, bagels, and white English muffins (medium oxalate)    Saltine or soda crackers and vanilla wafers (medium oxalate)    Brown rice, spaghetti, and other noodles and pastas (medium oxalate)    Fruit:      Apples, bananas, grapes    Cranberries    Peaches, nectarines, apricots, and pears    Papayas and strawberries    Melons and pineapples    Blackberries, blueberries, mangoes, and prunes (medium oxalate)    Vegetables:      Artichokes, asparagus, and brussels sprouts    Broccoli and cauliflower    Kale, endive, cabbage, and lettuce    Cucumbers, peas, and zucchini    Mushrooms, onions, and peppers    Corn    Carrots, celery, and green beans (medium oxalate)    Parsnips, summer squash, tomatoes, and turnips (medium oxalate)    Dairy:      American cheese, Swiss cheese, cottage cheese, ricotta cheese, and cheddar cheese    Milk and buttermilk    Yogurt    Protein foods:      Meat, fish, shellfish, chicken, and turkey    Lunch meat and ham (medium oxalate)    Hot dogs, bratwurst,  "Unknown, Entered By History   citalopram (CELEXA) 20 MG tablet Take 1 tablet (20 mg) by mouth daily 2/8/16   Nila Espana,    cloNIDine (CATAPRES-TTS2) 0.2 MG/24HR patch Place 1 patch onto the skin once a week    Reported, Patient   Gabapentin (NEURONTIN PO) Take 200 mg by mouth daily    Unknown, Entered By History   insulin aspart (NOVOLOG FLEXPEN) 100 UNIT/ML injection Inject Subcutaneous 3 times daily (with meals) sliding scale up to 7 units before meals    Reported, Patient   insulin degludec (TRESIBA FLEXTOUCH) 100 UNIT/ML pen Inject 20 Units Subcutaneous daily (with breakfast)    Reported, Patient   Insulin Pen Needle (PEN NEEDLES 5/16\") 31G X 8 MM MISC 31g x 8 mm 1/30/15   Reported, Patient   isosorbide mononitrate (IMDUR) 30 MG 24 hr tablet Take 90 mg by mouth daily    Unknown, Entered By History   latanoprost (XALATAN) 0.005 % ophthalmic solution 1 drop 10/8/15   Reported, Patient   LOSARTAN POTASSIUM PO Take 100 mg by mouth daily    Unknown, Entered By History   ombitasvir-paritaprevir-ritonavir;dasabuvir (VIEKIRA MAYANK) 12.5-75-50 &250 MG tablets Take twice daily according to package instructions for 12 weeks total. 9/30/15   Reported, Patient   OXYCODONE HCL PO Take 10 mg by mouth 3 times daily as needed (takes scheduled)    Unknown, Entered By History   ULTICARE MICRO 32G X 4 MM insulin pen needle  10/24/16   Reported, Patient   ULTICARE PEN NEEDLES 29G X 12MM  12/19/16   Reported, Patient   umeclidinium (INCRUSE ELLIPTA) 62.5 MCG/INH oral inhaler Inhale 1 puff into the lungs daily    Unknown, Entered By History   VITAMIN D, CHOLECALCIFEROL, PO Take 1,000 Units by mouth daily    Unknown, Entered By History        Vitals:  Wt 105.1 kg (231 lb 9.6 oz)   BMI 29.74 kg/m        Exam:  GENERAL APPEARANCE: alert and no distress  HENT: mouth without ulcers or lesions. Fair dentition. No signs of oral or dental infection.   LYMPHATICS: no cervical or supraclavicular nodes  RESP: lungs clear to " pedersen, and sausage (medium oxalate)    Drinks and desserts:      Coffee    Fruit punch and lemonade or limeade without added vitamin C    Desserts:      Cookies, cakes, and ice cream    Pudding without chocolate    Foods to limit or avoid:  Limit the following foods that are high in oxalate.  Grains:      Wheat bran, wheat germ, and barley    Grits and bran cereal    White corn flour and buckwheat flour    Whole wheat bread    Fruit:      Dried apricots    Red currants, figs, and rhubarb    Kiwi    Grapefruit    Vegetables:      Potatoes and yams    Dale greens, leeks, okra, and spinach    Wax beans     Eggplant    Beets and beet greens    Swiss chard, escarole, parsley, and rutabagas    Tomato paste    Protein foods:      Baked beans with tomato sauce    Nut butters and nuts (peanuts, almonds, pecans, cashews, hazelnuts)    Soy burgers    Miso    Dried beans    Desserts:      Fruitcake    Chocolate    Carob and marmalade    Beverages:      Chocolate drink mixes    Soy milk    Instant iced tea    Other foods:      Sesame seeds and tahini (paste made of sesame seeds)    Poppy seeds    Other dietary guidelines:   Drink about 12 to 16 (eight-ounce) cups of liquid each day.  Liquids help clear kidney stones and prevent them from forming again. Water is the best liquid to drink. You may need more liquid if you are physically active. Ask your healthcare provider or dietitian how much liquid you need to drink each day.    Your healthcare provider may suggest that you make other diet changes to help prevent kidney stones.  This may include decreasing the amount of sodium you eat each day.    © Copyright [employer] 2023 Information is for End User's use only and may not be sold, redistributed or otherwise used for commercial purposes.  The above information is an  only. It is not intended as medical advice for individual conditions or treatments. Talk to your doctor, nurse or pharmacist before following any  auscultation - no rales, rhonchi or wheezes  CV: regular rhythm, normal rate, no rub, no murmur  FEMORAL PULSES: 1+ equal bilaterally.   EDEMA: no LE edema bilaterally  ABDOMEN: soft, nondistended, nontender, bowel sounds normal  MS: extremities normal - no gross deformities noted, no evidence of inflammation in joints, no muscle tenderness  SKIN: no rash     Results:  Office Visit on 01/03/2019   Component Date Value Ref Range Status     Interpretation ECG 01/03/2019 Click View Image link to view waveform and result   Final     Again, thank you for allowing me to participate in the care of your patient.      Sincerely,    YARA Storey CNP       medical regimen to see if it is safe and effective for you.          Subjective:   There has been no hospitalizations or acute illnesses since last visit.  The patient overall is feeling well.  No fevers, chills, or cough or colds.  Good appetite and good energy  No hematuria, dysuria, voiding symptoms or foamy urine; no kidney stone passage  No gastrointestinal symptoms except for occasional intermittent diarrhea related to Crohn's  No cardiovascular symptoms including swelling of the legs  No headaches, dizziness or lightheadedness  Blood pressure medications:  Toprol- mg daily in the afternoon    Renal pertinent medications:  Tamsulosin 0.5 mg daily with dinner  Potassium citrate 20 mill equivalents just once a day supposed to be taking 3 twice a day for 30 mill equivalents twice a day  Protonix 40 mg daily  Magnesium oxide 400 mg daily  Vitamin D 5000 units daily    ROS:  See HPI, otherwise review of systems as completely reviewed with the patient are negative    Past Medical History:   Diagnosis Date   • Arthritis    • Asthma    • Chronic kidney disease     hx stones   • Crohn disease (HCC)    • Crohn disease (HCC)    • GERD (gastroesophageal reflux disease)    • Hypertension    • Kidney stone    • Rosacea      Past Surgical History:   Procedure Laterality Date   • APPENDECTOMY     • COLON SURGERY  2014   • COLONOSCOPY N/A 6/24/2016    Procedure: COLONOSCOPY;  Surgeon: Luis Armando Garcia MD;  Location: [facility];  Service:    • ESOPHAGOGASTRODUODENOSCOPY N/A 6/24/2016    Procedure: ESOPHAGOGASTRODUODENOSCOPY (EGD);  Surgeon: Luis Armando Garcia MD;  Location: [facility];  Service:    • FL RETROGRADE PYELOGRAM  6/8/2022   • HERNIA REPAIR     • JOINT REPLACEMENT      left hip replacement   • [place] ANRCT XM SURG REQ ANES GENERAL SPI/EDRL DX N/A 12/6/2019    Procedure: EXAM UNDER ANESTHESIA (EUA),POSSIBLE SETON;  Surgeon: Lasha Anthony MD;  [facility];  Service: Colorectal   • KY COLONOSCOPY FLX DX  W/COLLJ SPEC WHEN PFRMD N/A 4/3/2019    Procedure: COLONOSCOPY;  Surgeon: Luis Armando Garcia MD;  [facility];  Service: Gastroenterology   • TN CYSTO/URETERO W/LITHOTRIPSY &INDWELL STENT INSRT Right 6/8/2022    Procedure: CYSTO USCOPE LITHO&STENT;  Surgeon: Austyn Robledo MD;  Location: [facility];  Service: Urology   • TN CYSTO/URETERO W/LITHOTRIPSY &INDWELL STENT INSRT Right 6/27/2022    Procedure: CYSTOSCOPY URETEROSCOPY WITH LITHOTRIPSY HOLMIUM LASER, RETROGRADE PYELOGRAM AND INSERTION STENT URETERAL;  Surgeon: Austyn Robledo MD;  Location: [facility];  Service: Urology   • TN ESOPHAGOGASTRODUODENOSCOPY TRANSORAL DIAGNOSTIC N/A 4/3/2019    Procedure: ESOPHAGOGASTRODUODENOSCOPY (EGD);  Surgeon: Luis Armando Garcia MD;  [facility];  Service: Gastroenterology   • TN SURG TX ANAL FISTULA SUBQ N/A 12/6/2019    Procedure: FISTULOTOMY;  Surgeon: Lasha Anthony MD;  [facility];  Service: Colorectal   • TONSILLECTOMY     • UPPER GASTROINTESTINAL ENDOSCOPY       Family History   Problem Relation Age of Onset   • Cancer Mother    • Heart disease Father    • Coronary artery disease Father    • Diabetes Father    • Diabetes Sister    • Diabetes Maternal Grandfather    • Colon cancer Neg Hx       reports that he quit smoking about 8 years ago. His smoking use included cigarettes. He started smoking about 34 years ago. He has a 25 pack-year smoking history. He has been exposed to tobacco smoke. He has never used smokeless tobacco. He reports that he does not currently use alcohol. He reports that he does not use drugs.    I COMPLETELY REVIEWED THE PAST MEDICAL HISTORY/PAST SURGICAL HISTORY/SOCIAL HISTORY/FAMILY HISTORY/AND MEDICATIONS  AND UPDATED ALL    Objective:     Vitals:   BP sitting on left: 156/66 with a heart rate of 72 and regular  BP standing on left: 162/76 with a heart rate of 72 and regular  Vitals:    06/18/24 1524   BP: 157/66   Pulse: 66       Weight (last 2 days)        Date/Time Weight    06/18/24 1524 98.3 (216.8)          Wt Readings from Last 3 Encounters:   06/18/24 98.3 kg (216 lb 12.8 oz)   05/16/24 96.6 kg (213 lb)   04/05/24 101 kg (223 lb)       Body mass index is 32.96 kg/m².    Physical Exam: General:  No acute distress/obese  Skin:  No acute rash  Eyes:  No scleral icterus, noninjected, no discharge from eyes  ENT:  Moist mucous membranes  Neck:  Supple, no jugular venous distention, trachea is midline, no lymphadenopathy and no thyromegaly  Back   No CVAT  Chest:  Clear to auscultation and percussion, good respiratory effort  CVS:  Regular rate and rhythm without a rub, or gallops or murmurs  Abdomen: Obese, soft and nontender with normal bowel sounds  Extremities:  No cyanosis and no edema, no arthritic changes, normal range of motion  Neuro:  Grossly intact  Psych:  Alert, oriented x3 and appropriate      Medications:    Current Outpatient Medications:   •  amLODIPine (NORVASC) 2.5 mg tablet, Take 1 tablet (2.5 mg total) by mouth daily, Disp: 90 tablet, Rfl: 3  •  Cholecalciferol (VITAMIN D3) 5000 units CAPS, Take 1 capsule by mouth every morning, Disp: , Rfl:   •  Cyanocobalamin (B-12) 1000 MCG/ML KIT, Inject as directed every 30 (thirty) days, Disp: , Rfl:   •  dicyclomine (BENTYL) 20 mg tablet, Take 1 tablet (20 mg total) by mouth daily, Disp: 90 tablet, Rfl: 2  •  folic acid (FOLVITE) 1 mg tablet, Take 1 tablet (1 mg total) by mouth once a week, Disp: 15 tablet, Rfl: 3  •  ketoconazole (NIZORAL) 2 % cream, Apply 1 application topically 2 (two) times a day as needed To affected area, Disp: , Rfl:   •  magnesium Oxide (MAG-OX) 400 mg TABS, TAKE 1 TABLET BY MOUTH 2 TIMES A DAY. (Patient taking differently: Take 400 mg by mouth daily), Disp: 180 tablet, Rfl: 2  •  methotrexate 2.5 MG tablet, Take 6 tablets (15 mg total) by mouth once a week, Disp: 72 tablet, Rfl: 2  •  metoprolol succinate (TOPROL-XL) 100 mg 24 hr tablet, TAKE 1 TABLET BY MOUTH EVERY DAY, Disp:  90 tablet, Rfl: 0  •  metroNIDAZOLE (METROCREAM) 0.75 % cream, , Disp: , Rfl:   •  minocycline (MINOCIN) 50 mg capsule, Take 50 mg by mouth daily in the early morning, Disp: , Rfl:   •  mupirocin (BACTROBAN) 2 % ointment, , Disp: , Rfl:   •  ondansetron (ZOFRAN-ODT) 4 mg disintegrating tablet, TAKE 1 TABLET BY MOUTH EVERY 6 HOURS AS NEEDED FOR NAUSEA OR VOMITING TAKE BEFORE METHOTREXATE, Disp: 20 tablet, Rfl: 3  •  pantoprazole (PROTONIX) 40 mg tablet, TAKE 1 TABLET BY MOUTH EVERY DAY, Disp: 90 tablet, Rfl: 1  •  potassium citrate (UROCIT-K) 10 mEq, Take 3 tablets (30 mEq total) by mouth 2 (two) times a day, Disp: 540 tablet, Rfl: 3  •  psyllium (METAMUCIL) 58.6 % packet, Take 1 packet by mouth daily, Disp: , Rfl:   •  spironolactone (ALDACTONE) 25 mg tablet, TAKE 1 TABLET (25 MG TOTAL) BY MOUTH DAILY., Disp: 90 tablet, Rfl: 3  •  Stelara 45 MG/0.5ML injection, every 8 weeks, Disp: , Rfl:   •  tamsulosin (FLOMAX) 0.4 mg, TAKE 1 CAPSULE BY MOUTH EVERY DAY WITH DINNER, Disp: 90 capsule, Rfl: 3  •  triamcinolone (KENALOG) 0.1 % cream, , Disp: , Rfl:   •  ciclopirox (LOPROX) 0.77 % SUSP, 1 application 2 (two) times a day (Patient not taking: Reported on 2/28/2024), Disp: , Rfl:   •  ciclopirox (PENLAC) 8 % solution, Apply 1 application topically daily at bedtime (Patient not taking: Reported on 2/28/2024), Disp: , Rfl:     Current Facility-Administered Medications:   •  cyanocobalamin injection 1,000 mcg, 1,000 mcg, Intramuscular, Q30 Days, NADIYA Roldan, 1,000 mcg at 06/17/24 0801    Lab, Imaging and other studies: I have personally reviewed pertinent labs.  Laboratory Results:  Results for orders placed or performed in visit on 06/08/24   Hepatic function panel   Result Value Ref Range    Total Bilirubin 0.83 0.20 - 1.00 mg/dL    Bilirubin, Direct 0.16 0.00 - 0.20 mg/dL    Alkaline Phosphatase 133 (H) 34 - 104 U/L    AST 32 13 - 39 U/L    ALT 38 7 - 52 U/L    Total Protein 6.6 6.4 - 8.4 g/dL    Albumin 4.0  "3.5 - 5.0 g/dL     *Note: Due to a large number of results and/or encounters for the requested time period, some results have not been displayed. A complete set of results can be found in Results Review.             Invalid input(s): \"ALBUMIN\"      Radiology review:   chest X-ray    Ultrasound      Portions of the record may have been created with voice recognition software.  Occasional wrong word or \"sound a like\" substitutions may have occurred due to the inherent limitations of voice recognition software.  Read the chart carefully and recognize, using context, where substitutions have occurred.                    "

## 2025-01-14 DIAGNOSIS — H40.9 GLAUCOMA: Primary | ICD-10-CM

## 2025-01-14 DIAGNOSIS — E11.319 DIABETIC RETINOPATHY OF BOTH EYES (H): ICD-10-CM

## 2025-01-15 ENCOUNTER — OFFICE VISIT (OUTPATIENT)
Dept: OPHTHALMOLOGY | Facility: CLINIC | Age: 80
End: 2025-01-15
Attending: OPHTHALMOLOGY
Payer: COMMERCIAL

## 2025-01-15 DIAGNOSIS — H52.4 HYPEROPIA OF BOTH EYES WITH ASTIGMATISM AND PRESBYOPIA: ICD-10-CM

## 2025-01-15 DIAGNOSIS — H52.203 HYPEROPIA OF BOTH EYES WITH ASTIGMATISM AND PRESBYOPIA: ICD-10-CM

## 2025-01-15 DIAGNOSIS — E11.319 DIABETIC RETINOPATHY OF BOTH EYES (H): ICD-10-CM

## 2025-01-15 DIAGNOSIS — H52.03 HYPEROPIA OF BOTH EYES WITH ASTIGMATISM AND PRESBYOPIA: ICD-10-CM

## 2025-01-15 DIAGNOSIS — H40.1120 PRIMARY OPEN ANGLE GLAUCOMA OF LEFT EYE, UNSPECIFIED GLAUCOMA STAGE: Primary | ICD-10-CM

## 2025-01-15 PROCEDURE — 92004 COMPRE OPH EXAM NEW PT 1/>: CPT | Mod: GC | Performed by: OPHTHALMOLOGY

## 2025-01-15 PROCEDURE — 99207 OCT OPTIC NERVE RNFL SPECTRALIS OU (BOTH EYES): CPT | Mod: 26 | Performed by: OPHTHALMOLOGY

## 2025-01-15 PROCEDURE — G0463 HOSPITAL OUTPT CLINIC VISIT: HCPCS | Performed by: OPHTHALMOLOGY

## 2025-01-15 PROCEDURE — 92015 DETERMINE REFRACTIVE STATE: CPT | Performed by: OPHTHALMOLOGY

## 2025-01-15 PROCEDURE — 92134 CPTRZ OPH DX IMG PST SGM RTA: CPT | Performed by: OPHTHALMOLOGY

## 2025-01-15 PROCEDURE — 92133 CPTRZD OPH DX IMG PST SGM ON: CPT | Performed by: OPHTHALMOLOGY

## 2025-01-15 RX ORDER — FLUTICASONE FUROATE AND VILANTEROL 200; 25 UG/1; UG/1
1 POWDER RESPIRATORY (INHALATION) DAILY
COMMUNITY
Start: 2023-06-06

## 2025-01-15 RX ORDER — POLYETHYLENE GLYCOL 3350 17 G/17G
17 POWDER, FOR SOLUTION ORAL
COMMUNITY
Start: 2024-10-04

## 2025-01-15 RX ORDER — ACYCLOVIR 400 MG/1
TABLET ORAL
COMMUNITY
Start: 2024-10-30

## 2025-01-15 RX ORDER — SENNOSIDES 8.6 MG
1 CAPSULE ORAL
COMMUNITY
Start: 2024-02-03

## 2025-01-15 RX ORDER — LATANOPROST 50 UG/ML
1 SOLUTION/ DROPS OPHTHALMIC AT BEDTIME
Qty: 2.5 ML | Refills: 11 | Status: SHIPPED | OUTPATIENT
Start: 2025-01-15

## 2025-01-15 RX ORDER — SENNOSIDES A AND B 8.6 MG/1
1 TABLET, FILM COATED ORAL
COMMUNITY
Start: 2023-04-25

## 2025-01-15 RX ORDER — CINACALCET 60 MG/1
1 TABLET, FILM COATED ORAL
COMMUNITY
Start: 2024-12-29

## 2025-01-15 RX ORDER — SODIUM ZIRCONIUM CYCLOSILICATE 5 G/5G
POWDER, FOR SUSPENSION ORAL
COMMUNITY
Start: 2024-11-25

## 2025-01-15 RX ORDER — SEVELAMER CARBONATE 800 MG/1
800 TABLET, FILM COATED ORAL
COMMUNITY

## 2025-01-15 RX ORDER — BUPRENORPHINE HYDROCHLORIDE 300 UG/1
FILM, SOLUBLE BUCCAL
COMMUNITY
Start: 2025-01-13

## 2025-01-15 RX ORDER — INSULIN LISPRO 100 [IU]/ML
INJECTION, SOLUTION INTRAVENOUS; SUBCUTANEOUS
COMMUNITY
Start: 2024-04-23

## 2025-01-15 RX ORDER — PREGABALIN 50 MG/1
CAPSULE ORAL
COMMUNITY
Start: 2023-04-25

## 2025-01-15 RX ORDER — ACYCLOVIR 400 MG/1
TABLET ORAL
COMMUNITY
Start: 2024-12-21

## 2025-01-15 ASSESSMENT — CONF VISUAL FIELD
OS_SUPERIOR_NASAL_RESTRICTION: 0
OD_INFERIOR_NASAL_RESTRICTION: 0
OS_INFERIOR_TEMPORAL_RESTRICTION: 0
METHOD: COUNTING FINGERS
OS_SUPERIOR_TEMPORAL_RESTRICTION: 0
OD_NORMAL: 1
OD_SUPERIOR_NASAL_RESTRICTION: 0
OS_INFERIOR_NASAL_RESTRICTION: 0
OD_INFERIOR_TEMPORAL_RESTRICTION: 0
OD_SUPERIOR_TEMPORAL_RESTRICTION: 0
OS_NORMAL: 1

## 2025-01-15 ASSESSMENT — TONOMETRY
OD_IOP_MMHG: 16
OD_IOP_MMHG: 17
IOP_METHOD: ICARE
OS_IOP_MMHG: 14
OS_IOP_MMHG: 12
IOP_METHOD: ICARE

## 2025-01-15 ASSESSMENT — SLIT LAMP EXAM - LIDS
COMMENTS: NORMAL
COMMENTS: NORMAL

## 2025-01-15 ASSESSMENT — VISUAL ACUITY
OD_SC: 4'/200
OS_SC: 20/25
OS_SC+: -1/+3
METHOD: SNELLEN - LINEAR

## 2025-01-15 ASSESSMENT — EXTERNAL EXAM - RIGHT EYE: OD_EXAM: NORMAL

## 2025-01-15 ASSESSMENT — CUP TO DISC RATIO
OD_RATIO: 0.55
OS_RATIO: 0.7

## 2025-01-15 ASSESSMENT — REFRACTION_MANIFEST
OS_SPHERE: +0.25
OD_CYLINDER: SPHERE
OS_CYLINDER: SPHERE
OD_SPHERE: +0.75
OS_ADD: +2.25
OD_ADD: +2.25

## 2025-01-15 ASSESSMENT — PATIENT HEALTH QUESTIONNAIRE - PHQ9: SUM OF ALL RESPONSES TO PHQ QUESTIONS 1-9: 0

## 2025-01-15 ASSESSMENT — EXTERNAL EXAM - LEFT EYE: OS_EXAM: NORMAL

## 2025-01-15 NOTE — NURSING NOTE
Chief Complaints and History of Present Illnesses   Patient presents with    Glaucoma Evaluation    Diabetic Retinopathy Evaluation     Chief Complaint(s) and History of Present Illness(es)       Glaucoma Evaluation              Laterality: both eyes              Diabetic Retinopathy Evaluation               Comments    Patient states last eye exam was about a month and a half ago, with dilation. Does not remember the name of provider, but was seen at the assisted living facility. Was told may need new glasses.    Patient has not noticed any recent changes with vision either eye. Can see clearly for distance, reads at near with OTC readers. No floaters or flashes of light currently, has had floaters in the past. No past ocular surgeries. No ocular pain. No ocular medications currently.    IDDM, BS monitored with sensor;  Lab Results       Component                Value               Date                       A1C                      8.9                 06/22/2020                 A1C                      11.9                09/18/2017                 A1C                      8.1                 06/22/2017                 A1C                      7.6                 01/16/2017                 A1C                      8.8                 12/06/2016            Daisy De León on 1/15/2025 at 12:28 PM

## 2025-01-15 NOTE — PROGRESS NOTES
Chief Complaints and History of Present Illnesses   Patient presents with    Glaucoma Evaluation    Diabetic Retinopathy Evaluation              Lab Results   Component Value Date    A1C 8.9 06/22/2020    A1C 11.9 09/18/2017    A1C 8.1 06/22/2017    A1C 7.6 01/16/2017    A1C 8.8 12/06/2016         Assessment & Plan     Keagan Wayne is a 79 year old male with the following diagnoses:   1. Glaucoma    2. Diabetic retinopathy of both eyes (H)                    Attending Physician Attestation:  Complete documentation of historical and exam elements from today's encounter can be found in the full encounter summary report (not reduplicated in this progress note).  I personally obtained the chief complaint(s) and history of present illness.  I confirmed and edited as necessary the review of systems, past medical/surgical history, family history, social history, and examination findings as documented by others; and I examined the patient myself.  I personally reviewed the relevant tests, images, and reports as documented above.  I formulated and edited as necessary the assessment and plan and discussed the findings and management plan with the patient and family. - Talmage Broadbent MD, PhD

## 2025-01-15 NOTE — PROGRESS NOTES
HPI       Glaucoma Evaluation    In both eyes.             Comments    Patient states last eye exam was about a month and a half ago, with dilation. Does not remember the name of provider, but was seen at the assisted living facility. Was told may need new glasses.    Patient has not noticed any recent changes with vision either eye. Can see clearly for distance, reads at near with OTC readers. No floaters or flashes of light currently, has had floaters in the past. No past ocular surgeries. No ocular pain. No ocular medications currently.    IDDM, BS monitored with sensor;  Lab Results       Component                Value               Date                       A1C                      8.9                 06/22/2020                 A1C                      11.9                09/18/2017                 A1C                      8.1                 06/22/2017                 A1C                      7.6                 01/16/2017                 A1C                      8.8                 12/06/2016            Daisy De León on 1/15/2025 at 12:28 PM                Last edited by Daisy De León on 1/15/2025 12:28 PM.         Review of systems for the eyes was negative other than the pertinent positives/negatives listed in the HPI.      Assessment & Plan    HPI:  Keagan Wayne is a 79 year old male with a complex medical history - he had been followed in the past for glaucoma and NPDR at [facility] - last visit was in 2014. Patient has not seen an eye doctor since and has not been on any form of eye drops. Says his vision in his right eye has been bad for a long time - visual acuity at [facility] in 2014 measured 20/60 in right eye. No pain.    POHx: Diabetic retinopathy, glaucoma; Cataract surgery both eyes; PRP right eye (unknown time)  PMHx: Complex medical history including diabetes, HLD, HTN, CHF  Current Medications:   Current Outpatient Medications   Medication Sig Dispense Refill    acetaminophen  (ACETAMINOPHEN 8 HOUR) 650 MG CR tablet 1 tablet.      aspirin 81 MG chewable tablet Take 1 tablet (81 mg) by mouth daily 36 tablet     atorvastatin (LIPITOR) 80 MG tablet Take 80 mg by mouth At Bedtime       B COMPLEX-C PO Take 1 tablet by mouth.      BELBUCA 300 MCG FILM buccal film       cinacalcet (SENSIPAR) 60 MG tablet Take 1 tablet by mouth daily at 2 pm.      Continuous Glucose  (DEXCOM G7 ) NEGRITO       Continuous Glucose Sensor (DEXCOM G7 SENSOR) MISC       fluticasone-vilanterol (BREO ELLIPTA) 200-25 MCG/ACT inhaler Inhale 1 puff into the lungs daily.      HUMALOG KWIKPEN 100 UNIT/ML soln Inject subcutaneously.      insulin glargine (LANTUS PEN) 100 UNIT/ML pen Inject 22 Units Subcutaneous every morning      latanoprost (XALATAN) 0.005 % ophthalmic solution Place 1 drop into both eyes at bedtime. 2.5 mL 11    LOKELMA 5 g PACK packet       LYRICA 50 MG capsule Take by mouth.      melatonin 3 MG CAPS Take by mouth.      metoprolol tartrate (LOPRESSOR) 25 MG tablet Take 12.5 mg by mouth 2 times daily On dialysis days, only take in the evening. Skip morning dose on dialysis days. 1/2 x 25mg = 12.5mg      multivitamin RENAL (NEPHROCAPS/TRIPHROCAPS) 1 MG capsule Take 1 capsule by mouth every evening      naloxone (NARCAN) 4 MG/0.1ML nasal spray Spray 4 mg into one nostril alternating nostrils as needed for opioid reversal. every 2-3 minutes until assistance arrives      polyethylene glycol (MIRALAX) 17 GM/Dose powder Take 17 g by mouth.      senna (SENNA-TIME) 8.6 MG tablet Take 1 tablet by mouth.      sevelamer carbonate (RENVELA) 800 MG tablet Take 800 mg by mouth 3 times daily (with meals).      tiotropium (SPIRIVA RESPIMAT) 2.5 MCG/ACT inhaler Inhale 2 puffs into the lungs.      UNABLE TO FIND MEDICATION NAME: chacalcet      VITAMIN D, CHOLECALCIFEROL, PO Take 1,000 Units by mouth daily afternoon      blood glucose monitoring (ACCU-CHEK MINISTERIO PLUS) meter device kit accucheck Lesly glucometer    "   blood glucose monitoring (ACCU-CHEK FASTCLIX) lancets Test 2 times per day.      Blood Pressure Monitoring (RA BLOOD PRESSURE CUFF MONITOR) MISC by Misc.(Non-Drug; Combo Route) route once daily.      calcium acetate (PHOSLO) 667 MG CAPS capsule Take 2,001 mg by mouth 3 times daily (with meals)       Gabapentin (NEURONTIN PO) Take 100 mg by mouth every evening       insulin aspart (NOVOLOG FLEXPEN) 100 UNIT/ML injection Inject 8 Units Subcutaneous 3 times daily (with meals)       Insulin Pen Needle (PEN NEEDLES 5/16\") 31G X 8 MM MISC 31g x 8 mm      latanoprost (XALATAN) 0.005 % ophthalmic solution Place 1 drop into both eyes At Bedtime       Misc. Devices (ROLLATOR ULTRA-LIGHT) MISC 1 each by Other route      OLANZapine zydis (ZYPREXA) 15 MG ODT Take 15 mg by mouth At Bedtime      silver sulfADIAZINE (SILVADENE) 1 % external cream Apply topically daily Apply on blisters      ULTICARE MICRO 32G X 4 MM insulin pen needle       ULTICARE PEN NEEDLES 29G X 12MM       umeclidinium (INCRUSE ELLIPTA) 62.5 MCG/INH oral inhaler Inhale 1 puff into the lungs daily       No current facility-administered medications for this visit.     FHx: Unknown  PSHx: See above      Current Eye Medications:      Assessment & Plan:  (H40.1120) Primary open angle glaucoma of left eye, unspecified glaucoma stage  (primary encounter diagnosis)  Comment: Patient with recorded history of glaucoma last seen at [facility] in 2014. IOP 17/12 today. Vision 20/600 in right eye, 20/25 left eye - essentially monocular. OCT RNFL inferior and superior moderate loss and severe temporal loss in the left eye, C/D 0.7 in the right, patient unable to perform VF at this visit; history of pressure lowering drop use in the past.  OCT nerve fiber layer 01/15/25:   Right eye - G(g) 126 NI (g) 111 TI (g) 175 NS (g) 159 TS (g) 150      Left eye - G(r) 73 NI (g) 79 TI (g) 111 NS (g) 94 TS (r) 73    Plan: 1-2 month pressure check; RX for protection monocular; start " latanoprost at bedtime both eyes     (E11.319) Diabetic retinopathy of both eyes (H)  Comment: Regressed NVD in right eye, no signs of PDR. There are white small circular lesions in the periphery that are concerning and do not resemble CWS; would like retina to evaluate non-urgently. Atrophy of photoreceptor layer on OCT Macula    Plan: Monitor every 3 months; non-urgent retina visit to evaluated peripheral lesions in right eye    (H52.03,  H52.203,  H52.4) Hyperopia of both eyes with astigmatism and presbyopia  Patient has minimal hyperopia but a refraction was dispensed for full time glasses wear due to monocular status  Presbyopia is difficulty seeing up close and is treated with bifocals or over the counter reading glasses      Return for 2 appointments see check-out note.    Zackary John MD     Attending Physician Attestation:  Complete documentation of historical and exam elements from today's encounter can be found in the full encounter summary report (not reduplicated in this progress note).  I personally obtained the chief complaint(s) and history of present illness.  I confirmed and edited as necessary the review of systems, past medical/surgical history, family history, social history, and examination findings as documented by others; and I examined the patient myself.  I personally reviewed the relevant tests, images, and reports as documented above.  I formulated and edited as necessary the assessment and plan and discussed the findings and management plan with the patient and family. - Rex Negrete MD

## 2025-02-06 ENCOUNTER — OFFICE VISIT (OUTPATIENT)
Dept: OPHTHALMOLOGY | Facility: CLINIC | Age: 80
End: 2025-02-06
Payer: COMMERCIAL

## 2025-02-06 DIAGNOSIS — E11.319 DIABETIC RETINOPATHY OF BOTH EYES (H): Primary | ICD-10-CM

## 2025-02-06 PROCEDURE — G0463 HOSPITAL OUTPT CLINIC VISIT: HCPCS

## 2025-02-06 PROCEDURE — 92235 FLUORESCEIN ANGRPH MLTIFRAME: CPT

## 2025-02-06 PROCEDURE — 92250 FUNDUS PHOTOGRAPHY W/I&R: CPT

## 2025-02-06 PROCEDURE — 92134 CPTRZ OPH DX IMG PST SGM RTA: CPT

## 2025-02-06 ASSESSMENT — EXTERNAL EXAM - RIGHT EYE: OD_EXAM: NORMAL

## 2025-02-06 ASSESSMENT — SLIT LAMP EXAM - LIDS
COMMENTS: NORMAL
COMMENTS: NORMAL

## 2025-02-06 ASSESSMENT — VISUAL ACUITY
OS_SC: 20/20
METHOD: SNELLEN - LINEAR
OS_SC+: -2
OD_SC: 2'/200

## 2025-02-06 ASSESSMENT — TONOMETRY
OS_IOP_MMHG: 15
OD_IOP_MMHG: 17
IOP_METHOD: TONOPEN

## 2025-02-06 ASSESSMENT — CUP TO DISC RATIO
OD_RATIO: 0.55
OS_RATIO: 0.7

## 2025-02-06 ASSESSMENT — EXTERNAL EXAM - LEFT EYE: OS_EXAM: NORMAL

## 2025-02-06 NOTE — NURSING NOTE
Chief Complaints and History of Present Illnesses   Patient presents with    Retinal Evaluation     Chief Complaint(s) and History of Present Illness(es)       Retinal Evaluation               Comments    Patient states vision is the same since last visit. No pain. No dryness or irritation. No new flashes or floaters.    Ocular Meds: latanoprost at bedtime BE    Rere IVORY 1:15 PM February 6, 2025

## 2025-02-06 NOTE — PROGRESS NOTES
CC: retinal lesion     HPI: blurred vision right eye, long standing     PMHx:   CHF  DL  HTN  ESRD on dialysis       Imaging:    OCT: 02/06/2025  Right eye: poor quality due to unclear media, disorganized retinal layers, Epiretinal membrane, no laura IRF/SRF, thinned choroid, RPE/photoreceptor loss, area right eye SRHM/scar  Left eye: Good foveal contour, no IRF/SRF       FA: 2/6/25  Transit Left eye,possible mild delay in choroidal filling, AV transit time acceptable.  OS: adequate arterial and venous filling, no laura leakage along major blood vessels, some areas of peripheral non-perfusion, active CVN at disc or in the periphery, there is disc staining  OD:  enhanced watershed zones, diffuse staining along the vessels and in the macula, no evidence of active CNV at isc or periphery, areas of hyper and hypo fluorescence scattered in the periphery which may signify old PRP scars and microaneurysms    Retina Laser procedures:  PRP OD    Intravitreal injections:  NA    Assessment/ Plan: 02/06/2025       # Diabetic retinopathy both eyes, asymmetric disease involvement   # Possible proliferative diabetic retinopathy OD vs hx of retinal artery occlusion or ischemic retinal vein occlusion vs ocular  # Temporal retinal a macroaneurysm OD  Last A1c is:  Lab Results   Component Value Date    A1C 8.9 06/22/2020    A1C 11.9 09/18/2017    A1C 8.1 06/22/2017    A1C 7.6 01/16/2017    A1C 8.8 12/06/2016   Vision today is 2/200 and 20/25 -stable  OCT shows no diabetic macular edema both eyes   The right eye shows chronic ischemic changes and is s/p PRP. The asymmetry between both eyes could be due to issues with carotid artery blood flow, they could also be due to a possible old ischemic retinal vein occlusion or retinal artery occlusion or occular ischemia given his background of DM, HTN and CHF and ESRD.  Given that this is not acute would recommend blood thinners (which patient is on now) and close observation for development  of complications like NVI/NVD/NVE OD.   The patient might benefit from carotid doppler US to see if there is any flow restrictions.  Recommend working with primary care/nephrologist to control risk factors :adequate blood sugar control and blood pressure control to prevent future damage to Left eye  Follows up with Dr Negrete    # epiretinal membrane OD  Observe     # POAG left eye   Managed by Dr Caden Mukherjee MD     [facility]  [facility]     Attending Physician Attestation:  Complete documentation of historical and exam elements from today's encounter can be found in the full encounter summary report (not reduplicated in this progress note).  I personally obtained the chief complaint(s) and history of present illness.  I confirmed and edited as necessary the review of systems, past medical/surgical history, family history, social history, and examination findings as documented by others; and I examined the patient myself.  I personally reviewed the relevant tests, images, and reports as documented above.  I formulated and edited as necessary the assessment and plan and discussed the findings and management plan with the patient and family. Bradley Mukherjee MD

## 2025-03-05 ENCOUNTER — OFFICE VISIT (OUTPATIENT)
Dept: OPHTHALMOLOGY | Facility: CLINIC | Age: 80
End: 2025-03-05
Attending: OPHTHALMOLOGY
Payer: COMMERCIAL

## 2025-03-05 ENCOUNTER — TRANSFERRED RECORDS (OUTPATIENT)
Dept: HEALTH INFORMATION MANAGEMENT | Facility: CLINIC | Age: 80
End: 2025-03-05

## 2025-03-05 DIAGNOSIS — H40.1120 PRIMARY OPEN ANGLE GLAUCOMA OF LEFT EYE, UNSPECIFIED GLAUCOMA STAGE: Primary | ICD-10-CM

## 2025-03-05 PROCEDURE — G0463 HOSPITAL OUTPT CLINIC VISIT: HCPCS | Performed by: OPHTHALMOLOGY

## 2025-03-05 PROCEDURE — 99213 OFFICE O/P EST LOW 20 MIN: CPT | Performed by: OPHTHALMOLOGY

## 2025-03-05 PROCEDURE — 92083 EXTENDED VISUAL FIELD XM: CPT | Performed by: OPHTHALMOLOGY

## 2025-03-05 ASSESSMENT — EXTERNAL EXAM - RIGHT EYE: OD_EXAM: NORMAL

## 2025-03-05 ASSESSMENT — TONOMETRY
IOP_METHOD: TONOPEN
OD_IOP_MMHG: 19
OS_IOP_MMHG: 17

## 2025-03-05 ASSESSMENT — CONF VISUAL FIELD
OD_INFERIOR_NASAL_RESTRICTION: 3
METHOD: COUNTING FINGERS
OD_SUPERIOR_NASAL_RESTRICTION: 3
OS_SUPERIOR_TEMPORAL_RESTRICTION: 0
OS_NORMAL: 1
OS_INFERIOR_TEMPORAL_RESTRICTION: 0
OS_SUPERIOR_NASAL_RESTRICTION: 0
OS_INFERIOR_NASAL_RESTRICTION: 0

## 2025-03-05 ASSESSMENT — SLIT LAMP EXAM - LIDS
COMMENTS: NORMAL
COMMENTS: NORMAL

## 2025-03-05 ASSESSMENT — VISUAL ACUITY
METHOD: SNELLEN - LINEAR
OD_SC: 20/400
OS_SC: 20/30

## 2025-03-05 ASSESSMENT — REFRACTION_WEARINGRX
OD_SPHERE: +0.75
OS_ADD: +2.25
OD_ADD: +2.25
OD_CYLINDER: SPHERE
OS_CYLINDER: SPHERE
OS_SPHERE: +0.25

## 2025-03-05 ASSESSMENT — EXTERNAL EXAM - LEFT EYE: OS_EXAM: NORMAL

## 2025-03-05 NOTE — PROGRESS NOTES
HPI       Primary Open Angle Glaucoma Follow Up     Additional comments: 7 week follow up             Comments    Pt states vision is about the same as last visit. Pt notes achy eye pain in RE daily. No new flashes or floaters.  DM2 BS: unknown to pt, stable per pt.  Lab Results       Component                Value               Date                       A1C                      8.9                 06/22/2020                 A1C                      11.9                09/18/2017                 A1C                      8.1                 06/22/2017                 A1C                      7.6                 01/16/2017                 A1C                      8.8                 12/06/2016              THALIA Toledo March 5, 2025 12:53 PM              Last edited by Leonard Gupta COMT on 3/5/2025 12:53 PM.         Review of systems for the eyes was negative other than the pertinent positives/negatives listed in the HPI.      Assessment & Plan    HPI:  Keagan Wayne is a 79 year old male with a complex medical history here for Glaucoma followup after starting latanorpost. He has a nurse administer drops. States that she is administering twice a day both eyes . Notes right eye achy pain    POHx: Diabetic retinopathy, glaucoma; Cataract surgery both eyes; PRP right eye (unknown time)  PMHx: Complex medical history including diabetes, HLD, HTN, CHF  Current Medications:   Current Outpatient Medications   Medication Sig Dispense Refill    acetaminophen (ACETAMINOPHEN 8 HOUR) 650 MG CR tablet 1 tablet.      aspirin 81 MG chewable tablet Take 1 tablet (81 mg) by mouth daily 36 tablet     atorvastatin (LIPITOR) 80 MG tablet Take 80 mg by mouth At Bedtime       B COMPLEX-C PO Take 1 tablet by mouth.      BELBUCA 300 MCG FILM buccal film       blood glucose monitoring (ACCU-CHEK MINISTERIO PLUS) meter device kit accucheck Lesly glucometer      blood glucose monitoring (ACCU-CHEK FASTCLIX) lancets Test 2 times  "per day.      Blood Pressure Monitoring ( BLOOD PRESSURE CUFF MONITOR) MISC by Misc.(Non-Drug; Combo Route) route once daily.      calcium acetate (PHOSLO) 667 MG CAPS capsule Take 2,001 mg by mouth 3 times daily (with meals)       cinacalcet (SENSIPAR) 60 MG tablet Take 1 tablet by mouth daily at 2 pm.      Continuous Glucose  (DEXCOM G7 ) NEGRITO       Continuous Glucose Sensor (DEXCOM G7 SENSOR) MISC       fluticasone-vilanterol (BREO ELLIPTA) 200-25 MCG/ACT inhaler Inhale 1 puff into the lungs daily.      Gabapentin (NEURONTIN PO) Take 100 mg by mouth every evening       HUMALOG KWIKPEN 100 UNIT/ML soln Inject subcutaneously.      insulin aspart (NOVOLOG FLEXPEN) 100 UNIT/ML injection Inject 8 Units Subcutaneous 3 times daily (with meals)       insulin glargine (LANTUS PEN) 100 UNIT/ML pen Inject 22 Units Subcutaneous every morning      Insulin Pen Needle (PEN NEEDLES 5/16\") 31G X 8 MM MISC 31g x 8 mm      latanoprost (XALATAN) 0.005 % ophthalmic solution Place 1 drop into both eyes at bedtime. 2.5 mL 11    latanoprost (XALATAN) 0.005 % ophthalmic solution Place 1 drop into both eyes At Bedtime       LOKELMA 5 g PACK packet       LYRICA 50 MG capsule Take by mouth.      melatonin 3 MG CAPS Take by mouth.      metoprolol tartrate (LOPRESSOR) 25 MG tablet Take 12.5 mg by mouth 2 times daily On dialysis days, only take in the evening. Skip morning dose on dialysis days. 1/2 x 25mg = 12.5mg      Misc. Devices (ROLLATOR ULTRA-LIGHT) MISC 1 each by Other route      multivitamin RENAL (NEPHROCAPS/TRIPHROCAPS) 1 MG capsule Take 1 capsule by mouth every evening      naloxone (NARCAN) 4 MG/0.1ML nasal spray Spray 4 mg into one nostril alternating nostrils as needed for opioid reversal. every 2-3 minutes until assistance arrives      OLANZapine zydis (ZYPREXA) 15 MG ODT Take 15 mg by mouth At Bedtime      polyethylene glycol (MIRALAX) 17 GM/Dose powder Take 17 g by mouth.      senna (SENNA-TIME) 8.6 MG " tablet Take 1 tablet by mouth.      sevelamer carbonate (RENVELA) 800 MG tablet Take 800 mg by mouth 3 times daily (with meals).      silver sulfADIAZINE (SILVADENE) 1 % external cream Apply topically daily Apply on blisters      tiotropium (SPIRIVA RESPIMAT) 2.5 MCG/ACT inhaler Inhale 2 puffs into the lungs.      ULTICARE MICRO 32G X 4 MM insulin pen needle       ULTICARE PEN NEEDLES 29G X 12MM       umeclidinium (INCRUSE ELLIPTA) 62.5 MCG/INH oral inhaler Inhale 1 puff into the lungs daily      UNABLE TO FIND MEDICATION NAME: chacalcet      VITAMIN D, CHOLECALCIFEROL, PO Take 1,000 Units by mouth daily afternoon       No current facility-administered medications for this visit.     FHx: Unknown  PSHx: See above      Current Eye Medications:  ?latanoprost twice a day     Assessment & Plan:  (H40.1120) Primary open angle glaucoma of left eye, unspecified glaucoma stage  (primary encounter diagnosis)  Comment: Patient with recorded history of glaucoma last seen at [facility] in 2014. IOP 17/12 today. Vision 20/600 in right eye, 20/25 left eye - essentially monocular. OCT RNFL inferior and superior moderate loss and severe temporal loss in the left eye, C/D 0.7 in the right, patient unable to perform VF at this visit; history of pressure lowering drop use in the past.    Virtual Field OG FAST 03/05/25   Right eye stimulus III, severely constricted, false positives 4/12, false negatives 6/8   Left eye stimulus V, cloverleaf pattern, false positives 8/11, false negatives 4/7  Poor compliance, poor attention  Will attempt again on non-dilaysis day    OCT nerve fiber layer 01/15/25:   Right eye - G(g) 126 NI (g) 111 TI (g) 175 NS (g) 159 TS (g) 150      Left eye - G(r) 73 NI (g) 79 TI (g) 111 NS (g) 94 TS (r) 73      (E11.319) Diabetic retinopathy of both eyes (H)  right eye shows chronic ischemic changes and is s/p PRP. The asymmetry between both eyes could be due to issues with carotid artery blood flow, they could also  be due to a possible old ischemic retinal vein occlusion or retinal artery occlusion or occular ischemia given his background of DM, HTN and CHF and ESRD.     Comment: Regressed NVD in right eye, no signs of PDR.  FA with late leakage right eye> left eye       (H52.03,  H52.203,  H52.4) Hyperopia of both eyes with astigmatism and presbyopia  Not wearing glasses today  Advised full time glasses wear for monocular precautions       Return in about 3 months (around 6/5/2025) for Follow Up-v/t, OCT RNFL, LVC OD, HVF 24-2 OS(VIRTUAL FIELD).      Attending Physician Attestation:  Complete documentation of historical and exam elements from today's encounter can be found in the full encounter summary report (not reduplicated in this progress note).  I personally obtained the chief complaint(s) and history of present illness.  I confirmed and edited as necessary the review of systems, past medical/surgical history, family history, social history, and examination findings as documented by others; and I examined the patient myself.  I personally reviewed the relevant tests, images, and reports as documented above.  I formulated and edited as necessary the assessment and plan and discussed the findings and management plan with the patient and family. - Rex Negrete MD

## 2025-03-05 NOTE — PATIENT INSTRUCTIONS
Mr. Wayne, please use latanoprost at bedtime both eyes     OK to use artificial tears 2-4x daily PRN

## 2025-03-05 NOTE — NURSING NOTE
Chief Complaints and History of Present Illnesses   Patient presents with    Primary Open Angle Glaucoma Follow Up     7 week follow up     Chief Complaint(s) and History of Present Illness(es)       Primary Open Angle Glaucoma Follow Up              Comments: 7 week follow up              Comments    Pt states vision is about the same as last visit. Pt notes achy eye pain in RE daily. No new flashes or floaters.  DM2 BS: unknown to pt, stable per pt.  Lab Results       Component                Value               Date                       A1C                      8.9                 06/22/2020                 A1C                      11.9                09/18/2017                 A1C                      8.1                 06/22/2017                 A1C                      7.6                 01/16/2017                 A1C                      8.8                 12/06/2016              THALIA Toledo March 5, 2025 12:53 PM                       
81

## 2025-03-10 NOTE — IP AVS SNAPSHOT
MRN:[id number]                      After Visit Summary   1/15/2017    Keagan Wayne    MRN: [id number]           Thank you!     Thank you for choosing [facility] for your care. Our goal is always to provide you with excellent care. Hearing back from our patients is one way we can continue to improve our services. Please take a few minutes to complete the written survey that you may receive in the mail after you visit with us. Thank you!        Patient Information     Date Of Birth          1945        About your hospital stay     You were admitted on:  January 15, 2017 You last received care in the:  [facility] [address] [facility]    You were discharged on:  January 19, 2017        Reason for your hospital stay       pneumonia                  Who to Call     For medical emergencies, please call 911.  For non-urgent questions about your medical care, please call your primary care provider or clinic, [phone]          Attending Provider     Provider    Elida Dickey MD Cho, Tommy M, MD       Primary Care Provider Office Phone # Fax #    Precious Echevarria [phone]       [facility] [address] [place] [address]  [facility] [address]        After Care Instructions     Activity       Your activity upon discharge: activity as tolerated            Diet       Follow this diet upon discharge: Orders Placed This Encounter  Combination Diet Dialysis Diet              Transitions 30-Day Tel-Assurance       You will receive a phone call for enrollment following hospital discharge. If you have not received a phone call within 24 hours to enroll, please call [phone].                  Follow-up Appointments     Follow-up and recommended labs and tests       Follow up with primary care provider, Precious Echevarria, within 7 days for hospital follow- up.  The following labs/tests are recommended: chest x-ray in 1-2 months.                  Your next 10  appointments already scheduled     Feb 06, 2017  9:30 AM   US EXTREMITY ARTERIAL VENOUS DIALYSIS ACCESS GRAFT with SHVUS1   [facility] ([facility])    [address]  [place] [address]   [phone]           Please bring a list of your medicines (including vitamins, minerals and over-the-counter drugs). Also, tell your doctor about any allergies you may have. Wear comfortable clothes and leave your valuables at home.  You do not need to do anything special to prepare for your exam.  Please call the Imaging Department at your exam site with any questions.            Feb 06, 2017 10:30 AM   Return Visit with Nuno Dee PA-C   [facility] ([facility])    [address]  [place] [id number] [phone]              Further instructions from your care team       The following appointments have been scheduled for you:  Dr Echevarria  Monday 1/23/17 at 10:00AM  [facility]  [phone]    [facility]  Xu Schaffer PA-C  Monday 2/6/17 at 9:15AM, please arrive at 9:00AM  [address] [facility]  [phone]    - Recommend not taking citalopram until you have completed your final dose of Levaquin antibiotic    Pending Results     Date and Time Order Name Status Description    1/15/2017 0615 Blood culture Preliminary     1/15/2017 0615 Blood culture Preliminary             Statement of Approval     Ordered          01/19/17 1236  I have reviewed and agree with all the recommendations and orders detailed in this document.   EFFECTIVE NOW     Approved and electronically signed by:  Tad Barker MD             Admission Information        Provider Department Dept Phone    1/15/2017 Lazaro Ward MD [address] [facility] [phone]      Your Vitals Were     Blood Pressure Pulse Temperature    151/87 mmHg 68 97.9  F  "(36.6  C) (Oral)    Respirations Height Weight    16 1.905 m (6' 3\") 75.8 kg (167 lb 1.7 oz)    BMI (Body Mass Index) Pulse Oximetry       20.89 kg/m2 96%       MyChart Information     "[employer] lets you send messages to your doctor, view your test results, renew your prescriptions, schedule appointments and more. To sign up, go to [email or web address] . Click on \"Log in\" on the left side of the screen, which will take you to the Welcome page. Then click on \"Sign up Now\" on the right side of the page.     You will be asked to enter the access code listed below, as well as some personal information. Please follow the directions to create your username and password.     Your access code is: [email or web address]  Expires: 2017  8:28 AM     Your access code will  in 90 days. If you need help or a new code, please call your [facility] clinic or [phone].        Care EveryWhere ID     This is your Care EveryWhere ID. This could be used by other organizations to access your [facility] medical records  [id number]           Review of your medicines      START taking        Dose / Directions    levofloxacin 500 MG tablet   Commonly known as:  LEVAQUIN   Used for:  Pneumonia of left lower lobe due to infectious organism        Dose:  500 mg   Start taking on:  2017   Take 1 tablet (500 mg) by mouth every 48 hours for 2 doses   Quantity:  2 tablet   Refills:  0         CONTINUE these medicines which may have CHANGED, or have new prescriptions. If we are uncertain of the size of tablets/capsules you have at home, strength may be listed as something that might have changed.        Dose / Directions    insulin glargine 100 UNIT/ML injection   Commonly known as:  LANTUS   This may have changed:  how much to take   Used for:  Uncontrolled type 2 diabetes mellitus with complication, with long-term current use of insulin (H)        Dose:  16 Units   Inject 16 Units Subcutaneous At Bedtime   Quantity:  6 mL   Refills:  0    "      CONTINUE these medicines which have NOT CHANGED        Dose / Directions    * albuterol (2.5 MG/3ML) 0.083% neb solution   Used for:  Chronic obstructive pulmonary disease, unspecified COPD type (H)        Dose:  1 vial   Take 1 vial (2.5 mg) by nebulization every 4 hours as needed for shortness of breath / dyspnea or wheezing   Quantity:  360 mL   Refills:  0       * albuterol 108 (90 BASE) MCG/ACT Inhaler   Commonly known as:  PROAIR HFA/PROVENTIL HFA/VENTOLIN HFA   Used for:  Chronic obstructive pulmonary disease, unspecified COPD type (H)        Dose:  2 puff   Inhale 2 puffs into the lungs every 4 hours as needed for shortness of breath / dyspnea or wheezing   Quantity:  1 Inhaler   Refills:  3       aspirin 81 MG chewable tablet   Used for:  Type 2 diabetes mellitus with diabetic nephropathy (H)        Dose:  81 mg   Take 1 tablet (81 mg) by mouth daily   Quantity:  36 tablet   Refills:  0       atorvastatin 80 MG tablet   Commonly known as:  LIPITOR   Used for:  Acute diastolic CHF (congestive heart failure) (H)        Dose:  80 mg   Take 1 tablet (80 mg) by mouth daily   Quantity:  30 tablet   Refills:  0       beclomethasone 40 MCG/ACT Inhaler   Commonly known as:  QVAR   Used for:  Chronic obstructive pulmonary disease, unspecified COPD type (H)        Dose:  2 puff   Inhale 2 puffs into the lungs 2 times daily   Refills:  0       brimonidine 0.15 % ophthalmic solution   Commonly known as:  ALPHAGAN-P   Used for:  Glaucoma        Dose:  1 drop   Place 1 drop into both eyes 3 times daily   Quantity:  1 Bottle   Refills:  0       bumetanide 2 MG tablet   Commonly known as:  BUMEX   Used for:  Acute on chronic systolic congestive heart failure (H)        Dose:  4 mg   Take 2 tablets (4 mg) by mouth daily   Quantity:  60 tablet   Refills:  2       calcium acetate 667 MG Caps capsule   Commonly known as:  PHOSLO   Used for:  Chronic kidney disease, stage IV (severe) (H)        Dose:  667 mg   Take 1  capsule (667 mg) by mouth 3 times daily (with meals)   Quantity:  180 capsule   Refills:  0       carvedilol 25 MG tablet   Commonly known as:  COREG   Used for:  Acute diastolic CHF (congestive heart failure) (H)        Dose:  37.5 mg   Take 1.5 tablets (37.5 mg) by mouth 2 times daily (with meals)   Quantity:  100 tablet   Refills:  0       citalopram 20 MG tablet   Commonly known as:  celeXA   Used for:  Depression        Dose:  20 mg   Take 1 tablet (20 mg) by mouth daily   Quantity:  30 tablet   Refills:  0       cloNIDine 0.2 MG/24HR WK patch   Commonly known as:  CATAPRES-TTS2        Dose:  1 patch   Place 1 patch onto the skin once a week   Refills:  0       ferrous sulfate 325 (65 FE) MG tablet   Commonly known as:  IRON        Dose:  325 mg   Take 325 mg by mouth 2 times daily   Refills:  0       fluticasone 110 MCG/ACT Inhaler   Commonly known as:  FLOVENT HFA        Dose:  2 puff   Inhale 2 puffs into the lungs 2 times daily   Refills:  0       folic acid-vit B6-vit B12 0.8-10-0.115 MG Tabs per tablet   Commonly known as:  FOLGARD   Used for:  Anemia of chronic disease        Dose:  1 tablet   Take 1 tablet by mouth daily   Quantity:  30 tablet   Refills:  0       GABAPENTIN PO        Dose:  200 mg   Take 200 mg by mouth daily   Refills:  0       guaiFENesin-codeine 100-10 MG/5ML Soln solution   Commonly known as:  ROBITUSSIN AC   Used for:  Cough        Dose:  1 tsp.   Take 5 mLs by mouth every 4 hours as needed for cough   Quantity:  120 mL   Refills:  0       guaiFENesin-dextromethorphan 100-10 MG/5ML syrup   Commonly known as:  ROBITUSSIN DM   Used for:  Cough        Dose:  10 mL   Take 10 mLs by mouth every 4 hours as needed for cough   Quantity:  560 mL   Refills:  0       hydrALAZINE 50 MG tablet   Commonly known as:  APRESOLINE   Used for:  Benign essential hypertension        Dose:  50 mg   Take 1 tablet (50 mg) by mouth 3 times daily   Quantity:  90 tablet   Refills:  0       IMDUR PO         Dose:  90 mg   Take 90 mg by mouth daily   Refills:  0       latanoprost 0.005 % ophthalmic solution   Commonly known as:  XALATAN   Used for:  Glaucoma        Dose:  1 drop   Place 1 drop into both eyes At Bedtime   Quantity:  1 Bottle   Refills:  0       lisinopril 40 MG tablet   Commonly known as:  PRINIVIL/ZESTRIL   Used for:  Hypertension due to endocrine disorder        Dose:  40 mg   Take 1 tablet (40 mg) by mouth daily   Quantity:  30 tablet   Refills:  0       OXYCODONE HCL PO        Dose:  15 mg   Take 15 mg by mouth every 6 hours Pt states he has been taking around the clock   Refills:  0       sodium bicarbonate 650 MG tablet   Used for:  Chronic kidney disease, stage IV (severe) (H)        Dose:  650 mg   Take 1 tablet (650 mg) by mouth 2 times daily   Refills:  0       VITAMIN D (CHOLECALCIFEROL) PO        Dose:  1000 Units   Take 1,000 Units by mouth daily   Refills:  0       * Notice:  This list has 2 medication(s) that are the same as other medications prescribed for you. Read the directions carefully, and ask your doctor or other care provider to review them with you.         Where to get your medicines      These medications were sent to [facility] [place] - [place] - [address]  [address] [facility] [address], [facility] [address]     Phone:  [phone]    - levofloxacin 500 MG tablet      Some of these will need a paper prescription and others can be bought over the counter. Ask your nurse if you have questions.     You don't need a prescription for these medications    - insulin glargine 100 UNIT/ML injection             Protect others around you: Learn how to safely use, store and throw away your medicines at www.disposemymeds.org.             Medication List: This is a list of all your medications and when to take them. Check marks below indicate your daily home schedule. Keep this list as a reference.      Medications           Morning Afternoon Evening Bedtime As Needed    *  albuterol (2.5 MG/3ML) 0.083% neb solution   Take 1 vial (2.5 mg) by nebulization every 4 hours as needed for shortness of breath / dyspnea or wheezing   Last time this was given:  2.5 mg on 1/15/2017 11:03 AM                                   * albuterol 108 (90 BASE) MCG/ACT Inhaler   Commonly known as:  PROAIR HFA/PROVENTIL HFA/VENTOLIN HFA   Inhale 2 puffs into the lungs every 4 hours as needed for shortness of breath / dyspnea or wheezing                                   aspirin 81 MG chewable tablet   Take 1 tablet (81 mg) by mouth daily   Last time this was given:  81 mg on 1/19/2017 11:42 AM   Next Dose Due:  1/20/ 17                                   atorvastatin 80 MG tablet   Commonly known as:  LIPITOR   Take 1 tablet (80 mg) by mouth daily   Last time this was given:  80 mg on 1/19/2017 11:39 AM   Next Dose Due:  1/20/17                                   beclomethasone 40 MCG/ACT Inhaler   Commonly known as:  QVAR   Inhale 2 puffs into the lungs 2 times daily   Next Dose Due:  1/19/17                                   brimonidine 0.15 % ophthalmic solution   Commonly known as:  ALPHAGAN-P   Place 1 drop into both eyes 3 times daily   Next Dose Due:  1/19/17                                      bumetanide 2 MG tablet   Commonly known as:  BUMEX   Take 2 tablets (4 mg) by mouth daily   Last time this was given:  4 mg on 1/19/2017 11:49 AM   Next Dose Due:  1/20//17                                   calcium acetate 667 MG Caps capsule   Commonly known as:  PHOSLO   Take 1 capsule (667 mg) by mouth 3 times daily (with meals)   Last time this was given:  667 mg on 1/18/2017  6:01 PM   Next Dose Due:  1/19/20                                         carvedilol 25 MG tablet   Commonly known as:  COREG   Take 1.5 tablets (37.5 mg) by mouth 2 times daily (with meals)   Last time this was given:  1/19/2017 11:42 AM   Next Dose Due:  1/19/17                                   citalopram 20 MG tablet   Commonly  Detail Level: Zone known as:  celeXA   Take 1 tablet (20 mg) by mouth daily   Next Dose Due:  1/19/17                                   cloNIDine 0.2 MG/24HR WK patch   Commonly known as:  CATAPRES-TTS2   Place 1 patch onto the skin once a week   Last time this was given:  1 patch on 1/15/2017  9:40 AM                                ferrous sulfate 325 (65 FE) MG tablet   Commonly known as:  IRON   Take 325 mg by mouth 2 times daily   Last time this was given:  325 mg on 1/19/2017 11:43 AM   Next Dose Due:  1/19/17                                      fluticasone 110 MCG/ACT Inhaler   Commonly known as:  FLOVENT HFA   Inhale 2 puffs into the lungs 2 times daily   Next Dose Due:  1/19/17                                      folic acid-vit B6-vit B12 0.8-10-0.115 MG Tabs per tablet   Commonly known as:  FOLGARD   Take 1 tablet by mouth daily   Last time this was given:  1 tablet on 1/19/2017 11:41 AM   Next Dose Due:  1/20/17                                   GABAPENTIN PO   Take 200 mg by mouth daily   Last time this was given:  200 mg on 1/19/2017 11:39 AM   Next Dose Due:  1/20/17                                   guaiFENesin-codeine 100-10 MG/5ML Soln solution   Commonly known as:  ROBITUSSIN AC   Take 5 mLs by mouth every 4 hours as needed for cough   Last time this was given:  5 mLs on 1/19/2017  9:19 AM                                   guaiFENesin-dextromethorphan 100-10 MG/5ML syrup   Commonly known as:  ROBITUSSIN DM   Take 10 mLs by mouth every 4 hours as needed for cough                                   hydrALAZINE 50 MG tablet   Commonly known as:  APRESOLINE   Take 1 tablet (50 mg) by mouth 3 times daily   Last time this was given:  50 mg on 1/19/2017 11:42 AM   Next Dose Due:  1/19/17                                         IMDUR PO   Take 90 mg by mouth daily   Last time this was given:  90 mg on 1/19/2017 11:40 AM   Next Dose Due:  1/20/17                                   insulin glargine 100 UNIT/ML injection    Commonly known as:  LANTUS   Inject 16 Units Subcutaneous At Bedtime   Last time this was given:  16 Units on 1/18/2017 10:21 PM   Next Dose Due:  1/19/17                                   latanoprost 0.005 % ophthalmic solution   Commonly known as:  XALATAN   Place 1 drop into both eyes At Bedtime   Last time this was given:  1 drop on 1/18/2017 10:24 PM   Next Dose Due:  1/19/17                                   levofloxacin 500 MG tablet   Commonly known as:  LEVAQUIN   Take 1 tablet (500 mg) by mouth every 48 hours for 2 doses   Start taking on:  1/21/2017   Next Dose Due:  1/21/17                                   lisinopril 40 MG tablet   Commonly known as:  PRINIVIL/ZESTRIL   Take 1 tablet (40 mg) by mouth daily   Last time this was given:  40 mg on 1/19/2017 11:41 AM   Next Dose Due:  1/20/17                                   OXYCODONE HCL PO   Take 15 mg by mouth every 6 hours Pt states he has been taking around the clock   Last time this was given:  15 mg on 1/19/2017  9:19 AM                                   sodium bicarbonate 650 MG tablet   Take 1 tablet (650 mg) by mouth 2 times daily   Last time this was given:  650 mg on 1/19/2017 11:39 AM   Next Dose Due:  1/19/20                                      VITAMIN D (CHOLECALCIFEROL) PO   Take 1,000 Units by mouth daily   Last time this was given:  1,000 Units on 1/19/2017 11:40 AM   Next Dose Due:  1/20/17                                   * Notice:  This list has 2 medication(s) that are the same as other medications prescribed for you. Read the directions carefully, and ask your doctor or other care provider to review them with you.       Hide Include Location In Plan Question?: No Additional Note: Patient to observe site for changes. Discussed the importance of monthly self skin checks and routine skin exams. Discussed the importance of daily sun protection- SPF 35 or higher.

## 2025-06-10 DIAGNOSIS — H40.1120 PRIMARY OPEN ANGLE GLAUCOMA OF LEFT EYE, UNSPECIFIED GLAUCOMA STAGE: Primary | ICD-10-CM

## 2025-06-11 ENCOUNTER — OFFICE VISIT (OUTPATIENT)
Dept: OPHTHALMOLOGY | Facility: CLINIC | Age: 80
End: 2025-06-11
Attending: OPHTHALMOLOGY
Payer: COMMERCIAL

## 2025-06-11 DIAGNOSIS — Z96.1 PSEUDOPHAKIA, BOTH EYES: ICD-10-CM

## 2025-06-11 DIAGNOSIS — H40.1130 PRIMARY OPEN ANGLE GLAUCOMA OF BOTH EYES, UNSPECIFIED GLAUCOMA STAGE: Primary | ICD-10-CM

## 2025-06-11 DIAGNOSIS — H40.1120 PRIMARY OPEN ANGLE GLAUCOMA OF LEFT EYE, UNSPECIFIED GLAUCOMA STAGE: ICD-10-CM

## 2025-06-11 DIAGNOSIS — E11.3553 STABLE PROLIFERATIVE DIABETIC RETINOPATHY OF BOTH EYES ASSOCIATED WITH TYPE 2 DIABETES MELLITUS (H): ICD-10-CM

## 2025-06-11 PROCEDURE — 92083 EXTENDED VISUAL FIELD XM: CPT | Performed by: OPHTHALMOLOGY

## 2025-06-11 PROCEDURE — 92133 CPTRZD OPH DX IMG PST SGM ON: CPT | Performed by: OPHTHALMOLOGY

## 2025-06-11 PROCEDURE — G0463 HOSPITAL OUTPT CLINIC VISIT: HCPCS | Performed by: OPHTHALMOLOGY

## 2025-06-11 RX ORDER — CLOPIDOGREL BISULFATE 75 MG/1
1 TABLET ORAL
COMMUNITY
Start: 2025-03-23

## 2025-06-11 RX ORDER — OXYCODONE HYDROCHLORIDE 5 MG/1
5-10 TABLET ORAL
COMMUNITY
Start: 2025-03-22

## 2025-06-11 ASSESSMENT — TONOMETRY
IOP_METHOD: TONOPEN
OD_IOP_MMHG: 19
OS_IOP_MMHG: 20
OD_IOP_MMHG: 13
IOP_METHOD: TONOPEN
OS_IOP_MMHG: 13

## 2025-06-11 ASSESSMENT — EXTERNAL EXAM - RIGHT EYE: OD_EXAM: NORMAL

## 2025-06-11 ASSESSMENT — VISUAL ACUITY
OS_SC+: +1
OS_SC: 20/25
METHOD: SNELLEN - LINEAR

## 2025-06-11 ASSESSMENT — CONF VISUAL FIELD: COMMENTS: VF TODAY

## 2025-06-11 ASSESSMENT — EXTERNAL EXAM - LEFT EYE: OS_EXAM: NORMAL

## 2025-06-11 ASSESSMENT — SLIT LAMP EXAM - LIDS
COMMENTS: NORMAL
COMMENTS: NORMAL

## 2025-06-11 NOTE — PROGRESS NOTES
HPI       Glaucoma Follow Up    In left eye. Additional comments: Primary open angle glaucoma of left eye  Diabetic retinopathy both eyes             Comments    Patient is here for a three month follow up of both eyes for visual field testing. Did have dialysis this morning. No changes with vision noticed since here last exam. No ocular pain. No headaches. No extreme photosensitivity. No floaters or flashes of light. Nurse administers eye drops for patient twice daily both eyes, patient is unsure of the name of the eye drop or what it is for.     DM, gets blood sugars checked Monday, Wednesday, and Friday at dialysis;  Lab Results       Component                Value               Date                       A1C                      8.9                 06/22/2020                 A1C                      11.9                09/18/2017                 A1C                      8.1                 06/22/2017                 A1C                      7.6                 01/16/2017                 A1C                      8.8                 12/06/2016            Daisy De León on 6/11/2025 at 1:14 PM              Last edited by Daisy De León on 6/11/2025  1:14 PM.         Review of systems for the eyes was negative other than the pertinent positives/negatives listed in the HPI.      Assessment & Plan    HPI:  Keagan Wayne is a 79 year old male with a complex medical history here for Glaucoma followup after starting latanorpost. He has a nurse administer drops. Nursing forms states receiving at bedtime, but nurse states twice a day.     POHx: Diabetic retinopathy, glaucoma; Cataract surgery both eyes; PRP right eye (unknown time)  PMHx: Complex medical history including diabetes, HLD, HTN, CHF  Current Medications:   Current Outpatient Medications   Medication Sig Dispense Refill    acetaminophen (ACETAMINOPHEN 8 HOUR) 650 MG CR tablet 1 tablet.      atorvastatin (LIPITOR) 80 MG tablet Take 80 mg by mouth At  "Bedtime       B COMPLEX-C PO Take 1 tablet by mouth.      BELBUCA 300 MCG FILM buccal film       blood glucose monitoring (ACCU-CHEK MINISTERIO PLUS) meter device kit accucheck Lesly glucometer      blood glucose monitoring (ACCU-CHEK FASTCLIX) lancets Test 2 times per day.      Blood Pressure Monitoring (RA BLOOD PRESSURE CUFF MONITOR) MISC by Misc.(Non-Drug; Combo Route) route once daily.      cinacalcet (SENSIPAR) 60 MG tablet Take 1 tablet by mouth daily at 2 pm.      clopidogrel (PLAVIX) 75 MG tablet Take 1 tablet by mouth.      Continuous Glucose  (DEXCOM G7 ) NEGRITO       Continuous Glucose Sensor (DEXCOM G7 SENSOR) MISC       fluticasone-vilanterol (BREO ELLIPTA) 200-25 MCG/ACT inhaler Inhale 1 puff into the lungs daily.      HUMALOG KWIKPEN 100 UNIT/ML soln Inject subcutaneously.      Insulin Pen Needle (PEN NEEDLES 5/16\") 31G X 8 MM MISC 31g x 8 mm      latanoprost (XALATAN) 0.005 % ophthalmic solution Place 1 drop into both eyes at bedtime. 2.5 mL 11    LOKELMA 5 g PACK packet       LYRICA 50 MG capsule Take by mouth.      melatonin 3 MG CAPS Take by mouth.      metoprolol tartrate (LOPRESSOR) 25 MG tablet Take 12.5 mg by mouth 2 times daily On dialysis days, only take in the evening. Skip morning dose on dialysis days. 1/2 x 25mg = 12.5mg      naloxone (NARCAN) 4 MG/0.1ML nasal spray Spray 4 mg into one nostril alternating nostrils as needed for opioid reversal. every 2-3 minutes until assistance arrives      oxyCODONE (ROXICODONE) 5 MG tablet Take 5-10 mg by mouth.      polyethylene glycol (MIRALAX) 17 GM/Dose powder Take 17 g by mouth.      senna (SENNA-TIME) 8.6 MG tablet Take 1 tablet by mouth.      sevelamer carbonate (RENVELA) 800 MG tablet Take 800 mg by mouth 3 times daily (with meals).      tiotropium (SPIRIVA RESPIMAT) 2.5 MCG/ACT inhaler Inhale 2 puffs into the lungs.      ULTICARE MICRO 32G X 4 MM insulin pen needle       ULTICARE PEN NEEDLES 29G X 12MM       VITAMIN D, " CHOLECALCIFEROL, PO Take 1,000 Units by mouth daily afternoon      aspirin 81 MG chewable tablet Take 1 tablet (81 mg) by mouth daily 36 tablet     calcium acetate (PHOSLO) 667 MG CAPS capsule Take 2,001 mg by mouth 3 times daily (with meals)       Gabapentin (NEURONTIN PO) Take 100 mg by mouth every evening       insulin aspart (NOVOLOG FLEXPEN) 100 UNIT/ML injection Inject 8 Units Subcutaneous 3 times daily (with meals)       insulin glargine (LANTUS PEN) 100 UNIT/ML pen Inject 22 Units Subcutaneous every morning      latanoprost (XALATAN) 0.005 % ophthalmic solution Place 1 drop into both eyes At Bedtime       Misc. Devices (ROLLATOR ULTRA-LIGHT) MISC 1 each by Other route      multivitamin RENAL (NEPHROCAPS/TRIPHROCAPS) 1 MG capsule Take 1 capsule by mouth every evening      OLANZapine zydis (ZYPREXA) 15 MG ODT Take 15 mg by mouth At Bedtime      silver sulfADIAZINE (SILVADENE) 1 % external cream Apply topically daily Apply on blisters      umeclidinium (INCRUSE ELLIPTA) 62.5 MCG/INH oral inhaler Inhale 1 puff into the lungs daily      UNABLE TO FIND MEDICATION NAME: [employer]       No current facility-administered medications for this visit.     FHx: Unknown  PSHx: Cataract extraction/intraocular lens both eyes       Current Eye Medications:  latanoprost twice a day     Assessment & Plan:  (H40.1120) Primary open angle glaucoma of left eye, unspecified glaucoma stage  (primary encounter diagnosis)  Virtual field 06/11/25   Right eye 24-2 Stimulus VI, superonasal and inferotemporal loss, VFI 39.21%, FL 1/20, false positives 3/13, false negatives 3/9  Left eye 24-2 Fast, cloverleaf pattern, VFI 60.43%, FL 0/15, false positives 0/10, false negatives 4/7  Virtual Field OG FAST 03/05/25   Right eye stimulus III, severely constricted, false positives 4/12, false negatives 6/8   Left eye stimulus V, cloverleaf pattern, false positives 8/11, false negatives 4/7    Poor compliance, poor attention  Had dialysis  today    OCT nerve fiber layer 06/11/25 :   Right eye - G(g) 121 NI (g) 108 TI (g) 163 NS (g) 154 TS (g) 147 (thinner but all green)      Left eye - G(r) 71 NI (g) 77 TI (g) 111 NS (g) 93 TS (r) 71  OCT nerve fiber layer 01/15/25:   Right eye - G(g) 126 NI (g) 111 TI (g) 175 NS (g) 159 TS (g) 150      Left eye - G(r) 73 NI (g) 79 TI (g) 111 NS (g) 94 TS (r) 73    OCT stable, poor test taker  Continue present management     (E11.319) Diabetic retinopathy of both eyes (H)  right eye shows chronic ischemic changes and is s/p PRP. The asymmetry between both eyes could be due to issues with carotid artery blood flow, they could also be due to a possible old ischemic retinal vein occlusion or retinal artery occlusion or occular ischemia given his background of DM, HTN and CHF and ESRD.     Comment: Regressed NVD in right eye, no signs of PDR.  FA with late leakage right eye> left eye       (H52.03,  H52.203,  H52.4) Hyperopia of both eyes with astigmatism and presbyopia  Not wearing glasses today  Advised full time glasses wear for monocular precautions     (Z96.1) Pseudophakia, both eyes    Return in about 4 months (around 10/11/2025) for Follow Up-v/t/d, OCT RNFL.      Attending Physician Attestation:  Complete documentation of historical and exam elements from today's encounter can be found in the full encounter summary report (not reduplicated in this progress note).  I personally obtained the chief complaint(s) and history of present illness.  I confirmed and edited as necessary the review of systems, past medical/surgical history, family history, social history, and examination findings as documented by others; and I examined the patient myself.  I personally reviewed the relevant tests, images, and reports as documented above.  I formulated and edited as necessary the assessment and plan and discussed the findings and management plan with the patient and family. - Rex Negrete MD

## 2025-06-17 ENCOUNTER — TELEPHONE (OUTPATIENT)
Dept: OPHTHALMOLOGY | Facility: CLINIC | Age: 80
End: 2025-06-17
Payer: COMMERCIAL

## 2025-06-17 NOTE — TELEPHONE ENCOUNTER
[facility] Call Center    Phone Message    May a detailed message be left on voicemail: no     Reason for Call: Form or Letter   Type or form/letter needing completion: Orders from visit on 6/11  Provider: Dr Negrete  Date form needed: Soon  Once completed: Fax form to: [phone]    Maria Alejandra from [facility] just saw the provider notes from visit on 6/11 with Dr Negrete and would like us to fax orders to them. It looks like there was some changes but she couldn't read it.    Maria Alejandra can be reached at [phone]. Thank you.    Action Taken: Message routed to:  [facility] Eye    Travel Screening: Not Applicable

## 2025-06-18 NOTE — TELEPHONE ENCOUNTER
[facility] Call Center    Phone Message    May a detailed message be left on voicemail: no     Reason for Call: Other: Jeane from [facility] calling that they did not get our fax and would like us to refax it to [phone] Attn: Jeane.    Jeane can be reached at [phone]. Thank you.    Action Taken: Message routed to:  [facility] Eye    Travel Screening: Not Applicable

## 2025-06-18 NOTE — TELEPHONE ENCOUNTER
[facility] Call Center    Phone Message    May a detailed message be left on voicemail: yes     Reason for Call: Jeane from [facility] called stating that the request for the order to be refaxed can be cancelled as the original has been  received. Thank you    Action Taken: Other: [facility]    Travel Screening: Not Applicable     Date of Service:

## (undated) DEVICE — PAD CHUX UNDERPAD 30X30"

## (undated) DEVICE — SPECIMEN CONTAINER 5OZ STERILE 2600SA

## (undated) DEVICE — GLOVE PROTEXIS W/NEU-THERA 7.5  2D73TE75

## (undated) DEVICE — SYR 20ML LL W/O NDL 302830

## (undated) DEVICE — LINEN GOWN X4 5410

## (undated) DEVICE — DRAPE C-ARM W/STRAPS 42X72" 07-CA104

## (undated) DEVICE — SUCTION MANIFOLD DORNOCH ULTRA CART UL-CL500

## (undated) DEVICE — Device

## (undated) DEVICE — JELLY LUBRICATING SURGILUBE 2OZ TUBE

## (undated) DEVICE — STRAP KNEE/BODY 31143004

## (undated) DEVICE — GUIDEWIRE SENSOR DUAL FLEX STR 0.035"X150CM M0066703080

## (undated) DEVICE — SOL NACL 0.9% IRRIG 1000ML BOTTLE 2F7124

## (undated) DEVICE — GOWN XLG DISP 9545

## (undated) DEVICE — SOL NACL 0.9% IRRIG 3000ML BAG 2B7477

## (undated) DEVICE — CATH URETERAL OPEN END TIP 05FR 70CM 134105LT / 134105RT

## (undated) DEVICE — SOL WATER IRRIG 1000ML BOTTLE 2F7114

## (undated) DEVICE — LINEN TOWEL PACK X5 5464

## (undated) RX ORDER — PROPOFOL 10 MG/ML
INJECTION, EMULSION INTRAVENOUS
Status: DISPENSED
Start: 2018-03-29

## (undated) RX ORDER — ONDANSETRON 2 MG/ML
INJECTION INTRAMUSCULAR; INTRAVENOUS
Status: DISPENSED
Start: 2018-03-29

## (undated) RX ORDER — REGADENOSON 0.08 MG/ML
INJECTION, SOLUTION INTRAVENOUS
Status: DISPENSED
Start: 2019-01-03

## (undated) RX ORDER — FENTANYL CITRATE 50 UG/ML
INJECTION, SOLUTION INTRAMUSCULAR; INTRAVENOUS
Status: DISPENSED
Start: 2018-03-29

## (undated) RX ORDER — ACETAMINOPHEN 325 MG/1
TABLET ORAL
Status: DISPENSED
Start: 2020-06-22

## (undated) RX ORDER — ALBUTEROL SULFATE 0.83 MG/ML
SOLUTION RESPIRATORY (INHALATION)
Status: DISPENSED
Start: 2017-10-12

## (undated) RX ORDER — LIDOCAINE HYDROCHLORIDE 20 MG/ML
INJECTION, SOLUTION EPIDURAL; INFILTRATION; INTRACAUDAL; PERINEURAL
Status: DISPENSED
Start: 2018-03-29

## (undated) RX ORDER — CEFAZOLIN SODIUM 2 G/100ML
INJECTION, SOLUTION INTRAVENOUS
Status: DISPENSED
Start: 2018-03-29